# Patient Record
Sex: FEMALE | Race: WHITE | Employment: OTHER | URBAN - METROPOLITAN AREA
[De-identification: names, ages, dates, MRNs, and addresses within clinical notes are randomized per-mention and may not be internally consistent; named-entity substitution may affect disease eponyms.]

---

## 2018-09-14 ENCOUNTER — HOSPITAL ENCOUNTER (EMERGENCY)
Age: 78
Discharge: HOME | End: 2018-09-14
Attending: EMERGENCY MEDICINE
Payer: MEDICARE

## 2018-09-14 ENCOUNTER — APPOINTMENT (OUTPATIENT)
Dept: CT IMAGING | Age: 78
End: 2018-09-14
Payer: MEDICARE

## 2018-09-14 VITALS
SYSTOLIC BLOOD PRESSURE: 207 MMHG | HEIGHT: 59 IN | BODY MASS INDEX: 31.85 KG/M2 | TEMPERATURE: 98.1 F | HEART RATE: 75 BPM | DIASTOLIC BLOOD PRESSURE: 82 MMHG | OXYGEN SATURATION: 99 % | WEIGHT: 158 LBS | RESPIRATION RATE: 14 BRPM
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
## 2018-09-14 DIAGNOSIS — I10 ESSENTIAL HYPERTENSION: ICD-10-CM

## 2018-09-14 DIAGNOSIS — S09.90XA INJURY OF HEAD, INITIAL ENCOUNTER: Primary | ICD-10-CM

## 2018-09-14 PROCEDURE — 6370000000 HC RX 637 (ALT 250 FOR IP): Performed by: PHYSICIAN ASSISTANT

## 2018-09-14 PROCEDURE — 99284 EMERGENCY DEPT VISIT MOD MDM: CPT

## 2018-09-14 PROCEDURE — 6370000000 HC RX 637 (ALT 250 FOR IP): Performed by: EMERGENCY MEDICINE

## 2018-09-14 PROCEDURE — 70450 CT HEAD/BRAIN W/O DYE: CPT

## 2018-09-14 PROCEDURE — 72125 CT NECK SPINE W/O DYE: CPT

## 2018-09-14 RX ORDER — ACETAMINOPHEN 325 MG/1
650 TABLET ORAL ONCE
Status: COMPLETED | OUTPATIENT
Start: 2018-09-14 | End: 2018-09-14

## 2018-09-14 RX ORDER — LOSARTAN POTASSIUM 50 MG/1
50 TABLET ORAL ONCE
Status: COMPLETED | OUTPATIENT
Start: 2018-09-14 | End: 2018-09-14

## 2018-09-14 RX ORDER — CLONIDINE HYDROCHLORIDE 0.1 MG/1
0.2 TABLET ORAL ONCE
Status: COMPLETED | OUTPATIENT
Start: 2018-09-14 | End: 2018-09-14

## 2018-09-14 RX ORDER — HYDROCODONE BITARTRATE AND ACETAMINOPHEN 5; 325 MG/1; MG/1
1 TABLET ORAL EVERY 6 HOURS PRN
Qty: 12 TABLET | Refills: 0 | Status: SHIPPED | OUTPATIENT
Start: 2018-09-14 | End: 2018-09-17

## 2018-09-14 RX ADMIN — LOSARTAN POTASSIUM 50 MG: 50 TABLET, FILM COATED ORAL at 20:14

## 2018-09-14 RX ADMIN — CLONIDINE HYDROCHLORIDE 0.2 MG: 0.1 TABLET ORAL at 19:03

## 2018-09-14 RX ADMIN — ACETAMINOPHEN 650 MG: 325 TABLET ORAL at 19:02

## 2018-09-14 ASSESSMENT — PAIN DESCRIPTION - PAIN TYPE: TYPE: ACUTE PAIN

## 2018-09-14 ASSESSMENT — PAIN SCALES - GENERAL
PAINLEVEL_OUTOF10: 7
PAINLEVEL_OUTOF10: 8

## 2018-09-14 ASSESSMENT — PAIN DESCRIPTION - LOCATION: LOCATION: HEAD

## 2018-09-14 NOTE — ED PROVIDER NOTES
non tender, non distended, mild tenderness to palpation in bilateral lower paraspinous muscles of the back. No midline spine tenderness. Negative straight leg raise to 20° bilaterally. Extremities: Moves all extremities x 4. Warm and well perfused  Skin: warm and dry without rash  Neurologic: GCS 15, cranial nerves II through XII grossly intact. Finger to nose test when using the right arm is intact. Left arm is diminished strength due to previous injury. Patient is able to ambulate in the room with assistance. Psych: Normal Affect      ------------------------------ ED COURSE/MEDICAL DECISION MAKING----------------------  Medications   cloNIDine (CATAPRES) tablet 0.2 mg (0.2 mg Oral Given 9/14/18 1903)   acetaminophen (TYLENOL) tablet 650 mg (650 mg Oral Given 9/14/18 1902)   losartan (COZAAR) tablet 50 mg (50 mg Oral Given 9/14/18 2014)         ED COURSE:       Medical Decision Making:    Patient's blood pressure is 190/84 manually, right arm, lying. Patient is feeling better in terms of her headache, but is feeling more sore from the fall. She was discharged with a prescription for Norco. I did review the medications that the  is to be giving the wife, and they seem to be some confusion on which blood pressure medication she is to be taking. We did write down her list of medications that she is to be taking. Patient is to be returning to the emergency room she has any worsening symptoms, headache, dizziness, or any other acute change condition. Counseling: The emergency provider has spoken with the patient and  and discussed todays results, in addition to providing specific details for the plan of care and counseling regarding the diagnosis and prognosis. Questions are answered at this time and they are agreeable with the plan.      --------------------------------- IMPRESSION AND DISPOSITION ---------------------------------    IMPRESSION  1. Injury of head, initial encounter    2.  Fall, initial encounter    3. Essential hypertension        DISPOSITION  Disposition: Discharge to home  Patient condition is good      NOTE: This report was transcribed using voice recognition software.  Every effort was made to ensure accuracy; however, inadvertent computerized transcription errors may be present       Drea Coto  09/14/18 2017

## 2018-10-22 ENCOUNTER — HOSPITAL ENCOUNTER (OUTPATIENT)
Dept: PREADMISSION TESTING | Age: 78
Discharge: HOME | End: 2018-10-22
Payer: MEDICARE

## 2018-10-22 ENCOUNTER — ANESTHESIA EVENT (OUTPATIENT)
Dept: OPERATING ROOM | Age: 78
DRG: 469 | End: 2018-10-22
Payer: MEDICARE

## 2018-10-22 VITALS
HEART RATE: 72 BPM | OXYGEN SATURATION: 99 % | SYSTOLIC BLOOD PRESSURE: 160 MMHG | RESPIRATION RATE: 16 BRPM | HEIGHT: 59 IN | TEMPERATURE: 97.8 F | BODY MASS INDEX: 32.46 KG/M2 | DIASTOLIC BLOOD PRESSURE: 67 MMHG | WEIGHT: 161 LBS

## 2018-10-22 LAB
ABO/RH: NORMAL
ANION GAP SERPL CALCULATED.3IONS-SCNC: 17 MMOL/L (ref 7–16)
ANTIBODY SCREEN: NORMAL
BUN BLDV-MCNC: 57 MG/DL (ref 8–23)
CALCIUM SERPL-MCNC: 9.4 MG/DL (ref 8.6–10.2)
CHLORIDE BLD-SCNC: 90 MMOL/L (ref 98–107)
CO2: 29 MMOL/L (ref 22–29)
CREAT SERPL-MCNC: 8.4 MG/DL (ref 0.5–1)
GFR AFRICAN AMERICAN: 6
GFR NON-AFRICAN AMERICAN: 5 ML/MIN/1.73
GLUCOSE BLD-MCNC: 227 MG/DL (ref 74–109)
HCT VFR BLD CALC: 31 % (ref 34–48)
HEMOGLOBIN: 10.5 G/DL (ref 11.5–15.5)
MCH RBC QN AUTO: 33.1 PG (ref 26–35)
MCHC RBC AUTO-ENTMCNC: 33.9 % (ref 32–34.5)
MCV RBC AUTO: 97.8 FL (ref 80–99.9)
PDW BLD-RTO: 14.9 FL (ref 11.5–15)
PLATELET # BLD: 391 E9/L (ref 130–450)
PMV BLD AUTO: 10.3 FL (ref 7–12)
POTASSIUM SERPL-SCNC: 4 MMOL/L (ref 3.5–5)
RBC # BLD: 3.17 E12/L (ref 3.5–5.5)
SODIUM BLD-SCNC: 136 MMOL/L (ref 132–146)
WBC # BLD: 14.8 E9/L (ref 4.5–11.5)

## 2018-10-22 PROCEDURE — 80048 BASIC METABOLIC PNL TOTAL CA: CPT

## 2018-10-22 PROCEDURE — 87081 CULTURE SCREEN ONLY: CPT

## 2018-10-22 PROCEDURE — 86901 BLOOD TYPING SEROLOGIC RH(D): CPT

## 2018-10-22 PROCEDURE — 86900 BLOOD TYPING SEROLOGIC ABO: CPT

## 2018-10-22 PROCEDURE — 86850 RBC ANTIBODY SCREEN: CPT

## 2018-10-22 PROCEDURE — 85027 COMPLETE CBC AUTOMATED: CPT

## 2018-10-22 PROCEDURE — 36415 COLL VENOUS BLD VENIPUNCTURE: CPT

## 2018-10-22 RX ORDER — CALCITRIOL 0.25 UG/1
0.25 CAPSULE, LIQUID FILLED ORAL SEE ADMIN INSTRUCTIONS
Status: ON HOLD | COMMUNITY
End: 2019-06-12

## 2018-10-22 RX ORDER — RANITIDINE 300 MG/1
300 TABLET ORAL DAILY
Status: ON HOLD | COMMUNITY
End: 2019-06-12

## 2018-10-22 RX ORDER — DULOXETIN HYDROCHLORIDE 30 MG/1
30 CAPSULE, DELAYED RELEASE ORAL DAILY
COMMUNITY

## 2018-10-22 RX ORDER — ALLOPURINOL 300 MG/1
150 TABLET ORAL DAILY
Status: ON HOLD | COMMUNITY
End: 2019-06-12

## 2018-10-22 RX ORDER — GLUCOSAMINE/D3/BOSWELLIA SERRA 1500MG-400
1 TABLET ORAL DAILY
Status: ON HOLD | COMMUNITY
End: 2019-06-12

## 2018-10-22 RX ORDER — HYDRALAZINE HYDROCHLORIDE 25 MG/1
25 TABLET, FILM COATED ORAL PRN
Status: ON HOLD | COMMUNITY
End: 2019-06-12

## 2018-10-22 RX ORDER — AMLODIPINE BESYLATE 2.5 MG/1
2.5 TABLET ORAL DAILY
Status: ON HOLD | COMMUNITY
End: 2019-06-12

## 2018-10-22 RX ORDER — ACETAMINOPHEN 160 MG
1 TABLET,DISINTEGRATING ORAL DAILY
Status: ON HOLD | COMMUNITY
End: 2019-06-12

## 2018-10-22 RX ORDER — METOPROLOL SUCCINATE 50 MG/1
50 TABLET, EXTENDED RELEASE ORAL DAILY
COMMUNITY
End: 2022-07-20

## 2018-10-22 RX ORDER — LOSARTAN POTASSIUM 100 MG/1
100 TABLET ORAL DAILY
COMMUNITY
End: 2022-07-20

## 2018-10-22 ASSESSMENT — PAIN DESCRIPTION - PROGRESSION
CLINICAL_PROGRESSION: GRADUALLY WORSENING
CLINICAL_PROGRESSION_2: NOT CHANGED

## 2018-10-22 ASSESSMENT — KOOS JR
STANDING UPRIGHT: 2
BENDING TO THE FLOOR TO PICK UP OBJECT: 3
HOW SEVERE IS YOUR KNEE STIFFNESS AFTER FIRST WAKING IN MORNING: 2
TWISING OR PIVOTING ON KNEE: 3
RISING FROM SITTING: 3
STRAIGHTENING KNEE FULLY: 3
GOING UP OR DOWN STAIRS: 3

## 2018-10-22 ASSESSMENT — PAIN DESCRIPTION - PAIN TYPE
TYPE_2: CHRONIC PAIN
TYPE: CHRONIC PAIN

## 2018-10-22 ASSESSMENT — PAIN DESCRIPTION - INTENSITY: RATING_2: 2

## 2018-10-22 ASSESSMENT — PAIN DESCRIPTION - ONSET
ONSET: ON-GOING
ONSET_2: GRADUAL

## 2018-10-22 ASSESSMENT — PAIN DESCRIPTION - ORIENTATION
ORIENTATION_2: LOWER
ORIENTATION: RIGHT

## 2018-10-22 ASSESSMENT — PAIN DESCRIPTION - DURATION: DURATION_2: INTERMITTENT

## 2018-10-22 ASSESSMENT — PAIN DESCRIPTION - LOCATION
LOCATION_2: BACK
LOCATION: KNEE

## 2018-10-22 ASSESSMENT — PAIN SCALES - GENERAL: PAINLEVEL_OUTOF10: 6

## 2018-10-22 ASSESSMENT — PAIN DESCRIPTION - FREQUENCY: FREQUENCY: CONTINUOUS

## 2018-10-22 ASSESSMENT — PAIN DESCRIPTION - DESCRIPTORS
DESCRIPTORS_2: THROBBING
DESCRIPTORS: ACHING;CONSTANT

## 2018-10-22 NOTE — PROGRESS NOTES
Cbc results of 10/22/2018 PAT visit faxed to Dr Jim Pedraza; Kaylin Harper at Dr Faisal Rand office notified of above.
Dr Ana Plaza notified of pt hx ESRD, peritoneal dialysis nightly, result of bmp 10/22/2018 of PAT visit, result of creatinine 10/01/2018 paper on chart. Order for nephrology clearance. message left for Blanca Clemons ext [phone] at Dr Neftaly Ames office re: side clarification and need nephrology clearance, cbc and bmp results of 10/22/2018 PAT visit faxed to Dr Jamie Flood.
products on the day of surgery    [] If not already done, you can expect a call from registration    [x] You can expect a call the business day prior to procedure to notify you if your arrival time changes    [x] If you receive a survey after surgery we would greatly appreciate your comments    [] Parent/guardian of a minor must accompany their child and remain on the premises  the entire time they are under our care     [] Pediatric patients may bring favorite toy, blanket or comfort item with them    [] A caregiver or family member must remain with the patient during their stay if they are mentally handicapped, have dementia, disoriented or unable to use a call light or would be a safety concern if left unattended    [x] Please notify surgeon if you develop any illness between now and time of surgery (cold, cough, sore throat, fever, nausea, vomiting) or any signs of infections  including skin, wounds, and dental.    [] Other instructions    EDUCATIONAL MATERIALS PROVIDED:    [x] PAT Preoperative Education Packet/Booklet     [x] Medication List    [] Fluoroscopy Information Pamphlet    [x] Transfusion bracelet applied with instructions    [] Joint replacement video reviewed    [x] Shower with soap, lather and rinse well, and use CHG wipes provided the evening before surgery as instructed

## 2018-10-22 NOTE — ANESTHESIA PRE PROCEDURE
by mouth 3 times daily       aspirin 81 MG tablet Take 81 mg by mouth daily Prescribed per Dr Slim Artis; states instructed to hold preop      insulin detemir (LEVEMIR) 100 UNIT/ML injection vial Inject 20 Units into the skin 2 times daily Take 10 (ten) units 10/30/2018 pm dose      isosorbide mononitrate (IMDUR) 30 MG CR tablet Take 30 mg by mouth daily Take am day of surgery 10/31      levothyroxine (SYNTHROID) 50 MCG tablet Take 50 mcg by mouth Daily      ezetimibe-simvastatin (VYTORIN) 10-20 MG per tablet Take 1 tablet by mouth every 72 hours          Allergies:     Allergies   Allergen Reactions    Sulfa Antibiotics Swelling     Swelling after being out in sun       Problem List:    Patient Active Problem List   Diagnosis Code    Peritoneal dialysis catheter infection ([facility] T85.71XA    Sepsis ([facility] [address] [facility] A41.9    SIRS (systemic inflammatory response syndrome) ([facility] [facility] R65.10    Type 2 diabetes mellitus with diabetic chronic kidney disease ([facility] [facility] E11.22       Past Medical History:        Diagnosis Date    Anemia     Arthritis     CAD (coronary artery disease)     Diabetes mellitus ([facility] [facility]     Gout     History of bleeding peptic ulcer approx     Hypertension     Peritoneal dialysis catheter in place [facility])     Peritoneal dialysis status ([facility] [address] [facility]     at night for 8 hours    Thyroid disease     Use of cane as ambulatory aid     Wears glasses        Past Surgical History:        Procedure Laterality Date    BACK SURGERY      CARDIOVASCULAR STRESS TEST      CARPAL TUNNEL RELEASE Bilateral     CATARACT REMOVAL WITH IMPLANT Bilateral      SECTION      CORONARY ARTERY BYPASS GRAFT  approx 2000    x 3; per Dr Mikael Sawant, COLON, DIAGNOSTIC      JOINT REPLACEMENT      left knee, right shoulder    UPPER GASTROINTESTINAL ENDOSCOPY         Social History:    Social History   Substance Use Topics    Smoking status: Never Smoker    Smokeless tobacco: Never Used    Alcohol use No

## 2018-10-23 ASSESSMENT — PROMIS GLOBAL HEALTH SCALE
WHO IS THE PERSON COMPLETING THE PROMIS V1.1 SURVEY?: 0
IN THE PAST 7 DAYS, HOW OFTEN HAVE YOU BEEN BOTHERED BY EMOTIONAL PROBLEMS, SUCH AS FEELING ANXIOUS, DEPRESSED, OR IRRITABLE [ON A SCALE FROM 1 (NEVER) TO 5 (ALWAYS)]?: 1
IN GENERAL, WOULD YOU SAY YOUR QUALITY OF LIFE IS...[ON A SCALE OF 1 (POOR) TO 5 (EXCELLENT)]: 2
TO WHAT EXTENT ARE YOU ABLE TO CARRY OUT YOUR EVERYDAY PHYSICAL ACTIVITIES SUCH AS WALKING, CLIMBING STAIRS, CARRYING GROCERIES, OR MOVING A CHAIR [ON A SCALE OF 1 (NOT AT ALL) TO 5 (COMPLETELY)]?: 1
SUM OF RESPONSES TO QUESTIONS 2, 4, 5, & 10: 11
IN GENERAL, PLEASE RATE HOW WELL YOU CARRY OUT YOUR USUAL SOCIAL ACTIVITIES (INCLUDES ACTIVITIES AT HOME, AT WORK, AND IN YOUR COMMUNITY, AND RESPONSIBILITIES AS A PARENT, CHILD, SPOUSE, EMPLOYEE, FRIEND, ETC) [ON A SCALE OF 1 (POOR) TO 5 (EXCELLENT)]?: 3
IN THE PAST 7 DAYS, HOW WOULD YOU RATE YOUR PAIN ON AVERAGE [ON A SCALE FROM 0 (NO PAIN) TO 10 (WORST IMAGINABLE PAIN)]?: 8
IN THE PAST 7 DAYS, HOW WOULD YOU RATE YOUR FATIGUE ON AVERAGE [ON A SCALE FROM 1 (NONE) TO 5 (VERY SEVERE)]?: 2
IN GENERAL, HOW WOULD YOU RATE YOUR SATISFACTION WITH YOUR SOCIAL ACTIVITIES AND RELATIONSHIPS [ON A SCALE OF 1 (POOR) TO 5 (EXCELLENT)]?: 4
SUM OF RESPONSES TO QUESTIONS 3, 6, 7, & 8: 13
IN GENERAL, HOW WOULD YOU RATE YOUR PHYSICAL HEALTH [ON A SCALE OF 1 (POOR) TO 5 (EXCELLENT)]?: 2
IN GENERAL, HOW WOULD YOU RATE YOUR MENTAL HEALTH, INCLUDING YOUR MOOD AND YOUR ABILITY TO THINK [ON A SCALE OF 1 (POOR) TO 5 (EXCELLENT)]?: 4
HOW IS THE PROMIS V1.1 BEING ADMINISTERED?: 2
IN GENERAL, WOULD YOU SAY YOUR HEALTH IS...[ON A SCALE OF 1 (POOR) TO 5 (EXCELLENT)]: 3

## 2018-10-23 ASSESSMENT — KOOS JR
TWISING OR PIVOTING ON KNEE: 2
STRAIGHTENING KNEE FULLY: 3
STANDING UPRIGHT: 4
HOW SEVERE IS YOUR KNEE STIFFNESS AFTER FIRST WAKING IN MORNING: 2
GOING UP OR DOWN STAIRS: 4
BENDING TO THE FLOOR TO PICK UP OBJECT: 4
RISING FROM SITTING: 3

## 2018-10-24 LAB — MRSA CULTURE ONLY: NORMAL

## 2018-10-31 ENCOUNTER — ANESTHESIA (OUTPATIENT)
Dept: OPERATING ROOM | Age: 78
DRG: 469 | End: 2018-10-31
Payer: MEDICARE

## 2018-10-31 ENCOUNTER — HOSPITAL ENCOUNTER (INPATIENT)
Age: 78
LOS: 4 days | Discharge: NURSING, REHAB OR LONG-TERM CARE | DRG: 469 | End: 2018-11-04
Attending: ORTHOPAEDIC SURGERY | Admitting: ORTHOPAEDIC SURGERY
Payer: MEDICARE

## 2018-10-31 ENCOUNTER — APPOINTMENT (OUTPATIENT)
Dept: GENERAL RADIOLOGY | Age: 78
DRG: 469 | End: 2018-10-31
Attending: ORTHOPAEDIC SURGERY
Payer: MEDICARE

## 2018-10-31 VITALS — OXYGEN SATURATION: 98 % | SYSTOLIC BLOOD PRESSURE: 137 MMHG | DIASTOLIC BLOOD PRESSURE: 63 MMHG | TEMPERATURE: 98.4 F

## 2018-10-31 DIAGNOSIS — M17.11 PRIMARY OSTEOARTHRITIS OF RIGHT KNEE: Primary | ICD-10-CM

## 2018-10-31 PROBLEM — M17.12 PRIMARY LOCALIZED OSTEOARTHROSIS OF THE KNEE, LEFT: Status: ACTIVE | Noted: 2018-10-31

## 2018-10-31 LAB
METER GLUCOSE: 158 MG/DL (ref 70–110)
METER GLUCOSE: 252 MG/DL (ref 70–110)
POTASSIUM SERPL-SCNC: 3.4 MMOL/L (ref 3.5–5)

## 2018-10-31 PROCEDURE — 82962 GLUCOSE BLOOD TEST: CPT

## 2018-10-31 PROCEDURE — 2500000003 HC RX 250 WO HCPCS: Performed by: ORTHOPAEDIC SURGERY

## 2018-10-31 PROCEDURE — 7100000001 HC PACU RECOVERY - ADDTL 15 MIN: Performed by: ORTHOPAEDIC SURGERY

## 2018-10-31 PROCEDURE — 84132 ASSAY OF SERUM POTASSIUM: CPT

## 2018-10-31 PROCEDURE — 90945 DIALYSIS ONE EVALUATION: CPT | Performed by: INTERNAL MEDICINE

## 2018-10-31 PROCEDURE — 76942 ECHO GUIDE FOR BIOPSY: CPT | Performed by: ANESTHESIOLOGY

## 2018-10-31 PROCEDURE — 3600000015 HC SURGERY LEVEL 5 ADDTL 15MIN: Performed by: ORTHOPAEDIC SURGERY

## 2018-10-31 PROCEDURE — 3700000000 HC ANESTHESIA ATTENDED CARE: Performed by: ORTHOPAEDIC SURGERY

## 2018-10-31 PROCEDURE — 1200000000 HC SEMI PRIVATE

## 2018-10-31 PROCEDURE — 6360000002 HC RX W HCPCS: Performed by: ANESTHESIOLOGY

## 2018-10-31 PROCEDURE — 2580000003 HC RX 258: Performed by: INTERNAL MEDICINE

## 2018-10-31 PROCEDURE — 2500000003 HC RX 250 WO HCPCS: Performed by: NURSE ANESTHETIST, CERTIFIED REGISTERED

## 2018-10-31 PROCEDURE — 88311 DECALCIFY TISSUE: CPT

## 2018-10-31 PROCEDURE — 0SRC0J9 REPLACEMENT OF RIGHT KNEE JOINT WITH SYNTHETIC SUBSTITUTE, CEMENTED, OPEN APPROACH: ICD-10-PCS | Performed by: ORTHOPAEDIC SURGERY

## 2018-10-31 PROCEDURE — 6360000002 HC RX W HCPCS: Performed by: ORTHOPAEDIC SURGERY

## 2018-10-31 PROCEDURE — 6370000000 HC RX 637 (ALT 250 FOR IP): Performed by: ANESTHESIOLOGY

## 2018-10-31 PROCEDURE — 2580000003 HC RX 258: Performed by: ORTHOPAEDIC SURGERY

## 2018-10-31 PROCEDURE — 2580000003 HC RX 258: Performed by: NURSE ANESTHETIST, CERTIFIED REGISTERED

## 2018-10-31 PROCEDURE — 6370000000 HC RX 637 (ALT 250 FOR IP): Performed by: ORTHOPAEDIC SURGERY

## 2018-10-31 PROCEDURE — 73560 X-RAY EXAM OF KNEE 1 OR 2: CPT

## 2018-10-31 PROCEDURE — 3700000001 HC ADD 15 MINUTES (ANESTHESIA): Performed by: ORTHOPAEDIC SURGERY

## 2018-10-31 PROCEDURE — 2709999900 HC NON-CHARGEABLE SUPPLY: Performed by: ORTHOPAEDIC SURGERY

## 2018-10-31 PROCEDURE — 36415 COLL VENOUS BLD VENIPUNCTURE: CPT

## 2018-10-31 PROCEDURE — 88305 TISSUE EXAM BY PATHOLOGIST: CPT

## 2018-10-31 PROCEDURE — C1713 ANCHOR/SCREW BN/BN,TIS/BN: HCPCS | Performed by: ORTHOPAEDIC SURGERY

## 2018-10-31 PROCEDURE — 6360000002 HC RX W HCPCS: Performed by: NURSE ANESTHETIST, CERTIFIED REGISTERED

## 2018-10-31 PROCEDURE — 3600000005 HC SURGERY LEVEL 5 BASE: Performed by: ORTHOPAEDIC SURGERY

## 2018-10-31 PROCEDURE — 2720000010 HC SURG SUPPLY STERILE: Performed by: ORTHOPAEDIC SURGERY

## 2018-10-31 PROCEDURE — 7100000000 HC PACU RECOVERY - FIRST 15 MIN: Performed by: ORTHOPAEDIC SURGERY

## 2018-10-31 PROCEDURE — C1776 JOINT DEVICE (IMPLANTABLE): HCPCS | Performed by: ORTHOPAEDIC SURGERY

## 2018-10-31 DEVICE — BASEPLATE TIB SZ 2 KNEE TRITANIUM CEM PRI LO PROF TRIATHLON: Type: IMPLANTABLE DEVICE | Site: KNEE | Status: FUNCTIONAL

## 2018-10-31 DEVICE — CEMENT BNE 40 GM RADIOPAQUE BA SIMPLEX P: Type: IMPLANTABLE DEVICE | Site: KNEE | Status: FUNCTIONAL

## 2018-10-31 DEVICE — COMPONENT FEM SZ 3 R KNEE POST STBL CEM TRIATHLON: Type: IMPLANTABLE DEVICE | Site: KNEE | Status: FUNCTIONAL

## 2018-10-31 DEVICE — IMPLANTABLE DEVICE: Type: IMPLANTABLE DEVICE | Site: KNEE | Status: FUNCTIONAL

## 2018-10-31 RX ORDER — FENTANYL CITRATE 50 UG/ML
25 INJECTION, SOLUTION INTRAMUSCULAR; INTRAVENOUS PRN
Status: DISCONTINUED | OUTPATIENT
Start: 2018-10-31 | End: 2018-10-31 | Stop reason: HOSPADM

## 2018-10-31 RX ORDER — ONDANSETRON 2 MG/ML
4 INJECTION INTRAMUSCULAR; INTRAVENOUS
Status: DISCONTINUED | OUTPATIENT
Start: 2018-10-31 | End: 2018-10-31 | Stop reason: HOSPADM

## 2018-10-31 RX ORDER — ONDANSETRON 2 MG/ML
INJECTION INTRAMUSCULAR; INTRAVENOUS PRN
Status: DISCONTINUED | OUTPATIENT
Start: 2018-10-31 | End: 2018-10-31 | Stop reason: SDUPTHER

## 2018-10-31 RX ORDER — PROPOFOL 10 MG/ML
INJECTION, EMULSION INTRAVENOUS PRN
Status: DISCONTINUED | OUTPATIENT
Start: 2018-10-31 | End: 2018-10-31 | Stop reason: SDUPTHER

## 2018-10-31 RX ORDER — LOSARTAN POTASSIUM 50 MG/1
100 TABLET ORAL DAILY
Status: DISCONTINUED | OUTPATIENT
Start: 2018-10-31 | End: 2018-11-04 | Stop reason: HOSPADM

## 2018-10-31 RX ORDER — CALCITRIOL 0.25 UG/1
0.25 CAPSULE, LIQUID FILLED ORAL
Status: DISCONTINUED | OUTPATIENT
Start: 2018-11-01 | End: 2018-11-04 | Stop reason: HOSPADM

## 2018-10-31 RX ORDER — MIDAZOLAM HYDROCHLORIDE 1 MG/ML
1 INJECTION INTRAMUSCULAR; INTRAVENOUS EVERY 5 MIN PRN
Status: DISCONTINUED | OUTPATIENT
Start: 2018-10-31 | End: 2018-10-31 | Stop reason: HOSPADM

## 2018-10-31 RX ORDER — CHOLECALCIFEROL (VITAMIN D3) 50 MCG
2000 TABLET ORAL DAILY
Status: DISCONTINUED | OUTPATIENT
Start: 2018-10-31 | End: 2018-11-04 | Stop reason: HOSPADM

## 2018-10-31 RX ORDER — METOPROLOL SUCCINATE 50 MG/1
50 TABLET, EXTENDED RELEASE ORAL DAILY
Status: DISCONTINUED | OUTPATIENT
Start: 2018-10-31 | End: 2018-11-04 | Stop reason: HOSPADM

## 2018-10-31 RX ORDER — ONDANSETRON 2 MG/ML
4 INJECTION INTRAMUSCULAR; INTRAVENOUS EVERY 6 HOURS PRN
Status: DISCONTINUED | OUTPATIENT
Start: 2018-10-31 | End: 2018-11-04 | Stop reason: HOSPADM

## 2018-10-31 RX ORDER — LIDOCAINE HYDROCHLORIDE 10 MG/ML
5 INJECTION, SOLUTION EPIDURAL; INFILTRATION; INTRACAUDAL; PERINEURAL ONCE
Status: DISCONTINUED | OUTPATIENT
Start: 2018-10-31 | End: 2018-10-31 | Stop reason: HOSPADM

## 2018-10-31 RX ORDER — HYDROCODONE BITARTRATE AND ACETAMINOPHEN 5; 325 MG/1; MG/1
0.5 TABLET ORAL EVERY 6 HOURS PRN
Status: DISCONTINUED | OUTPATIENT
Start: 2018-10-31 | End: 2018-11-04 | Stop reason: HOSPADM

## 2018-10-31 RX ORDER — LIDOCAINE HYDROCHLORIDE 20 MG/ML
INJECTION, SOLUTION INFILTRATION; PERINEURAL PRN
Status: DISCONTINUED | OUTPATIENT
Start: 2018-10-31 | End: 2018-10-31 | Stop reason: SDUPTHER

## 2018-10-31 RX ORDER — RANITIDINE 150 MG/1
300 TABLET ORAL DAILY
Status: DISCONTINUED | OUTPATIENT
Start: 2018-10-31 | End: 2018-10-31 | Stop reason: CLARIF

## 2018-10-31 RX ORDER — DOCUSATE SODIUM 100 MG/1
100 CAPSULE, LIQUID FILLED ORAL DAILY
Status: DISCONTINUED | OUTPATIENT
Start: 2018-10-31 | End: 2018-11-04 | Stop reason: HOSPADM

## 2018-10-31 RX ORDER — SEVELAMER CARBONATE 800 MG/1
1600 TABLET, FILM COATED ORAL 3 TIMES DAILY
Status: DISCONTINUED | OUTPATIENT
Start: 2018-10-31 | End: 2018-11-04 | Stop reason: HOSPADM

## 2018-10-31 RX ORDER — PROMETHAZINE HYDROCHLORIDE 25 MG/ML
12.5 INJECTION, SOLUTION INTRAMUSCULAR; INTRAVENOUS
Status: DISCONTINUED | OUTPATIENT
Start: 2018-10-31 | End: 2018-10-31 | Stop reason: HOSPADM

## 2018-10-31 RX ORDER — SODIUM CHLORIDE 9 MG/ML
INJECTION, SOLUTION INTRAVENOUS CONTINUOUS
Status: DISCONTINUED | OUTPATIENT
Start: 2018-10-31 | End: 2018-11-04 | Stop reason: HOSPADM

## 2018-10-31 RX ORDER — EPHEDRINE SULFATE/0.9% NACL/PF 50 MG/5 ML
SYRINGE (ML) INTRAVENOUS PRN
Status: DISCONTINUED | OUTPATIENT
Start: 2018-10-31 | End: 2018-10-31 | Stop reason: SDUPTHER

## 2018-10-31 RX ORDER — ROPIVACAINE HYDROCHLORIDE 5 MG/ML
30 INJECTION, SOLUTION EPIDURAL; INFILTRATION; PERINEURAL ONCE
Status: COMPLETED | OUTPATIENT
Start: 2018-10-31 | End: 2018-10-31

## 2018-10-31 RX ORDER — INSULIN GLARGINE 100 [IU]/ML
20 INJECTION, SOLUTION SUBCUTANEOUS 2 TIMES DAILY
Status: DISCONTINUED | OUTPATIENT
Start: 2018-10-31 | End: 2018-11-04 | Stop reason: HOSPADM

## 2018-10-31 RX ORDER — SODIUM CHLORIDE 9 MG/ML
INJECTION, SOLUTION INTRAVENOUS CONTINUOUS PRN
Status: DISCONTINUED | OUTPATIENT
Start: 2018-10-31 | End: 2018-10-31 | Stop reason: SDUPTHER

## 2018-10-31 RX ORDER — SODIUM CHLORIDE, SODIUM LACTATE, POTASSIUM CHLORIDE, CALCIUM CHLORIDE 600; 310; 30; 20 MG/100ML; MG/100ML; MG/100ML; MG/100ML
INJECTION, SOLUTION INTRAVENOUS CONTINUOUS
Status: DISCONTINUED | OUTPATIENT
Start: 2018-10-31 | End: 2018-10-31 | Stop reason: ALTCHOICE

## 2018-10-31 RX ORDER — CEFAZOLIN SODIUM 2 G/50ML
2 SOLUTION INTRAVENOUS
Status: COMPLETED | OUTPATIENT
Start: 2018-10-31 | End: 2018-10-31

## 2018-10-31 RX ORDER — ACETAMINOPHEN 500 MG
1000 TABLET ORAL ONCE
Status: COMPLETED | OUTPATIENT
Start: 2018-10-31 | End: 2018-10-31

## 2018-10-31 RX ORDER — ROCURONIUM BROMIDE 10 MG/ML
INJECTION, SOLUTION INTRAVENOUS PRN
Status: DISCONTINUED | OUTPATIENT
Start: 2018-10-31 | End: 2018-10-31 | Stop reason: SDUPTHER

## 2018-10-31 RX ORDER — GLUCOSAMINE/D3/BOSWELLIA SERRA 1500MG-400
1 TABLET ORAL DAILY
Status: DISCONTINUED | OUTPATIENT
Start: 2018-10-31 | End: 2018-10-31 | Stop reason: CLARIF

## 2018-10-31 RX ORDER — MORPHINE SULFATE 2 MG/ML
2 INJECTION, SOLUTION INTRAMUSCULAR; INTRAVENOUS
Status: DISCONTINUED | OUTPATIENT
Start: 2018-10-31 | End: 2018-11-04 | Stop reason: HOSPADM

## 2018-10-31 RX ORDER — OXYCODONE HCL 10 MG/1
10 TABLET, FILM COATED, EXTENDED RELEASE ORAL ONCE
Status: COMPLETED | OUTPATIENT
Start: 2018-10-31 | End: 2018-10-31

## 2018-10-31 RX ORDER — ASPIRIN 81 MG/1
81 TABLET, CHEWABLE ORAL DAILY
Status: DISCONTINUED | OUTPATIENT
Start: 2018-11-01 | End: 2018-11-04 | Stop reason: HOSPADM

## 2018-10-31 RX ORDER — NEOSTIGMINE METHYLSULFATE 1 MG/ML
INJECTION, SOLUTION INTRAVENOUS PRN
Status: DISCONTINUED | OUTPATIENT
Start: 2018-10-31 | End: 2018-10-31 | Stop reason: SDUPTHER

## 2018-10-31 RX ORDER — LABETALOL HYDROCHLORIDE 5 MG/ML
10 INJECTION, SOLUTION INTRAVENOUS EVERY 10 MIN PRN
Status: DISCONTINUED | OUTPATIENT
Start: 2018-10-31 | End: 2018-10-31 | Stop reason: HOSPADM

## 2018-10-31 RX ORDER — 0.9 % SODIUM CHLORIDE 0.9 %
500 INTRAVENOUS SOLUTION INTRAVENOUS ONCE
Status: COMPLETED | OUTPATIENT
Start: 2018-10-31 | End: 2018-10-31

## 2018-10-31 RX ORDER — ALLOPURINOL 300 MG/1
150 TABLET ORAL DAILY
Status: DISCONTINUED | OUTPATIENT
Start: 2018-10-31 | End: 2018-11-04 | Stop reason: HOSPADM

## 2018-10-31 RX ORDER — AMLODIPINE BESYLATE 2.5 MG/1
2.5 TABLET ORAL DAILY
Status: DISCONTINUED | OUTPATIENT
Start: 2018-10-31 | End: 2018-11-04 | Stop reason: HOSPADM

## 2018-10-31 RX ORDER — LEVOTHYROXINE SODIUM 0.05 MG/1
50 TABLET ORAL DAILY
Status: DISCONTINUED | OUTPATIENT
Start: 2018-10-31 | End: 2018-11-04 | Stop reason: HOSPADM

## 2018-10-31 RX ORDER — BUPIVACAINE HYDROCHLORIDE AND EPINEPHRINE 2.5; 5 MG/ML; UG/ML
INJECTION, SOLUTION EPIDURAL; INFILTRATION; INTRACAUDAL; PERINEURAL PRN
Status: DISCONTINUED | OUTPATIENT
Start: 2018-10-31 | End: 2018-10-31 | Stop reason: HOSPADM

## 2018-10-31 RX ORDER — FAMOTIDINE 20 MG/1
40 TABLET, FILM COATED ORAL DAILY
Status: DISCONTINUED | OUTPATIENT
Start: 2018-10-31 | End: 2018-11-04 | Stop reason: HOSPADM

## 2018-10-31 RX ORDER — GLYCOPYRROLATE 1 MG/5 ML
SYRINGE (ML) INTRAVENOUS PRN
Status: DISCONTINUED | OUTPATIENT
Start: 2018-10-31 | End: 2018-10-31 | Stop reason: SDUPTHER

## 2018-10-31 RX ORDER — DULOXETIN HYDROCHLORIDE 20 MG/1
20 CAPSULE, DELAYED RELEASE ORAL DAILY
Status: DISCONTINUED | OUTPATIENT
Start: 2018-10-31 | End: 2018-11-04 | Stop reason: HOSPADM

## 2018-10-31 RX ORDER — EZETIMIBE AND SIMVASTATIN 10; 20 MG/1; MG/1
1 TABLET ORAL
Status: DISCONTINUED | OUTPATIENT
Start: 2018-10-31 | End: 2018-11-04 | Stop reason: HOSPADM

## 2018-10-31 RX ORDER — ISOSORBIDE MONONITRATE 30 MG/1
30 TABLET, EXTENDED RELEASE ORAL DAILY
Status: DISCONTINUED | OUTPATIENT
Start: 2018-10-31 | End: 2018-11-04 | Stop reason: HOSPADM

## 2018-10-31 RX ORDER — FENTANYL CITRATE 50 UG/ML
INJECTION, SOLUTION INTRAMUSCULAR; INTRAVENOUS PRN
Status: DISCONTINUED | OUTPATIENT
Start: 2018-10-31 | End: 2018-10-31 | Stop reason: SDUPTHER

## 2018-10-31 RX ORDER — MORPHINE SULFATE 2 MG/ML
1 INJECTION, SOLUTION INTRAMUSCULAR; INTRAVENOUS
Status: DISCONTINUED | OUTPATIENT
Start: 2018-10-31 | End: 2018-11-04 | Stop reason: HOSPADM

## 2018-10-31 RX ADMIN — Medication 5 MG: at 14:00

## 2018-10-31 RX ADMIN — LIDOCAINE HYDROCHLORIDE 60 MG: 20 INJECTION, SOLUTION INFILTRATION; PERINEURAL at 12:46

## 2018-10-31 RX ADMIN — ROPIVACAINE HYDROCHLORIDE 30 ML: 5 INJECTION, SOLUTION EPIDURAL; INFILTRATION; PERINEURAL at 12:11

## 2018-10-31 RX ADMIN — PHENYLEPHRINE HYDROCHLORIDE 100 MCG: 10 INJECTION INTRAVENOUS at 14:55

## 2018-10-31 RX ADMIN — SODIUM CHLORIDE: 9 INJECTION, SOLUTION INTRAVENOUS at 17:52

## 2018-10-31 RX ADMIN — FENTANYL CITRATE 50 MCG: 50 INJECTION, SOLUTION INTRAMUSCULAR; INTRAVENOUS at 12:46

## 2018-10-31 RX ADMIN — CEFAZOLIN 500 MG: 500 INJECTION, POWDER, FOR SOLUTION INTRAMUSCULAR; INTRAVENOUS at 22:17

## 2018-10-31 RX ADMIN — CHOLECALCIFEROL TAB 50 MCG (2000 UNIT) 2000 UNITS: 50 TAB at 17:53

## 2018-10-31 RX ADMIN — PHENYLEPHRINE HYDROCHLORIDE 100 MCG: 10 INJECTION INTRAVENOUS at 12:50

## 2018-10-31 RX ADMIN — DOCUSATE SODIUM 100 MG: 100 CAPSULE, LIQUID FILLED ORAL at 17:53

## 2018-10-31 RX ADMIN — Medication 0.6 MG: at 15:16

## 2018-10-31 RX ADMIN — ONDANSETRON HYDROCHLORIDE 4 MG: 2 INJECTION, SOLUTION INTRAMUSCULAR; INTRAVENOUS at 15:00

## 2018-10-31 RX ADMIN — SODIUM CHLORIDE: 9 INJECTION, SOLUTION INTRAVENOUS at 12:35

## 2018-10-31 RX ADMIN — Medication 3 MG: at 15:16

## 2018-10-31 RX ADMIN — MIDAZOLAM HYDROCHLORIDE 1 MG: 1 INJECTION, SOLUTION INTRAMUSCULAR; INTRAVENOUS at 12:08

## 2018-10-31 RX ADMIN — PHENYLEPHRINE HYDROCHLORIDE 50 MCG: 10 INJECTION INTRAVENOUS at 14:53

## 2018-10-31 RX ADMIN — HYDROMORPHONE HYDROCHLORIDE 0.5 MG: 1 INJECTION, SOLUTION INTRAMUSCULAR; INTRAVENOUS; SUBCUTANEOUS at 15:54

## 2018-10-31 RX ADMIN — SODIUM CHLORIDE 500 ML: 9 INJECTION, SOLUTION INTRAVENOUS at 20:00

## 2018-10-31 RX ADMIN — FENTANYL CITRATE 50 MCG: 50 INJECTION, SOLUTION INTRAMUSCULAR; INTRAVENOUS at 13:45

## 2018-10-31 RX ADMIN — HYDROCODONE BITARTRATE AND ACETAMINOPHEN 0.5 TABLET: 5; 325 TABLET ORAL at 21:08

## 2018-10-31 RX ADMIN — Medication 5 MG: at 14:12

## 2018-10-31 RX ADMIN — ACETAMINOPHEN 1000 MG: 500 TABLET ORAL at 10:50

## 2018-10-31 RX ADMIN — PHENYLEPHRINE HYDROCHLORIDE 100 MCG: 10 INJECTION INTRAVENOUS at 14:43

## 2018-10-31 RX ADMIN — CEFAZOLIN SODIUM 2 G: 2 SOLUTION INTRAVENOUS at 12:35

## 2018-10-31 RX ADMIN — PHENYLEPHRINE HYDROCHLORIDE 100 MCG: 10 INJECTION INTRAVENOUS at 13:36

## 2018-10-31 RX ADMIN — ALLOPURINOL 150 MG: 300 TABLET ORAL at 17:53

## 2018-10-31 RX ADMIN — OXYCODONE HYDROCHLORIDE 10 MG: 10 TABLET, FILM COATED, EXTENDED RELEASE ORAL at 10:51

## 2018-10-31 RX ADMIN — ROCURONIUM BROMIDE 35 MG: 10 SOLUTION INTRAVENOUS at 12:46

## 2018-10-31 RX ADMIN — FAMOTIDINE 40 MG: 20 TABLET, FILM COATED ORAL at 17:52

## 2018-10-31 RX ADMIN — Medication 5 MG: at 14:30

## 2018-10-31 RX ADMIN — Medication 5 MG: at 13:53

## 2018-10-31 RX ADMIN — PROPOFOL 100 MG: 10 INJECTION, EMULSION INTRAVENOUS at 12:46

## 2018-10-31 RX ADMIN — ROCURONIUM BROMIDE 10 MG: 10 SOLUTION INTRAVENOUS at 14:16

## 2018-10-31 RX ADMIN — INSULIN GLARGINE 20 UNITS: 100 INJECTION, SOLUTION SUBCUTANEOUS at 22:17

## 2018-10-31 RX ADMIN — Medication 5 MG: at 13:57

## 2018-10-31 RX ADMIN — FENTANYL CITRATE 50 MCG: 50 INJECTION, SOLUTION INTRAMUSCULAR; INTRAVENOUS at 12:08

## 2018-10-31 RX ADMIN — SEVELAMER CARBONATE 1600 MG: 800 TABLET, FILM COATED ORAL at 17:53

## 2018-10-31 ASSESSMENT — PULMONARY FUNCTION TESTS
PIF_VALUE: 1
PIF_VALUE: 17
PIF_VALUE: 21
PIF_VALUE: 19
PIF_VALUE: 10
PIF_VALUE: 0
PIF_VALUE: 18
PIF_VALUE: 21
PIF_VALUE: 20
PIF_VALUE: 17
PIF_VALUE: 25
PIF_VALUE: 18
PIF_VALUE: 25
PIF_VALUE: 0
PIF_VALUE: 0
PIF_VALUE: 25
PIF_VALUE: 25
PIF_VALUE: 27
PIF_VALUE: 19
PIF_VALUE: 20
PIF_VALUE: 25
PIF_VALUE: 19
PIF_VALUE: 8
PIF_VALUE: 10
PIF_VALUE: 20
PIF_VALUE: 19
PIF_VALUE: 18
PIF_VALUE: 21
PIF_VALUE: 20
PIF_VALUE: 17
PIF_VALUE: 18
PIF_VALUE: 25
PIF_VALUE: 21
PIF_VALUE: 17
PIF_VALUE: 17
PIF_VALUE: 20
PIF_VALUE: 20
PIF_VALUE: 21
PIF_VALUE: 19
PIF_VALUE: 18
PIF_VALUE: 10
PIF_VALUE: 10
PIF_VALUE: 19
PIF_VALUE: 6
PIF_VALUE: 18
PIF_VALUE: 20
PIF_VALUE: 20
PIF_VALUE: 21
PIF_VALUE: 3
PIF_VALUE: 22
PIF_VALUE: 0
PIF_VALUE: 20
PIF_VALUE: 11
PIF_VALUE: 21
PIF_VALUE: 20
PIF_VALUE: 20
PIF_VALUE: 10
PIF_VALUE: 20
PIF_VALUE: 20
PIF_VALUE: 24
PIF_VALUE: 20
PIF_VALUE: 10
PIF_VALUE: 20
PIF_VALUE: 11
PIF_VALUE: 18
PIF_VALUE: 19
PIF_VALUE: 18
PIF_VALUE: 20
PIF_VALUE: 16
PIF_VALUE: 18
PIF_VALUE: 19
PIF_VALUE: 10
PIF_VALUE: 10
PIF_VALUE: 20
PIF_VALUE: 17
PIF_VALUE: 17
PIF_VALUE: 0
PIF_VALUE: 4
PIF_VALUE: 18
PIF_VALUE: 21
PIF_VALUE: 19
PIF_VALUE: 17
PIF_VALUE: 16
PIF_VALUE: 25
PIF_VALUE: 20
PIF_VALUE: 10
PIF_VALUE: 21
PIF_VALUE: 17
PIF_VALUE: 0
PIF_VALUE: 19
PIF_VALUE: 21
PIF_VALUE: 20
PIF_VALUE: 21
PIF_VALUE: 23
PIF_VALUE: 20
PIF_VALUE: 20
PIF_VALUE: 21
PIF_VALUE: 0
PIF_VALUE: 18
PIF_VALUE: 19
PIF_VALUE: 17
PIF_VALUE: 19
PIF_VALUE: 17
PIF_VALUE: 20
PIF_VALUE: 22
PIF_VALUE: 10
PIF_VALUE: 19
PIF_VALUE: 21
PIF_VALUE: 20
PIF_VALUE: 20
PIF_VALUE: 18
PIF_VALUE: 20
PIF_VALUE: 18
PIF_VALUE: 18
PIF_VALUE: 21
PIF_VALUE: 19
PIF_VALUE: 17
PIF_VALUE: 18
PIF_VALUE: 20
PIF_VALUE: 21
PIF_VALUE: 18
PIF_VALUE: 17
PIF_VALUE: 19
PIF_VALUE: 17
PIF_VALUE: 18
PIF_VALUE: 18
PIF_VALUE: 20
PIF_VALUE: 16
PIF_VALUE: 10
PIF_VALUE: 14
PIF_VALUE: 20
PIF_VALUE: 21
PIF_VALUE: 18
PIF_VALUE: 21
PIF_VALUE: 20
PIF_VALUE: 0
PIF_VALUE: 10
PIF_VALUE: 19
PIF_VALUE: 10
PIF_VALUE: 24
PIF_VALUE: 17
PIF_VALUE: 18
PIF_VALUE: 10
PIF_VALUE: 0
PIF_VALUE: 21
PIF_VALUE: 19
PIF_VALUE: 17
PIF_VALUE: 20
PIF_VALUE: 10
PIF_VALUE: 22
PIF_VALUE: 10
PIF_VALUE: 20
PIF_VALUE: 0
PIF_VALUE: 17
PIF_VALUE: 10
PIF_VALUE: 21
PIF_VALUE: 20
PIF_VALUE: 21
PIF_VALUE: 18
PIF_VALUE: 11
PIF_VALUE: 26
PIF_VALUE: 20
PIF_VALUE: 22
PIF_VALUE: 20
PIF_VALUE: 21
PIF_VALUE: 21
PIF_VALUE: 26
PIF_VALUE: 20
PIF_VALUE: 17
PIF_VALUE: 20
PIF_VALUE: 20
PIF_VALUE: 19
PIF_VALUE: 19
PIF_VALUE: 20
PIF_VALUE: 19
PIF_VALUE: 20
PIF_VALUE: 10
PIF_VALUE: 17
PIF_VALUE: 0

## 2018-10-31 ASSESSMENT — PAIN SCALES - GENERAL
PAINLEVEL_OUTOF10: 5
PAINLEVEL_OUTOF10: 7
PAINLEVEL_OUTOF10: 3
PAINLEVEL_OUTOF10: 0
PAINLEVEL_OUTOF10: 5
PAINLEVEL_OUTOF10: 5
PAINLEVEL_OUTOF10: 7

## 2018-10-31 ASSESSMENT — PAIN DESCRIPTION - DESCRIPTORS
DESCRIPTORS: DISCOMFORT
DESCRIPTORS: DISCOMFORT

## 2018-10-31 ASSESSMENT — PAIN DESCRIPTION - ORIENTATION: ORIENTATION: RIGHT

## 2018-10-31 ASSESSMENT — PAIN DESCRIPTION - LOCATION: LOCATION: KNEE

## 2018-10-31 ASSESSMENT — PAIN - FUNCTIONAL ASSESSMENT: PAIN_FUNCTIONAL_ASSESSMENT: 0-10

## 2018-10-31 NOTE — PROGRESS NOTES
Rosa M Valerio was ordered Vytorin 10/20 which is a nonformulary medication. The patient has indicated that the home supply of this medication will be brought in to the hospital for inpatient use. If the medication has not been administered by 1400 on the following day from the time the order was placed, a pharmacist will follow-up with the nurse of the patient to assess the capability of the patient to bring in the medication. If it is determined that the patient cannot supply the medication and it is not available to be dispensed from the pharmacy, a call will be placed to the ordering provider to discuss alternative options.

## 2018-10-31 NOTE — ANESTHESIA PROCEDURE NOTES
Peripheral Block    Patient location during procedure: holding area  Start time: 10/31/2018 12:05 PM  End time: 10/31/2018 12:15 PM  Staffing  Anesthesiologist: Mario Mae  Performed: anesthesiologist   Preanesthetic Checklist  Completed: patient identified, site marked, surgical consent, pre-op evaluation, timeout performed, IV checked, risks and benefits discussed, monitors and equipment checked, anesthesia consent given, oxygen available and patient being monitored  Peripheral Block  Patient position: supine  Prep: ChloraPrep  Patient monitoring: cardiac monitor, continuous pulse ox, continuous capnometry, frequent blood pressure checks and IV access  Block type: Femoral  Laterality: right  Injection technique: single-shot  Procedures: ultrasound guided  Local infiltration: ropivacaine  Infiltration strength: 0.5 %  Dose: 30 mL  Adductor canal  Provider prep: mask and sterile gloves  Local infiltration: ropivacaine  Needle  Needle type: combined needle/nerve stimulator   Needle gauge: 20 G  Needle length: 8 cm  Needle localization: ultrasound guidance  Assessment  Injection assessment: negative aspiration for heme, no paresthesia on injection and local visualized surrounding nerve on ultrasound  Paresthesia pain: none  Slow fractionated injection: yes  Hemodynamics: stable  Additional Notes  Patient tolerated procedure well. VSS.   Versed 1 mg and fentanyl 50 mcg IV given for sedation for block  Reason for block: post-op pain management and at surgeon's request

## 2018-11-01 PROBLEM — Z98.890 HISTORY OF PERITONEAL DIALYSIS: Status: ACTIVE | Noted: 2018-11-01

## 2018-11-01 PROBLEM — N18.6 END STAGE RENAL DISEASE (HCC): Status: ACTIVE | Noted: 2018-11-01

## 2018-11-01 LAB
ABO/RH: NORMAL
ANION GAP SERPL CALCULATED.3IONS-SCNC: 22 MMOL/L (ref 7–16)
ANTIBODY SCREEN: NORMAL
BUN BLDV-MCNC: 53 MG/DL (ref 8–23)
CALCIUM SERPL-MCNC: 8.2 MG/DL (ref 8.6–10.2)
CHLORIDE BLD-SCNC: 93 MMOL/L (ref 98–107)
CO2: 20 MMOL/L (ref 22–29)
CREAT SERPL-MCNC: 7.9 MG/DL (ref 0.5–1)
FERRITIN: 699 NG/ML
GFR AFRICAN AMERICAN: 6
GFR NON-AFRICAN AMERICAN: 5 ML/MIN/1.73
GLUCOSE BLD-MCNC: 284 MG/DL (ref 74–109)
HCT VFR BLD CALC: 21.2 % (ref 34–48)
HEMOGLOBIN: 7.2 G/DL (ref 11.5–15.5)
IRON SATURATION: 15 % (ref 15–50)
IRON: 28 MCG/DL (ref 37–145)
MAGNESIUM: 1.6 MG/DL (ref 1.6–2.6)
MCH RBC QN AUTO: 33 PG (ref 26–35)
MCHC RBC AUTO-ENTMCNC: 34 % (ref 32–34.5)
MCV RBC AUTO: 97.2 FL (ref 80–99.9)
METER GLUCOSE: 155 MG/DL (ref 70–110)
METER GLUCOSE: 196 MG/DL (ref 70–110)
METER GLUCOSE: 206 MG/DL (ref 70–110)
METER GLUCOSE: 319 MG/DL (ref 70–110)
PDW BLD-RTO: 14.9 FL (ref 11.5–15)
PHOSPHORUS: 4.7 MG/DL (ref 2.5–4.5)
PLATELET # BLD: 303 E9/L (ref 130–450)
PMV BLD AUTO: 10.6 FL (ref 7–12)
POTASSIUM SERPL-SCNC: 3.5 MMOL/L (ref 3.5–5)
RBC # BLD: 2.18 E12/L (ref 3.5–5.5)
SODIUM BLD-SCNC: 135 MMOL/L (ref 132–146)
TOTAL IRON BINDING CAPACITY: 193 MCG/DL (ref 250–450)
WBC # BLD: 17.3 E9/L (ref 4.5–11.5)

## 2018-11-01 PROCEDURE — 36415 COLL VENOUS BLD VENIPUNCTURE: CPT

## 2018-11-01 PROCEDURE — 82962 GLUCOSE BLOOD TEST: CPT

## 2018-11-01 PROCEDURE — 6370000000 HC RX 637 (ALT 250 FOR IP): Performed by: INTERNAL MEDICINE

## 2018-11-01 PROCEDURE — 85027 COMPLETE CBC AUTOMATED: CPT

## 2018-11-01 PROCEDURE — 97110 THERAPEUTIC EXERCISES: CPT

## 2018-11-01 PROCEDURE — 84100 ASSAY OF PHOSPHORUS: CPT

## 2018-11-01 PROCEDURE — G8988 SELF CARE GOAL STATUS: HCPCS

## 2018-11-01 PROCEDURE — 97165 OT EVAL LOW COMPLEX 30 MIN: CPT

## 2018-11-01 PROCEDURE — 97530 THERAPEUTIC ACTIVITIES: CPT

## 2018-11-01 PROCEDURE — 83550 IRON BINDING TEST: CPT

## 2018-11-01 PROCEDURE — 80048 BASIC METABOLIC PNL TOTAL CA: CPT

## 2018-11-01 PROCEDURE — 83540 ASSAY OF IRON: CPT

## 2018-11-01 PROCEDURE — 86850 RBC ANTIBODY SCREEN: CPT

## 2018-11-01 PROCEDURE — 1200000000 HC SEMI PRIVATE

## 2018-11-01 PROCEDURE — G8987 SELF CARE CURRENT STATUS: HCPCS

## 2018-11-01 PROCEDURE — 6360000002 HC RX W HCPCS: Performed by: ORTHOPAEDIC SURGERY

## 2018-11-01 PROCEDURE — 86900 BLOOD TYPING SEROLOGIC ABO: CPT

## 2018-11-01 PROCEDURE — 86901 BLOOD TYPING SEROLOGIC RH(D): CPT

## 2018-11-01 PROCEDURE — 2580000003 HC RX 258: Performed by: ORTHOPAEDIC SURGERY

## 2018-11-01 PROCEDURE — P9016 RBC LEUKOCYTES REDUCED: HCPCS

## 2018-11-01 PROCEDURE — 86923 COMPATIBILITY TEST ELECTRIC: CPT

## 2018-11-01 PROCEDURE — 80074 ACUTE HEPATITIS PANEL: CPT

## 2018-11-01 PROCEDURE — 36430 TRANSFUSION BLD/BLD COMPNT: CPT

## 2018-11-01 PROCEDURE — 82728 ASSAY OF FERRITIN: CPT

## 2018-11-01 PROCEDURE — 97161 PT EVAL LOW COMPLEX 20 MIN: CPT

## 2018-11-01 PROCEDURE — 83735 ASSAY OF MAGNESIUM: CPT

## 2018-11-01 PROCEDURE — 6370000000 HC RX 637 (ALT 250 FOR IP): Performed by: ORTHOPAEDIC SURGERY

## 2018-11-01 PROCEDURE — 90945 DIALYSIS ONE EVALUATION: CPT | Performed by: INTERNAL MEDICINE

## 2018-11-01 RX ORDER — FAMOTIDINE 20 MG/1
20 TABLET, FILM COATED ORAL DAILY
Status: DISCONTINUED | OUTPATIENT
Start: 2018-11-01 | End: 2018-11-04 | Stop reason: HOSPADM

## 2018-11-01 RX ORDER — 0.9 % SODIUM CHLORIDE 0.9 %
250 INTRAVENOUS SOLUTION INTRAVENOUS ONCE
Status: DISCONTINUED | OUTPATIENT
Start: 2018-11-01 | End: 2018-11-04 | Stop reason: HOSPADM

## 2018-11-01 RX ORDER — DEXTROSE MONOHYDRATE 50 MG/ML
100 INJECTION, SOLUTION INTRAVENOUS PRN
Status: DISCONTINUED | OUTPATIENT
Start: 2018-11-01 | End: 2018-11-04 | Stop reason: HOSPADM

## 2018-11-01 RX ORDER — NICOTINE POLACRILEX 4 MG
15 LOZENGE BUCCAL PRN
Status: DISCONTINUED | OUTPATIENT
Start: 2018-11-01 | End: 2018-11-04 | Stop reason: HOSPADM

## 2018-11-01 RX ORDER — DEXTROSE MONOHYDRATE 25 G/50ML
12.5 INJECTION, SOLUTION INTRAVENOUS PRN
Status: DISCONTINUED | OUTPATIENT
Start: 2018-11-01 | End: 2018-11-04 | Stop reason: HOSPADM

## 2018-11-01 RX ORDER — HYDROCODONE BITARTRATE AND ACETAMINOPHEN 5; 325 MG/1; MG/1
.5-1 TABLET ORAL EVERY 6 HOURS PRN
Qty: 28 TABLET | Refills: 0 | Status: SHIPPED | OUTPATIENT
Start: 2018-11-01 | End: 2018-11-08

## 2018-11-01 RX ORDER — POTASSIUM CHLORIDE 20 MEQ/1
20 TABLET, EXTENDED RELEASE ORAL ONCE
Status: COMPLETED | OUTPATIENT
Start: 2018-11-01 | End: 2018-11-01

## 2018-11-01 RX ADMIN — LEVOTHYROXINE SODIUM 50 MCG: 50 TABLET ORAL at 06:37

## 2018-11-01 RX ADMIN — HYDROCODONE BITARTRATE AND ACETAMINOPHEN 0.5 TABLET: 5; 325 TABLET ORAL at 04:14

## 2018-11-01 RX ADMIN — MORPHINE SULFATE 2 MG: 2 INJECTION, SOLUTION INTRAMUSCULAR; INTRAVENOUS at 05:09

## 2018-11-01 RX ADMIN — FAMOTIDINE 40 MG: 20 TABLET, FILM COATED ORAL at 08:03

## 2018-11-01 RX ADMIN — INSULIN LISPRO 2 UNITS: 100 INJECTION, SOLUTION INTRAVENOUS; SUBCUTANEOUS at 12:18

## 2018-11-01 RX ADMIN — CALCITRIOL 0.25 MCG: 0.25 CAPSULE ORAL at 08:03

## 2018-11-01 RX ADMIN — INSULIN LISPRO 2 UNITS: 100 INJECTION, SOLUTION INTRAVENOUS; SUBCUTANEOUS at 21:07

## 2018-11-01 RX ADMIN — AMLODIPINE BESYLATE 2.5 MG: 2.5 TABLET ORAL at 08:03

## 2018-11-01 RX ADMIN — MORPHINE SULFATE 2 MG: 2 INJECTION, SOLUTION INTRAMUSCULAR; INTRAVENOUS at 08:10

## 2018-11-01 RX ADMIN — CEFAZOLIN 500 MG: 500 INJECTION, POWDER, FOR SOLUTION INTRAMUSCULAR; INTRAVENOUS at 21:49

## 2018-11-01 RX ADMIN — DULOXETINE HYDROCHLORIDE 20 MG: 20 CAPSULE, DELAYED RELEASE ORAL at 08:03

## 2018-11-01 RX ADMIN — POTASSIUM CHLORIDE 20 MEQ: 20 TABLET, EXTENDED RELEASE ORAL at 10:42

## 2018-11-01 RX ADMIN — METOPROLOL SUCCINATE 50 MG: 50 TABLET, EXTENDED RELEASE ORAL at 08:03

## 2018-11-01 RX ADMIN — ASPIRIN 81 MG CHEWABLE TABLET 81 MG: 81 TABLET CHEWABLE at 08:03

## 2018-11-01 RX ADMIN — INSULIN GLARGINE 20 UNITS: 100 INJECTION, SOLUTION SUBCUTANEOUS at 21:08

## 2018-11-01 RX ADMIN — CEFAZOLIN 500 MG: 500 INJECTION, POWDER, FOR SOLUTION INTRAMUSCULAR; INTRAVENOUS at 08:02

## 2018-11-01 RX ADMIN — LOSARTAN POTASSIUM 100 MG: 50 TABLET, FILM COATED ORAL at 08:03

## 2018-11-01 RX ADMIN — DOCUSATE SODIUM 100 MG: 100 CAPSULE, LIQUID FILLED ORAL at 08:03

## 2018-11-01 RX ADMIN — ISOSORBIDE MONONITRATE 30 MG: 30 TABLET, EXTENDED RELEASE ORAL at 08:03

## 2018-11-01 RX ADMIN — INSULIN LISPRO 8 UNITS: 100 INJECTION, SOLUTION INTRAVENOUS; SUBCUTANEOUS at 06:34

## 2018-11-01 RX ADMIN — CHOLECALCIFEROL TAB 50 MCG (2000 UNIT) 2000 UNITS: 50 TAB at 08:03

## 2018-11-01 RX ADMIN — SEVELAMER CARBONATE 1600 MG: 800 TABLET, FILM COATED ORAL at 17:26

## 2018-11-01 RX ADMIN — INSULIN GLARGINE 20 UNITS: 100 INJECTION, SOLUTION SUBCUTANEOUS at 08:10

## 2018-11-01 RX ADMIN — SEVELAMER CARBONATE 1600 MG: 800 TABLET, FILM COATED ORAL at 08:03

## 2018-11-01 RX ADMIN — HYDROCODONE BITARTRATE AND ACETAMINOPHEN 0.5 TABLET: 5; 325 TABLET ORAL at 10:41

## 2018-11-01 RX ADMIN — ALLOPURINOL 150 MG: 300 TABLET ORAL at 08:03

## 2018-11-01 RX ADMIN — HYDROCODONE BITARTRATE AND ACETAMINOPHEN 0.5 TABLET: 5; 325 TABLET ORAL at 21:14

## 2018-11-01 RX ADMIN — FAMOTIDINE 20 MG: 20 TABLET ORAL at 08:03

## 2018-11-01 ASSESSMENT — PAIN DESCRIPTION - ORIENTATION: ORIENTATION: RIGHT

## 2018-11-01 ASSESSMENT — PAIN SCALES - GENERAL
PAINLEVEL_OUTOF10: 7
PAINLEVEL_OUTOF10: 0
PAINLEVEL_OUTOF10: 3
PAINLEVEL_OUTOF10: 0
PAINLEVEL_OUTOF10: 3
PAINLEVEL_OUTOF10: 3
PAINLEVEL_OUTOF10: 10

## 2018-11-01 ASSESSMENT — PAIN DESCRIPTION - DESCRIPTORS: DESCRIPTORS: DISCOMFORT

## 2018-11-01 ASSESSMENT — PAIN DESCRIPTION - PAIN TYPE: TYPE: CHRONIC PAIN

## 2018-11-01 ASSESSMENT — PAIN DESCRIPTION - LOCATION: LOCATION: KNEE

## 2018-11-01 NOTE — PROGRESS NOTES
Stair negotiation: ascended and descended NT  NT  4  steps with  1 rail with  Min assist    LE ROM  AAROM R knee 15-65   AAROM R knee 0-90 degrees    LE strength  2-/ 5 R knee   3-/ 5 R knee    AM- PAC RAW score  9/ 24 11/24      Balance: poor dynamic using WW for support    Pt performed therapeutic exercise of the following: B ankle pumps AAROM; R LE quad sets AROM; R LE LAQ's/seated knee flexion AAROM x 20    Patient education  Pt was educated on exercise for circulation, ROM and strengthening, R UE usage with transfers, gait promoting sequence, posture, B UE WB through [facility] during L LE advancement    Patient response to education:   Pt verbalized understanding Pt demonstrated skill Pt requires further education in this area   yes With repeated instruction yes     Additional Comments: Migdalia with gait very slow, guarded and laboring, step to pattern used, Pt fatigued after activity. Pt unable to use L UE to push from/reach for seated surface due to pain and poor ROM. Pt was left in bed with call light in reach. Chair/bed alarm: bed alarm active    Treatment time: 33 minutes  Time out: 1346    Pt is making good progress toward established Physical Therapy goals as per ability to gait this session. Continue with physical therapy current plan of care.     Yue Perez PTA   License Number: [id number]

## 2018-11-01 NOTE — PROGRESS NOTES
Physical Therapy    Facility/Department: [facility] SURGERY  Initial Assessment    NAME: Mariela Pineda  : 1940  MRN: [id number]    Date of Service: 2018      Patient Diagnosis(es): The encounter diagnosis was Primary osteoarthritis of right knee. has a past medical history of Anemia; Arthritis; CAD (coronary artery disease); Diabetes mellitus ([facility] Gout; History of bleeding peptic ulcer; Hypertension; Peritoneal dialysis catheter in place [facility]); Peritoneal dialysis status ([facility] [facility] Thyroid disease; Use of cane as ambulatory aid; and Wears glasses. has a past surgical history that includes  section; Carpal tunnel release (Bilateral); Coronary artery bypass graft (approx ); back surgery; Upper gastrointestinal endoscopy; cardiovascular stress test; Cataract removal with implant (Bilateral); Endoscopy, colon, diagnostic; joint replacement; and pr total knee arthroplasty (Right, 10/31/2018). Evaluating Therapist: Louis Vance PT         Room #:  640   DIAGNOSIS:  OA s/p R  TKA  PRECAUTIONS:  Falls, WBAT     Social:  Pt lives with spouse  in a 1  floor plan  3  steps and  1 rails to enter. Prior to admission pt walked with  ww      Initial Evaluation  Date: 18 Treatment      Short Term/ Long Term   Goals   Was pt agreeable to Eval/treatment? yes      Does pt have pain?   R knee     Bed Mobility  Rolling:  NT   Supine to sit:  Max assist   Sit to supine:  NT   Scooting: max assist    min assist    Transfers Sit to stand: max assist   Stand to sit:  Max assist x 2   Stand pivot: max assist x 2    min assist    Ambulation     Unable to advance feet   100 feet with  ww  with  Min assist        Stair negotiation: ascended and descended NT    4  steps with  1 rail with  Min assist    LE ROM  AAROM R knee 15-65   AAROM R knee 0-90 degrees    LE strength  2-/ 5 R knee   3-/ 5 R knee    AM- PAC RAW score              Pt is alert and Oriented x  3      Balance:  Max assist Endurance: poor   Chair alarm: Yes      ASSESSMENT  Pt displays functional ability as noted in the objective portion of this evaluation. Comments/Treatment:  Great difficulty standing  . Unable to advance LEs with gait      Examination of body systems Decreased   Functional mobility x   ROM x   Strength x   Safety Awareness x   Cognition    Endurance x   Sensation    Balance x   Vision/Visual Deficits    Coordination        Patient education  Pt educated on  Falls , LE exercises , hand placement with transfers    Patient response to education:   Pt verbalized understanding Pt demonstrated skill Pt requires further education in this area   x  x     Rehab potential is Good for reaching above PT goals. Pts/ family goals   1. Decreased pain     Patient and or family understand(s) diagnosis, prognosis, and plan of care. -  Yes     PLAN  PT care will be provided in accordance with the objectives noted above. Whenever appropriate, clear delegation orders will be provided for nursing staff. Exercises and functional mobility practice will be used as well as appropriate assistive devices or modalities to obtain goals. Patient and family education will also be administered as needed. Frequency of treatments will be BID x 3 -5 days.     Time in:  3056   Time out:  0900     Sebas  number:  PT [id number]

## 2018-11-01 NOTE — OP NOTE
[address] [address]                                OPERATIVE REPORT    PATIENT NAME: Vijay Meza                  :        1940  MED REC NO:   [id number]                            ROOM:       2310  ACCOUNT NO:   [de-identified]                           ADMIT DATE: 10/31/2018  PROVIDER:     Bertha Hernández MD    DATE OF PROCEDURE:  10/31/2018    PREOPERATIVE DIAGNOSES:  1. Right knee osteoarthritis. 2.  Peritoneal dialysis. 3.  Insulin-dependent diabetes. POSTOPERATIVE DIAGNOSES:  1. Right knee osteoarthritis. 2.  Peritoneal dialysis. 3.  Insulin-dependent diabetes. PROCEDURE PERFORMED:  Right total knee arthroplasty. SURGEON:  Bertha Hernández MD    ANESTHESIA:  General and block. COMPLICATIONS:  None. TOURNIQUET TIME:  102 minutes. ESTIMATED BLOOD LOSS:  100 mL. HARDWARE:  [place] Triathlon #3 femur, #2 tibia, 13-mm  posterior stabilized poly. SPECIMENS:  None. FINDINGS:  Severely arthritic knee, very thin patella measuring at its  thinnest point 16 mm and at its thickest point 20 mm. Extensive  osteophytes on the condyles as well as patella. Torn medial and lateral  menisci. Intact ACL and PCL. Varus alignment with flexion contracture. DESCRIPTION OF PROCEDURE:  The patient was identified and brought to the  operating room. Tourniquet was placed around her proximal right leg. It was prepped and draped in a sterile fashion. The surgical site was  marked out and sealed with Michelle Epp. The leg was exsanguinated and  tourniquet inflated to 250 mmHg. A midline incision was made and there  was found to be bleeding. The wound was packed. The tourniquet was  deflated, and the leg was re-exsanguinated and tourniquet inflated to  350 mmHg. I then went back to the wound.   There continued to be  bleeding, both venous and arterial.  I irrigated, repacked the wound,  deflated

## 2018-11-02 LAB
ANION GAP SERPL CALCULATED.3IONS-SCNC: 18 MMOL/L (ref 7–16)
BUN BLDV-MCNC: 52 MG/DL (ref 8–23)
CALCIUM SERPL-MCNC: 8.2 MG/DL (ref 8.6–10.2)
CHLORIDE BLD-SCNC: 91 MMOL/L (ref 98–107)
CO2: 24 MMOL/L (ref 22–29)
CREAT SERPL-MCNC: 8 MG/DL (ref 0.5–1)
GFR AFRICAN AMERICAN: 6
GFR NON-AFRICAN AMERICAN: 5 ML/MIN/1.73
GLUCOSE BLD-MCNC: 180 MG/DL (ref 74–109)
HAV IGM SER IA-ACNC: NORMAL
HCT VFR BLD CALC: 22.8 % (ref 34–48)
HEMOGLOBIN: 7.8 G/DL (ref 11.5–15.5)
HEPATITIS B CORE IGM ANTIBODY: NORMAL
HEPATITIS B SURFACE ANTIGEN INTERPRETATION: NORMAL
HEPATITIS C ANTIBODY INTERPRETATION: NORMAL
MAGNESIUM: 1.5 MG/DL (ref 1.6–2.6)
MCH RBC QN AUTO: 33.6 PG (ref 26–35)
MCHC RBC AUTO-ENTMCNC: 34.2 % (ref 32–34.5)
MCV RBC AUTO: 98.3 FL (ref 80–99.9)
METER GLUCOSE: 102 MG/DL (ref 70–110)
METER GLUCOSE: 150 MG/DL (ref 70–110)
METER GLUCOSE: 199 MG/DL (ref 70–110)
METER GLUCOSE: 203 MG/DL (ref 70–110)
PDW BLD-RTO: 15.1 FL (ref 11.5–15)
PHOSPHORUS: 5.9 MG/DL (ref 2.5–4.5)
PLATELET # BLD: 271 E9/L (ref 130–450)
PMV BLD AUTO: 10.7 FL (ref 7–12)
POTASSIUM SERPL-SCNC: 3.2 MMOL/L (ref 3.5–5)
RBC # BLD: 2.32 E12/L (ref 3.5–5.5)
SODIUM BLD-SCNC: 133 MMOL/L (ref 132–146)
WBC # BLD: 17.5 E9/L (ref 4.5–11.5)

## 2018-11-02 PROCEDURE — 83735 ASSAY OF MAGNESIUM: CPT

## 2018-11-02 PROCEDURE — 82962 GLUCOSE BLOOD TEST: CPT

## 2018-11-02 PROCEDURE — 6370000000 HC RX 637 (ALT 250 FOR IP): Performed by: ORTHOPAEDIC SURGERY

## 2018-11-02 PROCEDURE — 6370000000 HC RX 637 (ALT 250 FOR IP): Performed by: INTERNAL MEDICINE

## 2018-11-02 PROCEDURE — 97530 THERAPEUTIC ACTIVITIES: CPT

## 2018-11-02 PROCEDURE — 85027 COMPLETE CBC AUTOMATED: CPT

## 2018-11-02 PROCEDURE — 84100 ASSAY OF PHOSPHORUS: CPT

## 2018-11-02 PROCEDURE — 97110 THERAPEUTIC EXERCISES: CPT

## 2018-11-02 PROCEDURE — 90945 DIALYSIS ONE EVALUATION: CPT

## 2018-11-02 PROCEDURE — 36415 COLL VENOUS BLD VENIPUNCTURE: CPT

## 2018-11-02 PROCEDURE — 80048 BASIC METABOLIC PNL TOTAL CA: CPT

## 2018-11-02 PROCEDURE — 1200000000 HC SEMI PRIVATE

## 2018-11-02 RX ORDER — POTASSIUM CHLORIDE 20 MEQ/1
20 TABLET, EXTENDED RELEASE ORAL 2 TIMES DAILY WITH MEALS
Status: COMPLETED | OUTPATIENT
Start: 2018-11-02 | End: 2018-11-02

## 2018-11-02 RX ADMIN — HYDROCODONE BITARTRATE AND ACETAMINOPHEN 0.5 TABLET: 5; 325 TABLET ORAL at 03:40

## 2018-11-02 RX ADMIN — POTASSIUM CHLORIDE 20 MEQ: 20 TABLET, EXTENDED RELEASE ORAL at 16:48

## 2018-11-02 RX ADMIN — INSULIN LISPRO 2 UNITS: 100 INJECTION, SOLUTION INTRAVENOUS; SUBCUTANEOUS at 06:33

## 2018-11-02 RX ADMIN — INSULIN GLARGINE 20 UNITS: 100 INJECTION, SOLUTION SUBCUTANEOUS at 08:44

## 2018-11-02 RX ADMIN — LEVOTHYROXINE SODIUM 50 MCG: 50 TABLET ORAL at 06:33

## 2018-11-02 RX ADMIN — ASPIRIN 81 MG CHEWABLE TABLET 81 MG: 81 TABLET CHEWABLE at 08:46

## 2018-11-02 RX ADMIN — HYDROCODONE BITARTRATE AND ACETAMINOPHEN 0.5 TABLET: 5; 325 TABLET ORAL at 16:48

## 2018-11-02 RX ADMIN — INSULIN LISPRO 2 UNITS: 100 INJECTION, SOLUTION INTRAVENOUS; SUBCUTANEOUS at 21:36

## 2018-11-02 RX ADMIN — ISOSORBIDE MONONITRATE 30 MG: 30 TABLET, EXTENDED RELEASE ORAL at 08:46

## 2018-11-02 RX ADMIN — INSULIN GLARGINE 20 UNITS: 100 INJECTION, SOLUTION SUBCUTANEOUS at 21:36

## 2018-11-02 RX ADMIN — ALLOPURINOL 150 MG: 300 TABLET ORAL at 08:46

## 2018-11-02 RX ADMIN — LOSARTAN POTASSIUM 100 MG: 50 TABLET, FILM COATED ORAL at 08:46

## 2018-11-02 RX ADMIN — CALCITRIOL 0.25 MCG: 0.25 CAPSULE ORAL at 08:45

## 2018-11-02 RX ADMIN — AMLODIPINE BESYLATE 2.5 MG: 2.5 TABLET ORAL at 08:46

## 2018-11-02 RX ADMIN — DOCUSATE SODIUM 100 MG: 100 CAPSULE, LIQUID FILLED ORAL at 08:45

## 2018-11-02 RX ADMIN — POTASSIUM CHLORIDE 20 MEQ: 20 TABLET, EXTENDED RELEASE ORAL at 08:50

## 2018-11-02 RX ADMIN — METOPROLOL SUCCINATE 50 MG: 50 TABLET, EXTENDED RELEASE ORAL at 08:46

## 2018-11-02 RX ADMIN — DULOXETINE HYDROCHLORIDE 20 MG: 20 CAPSULE, DELAYED RELEASE ORAL at 08:45

## 2018-11-02 RX ADMIN — SEVELAMER CARBONATE 1600 MG: 800 TABLET, FILM COATED ORAL at 08:46

## 2018-11-02 RX ADMIN — CHOLECALCIFEROL TAB 50 MCG (2000 UNIT) 2000 UNITS: 50 TAB at 08:45

## 2018-11-02 RX ADMIN — FAMOTIDINE 20 MG: 20 TABLET ORAL at 08:45

## 2018-11-02 RX ADMIN — INSULIN LISPRO 2 UNITS: 100 INJECTION, SOLUTION INTRAVENOUS; SUBCUTANEOUS at 11:22

## 2018-11-02 RX ADMIN — HYDROCODONE BITARTRATE AND ACETAMINOPHEN 0.5 TABLET: 5; 325 TABLET ORAL at 09:42

## 2018-11-02 ASSESSMENT — PAIN DESCRIPTION - FREQUENCY
FREQUENCY: CONTINUOUS
FREQUENCY: INTERMITTENT

## 2018-11-02 ASSESSMENT — PAIN DESCRIPTION - PROGRESSION
CLINICAL_PROGRESSION: NOT CHANGED
CLINICAL_PROGRESSION: NOT CHANGED
CLINICAL_PROGRESSION: GRADUALLY WORSENING
CLINICAL_PROGRESSION: GRADUALLY IMPROVING

## 2018-11-02 ASSESSMENT — PAIN DESCRIPTION - LOCATION
LOCATION: KNEE

## 2018-11-02 ASSESSMENT — PAIN SCALES - GENERAL
PAINLEVEL_OUTOF10: 0
PAINLEVEL_OUTOF10: 4
PAINLEVEL_OUTOF10: 0
PAINLEVEL_OUTOF10: 3
PAINLEVEL_OUTOF10: 2
PAINLEVEL_OUTOF10: 0
PAINLEVEL_OUTOF10: 3

## 2018-11-02 ASSESSMENT — PAIN DESCRIPTION - ONSET
ONSET: ON-GOING

## 2018-11-02 ASSESSMENT — PAIN DESCRIPTION - ORIENTATION
ORIENTATION: RIGHT

## 2018-11-02 ASSESSMENT — PAIN DESCRIPTION - PAIN TYPE
TYPE: SURGICAL PAIN

## 2018-11-02 ASSESSMENT — PAIN DESCRIPTION - DESCRIPTORS
DESCRIPTORS: ACHING

## 2018-11-02 NOTE — PROGRESS NOTES
ascended and descended NT  NT  4  steps with  1 rail with  Min assist    LE ROM  AAROM R knee 15-65   AAROM R knee 0-90 degrees    LE strength  2-/ 5 R knee   3-/ 5 R knee    AM- PAC RAW score  9/ 24 13/24      Balance: poor during gait using [facility] for support    Pt performed therapeutic exercise of the following: Supine B ankle pumps AROM; R LE quad sets AROM; R LE heelo slides AAROM x 20    Patient education  Pt was educated on exercise for circulation, ROM and strengthening, R UE usage with transfers, gait promoting sequence, step length and upright posture  Patient response to education:   Pt verbalized understanding Pt demonstrated skill Pt requires further education in this area   yes With repeated instruction yes     Additional Comments: Migdalia slow and guarded, step to pattern used, Pt deconditioned, fatigued after activity. Pt was left in a bedside chair call light in reach. Chair/bed alarm: chair alarm active    Treatment time: 29 minutes  Time out: 1420    Pt is showing good progress toward established Physical Therapy goals as per increased functional mobility performed this session. Continue with physical therapy current plan of care.     Kentrell Santiago PTA   License Number: [id number]

## 2018-11-02 NOTE — PROGRESS NOTES
[facility] MD Lisandra Gama MD Clearence Leach, MD Leotis Overly, MD Annie Hutch, ANP  Progress Note  11/2/2018    SUBJECTIVE:  We are following Mrs. Page for end-stage renal failure . 11/2: \"Shitty. \"  Ongoing fatigue, malaise, generalized weakness, anorexia, mild persistent nausea and poor intake food and fluid, ongoing right knee pain. Mobility is poor. Think she needs more rehab. Note: All symptoms are getting better. Sooner be discharged to [facility] for rehab.         PROBLEM LIST:    Patient Active Problem List   Diagnosis    Peritoneal dialysis catheter infection ([facility]    Sepsis ([facility] [facility]    SIRS (systemic inflammatory response syndrome) ([facility] [facility]    Type 2 diabetes mellitus with diabetic chronic kidney disease ([facility] [facility]    Osteoarthritis of right knee    Arthritis of knee, right    History of peritoneal dialysis    End stage renal disease ([facility] [facility]        PAST MEDICAL HISTORY:    Past Medical History:   Diagnosis Date    Anemia     Arthritis     CAD (coronary artery disease)     Diabetes mellitus ([facility] [facility]     Gout     History of bleeding peptic ulcer approx 2015    Hypertension     Peritoneal dialysis catheter in place [facility])     Peritoneal dialysis status ([facility] [address]     at night for 8 hours    Thyroid disease     Use of cane as ambulatory aid     Wears glasses        MEDS (scheduled):   insulin lispro  0-12 Units Subcutaneous TID WC    insulin lispro  0-6 Units Subcutaneous Nightly    famotidine  20 mg Oral Daily    sodium chloride  250 mL Intravenous Once    IV syringe builder   Other Once    allopurinol  150 mg Oral Daily    amLODIPine  2.5 mg Oral Daily    aspirin  81 mg Oral Daily    calcitRIOL  0.25 mcg Oral Once per day on Mon Tue Wed Thu Fri    vitamin D  2,000 Units Oral Daily    DULoxetine  20 mg Oral Daily    ezetimibe-simvastatin  1 tablet Oral Q72H    isosorbide mononitrate  30 mg Oral Daily    levothyroxine  50 mcg Oral Daily    losartan 100 mg Oral Daily    metoprolol succinate  50 mg Oral Daily    sevelamer  1,600 mg Oral TID    insulin glargine  20 Units Subcutaneous BID    docusate sodium  100 mg Oral Daily    famotidine  40 mg Oral Daily       MEDS (infusions):   dextrose      sodium chloride 50 mL/hr at 10/31/18 1752       MEDS (prn):  glucose, dextrose, glucagon (rDNA), dextrose, ondansetron, HYDROcodone 5 mg - acetaminophen, morphine, morphine    DIET:    DIET CARB CONTROL; Carb Control: 4 carbs/meal (approximate 1800 kcals/day)      PHYSICAL EXAM:      Patient Vitals for the past 24 hrs:   BP Temp Temp src Pulse Resp SpO2 Weight   11/02/18 1620 (!) 140/46 97.8 °F (36.6 °C) Oral 76 16 - -   11/02/18 1401 (!) 124/44 98.7 °F (37.1 °C) Oral 80 16 - -   11/02/18 0800 (!) 121/40 98.2 °F (36.8 °C) - 83 16 94 % -   11/02/18 0606 134/66 99 °F (37.2 °C) Oral 84 16 - -   11/02/18 0127 - - - - - - 173 lb 8 oz (78.7 kg)   11/02/18 0115 (!) 144/68 99 °F (37.2 °C) Oral 86 16 - -   11/01/18 2114 (!) 128/58 98.2 °F (36.8 °C) Oral 89 16 - -   11/01/18 2015 (!) 151/63 - - 91 16 - -          Intake/Output Summary (Last 24 hours) at 11/02/18 1938  Last data filed at 11/02/18 0730   Gross per 24 hour   Intake                0 ml   Output              325 ml   Net             -325 ml       Wt Readings from Last 3 Encounters:   11/02/18 173 lb 8 oz (78.7 kg)   10/22/18 161 lb (73 kg)   09/14/18 158 lb (71.7 kg)       General:  Patient in no acute distress  Head: normocephalic, atraumatic  Eyes: equally round and reactive  Cardiovascular: regular rate and rhythm, no murmurs, gallops, or rubs  Respiratory:  Clear, no rales, rhochi, or wheezes  Gastrointestinal: soft, nontender, nondistended  Ext: no cyanosis, clubbing, or edema  Neuro: awake, alert, oriented x3  Skin: dry, no rash      DATA:      Recent Labs      11/01/18   0653  11/02/18   0533   WBC  17.3*  17.5*   HGB  7.2*  7.8*   HCT  21.2*  22.8*   MCV  97.2  98.3   PLT  303  271     Recent Labs

## 2018-11-02 NOTE — CONSULTS
[facility] MD Shauna Inteirano MD Lorenza Contras, MD Sherl Getting, MD Troy Amor, ANP    Consultation  11/1/2018    Thank you for consult  Seen this early afternoon  Full note dictated, to follow  Briefly, 66 y.o. y.o.  Woman w/ ESRD on CCPD, OA adm for elective R TKA, s/p surgery 10/31    A/R:  1) ESRD  2) anemia d/t ESRD + acute blood loss (hgb 10.5 --> 7.2)  3) Sec HPTH d/t ESRD  4) HTN  5) anorexia assoc'd w/ both pain and the pain meds    --> cont CCPD for solute and vol clearance as per o/p orders and dry wt  --> cont IV NS at conservative rate until po intake incr's  --> consider, tx prbc's, definetly if hgb drops  --> follow labs, BP    Nayely Davis MD

## 2018-11-02 NOTE — FLOWSHEET NOTE
CCPD complete. Pt disconnected from cycler using aseptic technique. 325mL NET UF. Effluent pale yellow/ scant fibrin present.

## 2018-11-03 LAB
ANION GAP SERPL CALCULATED.3IONS-SCNC: 18 MMOL/L (ref 7–16)
BLOOD BANK DISPENSE STATUS: NORMAL
BLOOD BANK DISPENSE STATUS: NORMAL
BLOOD BANK PRODUCT CODE: NORMAL
BLOOD BANK PRODUCT CODE: NORMAL
BPU ID: NORMAL
BPU ID: NORMAL
BUN BLDV-MCNC: 51 MG/DL (ref 8–23)
CALCIUM SERPL-MCNC: 8.1 MG/DL (ref 8.6–10.2)
CHLORIDE BLD-SCNC: 89 MMOL/L (ref 98–107)
CO2: 24 MMOL/L (ref 22–29)
CREAT SERPL-MCNC: 8.3 MG/DL (ref 0.5–1)
DESCRIPTION BLOOD BANK: NORMAL
DESCRIPTION BLOOD BANK: NORMAL
GFR AFRICAN AMERICAN: 6
GFR NON-AFRICAN AMERICAN: 5 ML/MIN/1.73
GLUCOSE BLD-MCNC: 183 MG/DL (ref 74–109)
HCT VFR BLD CALC: 21.2 % (ref 34–48)
HEMOGLOBIN: 7.2 G/DL (ref 11.5–15.5)
MCH RBC QN AUTO: 33.6 PG (ref 26–35)
MCHC RBC AUTO-ENTMCNC: 34 % (ref 32–34.5)
MCV RBC AUTO: 99.1 FL (ref 80–99.9)
METER GLUCOSE: 150 MG/DL (ref 70–110)
METER GLUCOSE: 153 MG/DL (ref 70–110)
METER GLUCOSE: 157 MG/DL (ref 70–110)
METER GLUCOSE: 220 MG/DL (ref 70–110)
METER GLUCOSE: 86 MG/DL (ref 70–110)
PDW BLD-RTO: 14.6 FL (ref 11.5–15)
PHOSPHORUS: 6.2 MG/DL (ref 2.5–4.5)
PLATELET # BLD: 273 E9/L (ref 130–450)
PMV BLD AUTO: 10.7 FL (ref 7–12)
POTASSIUM SERPL-SCNC: 4 MMOL/L (ref 3.5–5)
RBC # BLD: 2.14 E12/L (ref 3.5–5.5)
SODIUM BLD-SCNC: 131 MMOL/L (ref 132–146)
WBC # BLD: 16.4 E9/L (ref 4.5–11.5)

## 2018-11-03 PROCEDURE — 6370000000 HC RX 637 (ALT 250 FOR IP): Performed by: ORTHOPAEDIC SURGERY

## 2018-11-03 PROCEDURE — 84100 ASSAY OF PHOSPHORUS: CPT

## 2018-11-03 PROCEDURE — 80048 BASIC METABOLIC PNL TOTAL CA: CPT

## 2018-11-03 PROCEDURE — 36430 TRANSFUSION BLD/BLD COMPNT: CPT

## 2018-11-03 PROCEDURE — 97530 THERAPEUTIC ACTIVITIES: CPT | Performed by: PHYSICAL THERAPIST

## 2018-11-03 PROCEDURE — 6370000000 HC RX 637 (ALT 250 FOR IP): Performed by: INTERNAL MEDICINE

## 2018-11-03 PROCEDURE — 86923 COMPATIBILITY TEST ELECTRIC: CPT

## 2018-11-03 PROCEDURE — P9016 RBC LEUKOCYTES REDUCED: HCPCS

## 2018-11-03 PROCEDURE — 90945 DIALYSIS ONE EVALUATION: CPT

## 2018-11-03 PROCEDURE — 82962 GLUCOSE BLOOD TEST: CPT

## 2018-11-03 PROCEDURE — 2580000003 HC RX 258: Performed by: ORTHOPAEDIC SURGERY

## 2018-11-03 PROCEDURE — 36415 COLL VENOUS BLD VENIPUNCTURE: CPT

## 2018-11-03 PROCEDURE — 85027 COMPLETE CBC AUTOMATED: CPT

## 2018-11-03 PROCEDURE — 1200000000 HC SEMI PRIVATE

## 2018-11-03 RX ORDER — 0.9 % SODIUM CHLORIDE 0.9 %
250 INTRAVENOUS SOLUTION INTRAVENOUS ONCE
Status: COMPLETED | OUTPATIENT
Start: 2018-11-03 | End: 2018-11-04

## 2018-11-03 RX ADMIN — ALLOPURINOL 150 MG: 300 TABLET ORAL at 10:03

## 2018-11-03 RX ADMIN — DOCUSATE SODIUM 100 MG: 100 CAPSULE, LIQUID FILLED ORAL at 10:02

## 2018-11-03 RX ADMIN — CHOLECALCIFEROL TAB 50 MCG (2000 UNIT) 2000 UNITS: 50 TAB at 10:02

## 2018-11-03 RX ADMIN — SODIUM CHLORIDE 250 ML: 9 INJECTION, SOLUTION INTRAVENOUS at 14:12

## 2018-11-03 RX ADMIN — SEVELAMER CARBONATE 1600 MG: 800 TABLET, FILM COATED ORAL at 14:12

## 2018-11-03 RX ADMIN — DULOXETINE HYDROCHLORIDE 20 MG: 20 CAPSULE, DELAYED RELEASE ORAL at 10:04

## 2018-11-03 RX ADMIN — INSULIN LISPRO 2 UNITS: 100 INJECTION, SOLUTION INTRAVENOUS; SUBCUTANEOUS at 11:18

## 2018-11-03 RX ADMIN — INSULIN GLARGINE 20 UNITS: 100 INJECTION, SOLUTION SUBCUTANEOUS at 10:05

## 2018-11-03 RX ADMIN — SEVELAMER CARBONATE 1600 MG: 800 TABLET, FILM COATED ORAL at 10:01

## 2018-11-03 RX ADMIN — METOPROLOL SUCCINATE 50 MG: 50 TABLET, EXTENDED RELEASE ORAL at 10:03

## 2018-11-03 RX ADMIN — INSULIN LISPRO 4 UNITS: 100 INJECTION, SOLUTION INTRAVENOUS; SUBCUTANEOUS at 06:33

## 2018-11-03 RX ADMIN — LOSARTAN POTASSIUM 100 MG: 50 TABLET, FILM COATED ORAL at 10:02

## 2018-11-03 RX ADMIN — AMLODIPINE BESYLATE 2.5 MG: 2.5 TABLET ORAL at 10:02

## 2018-11-03 RX ADMIN — LEVOTHYROXINE SODIUM 50 MCG: 50 TABLET ORAL at 06:33

## 2018-11-03 RX ADMIN — ASPIRIN 81 MG CHEWABLE TABLET 81 MG: 81 TABLET CHEWABLE at 10:02

## 2018-11-03 RX ADMIN — ISOSORBIDE MONONITRATE 30 MG: 30 TABLET, EXTENDED RELEASE ORAL at 10:04

## 2018-11-03 RX ADMIN — INSULIN GLARGINE 20 UNITS: 100 INJECTION, SOLUTION SUBCUTANEOUS at 20:24

## 2018-11-03 RX ADMIN — FAMOTIDINE 20 MG: 20 TABLET ORAL at 10:04

## 2018-11-03 RX ADMIN — INSULIN LISPRO 1 UNITS: 100 INJECTION, SOLUTION INTRAVENOUS; SUBCUTANEOUS at 20:24

## 2018-11-03 RX ADMIN — HYDROCODONE BITARTRATE AND ACETAMINOPHEN 0.5 TABLET: 5; 325 TABLET ORAL at 06:40

## 2018-11-03 ASSESSMENT — PAIN SCALES - GENERAL
PAINLEVEL_OUTOF10: 0
PAINLEVEL_OUTOF10: 3
PAINLEVEL_OUTOF10: 5
PAINLEVEL_OUTOF10: 0

## 2018-11-03 ASSESSMENT — PAIN DESCRIPTION - DESCRIPTORS: DESCRIPTORS: ACHING;CONSTANT;DISCOMFORT

## 2018-11-03 ASSESSMENT — PAIN DESCRIPTION - LOCATION: LOCATION: KNEE

## 2018-11-03 ASSESSMENT — PAIN DESCRIPTION - PAIN TYPE: TYPE: SURGICAL PAIN

## 2018-11-03 ASSESSMENT — PAIN DESCRIPTION - ONSET: ONSET: ON-GOING

## 2018-11-03 ASSESSMENT — PAIN DESCRIPTION - FREQUENCY: FREQUENCY: CONTINUOUS

## 2018-11-03 ASSESSMENT — PAIN DESCRIPTION - ORIENTATION: ORIENTATION: RIGHT

## 2018-11-03 NOTE — PROGRESS NOTES
[facility] MD Roderick Webster MD Azalia Shears, MD Tempie Burow, MD Delberta Later, ANP  Progress Note  11/3/2018    SUBJECTIVE:  We are following Mrs. Page for end-stage renal failure . 11/2: \"Shitty. \"  Ongoing fatigue, malaise, generalized weakness, anorexia, mild persistent nausea and poor intake food and fluid, ongoing right knee pain. Mobility is poor. Think she needs more rehab. Note: All symptoms are getting better. Sooner be discharged to [facility] for rehab.  11/3 : feels ok . On RA.          PROBLEM LIST:    Patient Active Problem List   Diagnosis    Peritoneal dialysis catheter infection ([address]    Sepsis ([address]    SIRS (systemic inflammatory response syndrome) ([address]    Type 2 diabetes mellitus with diabetic chronic kidney disease ([address]    Osteoarthritis of right knee    Arthritis of knee, right    History of peritoneal dialysis    End stage renal disease ([address]        PAST MEDICAL HISTORY:    Past Medical History:   Diagnosis Date    Anemia     Arthritis     CAD (coronary artery disease)     Diabetes mellitus ([address]     Gout     History of bleeding peptic ulcer approx 2015    Hypertension     Peritoneal dialysis [facility] in place [facility])     Peritoneal dialysis status ([facility] [address]     at night for 8 hours    Thyroid disease     Use of cane as ambulatory aid     Wears glasses        MEDS (scheduled):   sodium chloride  250 mL Intravenous Once    insulin lispro  0-12 Units Subcutaneous TID WC    insulin lispro  0-6 Units Subcutaneous Nightly    famotidine  20 mg Oral Daily    sodium chloride  250 mL Intravenous Once    IV syringe builder   Other Once    allopurinol  150 mg Oral Daily    amLODIPine  2.5 mg Oral Daily    aspirin  81 mg Oral Daily    calcitRIOL  0.25 mcg Oral Once per day on Mon Tue Wed Thu Fri    vitamin D  2,000 Units Oral Daily    DULoxetine  20 mg Oral Daily    ezetimibe-simvastatin  1 tablet Oral Q72H    isosorbide

## 2018-11-03 NOTE — PROGRESS NOTES
CCPD treatment complete. Total  ml. Effluent drainage clear pale yellow. Stay safe cap applied using aseptic technique.

## 2018-11-03 NOTE — PLAN OF CARE
Problem: Falls - Risk of:  Goal: Will remain free from falls  Will remain free from falls   Outcome: Met This Shift  Patient educated on fall and safety precautions, Call light within reach.  Hourly rounding continues

## 2018-11-03 NOTE — PROGRESS NOTES
Physical Therapy  Facility/Department: [address] ORTHO SURGERY  Daily Treatment Note  NAME: Alex Rubin  : 1940  MRN: [id number]    Date of Service: 11/3/2018       Patient Diagnosis(es): The encounter diagnosis was Primary osteoarthritis of right knee.      has a past medical history of Anemia; Arthritis; CAD (coronary artery disease); Diabetes mellitus ([facility] Gout; History of bleeding peptic ulcer; Hypertension; Peritoneal dialysis catheter in place [facility]); Peritoneal dialysis status ([facility] [facility] Thyroid disease; Use of cane as ambulatory aid; and Wears glasses.   has a past surgical history that includes  section; Carpal tunnel release (Bilateral); Coronary artery bypass graft (approx ); back surgery; Upper gastrointestinal endoscopy; cardiovascular stress test; Cataract removal with implant (Bilateral); Endoscopy, colon, diagnostic; joint replacement; and pr total knee arthroplasty (Right, 10/31/2018).    Evaluating Therapist: Monica Green PT            Room #:  302   DIAGNOSIS:  OA s/p R  TKA  PRECAUTIONS:  Falls, WBAT      Social:  Pt lives with spouse  in a 1  floor plan  3  steps and  1 rails to enter. Prior to admission pt walked with  ww                        Initial Evaluation  Date: 18 Treatment    11/3/18 AM Short Term/ Long Term   Goals   Was pt agreeable to Eval/treatment?  yes  yes     Does pt have pain?  R knee  R knee     Bed Mobility  Rolling:  NT   Supine to sit:  Max assist   Sit to supine:  NT   Scooting: max assist  Rolling: NT  Supine to sit: NT  Sit to supine: NT  Scooting: NT  min assist    Transfers Sit to stand: max assist   Stand to sit:  Max assist x 2   Stand pivot: max assist x 2  Sit to stand: Mod A  Stand to sit:  Mod A   Stand Pivot: NT  min assist    Ambulation     Unable to advance feet  35 feet x 1 using [address] [place] [address] [place] for support Min A for balance WBAT R  feet with  ww  with  Min assist          Stair negotiation: ascended and descended NT  NT  4  steps with  1 rail with  Min assist    LE ROM  AAROM R knee 15-65    AAROM R knee 0-90 degrees    LE strength  2-/ 5 R knee    3-/ 5 R knee    AM- PAC RAW score  9/ 24 13/24        Balance: standing min A with w/w      Pt performed therapeutic exercise of the following:  LAQ's x 5     Patient education  Pt was educated on technique for mobility.     Patient response to education:   Pt verbalized understanding Pt demonstrated skill Pt requires further education in this area       x      Additional Comments: pt found sitting in bedside chair agreeable to session. Pt again requires increased time to complete all mobility. Pt reports leg feels stiff initially but improves with ambulation. Pt was left sitting in bedside chair with call light left by patient.     Chair/bed alarm: reapplied.     Time in: 1102  Time out: 1114     Pt is making fair  progress toward established Physical Therapy goals.   Continue with physical therapy current plan of care.     Andree Calix PT, DPT [id number]

## 2018-11-03 NOTE — PROGRESS NOTES
Perfect serve Dr. Rachel Estrada re: critical lab value. Awaiting response.     Physician read perfect serve

## 2018-11-04 VITALS
SYSTOLIC BLOOD PRESSURE: 140 MMHG | WEIGHT: 170.86 LBS | BODY MASS INDEX: 34.44 KG/M2 | TEMPERATURE: 98.5 F | HEIGHT: 59 IN | HEART RATE: 104 BPM | RESPIRATION RATE: 18 BRPM | OXYGEN SATURATION: 90 % | DIASTOLIC BLOOD PRESSURE: 76 MMHG

## 2018-11-04 LAB
BASOPHILS ABSOLUTE: 0.04 E9/L (ref 0–0.2)
BASOPHILS RELATIVE PERCENT: 0.2 % (ref 0–2)
EOSINOPHILS ABSOLUTE: 0.29 E9/L (ref 0.05–0.5)
EOSINOPHILS RELATIVE PERCENT: 1.8 % (ref 0–6)
HCT VFR BLD CALC: 26.5 % (ref 34–48)
HEMOGLOBIN: 9.2 G/DL (ref 11.5–15.5)
IMMATURE GRANULOCYTES #: 0.19 E9/L
IMMATURE GRANULOCYTES %: 1.2 % (ref 0–5)
LYMPHOCYTES ABSOLUTE: 1.11 E9/L (ref 1.5–4)
LYMPHOCYTES RELATIVE PERCENT: 6.8 % (ref 20–42)
MCH RBC QN AUTO: 33.2 PG (ref 26–35)
MCHC RBC AUTO-ENTMCNC: 34.7 % (ref 32–34.5)
MCV RBC AUTO: 95.7 FL (ref 80–99.9)
METER GLUCOSE: 135 MG/DL (ref 70–110)
METER GLUCOSE: 140 MG/DL (ref 70–110)
MONOCYTES ABSOLUTE: 1.13 E9/L (ref 0.1–0.95)
MONOCYTES RELATIVE PERCENT: 6.9 % (ref 2–12)
NEUTROPHILS ABSOLUTE: 13.51 E9/L (ref 1.8–7.3)
NEUTROPHILS RELATIVE PERCENT: 83.1 % (ref 43–80)
PDW BLD-RTO: 14.5 FL (ref 11.5–15)
PLATELET # BLD: 304 E9/L (ref 130–450)
PMV BLD AUTO: 10.3 FL (ref 7–12)
RBC # BLD: 2.77 E12/L (ref 3.5–5.5)
WBC # BLD: 16.3 E9/L (ref 4.5–11.5)

## 2018-11-04 PROCEDURE — 85025 COMPLETE CBC W/AUTO DIFF WBC: CPT

## 2018-11-04 PROCEDURE — 82962 GLUCOSE BLOOD TEST: CPT

## 2018-11-04 PROCEDURE — 6370000000 HC RX 637 (ALT 250 FOR IP): Performed by: ORTHOPAEDIC SURGERY

## 2018-11-04 PROCEDURE — 36415 COLL VENOUS BLD VENIPUNCTURE: CPT

## 2018-11-04 PROCEDURE — 97530 THERAPEUTIC ACTIVITIES: CPT | Performed by: PHYSICAL THERAPIST

## 2018-11-04 RX ORDER — INSULIN GLARGINE 100 [IU]/ML
20 INJECTION, SOLUTION SUBCUTANEOUS 2 TIMES DAILY
Qty: 1 VIAL | Refills: 3 | Status: ON HOLD | OUTPATIENT
Start: 2018-11-04 | End: 2019-06-18 | Stop reason: SDUPTHER

## 2018-11-04 RX ADMIN — FAMOTIDINE 20 MG: 20 TABLET ORAL at 08:58

## 2018-11-04 RX ADMIN — LEVOTHYROXINE SODIUM 50 MCG: 50 TABLET ORAL at 05:25

## 2018-11-04 RX ADMIN — AMLODIPINE BESYLATE 2.5 MG: 2.5 TABLET ORAL at 08:58

## 2018-11-04 RX ADMIN — DULOXETINE HYDROCHLORIDE 20 MG: 20 CAPSULE, DELAYED RELEASE ORAL at 09:04

## 2018-11-04 RX ADMIN — SEVELAMER CARBONATE 1600 MG: 800 TABLET, FILM COATED ORAL at 08:59

## 2018-11-04 RX ADMIN — METOPROLOL SUCCINATE 50 MG: 50 TABLET, EXTENDED RELEASE ORAL at 08:58

## 2018-11-04 RX ADMIN — CHOLECALCIFEROL TAB 50 MCG (2000 UNIT) 2000 UNITS: 50 TAB at 08:58

## 2018-11-04 RX ADMIN — HYDROCODONE BITARTRATE AND ACETAMINOPHEN 0.5 TABLET: 5; 325 TABLET ORAL at 05:25

## 2018-11-04 RX ADMIN — DOCUSATE SODIUM 100 MG: 100 CAPSULE, LIQUID FILLED ORAL at 08:59

## 2018-11-04 RX ADMIN — ALLOPURINOL 150 MG: 300 TABLET ORAL at 08:59

## 2018-11-04 RX ADMIN — INSULIN GLARGINE 20 UNITS: 100 INJECTION, SOLUTION SUBCUTANEOUS at 08:58

## 2018-11-04 RX ADMIN — ISOSORBIDE MONONITRATE 30 MG: 30 TABLET, EXTENDED RELEASE ORAL at 08:58

## 2018-11-04 RX ADMIN — ASPIRIN 81 MG CHEWABLE TABLET 81 MG: 81 TABLET CHEWABLE at 08:58

## 2018-11-04 ASSESSMENT — PAIN SCALES - GENERAL: PAINLEVEL_OUTOF10: 3

## 2018-11-04 NOTE — PROGRESS NOTES
Pod #4    Feels good, sleeping well    Vitals:    11/04/18 0734   BP: (!) 140/76   Pulse: 104   Resp: 18   Temp: 98.5 °F (36.9 °C)   SpO2:      Dressing r knee  Calf soft  Intact ehl    Pending cbc  If hgb stable ok to transfer  [facility] 72. dressing to be changed 11/7

## 2018-11-04 NOTE — PROGRESS NOTES
Physical Therapy  Facility/Department: [address] ORTHO SURGERY  Daily Treatment Note  NAME: Abby Hdez  : 1940  MRN: [id number]    Date of Service: 2018       Patient Diagnosis(es): The encounter diagnosis was Primary osteoarthritis of right knee.      has a past medical history of Anemia; Arthritis; CAD (coronary artery disease); Diabetes mellitus ([facility] [address] Gout; History of bleeding peptic ulcer; Hypertension; Peritoneal dialysis catheter in place [facility]); Peritoneal dialysis status ([facility] [address] Thyroid disease; Use of cane as ambulatory aid; and Wears glasses.   has a past surgical history that includes  section; Carpal tunnel release (Bilateral); Coronary artery bypass graft (approx ); back surgery; Upper gastrointestinal endoscopy; cardiovascular stress test; Cataract removal with implant (Bilateral); Endoscopy, colon, diagnostic; joint replacement; and pr total knee arthroplasty (Right, 10/31/2018).    Evaluating Therapist: Inocente Hanna PT            Room #:  324   DIAGNOSIS:  OA s/p R  TKA  PRECAUTIONS:  Falls, WBAT      Social:  Pt lives with spouse  in a 1  floor plan  3  steps and  1 rails to enter. Prior to admission pt walked with  ww                        Initial Evaluation  Date: 18 Treatment    18 AM Short Term/ Long Term   Goals   Was pt agreeable to Eval/treatment?  yes  yes     Does pt have pain?  R knee  R knee     Bed Mobility  Rolling:  NT   Supine to sit:  Max assist   Sit to supine:  NT   Scooting: max assist  Rolling: NT  Supine to sit: max A  Sit to supine: NT  Scooting: max A   min assist    Transfers Sit to stand: max assist   Stand to sit:  Max assist x 2   Stand pivot: max assist x 2  Sit to stand: Mod A  Stand to sit:  Mod A   Stand Pivot: NT  min assist    Ambulation     Unable to advance feet  6 feet x 1 using [facility] for support Min A for balance WBAT R  feet with  ww  with  Min assist          Stair negotiation: ascended and descended NT  NT  4  steps

## 2018-11-04 NOTE — PROGRESS NOTES
Physical Therapy  Facility/Department: [address] ORTHO SURGERY  Daily Treatment Note  NAME: Nelson Alaniz  : 1940  MRN: [id number]    Date of Service: 2018        Patient Diagnosis(es): The encounter diagnosis was Primary osteoarthritis of right knee.      has a past medical history of Anemia; Arthritis; CAD (coronary artery disease); Diabetes mellitus ([facility] [address] Gout; History of bleeding peptic ulcer; Hypertension; Peritoneal dialysis catheter in place [facility]); Peritoneal dialysis status ([facility] Thyroid disease; Use of cane as ambulatory aid; and Wears glasses.   has a past surgical history that includes  section; Carpal tunnel release (Bilateral); Coronary artery bypass graft (approx ); back surgery; Upper gastrointestinal endoscopy; cardiovascular stress test; Cataract removal with implant (Bilateral); Endoscopy, colon, diagnostic; joint replacement; and pr total knee arthroplasty (Right, 10/31/2018).    Evaluating Therapist: Fer Elizabeth PT            Room #:  495   DIAGNOSIS:  OA s/p R  TKA  PRECAUTIONS:  Falls, WBAT      Social:  Pt lives with spouse  in a 1  floor plan  3  steps and  1 rails to enter. Prior to admission pt walked with  ww                        Initial Evaluation  Date: 18 Treatment    18 AM Short Term/ Long Term   Goals   Was pt agreeable to Eval/treatment?  yes  yes     Does pt have pain?  R knee  R knee     Bed Mobility  Rolling:  NT   Supine to sit:  Max assist   Sit to supine:  NT   Scooting: max assist  Rolling: NT  Supine to sit: NT  Sit to supine: mod A   Scooting: NT  min assist    Transfers Sit to stand: max assist   Stand to sit:  Max assist x 2   Stand pivot: max assist x 2  Sit to stand: Mod A  Stand to sit:  Mod A   Stand Pivot: NT  min assist    Ambulation     Unable to advance feet  15 feet x 1 using [employer] for support Min A for balance WBAT R  feet with  ww  with  Min assist          Stair negotiation: ascended and descended NT  NT  4  steps

## 2018-11-04 NOTE — PROGRESS NOTES
Nurse to nurse report called to lacie at [facility].

## 2018-11-27 ASSESSMENT — KOOS JR
GOING UP OR DOWN STAIRS: 2
STRAIGHTENING KNEE FULLY: 1
HOW SEVERE IS YOUR KNEE STIFFNESS AFTER FIRST WAKING IN MORNING: 1
STANDING UPRIGHT: 0
RISING FROM SITTING: 1
BENDING TO THE FLOOR TO PICK UP OBJECT: 2
TWISING OR PIVOTING ON KNEE: 1

## 2018-11-27 ASSESSMENT — PROMIS GLOBAL HEALTH SCALE
TO WHAT EXTENT ARE YOU ABLE TO CARRY OUT YOUR EVERYDAY PHYSICAL ACTIVITIES SUCH AS WALKING, CLIMBING STAIRS, CARRYING GROCERIES, OR MOVING A CHAIR [ON A SCALE OF 1 (NOT AT ALL) TO 5 (COMPLETELY)]?: 3
HOW IS THE PROMIS V1.1 BEING ADMINISTERED?: 2
IN GENERAL, WOULD YOU SAY YOUR QUALITY OF LIFE IS...[ON A SCALE OF 1 (POOR) TO 5 (EXCELLENT)]: 4
IN GENERAL, HOW WOULD YOU RATE YOUR PHYSICAL HEALTH [ON A SCALE OF 1 (POOR) TO 5 (EXCELLENT)]?: 3
IN GENERAL, HOW WOULD YOU RATE YOUR SATISFACTION WITH YOUR SOCIAL ACTIVITIES AND RELATIONSHIPS [ON A SCALE OF 1 (POOR) TO 5 (EXCELLENT)]?: 4
IN THE PAST 7 DAYS, HOW WOULD YOU RATE YOUR PAIN ON AVERAGE [ON A SCALE FROM 0 (NO PAIN) TO 10 (WORST IMAGINABLE PAIN)]?: 2
IN THE PAST 7 DAYS, HOW OFTEN HAVE YOU BEEN BOTHERED BY EMOTIONAL PROBLEMS, SUCH AS FEELING ANXIOUS, DEPRESSED, OR IRRITABLE [ON A SCALE FROM 1 (NEVER) TO 5 (ALWAYS)]?: 2
IN THE PAST 7 DAYS, HOW WOULD YOU RATE YOUR FATIGUE ON AVERAGE [ON A SCALE FROM 1 (NONE) TO 5 (VERY SEVERE)]?: 3
IN GENERAL, WOULD YOU SAY YOUR HEALTH IS...[ON A SCALE OF 1 (POOR) TO 5 (EXCELLENT)]: 3
IN GENERAL, HOW WOULD YOU RATE YOUR MENTAL HEALTH, INCLUDING YOUR MOOD AND YOUR ABILITY TO THINK [ON A SCALE OF 1 (POOR) TO 5 (EXCELLENT)]?: 4
IN GENERAL, PLEASE RATE HOW WELL YOU CARRY OUT YOUR USUAL SOCIAL ACTIVITIES (INCLUDES ACTIVITIES AT HOME, AT WORK, AND IN YOUR COMMUNITY, AND RESPONSIBILITIES AS A PARENT, CHILD, SPOUSE, EMPLOYEE, FRIEND, ETC) [ON A SCALE OF 1 (POOR) TO 5 (EXCELLENT)]?: 3

## 2019-06-11 ENCOUNTER — APPOINTMENT (OUTPATIENT)
Dept: CT IMAGING | Age: 79
DRG: 947 | End: 2019-06-11
Payer: MEDICARE

## 2019-06-11 ENCOUNTER — HOSPITAL ENCOUNTER (INPATIENT)
Age: 79
LOS: 7 days | Discharge: HOME HEALTH | DRG: 947 | End: 2019-06-18
Attending: EMERGENCY MEDICINE | Admitting: INTERNAL MEDICINE
Payer: MEDICARE

## 2019-06-11 ENCOUNTER — APPOINTMENT (OUTPATIENT)
Dept: GENERAL RADIOLOGY | Age: 79
DRG: 947 | End: 2019-06-11
Payer: MEDICARE

## 2019-06-11 DIAGNOSIS — K65.2 SBP (SPONTANEOUS BACTERIAL PERITONITIS) (HCC): Primary | ICD-10-CM

## 2019-06-11 DIAGNOSIS — R41.82 ALTERED MENTAL STATUS, UNSPECIFIED ALTERED MENTAL STATUS TYPE: ICD-10-CM

## 2019-06-11 LAB
ALBUMIN SERPL-MCNC: 3.7 G/DL (ref 3.5–5.2)
ALP BLD-CCNC: 117 U/L (ref 35–104)
ALT SERPL-CCNC: 19 U/L (ref 0–32)
AMMONIA: 37.4 UMOL/L (ref 11–51)
ANION GAP SERPL CALCULATED.3IONS-SCNC: 19 MMOL/L (ref 7–16)
APPEARANCE FLUID: CLEAR
APTT: 26.6 SEC (ref 24.5–35.1)
AST SERPL-CCNC: 23 U/L (ref 0–31)
BASOPHILS ABSOLUTE: 0.08 E9/L (ref 0–0.2)
BASOPHILS RELATIVE PERCENT: 0.6 % (ref 0–2)
BILIRUB SERPL-MCNC: 0.2 MG/DL (ref 0–1.2)
BUN BLDV-MCNC: 58 MG/DL (ref 8–23)
CALCIUM SERPL-MCNC: 9.6 MG/DL (ref 8.6–10.2)
CELL COUNT FLUID TYPE: NORMAL
CHLORIDE BLD-SCNC: 88 MMOL/L (ref 98–107)
CHP ED QC CHECK: NORMAL
CO2: 24 MMOL/L (ref 22–29)
COLOR FLUID: YELLOW
CREAT SERPL-MCNC: 7.2 MG/DL (ref 0.5–1)
EOSINOPHILS ABSOLUTE: 0.26 E9/L (ref 0.05–0.5)
EOSINOPHILS RELATIVE PERCENT: 2.1 % (ref 0–6)
GFR AFRICAN AMERICAN: 7
GFR NON-AFRICAN AMERICAN: 5 ML/MIN/1.73
GLUCOSE BLD-MCNC: 194 MG/DL (ref 74–99)
GLUCOSE BLD-MCNC: 198 MG/DL
HCT VFR BLD CALC: 33.2 % (ref 34–48)
HEMOGLOBIN: 11.5 G/DL (ref 11.5–15.5)
IMMATURE GRANULOCYTES #: 0.09 E9/L
IMMATURE GRANULOCYTES %: 0.7 % (ref 0–5)
INR BLD: 1
LACTIC ACID, SEPSIS: 3.7 MMOL/L (ref 0.5–1.9)
LYMPHOCYTES ABSOLUTE: 1.83 E9/L (ref 1.5–4)
LYMPHOCYTES RELATIVE PERCENT: 14.5 % (ref 20–42)
MCH RBC QN AUTO: 33 PG (ref 26–35)
MCHC RBC AUTO-ENTMCNC: 34.6 % (ref 32–34.5)
MCV RBC AUTO: 95.4 FL (ref 80–99.9)
MONOCYTE, FLUID: 76 %
MONOCYTES ABSOLUTE: 0.88 E9/L (ref 0.1–0.95)
MONOCYTES RELATIVE PERCENT: 7 % (ref 2–12)
NEUTROPHIL, FLUID: 24 %
NEUTROPHILS ABSOLUTE: 9.51 E9/L (ref 1.8–7.3)
NEUTROPHILS RELATIVE PERCENT: 75.1 % (ref 43–80)
NUCLEATED CELLS FLUID: 45 /UL
PDW BLD-RTO: 13.8 FL (ref 11.5–15)
PLATELET # BLD: 387 E9/L (ref 130–450)
PMV BLD AUTO: 9.7 FL (ref 7–12)
POTASSIUM SERPL-SCNC: 6.1 MMOL/L (ref 3.5–5)
PROTHROMBIN TIME: 11.9 SEC (ref 9.3–12.4)
RBC # BLD: 3.48 E12/L (ref 3.5–5.5)
RBC FLUID: <2000 /UL
SODIUM BLD-SCNC: 131 MMOL/L (ref 132–146)
TOTAL PROTEIN: 6.9 G/DL (ref 6.4–8.3)
TROPONIN: 0.12 NG/ML (ref 0–0.03)
WBC # BLD: 12.7 E9/L (ref 4.5–11.5)

## 2019-06-11 PROCEDURE — 71045 X-RAY EXAM CHEST 1 VIEW: CPT

## 2019-06-11 PROCEDURE — 84484 ASSAY OF TROPONIN QUANT: CPT

## 2019-06-11 PROCEDURE — 87205 SMEAR GRAM STAIN: CPT

## 2019-06-11 PROCEDURE — 6360000002 HC RX W HCPCS: Performed by: INTERNAL MEDICINE

## 2019-06-11 PROCEDURE — 82140 ASSAY OF AMMONIA: CPT

## 2019-06-11 PROCEDURE — 6360000002 HC RX W HCPCS: Performed by: STUDENT IN AN ORGANIZED HEALTH CARE EDUCATION/TRAINING PROGRAM

## 2019-06-11 PROCEDURE — 96374 THER/PROPH/DIAG INJ IV PUSH: CPT

## 2019-06-11 PROCEDURE — 85730 THROMBOPLASTIN TIME PARTIAL: CPT

## 2019-06-11 PROCEDURE — 83605 ASSAY OF LACTIC ACID: CPT

## 2019-06-11 PROCEDURE — 6360000002 HC RX W HCPCS: Performed by: EMERGENCY MEDICINE

## 2019-06-11 PROCEDURE — 87070 CULTURE OTHR SPECIMN AEROBIC: CPT

## 2019-06-11 PROCEDURE — 89051 BODY FLUID CELL COUNT: CPT

## 2019-06-11 PROCEDURE — 85610 PROTHROMBIN TIME: CPT

## 2019-06-11 PROCEDURE — 94760 N-INVAS EAR/PLS OXIMETRY 1: CPT

## 2019-06-11 PROCEDURE — 2580000003 HC RX 258: Performed by: INTERNAL MEDICINE

## 2019-06-11 PROCEDURE — 3E1M39Z IRRIGATION OF PERITONEAL CAVITY USING DIALYSATE, PERCUTANEOUS APPROACH: ICD-10-PCS | Performed by: INTERNAL MEDICINE

## 2019-06-11 PROCEDURE — 80053 COMPREHEN METABOLIC PANEL: CPT

## 2019-06-11 PROCEDURE — 90945 DIALYSIS ONE EVALUATION: CPT

## 2019-06-11 PROCEDURE — 99285 EMERGENCY DEPT VISIT HI MDM: CPT

## 2019-06-11 PROCEDURE — 85025 COMPLETE CBC W/AUTO DIFF WBC: CPT

## 2019-06-11 PROCEDURE — 96375 TX/PRO/DX INJ NEW DRUG ADDON: CPT

## 2019-06-11 PROCEDURE — 96372 THER/PROPH/DIAG INJ SC/IM: CPT

## 2019-06-11 PROCEDURE — 2060000000 HC ICU INTERMEDIATE R&B

## 2019-06-11 RX ORDER — LORAZEPAM 2 MG/ML
1 INJECTION INTRAMUSCULAR ONCE
Status: COMPLETED | OUTPATIENT
Start: 2019-06-11 | End: 2019-06-11

## 2019-06-11 RX ORDER — 0.9 % SODIUM CHLORIDE 0.9 %
1000 INTRAVENOUS SOLUTION INTRAVENOUS ONCE
Status: COMPLETED | OUTPATIENT
Start: 2019-06-11 | End: 2019-06-12

## 2019-06-11 RX ORDER — ZIPRASIDONE MESYLATE 20 MG/ML
20 INJECTION, POWDER, LYOPHILIZED, FOR SOLUTION INTRAMUSCULAR ONCE
Status: COMPLETED | OUTPATIENT
Start: 2019-06-11 | End: 2019-06-11

## 2019-06-11 RX ORDER — HALOPERIDOL 5 MG/ML
2 INJECTION INTRAMUSCULAR ONCE
Status: COMPLETED | OUTPATIENT
Start: 2019-06-11 | End: 2019-06-11

## 2019-06-11 RX ORDER — DIPHENHYDRAMINE HYDROCHLORIDE 50 MG/ML
25 INJECTION INTRAMUSCULAR; INTRAVENOUS ONCE
Status: COMPLETED | OUTPATIENT
Start: 2019-06-11 | End: 2019-06-11

## 2019-06-11 RX ORDER — FENTANYL CITRATE 50 UG/ML
50 INJECTION, SOLUTION INTRAMUSCULAR; INTRAVENOUS ONCE
Status: COMPLETED | OUTPATIENT
Start: 2019-06-12 | End: 2019-06-12

## 2019-06-11 RX ORDER — HALOPERIDOL 5 MG/ML
5 INJECTION INTRAMUSCULAR ONCE
Status: COMPLETED | OUTPATIENT
Start: 2019-06-11 | End: 2019-06-11

## 2019-06-11 RX ADMIN — HALOPERIDOL LACTATE 2 MG: 5 INJECTION INTRAMUSCULAR at 21:50

## 2019-06-11 RX ADMIN — DEXTROSE MONOHYDRATE, SODIUM CHLORIDE, SODIUM LACTATE, CALCIUM CHLORIDE, MAGNESIUM CHLORIDE: 1.5; 538; 448; 18.4; 5.08 SOLUTION INTRAPERITONEAL at 21:45

## 2019-06-11 RX ADMIN — DIPHENHYDRAMINE HYDROCHLORIDE 25 MG: 50 INJECTION, SOLUTION INTRAMUSCULAR; INTRAVENOUS at 21:20

## 2019-06-11 RX ADMIN — ZIPRASIDONE MESYLATE 20 MG: 20 INJECTION, POWDER, LYOPHILIZED, FOR SOLUTION INTRAMUSCULAR at 22:43

## 2019-06-11 RX ADMIN — LORAZEPAM 1 MG: 2 INJECTION INTRAMUSCULAR; INTRAVENOUS at 20:45

## 2019-06-11 RX ADMIN — HALOPERIDOL LACTATE 5 MG: 5 INJECTION, SOLUTION INTRAMUSCULAR at 22:16

## 2019-06-11 NOTE — ED NOTES
Pt did not finish peritoneal dialysis.  found pt disoriented with dialysis port open. Per pt \"the dialysis bag should have more fluid in it. This is not normal\". Pt anxious and thrashing around in bed disoriented.       Jose Martin Mahmood RN  06/11/19 1949

## 2019-06-12 ENCOUNTER — APPOINTMENT (OUTPATIENT)
Dept: GENERAL RADIOLOGY | Age: 79
DRG: 947 | End: 2019-06-12
Payer: MEDICARE

## 2019-06-12 PROBLEM — I10 ESSENTIAL HYPERTENSION, BENIGN: Status: ACTIVE | Noted: 2019-06-12

## 2019-06-12 PROBLEM — R41.0 ACUTE DELIRIUM: Status: ACTIVE | Noted: 2019-06-12

## 2019-06-12 LAB
ANION GAP SERPL CALCULATED.3IONS-SCNC: 14 MMOL/L (ref 7–16)
ANION GAP SERPL CALCULATED.3IONS-SCNC: 15 MMOL/L (ref 7–16)
ANION GAP SERPL CALCULATED.3IONS-SCNC: 17 MMOL/L (ref 7–16)
APPEARANCE FLUID: CLEAR
BASOPHILS ABSOLUTE: 0.07 E9/L (ref 0–0.2)
BASOPHILS RELATIVE PERCENT: 0.5 % (ref 0–2)
BUN BLDV-MCNC: 56 MG/DL (ref 8–23)
BUN BLDV-MCNC: 57 MG/DL (ref 8–23)
BUN BLDV-MCNC: 60 MG/DL (ref 8–23)
C-REACTIVE PROTEIN: 1.1 MG/DL (ref 0–0.4)
CALCIUM SERPL-MCNC: 8.4 MG/DL (ref 8.6–10.2)
CALCIUM SERPL-MCNC: 8.7 MG/DL (ref 8.6–10.2)
CALCIUM SERPL-MCNC: 9.7 MG/DL (ref 8.6–10.2)
CELL COUNT FLUID TYPE: NORMAL
CHLORIDE BLD-SCNC: 93 MMOL/L (ref 98–107)
CHLORIDE BLD-SCNC: 95 MMOL/L (ref 98–107)
CHLORIDE BLD-SCNC: 98 MMOL/L (ref 98–107)
CO2: 23 MMOL/L (ref 22–29)
CO2: 24 MMOL/L (ref 22–29)
CO2: 25 MMOL/L (ref 22–29)
COLOR FLUID: NORMAL
CREAT SERPL-MCNC: 7.5 MG/DL (ref 0.5–1)
CREAT SERPL-MCNC: 7.8 MG/DL (ref 0.5–1)
CREAT SERPL-MCNC: 7.9 MG/DL (ref 0.5–1)
EKG ATRIAL RATE: 101 BPM
EKG P-R INTERVAL: 200 MS
EKG Q-T INTERVAL: 370 MS
EKG QRS DURATION: 88 MS
EKG QTC CALCULATION (BAZETT): 479 MS
EKG R AXIS: 27 DEGREES
EKG T AXIS: 92 DEGREES
EKG VENTRICULAR RATE: 101 BPM
EOSINOPHILS ABSOLUTE: 0.12 E9/L (ref 0.05–0.5)
EOSINOPHILS RELATIVE PERCENT: 0.9 % (ref 0–6)
FILM ARRAY ADENOVIRUS: NORMAL
FILM ARRAY BORDETELLA PERTUSSIS: NORMAL
FILM ARRAY CHLAMYDOPHILIA PNEUMONIAE: NORMAL
FILM ARRAY CORONAVIRUS 229E: NORMAL
FILM ARRAY CORONAVIRUS HKU1: NORMAL
FILM ARRAY CORONAVIRUS NL63: NORMAL
FILM ARRAY CORONAVIRUS OC43: NORMAL
FILM ARRAY INFLUENZA A VIRUS 09H1: NORMAL
FILM ARRAY INFLUENZA A VIRUS H1: NORMAL
FILM ARRAY INFLUENZA A VIRUS H3: NORMAL
FILM ARRAY INFLUENZA A VIRUS: NORMAL
FILM ARRAY INFLUENZA B: NORMAL
FILM ARRAY METAPNEUMOVIRUS: NORMAL
FILM ARRAY MYCOPLASMA PNEUMONIAE: NORMAL
FILM ARRAY PARAINFLUENZA VIRUS 1: NORMAL
FILM ARRAY PARAINFLUENZA VIRUS 2: NORMAL
FILM ARRAY PARAINFLUENZA VIRUS 3: NORMAL
FILM ARRAY PARAINFLUENZA VIRUS 4: NORMAL
FILM ARRAY RESPIRATORY SYNCITIAL VIRUS: NORMAL
FILM ARRAY RHINOVIRUS/ENTEROVIRUS: NORMAL
GFR AFRICAN AMERICAN: 6
GFR NON-AFRICAN AMERICAN: 5 ML/MIN/1.73
GLUCOSE BLD-MCNC: 159 MG/DL (ref 74–99)
GLUCOSE BLD-MCNC: 184 MG/DL (ref 74–99)
GLUCOSE BLD-MCNC: 285 MG/DL (ref 74–99)
HCT VFR BLD CALC: 26.9 % (ref 34–48)
HEMOGLOBIN: 9 G/DL (ref 11.5–15.5)
IMMATURE GRANULOCYTES #: 0.08 E9/L
IMMATURE GRANULOCYTES %: 0.6 % (ref 0–5)
LYMPHOCYTES ABSOLUTE: 2.22 E9/L (ref 1.5–4)
LYMPHOCYTES RELATIVE PERCENT: 15.9 % (ref 20–42)
MCH RBC QN AUTO: 33.1 PG (ref 26–35)
MCHC RBC AUTO-ENTMCNC: 33.5 % (ref 32–34.5)
MCV RBC AUTO: 98.9 FL (ref 80–99.9)
METER GLUCOSE: 146 MG/DL (ref 74–99)
METER GLUCOSE: 166 MG/DL (ref 74–99)
METER GLUCOSE: 240 MG/DL (ref 74–99)
METER GLUCOSE: 319 MG/DL (ref 74–99)
MONOCYTE, FLUID: 100 %
MONOCYTES ABSOLUTE: 1.23 E9/L (ref 0.1–0.95)
MONOCYTES RELATIVE PERCENT: 8.8 % (ref 2–12)
NEUTROPHIL, FLUID: 0 %
NEUTROPHILS ABSOLUTE: 10.28 E9/L (ref 1.8–7.3)
NEUTROPHILS RELATIVE PERCENT: 73.3 % (ref 43–80)
NUCLEATED CELLS FLUID: 28 /UL
PDW BLD-RTO: 14 FL (ref 11.5–15)
PLATELET # BLD: 276 E9/L (ref 130–450)
PMV BLD AUTO: 9.6 FL (ref 7–12)
POTASSIUM SERPL-SCNC: 4.9 MMOL/L (ref 3.5–5)
POTASSIUM SERPL-SCNC: 5.5 MMOL/L (ref 3.5–5)
POTASSIUM SERPL-SCNC: 5.9 MMOL/L (ref 3.5–5)
RBC # BLD: 2.72 E12/L (ref 3.5–5.5)
RBC FLUID: <2000 /UL
SEDIMENTATION RATE, ERYTHROCYTE: 60 MM/HR (ref 0–20)
SODIUM BLD-SCNC: 134 MMOL/L (ref 132–146)
SODIUM BLD-SCNC: 135 MMOL/L (ref 132–146)
SODIUM BLD-SCNC: 135 MMOL/L (ref 132–146)
WBC # BLD: 14 E9/L (ref 4.5–11.5)

## 2019-06-12 PROCEDURE — 90945 DIALYSIS ONE EVALUATION: CPT

## 2019-06-12 PROCEDURE — 6360000002 HC RX W HCPCS: Performed by: EMERGENCY MEDICINE

## 2019-06-12 PROCEDURE — 36415 COLL VENOUS BLD VENIPUNCTURE: CPT

## 2019-06-12 PROCEDURE — 2580000003 HC RX 258

## 2019-06-12 PROCEDURE — 80048 BASIC METABOLIC PNL TOTAL CA: CPT

## 2019-06-12 PROCEDURE — 2060000000 HC ICU INTERMEDIATE R&B

## 2019-06-12 PROCEDURE — 6360000002 HC RX W HCPCS: Performed by: INTERNAL MEDICINE

## 2019-06-12 PROCEDURE — 6370000000 HC RX 637 (ALT 250 FOR IP): Performed by: INTERNAL MEDICINE

## 2019-06-12 PROCEDURE — 93005 ELECTROCARDIOGRAM TRACING: CPT | Performed by: STUDENT IN AN ORGANIZED HEALTH CARE EDUCATION/TRAINING PROGRAM

## 2019-06-12 PROCEDURE — 85651 RBC SED RATE NONAUTOMATED: CPT

## 2019-06-12 PROCEDURE — 85025 COMPLETE CBC W/AUTO DIFF WBC: CPT

## 2019-06-12 PROCEDURE — 89051 BODY FLUID CELL COUNT: CPT

## 2019-06-12 PROCEDURE — 87040 BLOOD CULTURE FOR BACTERIA: CPT

## 2019-06-12 PROCEDURE — 93010 ELECTROCARDIOGRAM REPORT: CPT | Performed by: INTERNAL MEDICINE

## 2019-06-12 PROCEDURE — 71045 X-RAY EXAM CHEST 1 VIEW: CPT

## 2019-06-12 PROCEDURE — 87798 DETECT AGENT NOS DNA AMP: CPT

## 2019-06-12 PROCEDURE — 86140 C-REACTIVE PROTEIN: CPT

## 2019-06-12 PROCEDURE — 2500000003 HC RX 250 WO HCPCS: Performed by: INTERNAL MEDICINE

## 2019-06-12 PROCEDURE — 87581 M.PNEUMON DNA AMP PROBE: CPT

## 2019-06-12 PROCEDURE — 2580000003 HC RX 258: Performed by: STUDENT IN AN ORGANIZED HEALTH CARE EDUCATION/TRAINING PROGRAM

## 2019-06-12 PROCEDURE — 82962 GLUCOSE BLOOD TEST: CPT

## 2019-06-12 PROCEDURE — 87486 CHLMYD PNEUM DNA AMP PROBE: CPT

## 2019-06-12 PROCEDURE — 2580000003 HC RX 258: Performed by: EMERGENCY MEDICINE

## 2019-06-12 PROCEDURE — 87633 RESP VIRUS 12-25 TARGETS: CPT

## 2019-06-12 PROCEDURE — 2580000003 HC RX 258: Performed by: INTERNAL MEDICINE

## 2019-06-12 RX ORDER — CHOLECALCIFEROL (VITAMIN D3) 50 MCG
2000 TABLET ORAL DAILY
Status: DISCONTINUED | OUTPATIENT
Start: 2019-06-12 | End: 2019-06-12

## 2019-06-12 RX ORDER — NICOTINE POLACRILEX 4 MG
15 LOZENGE BUCCAL PRN
Status: DISCONTINUED | OUTPATIENT
Start: 2019-06-12 | End: 2019-06-18 | Stop reason: HOSPADM

## 2019-06-12 RX ORDER — DEXTROSE MONOHYDRATE 50 MG/ML
100 INJECTION, SOLUTION INTRAVENOUS PRN
Status: DISCONTINUED | OUTPATIENT
Start: 2019-06-12 | End: 2019-06-18 | Stop reason: HOSPADM

## 2019-06-12 RX ORDER — EZETIMIBE AND SIMVASTATIN 10; 20 MG/1; MG/1
1 TABLET ORAL
Status: DISCONTINUED | OUTPATIENT
Start: 2019-06-12 | End: 2019-06-12

## 2019-06-12 RX ORDER — CALCITRIOL 0.25 UG/1
0.25 CAPSULE, LIQUID FILLED ORAL SEE ADMIN INSTRUCTIONS
Status: DISCONTINUED | OUTPATIENT
Start: 2019-06-12 | End: 2019-06-12

## 2019-06-12 RX ORDER — DULOXETIN HYDROCHLORIDE 30 MG/1
30 CAPSULE, DELAYED RELEASE ORAL DAILY
Status: DISCONTINUED | OUTPATIENT
Start: 2019-06-13 | End: 2019-06-18 | Stop reason: HOSPADM

## 2019-06-12 RX ORDER — LEVOTHYROXINE SODIUM 0.05 MG/1
50 TABLET ORAL DAILY
Status: DISCONTINUED | OUTPATIENT
Start: 2019-06-12 | End: 2019-06-18 | Stop reason: HOSPADM

## 2019-06-12 RX ORDER — AMLODIPINE BESYLATE 2.5 MG/1
2.5 TABLET ORAL DAILY
Status: DISCONTINUED | OUTPATIENT
Start: 2019-06-12 | End: 2019-06-12

## 2019-06-12 RX ORDER — DEXTROSE MONOHYDRATE 25 G/50ML
12.5 INJECTION, SOLUTION INTRAVENOUS PRN
Status: DISCONTINUED | OUTPATIENT
Start: 2019-06-12 | End: 2019-06-18 | Stop reason: HOSPADM

## 2019-06-12 RX ORDER — LOSARTAN POTASSIUM 50 MG/1
100 TABLET ORAL DAILY
Status: DISCONTINUED | OUTPATIENT
Start: 2019-06-12 | End: 2019-06-18 | Stop reason: HOSPADM

## 2019-06-12 RX ORDER — INSULIN GLARGINE 100 [IU]/ML
20 INJECTION, SOLUTION SUBCUTANEOUS 2 TIMES DAILY
Status: DISCONTINUED | OUTPATIENT
Start: 2019-06-12 | End: 2019-06-16

## 2019-06-12 RX ORDER — SODIUM CHLORIDE 0.9 % (FLUSH) 0.9 %
SYRINGE (ML) INJECTION
Status: COMPLETED
Start: 2019-06-12 | End: 2019-06-12

## 2019-06-12 RX ORDER — HYDRALAZINE HYDROCHLORIDE 25 MG/1
25 TABLET, FILM COATED ORAL PRN
Status: DISCONTINUED | OUTPATIENT
Start: 2019-06-12 | End: 2019-06-14

## 2019-06-12 RX ORDER — FAMOTIDINE 20 MG/1
40 TABLET, FILM COATED ORAL EVERY EVENING
Status: DISCONTINUED | OUTPATIENT
Start: 2019-06-12 | End: 2019-06-12

## 2019-06-12 RX ORDER — SODIUM POLYSTYRENE SULFONATE 15 G/60ML
15 SUSPENSION ORAL; RECTAL ONCE
Status: COMPLETED | OUTPATIENT
Start: 2019-06-12 | End: 2019-06-12

## 2019-06-12 RX ORDER — SEVELAMER CARBONATE 800 MG/1
1600 TABLET, FILM COATED ORAL 3 TIMES DAILY
Status: DISCONTINUED | OUTPATIENT
Start: 2019-06-12 | End: 2019-06-18 | Stop reason: HOSPADM

## 2019-06-12 RX ORDER — ASPIRIN 81 MG/1
81 TABLET, CHEWABLE ORAL DAILY
Status: DISCONTINUED | OUTPATIENT
Start: 2019-06-12 | End: 2019-06-12

## 2019-06-12 RX ORDER — ALLOPURINOL 300 MG/1
150 TABLET ORAL DAILY
Status: DISCONTINUED | OUTPATIENT
Start: 2019-06-12 | End: 2019-06-12

## 2019-06-12 RX ORDER — METOPROLOL SUCCINATE 50 MG/1
50 TABLET, EXTENDED RELEASE ORAL DAILY
Status: DISCONTINUED | OUTPATIENT
Start: 2019-06-12 | End: 2019-06-18 | Stop reason: HOSPADM

## 2019-06-12 RX ORDER — DEXTROSE MONOHYDRATE 25 G/50ML
25 INJECTION, SOLUTION INTRAVENOUS ONCE
Status: COMPLETED | OUTPATIENT
Start: 2019-06-12 | End: 2019-06-12

## 2019-06-12 RX ORDER — GLUCOSAMINE/D3/BOSWELLIA SERRA 1500MG-400
1 TABLET ORAL DAILY
Status: DISCONTINUED | OUTPATIENT
Start: 2019-06-12 | End: 2019-06-12 | Stop reason: CLARIF

## 2019-06-12 RX ORDER — DULOXETIN HYDROCHLORIDE 20 MG/1
20 CAPSULE, DELAYED RELEASE ORAL DAILY
Status: DISCONTINUED | OUTPATIENT
Start: 2019-06-12 | End: 2019-06-12

## 2019-06-12 RX ORDER — ISOSORBIDE MONONITRATE 30 MG/1
30 TABLET, EXTENDED RELEASE ORAL DAILY
Status: DISCONTINUED | OUTPATIENT
Start: 2019-06-12 | End: 2019-06-18 | Stop reason: HOSPADM

## 2019-06-12 RX ADMIN — CALCIUM GLUCONATE 1 G: 98 INJECTION, SOLUTION INTRAVENOUS at 12:39

## 2019-06-12 RX ADMIN — Medication 10 ML: at 14:26

## 2019-06-12 RX ADMIN — CALCIUM GLUCONATE 1 G: 98 INJECTION, SOLUTION INTRAVENOUS at 02:15

## 2019-06-12 RX ADMIN — INSULIN GLARGINE 20 UNITS: 100 INJECTION, SOLUTION SUBCUTANEOUS at 22:03

## 2019-06-12 RX ADMIN — FENTANYL CITRATE 50 MCG: 50 INJECTION INTRAMUSCULAR; INTRAVENOUS at 02:23

## 2019-06-12 RX ADMIN — SEVELAMER CARBONATE 1600 MG: 800 TABLET, FILM COATED ORAL at 12:50

## 2019-06-12 RX ADMIN — SODIUM CHLORIDE 1000 ML: 9 INJECTION, SOLUTION INTRAVENOUS at 02:13

## 2019-06-12 RX ADMIN — DEXTROSE 50 % IN WATER (D50W) INTRAVENOUS SYRINGE 25 G: at 17:21

## 2019-06-12 RX ADMIN — SEVELAMER CARBONATE 1600 MG: 800 TABLET, FILM COATED ORAL at 22:03

## 2019-06-12 RX ADMIN — SODIUM CHLORIDE 1000 ML: 9 INJECTION, SOLUTION INTRAVENOUS at 02:14

## 2019-06-12 RX ADMIN — INSULIN HUMAN 6 UNITS: 100 INJECTION, SOLUTION PARENTERAL at 17:16

## 2019-06-12 RX ADMIN — Medication 10 ML: at 12:39

## 2019-06-12 RX ADMIN — CALCIUM GLUCONATE 1 G: 98 INJECTION, SOLUTION INTRAVENOUS at 17:16

## 2019-06-12 RX ADMIN — SODIUM POLYSTYRENE SULFONATE 15 G: 15 SUSPENSION ORAL; RECTAL at 17:13

## 2019-06-12 RX ADMIN — SODIUM BICARBONATE 50 MEQ: 84 INJECTION INTRAVENOUS at 17:16

## 2019-06-12 ASSESSMENT — PAIN SCALES - GENERAL
PAINLEVEL_OUTOF10: 0

## 2019-06-12 NOTE — PROGRESS NOTES
Peritoneal fluid specimen obtained using aseptic technique. Effluent drainage clear pale yellow no fibrin noted.

## 2019-06-12 NOTE — H&P
History and Physical  Internal Medicine  Lei Livingston MD    CHIEF COMPLAINT:  Altered mental status      HISTORY OF PRESENT ILLNESS:      The patient is a 66 y.o. female patient of Dr Jesse Prescott who presents with acute delirium presumptively due to Spont Bact Peritonitis - as per ER - The patient is a 80-year-old female presenting with altered mental status. Patient has a history of peritoneal dialysis, sepsis, type II diabetes, and end-stage renal disease. Patient was reported that she did not finish peritoneal dialysis today and  found her disoriented with a dialysis port open. Dialysis bag stated by the  should have more fluid in it. Patient anxious and thrashing and moving around upon arrival. ME is limited due the patient's condition. Patient's  at bedside states that she has a history of peritonitis and presented similarly in the past to her present presentation.             Past Medical History:   Diagnosis Date    Anemia     Arthritis     CAD (coronary artery disease)     Diabetes mellitus ([facility]     Gout     History of bleeding peptic ulcer approx     Hypertension     Peritoneal dialysis catheter in place [facility])     Peritoneal dialysis status ([facility] [address] [facility]     at night for 8 hours    Thyroid disease     Use of cane as ambulatory aid     Wears glasses            Past Surgical History:   Procedure Laterality Date    BACK SURGERY      CARDIOVASCULAR STRESS TEST      CARPAL TUNNEL RELEASE Bilateral     CATARACT REMOVAL WITH IMPLANT Bilateral      SECTION      CORONARY ARTERY BYPASS GRAFT  approx 2000    x 3; per BRENNON Burk, [facility] [address]      left knee, right shoulder    ME TOTAL KNEE ARTHROPLASTY Right 10/31/2018    RIGHT KNEE TOTAL ARTHROPLASTY +++BRIDGER++ PNB++ performed by Rossi Ramírez MD at [facility]         Medications Prior to Admission:    Medications Prior to Admission: insulin glargine (LANTUS) 100 UNIT/ML injection vial, Inject 20 Units into the skin 2 times daily  insulin lispro (HUMALOG) 100 UNIT/ML injection vial, Inject 0-12 Units into the skin 3 times daily (with meals)  insulin lispro (HUMALOG) 100 UNIT/ML injection vial, Inject 0-6 Units into the skin nightly  hydrALAZINE (APRESOLINE) 25 MG tablet, Take 25 mg by mouth as needed If BP systolic > 504 mm hg states pt. Take am day of surgery if needed  ranitidine (ZANTAC) 300 MG tablet, Take 300 mg by mouth daily Take am day of surgery 10/31  allopurinol (ZYLOPRIM) 300 MG tablet, Take 150 mg by mouth daily Pt states takes 1/2 of 300 mg tab daily  losartan (COZAAR) 100 MG tablet, Take 100 mg by mouth daily  metoprolol succinate (TOPROL XL) 50 MG extended release tablet, Take 50 mg by mouth daily Take am day of surgery 10/31  DULoxetine (CYMBALTA) 20 MG extended release capsule, Take 20 mg by mouth daily Take am day of surgery 10/31  NONFORMULARY, Take 1 tablet by mouth daily DAILYVITE 800 MG Last day preop 10/25  Biotin 55520 MCG TABS, Take 1 tablet by mouth daily LD 10/25  calcitRIOL (ROCALTROL) 0.25 MCG capsule, Take 0.25 mcg by mouth See Admin Instructions Pt states takes 1 tab 5 days per week  Cholecalciferol (VITAMIN D3) 2000 units CAPS, Take 1 capsule by mouth daily Ld 10/25  amLODIPine (NORVASC) 2.5 MG tablet, Take 2.5 mg by mouth daily Take am day of surgery 10/31  sevelamer (RENVELA) 800 MG tablet, Take 2 tablets by mouth 3 times daily   aspirin 81 MG tablet, Take 81 mg by mouth daily Prescribed per Dr Sujit Servin; states instructed to hold preop  isosorbide mononitrate (IMDUR) 30 MG CR tablet, Take 30 mg by mouth daily Take am day of surgery 10/31  levothyroxine (SYNTHROID) 50 MCG tablet, Take 50 mcg by mouth Daily  ezetimibe-simvastatin (VYTORIN) 10-20 MG per tablet, Take 1 tablet by mouth every 72 hours     Allergies:    Sulfa antibiotics    Social History:    reports that she has never smoked.  She has never used smokeless tobacco. She reports that she does not drink alcohol or use drugs. Family History:   family history is not on file.     REVIEW OF SYSTEMS:  As above in the HPI, otherwise negative    Vital Signs:  /71   Pulse 101   Temp 98.9 °F (37.2 °C) (Axillary)   Resp 20   Wt 160 lb 12.8 oz (72.9 kg)   SpO2 97%   BMI 32.48 kg/m²     Physical Exam:    General appearance: appears stated age  Head: Normocephalic, without obvious abnormality, atraumatic  Eyes: negative  Throat: normal findings: lips normal without lesions  Neck: no adenopathy, no carotid bruit, no JVD and supple, symmetrical, trachea midline  Back: negative  Lungs: clear to auscultation bilaterally  Chest wall: no tenderness  Heart: regular rate and rhythm  Abdomen: soft, non-tender; bowel sounds normal; no masses,  no organomegaly  Extremities: edema B  Pulses: 2+ and symmetric  Skin: Skin color, texture, turgor normal. No rashes or lesions  Lymph nodes: Cervical, supraclavicular, and axillary nodes normal.  Neurologic: unable to assess at this point  Rectal: deferred    LABS:    Recent Results (from the past 24 hour(s))   CBC Auto Differential    Collection Time: 06/11/19  7:51 PM   Result Value Ref Range    WBC 12.7 (H) 4.5 - 11.5 E9/L    RBC 3.48 (L) 3.50 - 5.50 E12/L    Hemoglobin 11.5 11.5 - 15.5 g/dL    Hematocrit 33.2 (L) 34.0 - 48.0 %    MCV 95.4 80.0 - 99.9 fL    MCH 33.0 26.0 - 35.0 pg    MCHC 34.6 (H) 32.0 - 34.5 %    RDW 13.8 11.5 - 15.0 fL    Platelets 186 092 - 195 E9/L    MPV 9.7 7.0 - 12.0 fL    Neutrophils % 75.1 43.0 - 80.0 %    Immature Granulocytes % 0.7 0.0 - 5.0 %    Lymphocytes % 14.5 (L) 20.0 - 42.0 %    Monocytes % 7.0 2.0 - 12.0 %    Eosinophils % 2.1 0.0 - 6.0 %    Basophils % 0.6 0.0 - 2.0 %    Neutrophils # 9.51 (H) 1.80 - 7.30 E9/L    Immature Granulocytes # 0.09 E9/L    Lymphocytes # 1.83 1.50 - 4.00 E9/L    Monocytes # 0.88 0.10 - 0.95 E9/L    Eosinophils # 0.26 0.05 - 0.50 E9/L    Basophils # 0.08 0.00 - 0.20 E9/L   Comprehensive Metabolic Panel    Collection Time: 06/11/19  7:51 PM   Result Value Ref Range    Sodium 131 (L) 132 - 146 mmol/L    Potassium 6.1 (H) 3.5 - 5.0 mmol/L    Chloride 88 (L) 98 - 107 mmol/L    CO2 24 22 - 29 mmol/L    Anion Gap 19 (H) 7 - 16 mmol/L    Glucose 194 (H) 74 - 99 mg/dL    BUN 58 (H) 8 - 23 mg/dL    CREATININE 7.2 (H) 0.5 - 1.0 mg/dL    GFR Non-African American 5 >=60 mL/min/1.73    GFR African American 7     Calcium 9.6 8.6 - 10.2 mg/dL    Total Protein 6.9 6.4 - 8.3 g/dL    Alb 3.7 3.5 - 5.2 g/dL    Total Bilirubin 0.2 0.0 - 1.2 mg/dL    Alkaline Phosphatase 117 (H) 35 - 104 U/L    ALT 19 0 - 32 U/L    AST 23 0 - 31 U/L   Ammonia    Collection Time: 06/11/19  7:51 PM   Result Value Ref Range    Ammonia 37.4 11.0 - 51.0 umol/L   Protime-INR    Collection Time: 06/11/19  7:51 PM   Result Value Ref Range    Protime 11.9 9.3 - 12.4 sec    INR 1.0    APTT    Collection Time: 06/11/19  7:51 PM   Result Value Ref Range    aPTT 26.6 24.5 - 35.1 sec   Troponin    Collection Time: 06/11/19  7:51 PM   Result Value Ref Range    Troponin 0.12 (H) 0.00 - 0.03 ng/mL   Lactate, Sepsis    Collection Time: 06/11/19  7:51 PM   Result Value Ref Range    Lactic Acid, Sepsis 3.7 (H) 0.5 - 1.9 mmol/L   POCT Glucose    Collection Time: 06/11/19  7:54 PM   Result Value Ref Range    Glucose 198 mg/dL    QC OK? ok    BODY FLUID CELL COUNT WITH DIFFERENTIAL    Collection Time: 06/11/19  8:30 PM   Result Value Ref Range    Cell Count Fluid Type Peritoneal     Color, Fluid Yellow     Appearance, Fluid Clear     Nucl Cell, Fluid 45 /uL    RBC, Fluid <2,000 /uL    Neutrophil Count, Fluid 24 %    Monocyte Count, Fluid 76 %   EKG 12 Lead    Collection Time: 06/12/19  2:23 AM   Result Value Ref Range    Ventricular Rate 101 BPM    Atrial Rate 101 BPM    P-R Interval 200 ms    QRS Duration 88 ms    Q-T Interval 370 ms    QTc Calculation (Bazett) 479 ms    R Axis 27 degrees    T Axis 92 degrees       Radiology:     Chest  Xray:  No results found for this or any previous visit. Results for orders placed during the hospital encounter of 06/11/19   XR CHEST PORTABLE    Narrative EXAM:    XR Chest, 1 View     EXAM DATE/TIME:    6/12/2019 2:15 AM     CLINICAL HISTORY:    66years old, female; Other vascular access device placement or adjustment;   Central line, non-tunnelled     TECHNIQUE:    Imaging protocol: XR of the chest, 1 view. COMPARISON:    DX XR CHEST PORTABLE 6/11/2019 7:54 PM     FINDINGS:    Tubes, catheters and devices: EKG leads overlie the chest.    Lungs: Peribronchial cuffing, mild prominence and indistinctness of the   central pulmonary vasculature and perihilar hazy and strandy opacities suggests   cardiac decompensation or volume overload. Pleural space: Unremarkable. No pleural effusion. No pneumothorax. Heart/Mediastinum: Unremarkable. No cardiomegaly. Vasculature: A central venous line is placed via the right internal jugular   vein with its tip at the level of the superior vena cava. Bones/joints: Status post right shoulder replacement. Status post median   sternotomy. Impression Findings suggesting cardiac decompensation or volume overload. This report has been electronically signed by Natacha Oliveira MD.       Other:  none      ASSESSMENT:      Principal Problem:    Acute delirium  Active Problems:    Peritoneal dialysis catheter infection ([address]    Sepsis ([address]    Type 2 diabetes mellitus with diabetic chronic kidney disease ([address]    History of peritoneal dialysis    End stage renal disease ([address]    Altered mental status    Essential hypertension, benign  Resolved Problems:    * No resolved hospital problems.  *      PLAN:    ATB per ID  Renal replacement therapy per Nephrology    Aniya Tse  7:00 AM  6/12/2019

## 2019-06-12 NOTE — ED PROVIDER NOTES
The patient is a 75-year-old female presenting with altered mental status. Patient has a history of peritoneal dialysis, sepsis, type II diabetes, and end-stage renal disease. Patient was reported that she did not finish peritoneal dialysis today and  found her disoriented with a dialysis port open. Dialysis bag stated by the  should have more fluid in it. Patient anxious and thrashing and moving around upon arrival. HPI is limited due the patient's condition. Patient's  at bedside states that she has a history of peritonitis and presented similarly in the past to her present presentation. The history is limited by the condition of the patient. Review of Systems   Unable to perform ROS: Mental status change       Physical Exam   Constitutional: She appears well-developed. She appears distressed. HENT:   Head: Normocephalic and atraumatic. Eyes: Pupils are equal, round, and reactive to light. Conjunctivae and EOM are normal.   Neck: Normal range of motion. No JVD present. Cardiovascular: Normal rate, regular rhythm, normal heart sounds and intact distal pulses. Pulmonary/Chest: Effort normal and breath sounds normal. No respiratory distress. She has no wheezes. Abdominal: Bowel sounds are normal. She exhibits no distension and no mass. There is generalized tenderness. There is no guarding. Generalized tenderness to palpation to the abdomen. Dialysis catheter in place in right lower quadrant of abdomen. No surrounding erythema present. No leakage. Musculoskeletal: She exhibits no edema. Neurological: She is disoriented. GCS eye subscore is 4. GCS verbal subscore is 2. GCS motor subscore is 5. Range of motion in upper and lower extremities. Skin: Skin is warm and dry. Skin jaundiced   Psychiatric: She has a normal mood and affect. Nursing note and vitals reviewed.       Procedures    MDM    ED Course as of Jun 12 1443   Tue Jun 11, 2019 2102 Lactic Acid, Sepsis(!): 3.7 [WL]   2113 WBC(!): 12.7 [WL]   2113 Lactic Acid, Sepsis(!): 3.7 [WL]   2113 Sodium(!): 131 [WL]   2113 Potassium(!): 6.1 [WL]   2146 9:46 PM  I received this patient at sign out from Dr. Ghazala Thorpe. I have discussed the patient's initial exam, treatment and plan of care with the out going physician. I have introduced my self to the patient / family and have answered their questions to this point. I have examined the patient myself and reviewed ordered tests / medications and  reviewed any available results to this point. If a resident is involved in the Emergency Department care, I have discussed my findings and plan with them as well. [KS]   Wed Jun 12, 2019   0206 Central Line Placement Procedure Note    Indication: vascular access, poor peripheral access, long term access and centrally administered medications    Consent: Unable to be obtained due to the emergent nature of this procedure. Procedure: The patient was positioned appropriately and the skin over the right internal jugular vein was prepped with betadine and draped in a sterile fashion. Local anesthesia was obtained by infiltration using 1% Lidocaine without epinephrine. A large bore needle was used to identify the vein. A guide wire was then inserted into the vein through the needle. A triple lumen catheter was then inserted into the vessel over the guide wire using the Seldinger technique. All ports showed good, free flowing blood return and were flushed with saline solution. The catheter was then securely fastened to the skin with suture at 16 cm. Two sutures were placed into the kit included tube clamp\", \"proximal eyelets\", \"a suture end from each of the securing sutures was extended around the catheter and tied to the proximal eyelets as an added measure to prevent dislodgement. An antibiotic disk was placed and the site was then covered with a sterile dressing.   A post procedure X-ray was ordered and showed good line position. The patient tolerated the procedure well. Complications: None          [KS]      ED Course User Index  [KS] Trenton Phalen, DO  [WL] 1024 [facility],      2130 Spoke with Dr. Mark Marquez, who agreed to admit the patient. 2123 spoke with Dr. Katheryn Ramos, nephrology, he stated he will put in some orders and the dialysis nurse will be here shortly to obtain cultures. 9:54 PM patient was signed out to oncoming physician. I have signed this patient out to the oncoming physician, Dr. Bonnie Owen. I have discussed the patient's initial exam, treatment and plan of care with the on coming physician. I have notified the patient / family of the change in treating physician and answered their questions to this point.              1024 [facility]  Resident  06/13/19 6564

## 2019-06-12 NOTE — ED NOTES
Pt thrashing in bed restless.  Dr Mauricio Sharma notified      Saundra Sebastian, RN  06/11/19 3048

## 2019-06-12 NOTE — ED NOTES
Pt tossing and turning in bed.  Unable to remain still while performing EKG     Jose Martin Mahmood RN  06/11/19 1111

## 2019-06-12 NOTE — FLOWSHEET NOTE
CAPD exchange complete using aseptic technique, pt was a fill only with 1.5% 2L .  Pt tolerated well, drsg changed to pd site

## 2019-06-12 NOTE — CONSULTS
Inpatient consult to Infectious Diseases  Consult performed by: Geovany Stoner MD  Consult ordered by: Jamar Santos MD        [address] [place] [address] [facility]  Consultation Note     Admit Date: 6/11/2019  7:21 PM    Reason for Consult:   Recurrent spontaneous bacterial peritonitis    Attending Physician:  Jamar Santos MD    HISTORY OF PRESENT ILLNESS:             The history is obtained from extensive review of available past medical records. The patient is a 66 y.o. female who is known to the ID service. The patient has a history of ESRD and is currently on home peritoneal dialysis. According to her  he had come back from [facility] and found her lying on the bed with the PD catheter disconnected and patient being confused and moaning. She was brought into the ED on 6/11/2019. White count was 12.7. Blood pressure was high and she was afebrile. Peritoneal fluid was sent out for cultures. She received a dose of intraperitoneal Vancomycin. The patient reports no abdominal pain. Her  says that she has been coughing since January and was told that it could be because of some of her medications. There is no history of fevers, chills, nausea, diarrhea or vomiting at home. No other complaints. The peritoneal fluid showed a paucity of WBCs. Past Medical History:        Diagnosis Date    Anemia     Arthritis     CAD (coronary artery disease)     Diabetes mellitus ([address]     Gout     History of bleeding peptic ulcer approx 2015    Hypertension     Peritoneal dialysis [facility] in [facility])     Peritoneal dialysis status ([address]     at night for 8 hours    Thyroid disease     Use of cane as ambulatory aid     Wears glasses      December 2015. Admitted to [facility] with acute abdominal pain, fever and an elevated white count. She was on peritoneal dialysis in the dialysate was cloudy.   Treated for PD catheter associated peritonitis with Vancomycin and Gentamicin. Cultures grew vancomycin sensitive Enterococcus faecalis. Seen by ID and continued intraperitoneal Vancomycin and Gentamicin to salvage the PD catheter. The patient was discharged on 3 weeks of intraperitoneal antibiotics. She followed up in the office and was doing well.     Past Surgical History:        Procedure Laterality Date    BACK SURGERY      CARDIOVASCULAR STRESS TEST      CARPAL TUNNEL RELEASE Bilateral     CATARACT REMOVAL WITH IMPLANT Bilateral      SECTION      CORONARY ARTERY BYPASS GRAFT  approx 2000    x 3; per Dr Aj Delgado, COLON, DIAGNOSTIC      JOINT REPLACEMENT      left knee, right shoulder    [place] TOTAL KNEE ARTHROPLASTY Right 10/31/2018    RIGHT KNEE TOTAL ARTHROPLASTY +++BRIDGER++ PNB++ performed by Sri White MD at [place] [address] ENDOSCOPY       Current Medications:   Scheduled Meds:   isosorbide mononitrate  30 mg Oral Daily    levothyroxine  50 mcg Oral Daily    sevelamer  1,600 mg Oral TID    losartan  100 mg Oral Daily    metoprolol succinate  50 mg Oral Daily    insulin glargine  20 Units Subcutaneous BID    [START ON 2019] DULoxetine  30 mg Oral Daily    calcium gluconate IVPB  1 g Intravenous Once     Continuous Infusions:   dextrose       PRN Meds:hydrALAZINE, glucose, dextrose, glucagon (rDNA), dextrose    Allergies:  Sulfa antibiotics    Social History:   Social History     Socioeconomic History    Marital status:      Spouse name: Not on file    Number of children: Not on file    Years of education: Not on file    Highest education level: Not on file   Occupational History    Not on file   Social Needs    Financial resource strain: Not on file    Food insecurity:     Worry: Not on file     Inability: Not on file    Transportation needs:     Medical: Not on file     Non-medical: Not on file   Tobacco Use    Smoking status: Never Smoker    Smokeless tobacco: Never Used   Substance and Sexual Activity    Alcohol use: No    Drug use: No    Sexual activity: Not on file   Lifestyle    Physical activity:     Days per week: Not on file     Minutes per session: Not on file    Stress: Not on file   Relationships    Social connections:     Talks on phone: Not on file     Gets together: Not on file     Attends Voodoo service: Not on file     Active member of club or organization: Not on file     Attends meetings of clubs or organizations: Not on file     Relationship status: Not on file    Intimate partner violence:     Fear of current or ex partner: Not on file     Emotionally abused: Not on file     Physically abused: Not on file     Forced sexual activity: Not on file   Other Topics Concern    Not on file   Social History Narrative    Not on file      Pets: No  Travel: No  Patient is  and lives at home with her     Family History:   No family history on file. . Otherwise non-pertinent to the chief complaint. REVIEW OF SYSTEMS:    Constitutional: Negative for fevers, chills, diaphoresis  Neurologic: Obtundation  Psychiatric: Negative  Rheumatologic: Negative   Endocrine: Negative  Hematologic: Negative  Immunologic: Negative  ENT: Negative  Respiratory: Negative   Cardiovascular: Negative  GI: Negative  : Negative  Musculoskeletal: Negative  Skin: No rashes. PHYSICAL EXAM:    Vitals:   BP (!) 107/42   Pulse 80   Temp 99.2 °F (37.3 °C) (Axillary)   Resp 18   Ht 4' 11\" (1.499 m)   Wt 160 lb 12.8 oz (72.9 kg)   SpO2 98%   BMI 32.48 kg/m²   Constitutional: The patient is lying in bed.  present. The patient is somewhat lethargic but arousable. Answers questions, smiles and follows commands. She goes back to sleep. Skin: Warm and dry. No rashes were noted. HEENT: Eyes show round, and reactive pupils. No jaundice. Moist mucous membranes, no ulcerations, no thrush. Neck: Supple to movements. No lymphadenopathy.    Chest: No use of accessory muscles to breathe. Symmetrical expansion. Auscultation reveals no wheezing, crackles, or rhonchi. Cardiovascular: S1 and S2 are rhythmic and regular. No murmurs appreciated. Abdomen: Positive bowel sounds to auscultation. Benign to palpation. No masses felt. No hepatosplenomegaly. Extremities: No clubbing, no cyanosis, no edema. Lines: peripheral      CBC+dif:  Recent Labs     06/11/19 1951 06/12/19  0908   WBC 12.7* 14.0*   HGB 11.5 9.0*   HCT 33.2* 26.9*   MCV 95.4 98.9    276   NEUTROABS 9.51* 10.28*     Lab Results   Component Value Date    CRP 21.1 (H) 12/21/2015      No results found for: CRPHS  Lab Results   Component Value Date    SEDRATE 109 (H) 12/21/2015     Lab Results   Component Value Date    ALT 19 06/11/2019    AST 23 06/11/2019    ALKPHOS 117 (H) 06/11/2019    BILITOT 0.2 06/11/2019     Lab Results   Component Value Date     06/12/2019    K 5.5 06/12/2019    CL 98 06/12/2019    CO2 23 06/12/2019    BUN 57 06/12/2019    CREATININE 7.5 06/12/2019    GFRAA 6 06/12/2019    LABGLOM 5 06/12/2019    GLUCOSE 159 06/12/2019    PROT 6.9 06/11/2019    LABALBU 3.7 06/11/2019    CALCIUM 8.4 06/12/2019    BILITOT 0.2 06/11/2019    ALKPHOS 117 06/11/2019    AST 23 06/11/2019    ALT 19 06/11/2019       Lab Results   Component Value Date    PROTIME 11.9 06/11/2019    INR 1.0 06/11/2019       No results found for: TSH    No results found for: NITRITE, COLORU, PHUR, LABCAST, WBCUA, RBCUA, MUCUS, TRICHOMONAS, YEAST, BACTERIA, CLARITYU, SPECGRAV, LEUKOCYTESUR, UROBILINOGEN, BILIRUBINUR, BLOODU, GLUCOSEU, AMORPHOUS    No results found for: HCO3, BE, O2SAT, PH, THGB, PCO2, PO2, TCO2  Radiology:  Noted    Microbiology:  Pending  No results for input(s): BC in the last 72 hours. No results for input(s): ORG in the last 72 hours. No results for input(s): Hector Brewer in the last 72 hours. No results for input(s): STREPNEUMAGU in the last 72 hours. No results for input(s): LP1UAG in the last 72 hours.   No results for input(s): ASO in the last 72 hours. No results for input(s): CULTRESP in the last 72 hours. No results for input(s): PROCAL in the last 72 hours. Assessment:  · Decreased level of consciousness. Etiology unclear  · No evidence of peritonitis, either spontaneous or secondary to PD catheter  · Leukocytosis  · Chronic cough    Plan:    · CT of the head  · Check ammonia level  · Respiratory panel  · Check cultures, baseline ESR, CRP  · Monitor labs  · Will follow with you    Thank you for having us see this patient in consultation. I will be discussing this case with the treating physicians.     April Snowball  12:05 PM  6/12/2019

## 2019-06-12 NOTE — CONSULTS
Consults   [facility] [employer]. Roberto A. Patrina Boeck, MD Marnee Dural, MD Marcha Fuel, MD Kaaren Kanaris, MD Christophe Gayer, CNP  Consultation  Patient's Name: Avinash Contreras  11:17 AM  6/12/2019    Nephrologist: Isaac Ruvalcaba MD    Reason for Consult: ESRD management  Requesting Physician:  Uri Oh MD    Chief Complaint: Acute mental status change    History Obtained From: Patient's , chart    History of Present Ilness:     Mrs. Becky Fragoso is a pleasant 70-year-old woman known to me from outpatient practice and followed for ESRD. She presented her  found her unresponsive. At the time she is apparently beginning to set up her CCPD cycler for the evening/night dialysis treatments. Her catheter was dangling, and the cap was open. He brought her to the ER. He notes that she has not had recent fever, chills, nausea or vomiting, abdominal pain or cramping, diarrhea or constipation, cloudy fluid or any blood in her dialysis effluent. He did note to the ER physician that in the past when she had an episode of peritonitis she had also been obtunded; hence, the believe that she had developed CAPD-associated peritonitis. Last night we performed one exchange, administering 1 g of vancomycin and 1 g of cefepime and her peritoneal fluid. We allowed it to do well overnight. Fluid this morning was clear. Cell count differential are noted (normal). Gram stain and culture is pending at this time. At the time of my initial evaluation she was awake, still confused, disoriented to time and clinical scenario. Still had no abdominal pain.       Past Medical History:   Diagnosis Date    Anemia     Arthritis     CAD (coronary artery disease)     Diabetes mellitus [facility]     Gout     History of bleeding peptic ulcer approx 2015    Hypertension     Peritoneal dialysis catheter in place [facility])     Peritoneal dialysis status ([address]     at night for 8 hours    Thyroid disease     Use of cane as ambulatory aid     Wears glasses        Past Surgical History:   Procedure Laterality Date    BACK SURGERY      CARDIOVASCULAR STRESS TEST      CARPAL TUNNEL RELEASE Bilateral     CATARACT REMOVAL WITH IMPLANT Bilateral      SECTION      CORONARY ARTERY BYPASS GRAFT  approx 2000    x 3; per Dr Kaleb Valerio COLON, DIAGNOSTIC     [address]      left knee, right shoulder    [place] TOTAL KNEE ARTHROPLASTY Right 10/31/2018    RIGHT KNEE TOTAL ARTHROPLASTY +++BRIDGER++ PNB++ performed by Laurie Gentile MD at [place] [address]         No family history on file. reports that she has never smoked. She has never used smokeless tobacco. She reports that she does not drink alcohol or use drugs. Allergies:  Sulfa antibiotics    Current Medications:      isosorbide mononitrate (IMDUR) extended release tablet 30 mg Daily   levothyroxine (SYNTHROID) tablet 50 mcg Daily   sevelamer (RENVELA) tablet 1,600 mg TID   hydrALAZINE (APRESOLINE) tablet 25 mg PRN   losartan (COZAAR) tablet 100 mg Daily   metoprolol succinate (TOPROL XL) extended release tablet 50 mg Daily   insulin glargine (LANTUS) injection vial 20 Units BID   glucose (GLUTOSE) 40 % oral gel 15 g PRN   dextrose 50 % IV solution PRN   glucagon (rDNA) injection 1 mg PRN   dextrose 5 % solution PRN   [START ON 2019] DULoxetine (CYMBALTA) extended release capsule 30 mg Daily   calcium gluconate 1 g in dextrose 5 % 100 mL IVPB Once       Review of Systems:   A comprehensive review of systems was negative.     Physical exam:   BP (!) 164/82   Pulse 106   Temp 97.4 °F (36.3 °C) (Oral)   Resp 20   Ht 4' 11\" (1.499 m)   Wt 165 lb 8 oz (75.1 kg)   SpO2 99%   BMI 33.43 kg/m²   In no apparent distress  NC/AT EOMI sclera conjunctiva clear and anicteric mucous members are moist  Neck soft supple trachea midline no carotid bruit no JVD  CTA bilateral  Regular rhythm normal S1 and S2  Abdomen soft nontender FERRITIN:    Lab Results   Component Value Date    FERRITIN 699 11/01/2018        Imaging:  XR CHEST PORTABLE   Final Result   Findings suggesting cardiac decompensation or volume overload. This report has been electronically signed by Michael Wilcox MD.      XR CHEST PORTABLE   Final Result      No evidence for acute cardiopulmonary process. Assessment  1. ESRD  2. Anemia  3. Hyperkalemia  4. HTN  5. Secondary HPTH  6. Acute mental status change    Note:  She does not have peritonitis -- nothing in her history, signs, or symptoms or her laboratory evaluation to indicate peritonitis. She did have a potential contamination, for which we would ordinarily treat with empiric prophylactic antibiotics. The vanco and cefepime administered IP last night and allowed to dwell > 6 hrs is more than enough. Recommendation  1. CAPD -- 1 manual exchange today  2. Resume CCPD tonight per cycler as per usual orders, all 1.5% dex solutions  3. Abilio gluc 1 g iv x 1      Thank you for the opportunity to participate in the care of your pleasant patient. We look forward to following along with you.     Electronically signed by Elias Mckeon MD on 6/12/2019 at 11:17 AM

## 2019-06-13 ENCOUNTER — APPOINTMENT (OUTPATIENT)
Dept: CT IMAGING | Age: 79
DRG: 947 | End: 2019-06-13
Payer: MEDICARE

## 2019-06-13 LAB
ANION GAP SERPL CALCULATED.3IONS-SCNC: 20 MMOL/L (ref 7–16)
BASOPHILS ABSOLUTE: 0.06 E9/L (ref 0–0.2)
BASOPHILS RELATIVE PERCENT: 0.5 % (ref 0–2)
BILIRUBIN URINE: NEGATIVE
BLOOD, URINE: ABNORMAL
BUN BLDV-MCNC: 49 MG/DL (ref 8–23)
CALCIUM SERPL-MCNC: 9.4 MG/DL (ref 8.6–10.2)
CHLORIDE BLD-SCNC: 96 MMOL/L (ref 98–107)
CLARITY: ABNORMAL
CO2: 26 MMOL/L (ref 22–29)
COLOR: ABNORMAL
CREAT SERPL-MCNC: 7.4 MG/DL (ref 0.5–1)
EOSINOPHILS ABSOLUTE: 0.36 E9/L (ref 0.05–0.5)
EOSINOPHILS RELATIVE PERCENT: 2.8 % (ref 0–6)
GFR AFRICAN AMERICAN: 6
GFR NON-AFRICAN AMERICAN: 5 ML/MIN/1.73
GLUCOSE BLD-MCNC: 197 MG/DL (ref 74–99)
GLUCOSE URINE: 250 MG/DL
GRAM STAIN ORDERABLE: NORMAL
HCT VFR BLD CALC: 29.3 % (ref 34–48)
HEMOGLOBIN: 9.9 G/DL (ref 11.5–15.5)
IMMATURE GRANULOCYTES #: 0.07 E9/L
IMMATURE GRANULOCYTES %: 0.5 % (ref 0–5)
KETONES, URINE: NEGATIVE MG/DL
LEUKOCYTE ESTERASE, URINE: ABNORMAL
LYMPHOCYTES ABSOLUTE: 1.74 E9/L (ref 1.5–4)
LYMPHOCYTES RELATIVE PERCENT: 13.6 % (ref 20–42)
MCH RBC QN AUTO: 32.9 PG (ref 26–35)
MCHC RBC AUTO-ENTMCNC: 33.8 % (ref 32–34.5)
MCV RBC AUTO: 97.3 FL (ref 80–99.9)
METER GLUCOSE: 191 MG/DL (ref 74–99)
METER GLUCOSE: 212 MG/DL (ref 74–99)
METER GLUCOSE: 234 MG/DL (ref 74–99)
METER GLUCOSE: 254 MG/DL (ref 74–99)
MONOCYTES ABSOLUTE: 1.06 E9/L (ref 0.1–0.95)
MONOCYTES RELATIVE PERCENT: 8.3 % (ref 2–12)
NEUTROPHILS ABSOLUTE: 9.47 E9/L (ref 1.8–7.3)
NEUTROPHILS RELATIVE PERCENT: 74.3 % (ref 43–80)
NITRITE, URINE: NEGATIVE
PDW BLD-RTO: 13.6 FL (ref 11.5–15)
PH UA: 8.5 (ref 5–9)
PLATELET # BLD: 319 E9/L (ref 130–450)
PMV BLD AUTO: 9.9 FL (ref 7–12)
POTASSIUM SERPL-SCNC: 4.1 MMOL/L (ref 3.5–5)
PROTEIN UA: >=300 MG/DL
RBC # BLD: 3.01 E12/L (ref 3.5–5.5)
SODIUM BLD-SCNC: 142 MMOL/L (ref 132–146)
SPECIFIC GRAVITY UA: 1.01 (ref 1–1.03)
UROBILINOGEN, URINE: 0.2 E.U./DL
WBC # BLD: 12.8 E9/L (ref 4.5–11.5)

## 2019-06-13 PROCEDURE — 90945 DIALYSIS ONE EVALUATION: CPT | Performed by: INTERNAL MEDICINE

## 2019-06-13 PROCEDURE — 82962 GLUCOSE BLOOD TEST: CPT

## 2019-06-13 PROCEDURE — 85025 COMPLETE CBC W/AUTO DIFF WBC: CPT

## 2019-06-13 PROCEDURE — 80048 BASIC METABOLIC PNL TOTAL CA: CPT

## 2019-06-13 PROCEDURE — 6370000000 HC RX 637 (ALT 250 FOR IP): Performed by: INTERNAL MEDICINE

## 2019-06-13 PROCEDURE — 87088 URINE BACTERIA CULTURE: CPT

## 2019-06-13 PROCEDURE — 70450 CT HEAD/BRAIN W/O DYE: CPT

## 2019-06-13 PROCEDURE — 36415 COLL VENOUS BLD VENIPUNCTURE: CPT

## 2019-06-13 PROCEDURE — 2060000000 HC ICU INTERMEDIATE R&B

## 2019-06-13 PROCEDURE — 81001 URINALYSIS AUTO W/SCOPE: CPT

## 2019-06-13 RX ORDER — HYDRALAZINE HYDROCHLORIDE 25 MG/1
TABLET, FILM COATED ORAL
Status: COMPLETED
Start: 2019-06-13 | End: 2019-06-14

## 2019-06-13 RX ADMIN — ISOSORBIDE MONONITRATE 30 MG: 30 TABLET, EXTENDED RELEASE ORAL at 09:02

## 2019-06-13 RX ADMIN — SEVELAMER CARBONATE 1600 MG: 800 TABLET, FILM COATED ORAL at 09:01

## 2019-06-13 RX ADMIN — LOSARTAN POTASSIUM 100 MG: 50 TABLET, FILM COATED ORAL at 09:02

## 2019-06-13 RX ADMIN — INSULIN GLARGINE 20 UNITS: 100 INJECTION, SOLUTION SUBCUTANEOUS at 09:02

## 2019-06-13 RX ADMIN — DULOXETINE HYDROCHLORIDE 30 MG: 30 CAPSULE, DELAYED RELEASE ORAL at 09:01

## 2019-06-13 RX ADMIN — LEVOTHYROXINE SODIUM 50 MCG: 50 TABLET ORAL at 06:41

## 2019-06-13 RX ADMIN — METOPROLOL SUCCINATE 50 MG: 50 TABLET, EXTENDED RELEASE ORAL at 09:02

## 2019-06-13 RX ADMIN — INSULIN GLARGINE 20 UNITS: 100 INJECTION, SOLUTION SUBCUTANEOUS at 22:47

## 2019-06-13 ASSESSMENT — PAIN SCALES - GENERAL
PAINLEVEL_OUTOF10: 0
PAINLEVEL_OUTOF10: 0

## 2019-06-13 NOTE — PROGRESS NOTES
years old, female; Other vascular access device placement or adjustment;   Central line, non-tunnelled     TECHNIQUE:    Imaging protocol: XR of the chest, 1 view. COMPARISON:    DX XR CHEST PORTABLE 6/11/2019 7:54 PM     FINDINGS:    Tubes, catheters and devices: EKG leads overlie the chest.    Lungs: Peribronchial cuffing, mild prominence and indistinctness of the   central pulmonary vasculature and perihilar hazy and strandy opacities suggests   cardiac decompensation or volume overload. Pleural space: Unremarkable. No pleural effusion. No pneumothorax. Heart/Mediastinum: Unremarkable. No cardiomegaly. Vasculature: A central venous line is placed via the right internal jugular   vein with its tip at the level of the superior vena cava. Bones/joints: Status post right shoulder replacement. Status post median   sternotomy. Impression Findings suggesting cardiac decompensation or volume overload.      This report has been electronically signed by Rosa Perez MD.       Additional Imaging:  none    Lab Review   Recent Results (from the past 24 hour(s))   BODY FLUID CELL COUNT WITH DIFFERENTIAL    Collection Time: 06/12/19  7:45 AM   Result Value Ref Range    Cell Count Fluid Type peritoneal     Color, Fluid Pale Yellow     Appearance, Fluid Clear     Nucl Cell, Fluid 28 /uL    RBC, Fluid <2,000 /uL    Neutrophil Count, Fluid 0 %    Monocyte Count, Fluid 100 %   CBC WITH AUTO DIFFERENTIAL    Collection Time: 06/12/19  9:08 AM   Result Value Ref Range    WBC 14.0 (H) 4.5 - 11.5 E9/L    RBC 2.72 (L) 3.50 - 5.50 E12/L    Hemoglobin 9.0 (L) 11.5 - 15.5 g/dL    Hematocrit 26.9 (L) 34.0 - 48.0 %    MCV 98.9 80.0 - 99.9 fL    MCH 33.1 26.0 - 35.0 pg    MCHC 33.5 32.0 - 34.5 %    RDW 14.0 11.5 - 15.0 fL    Platelets 311 870 - 575 E9/L    MPV 9.6 7.0 - 12.0 fL    Neutrophils % 73.3 43.0 - 80.0 %    Immature Granulocytes % 0.6 0.0 - 5.0 %    Lymphocytes % 15.9 (L) 20.0 - 42.0 %    Monocytes % 8.8 2.0 - Value Ref Range    Sodium 135 132 - 146 mmol/L    Potassium 4.9 3.5 - 5.0 mmol/L    Chloride 93 (L) 98 - 107 mmol/L    CO2 25 22 - 29 mmol/L    Anion Gap 17 (H) 7 - 16 mmol/L    Glucose 285 (H) 74 - 99 mg/dL    BUN 56 (H) 8 - 23 mg/dL    CREATININE 7.8 (H) 0.5 - 1.0 mg/dL    GFR Non-African American 5 >=60 mL/min/1.73    GFR African American 6     Calcium 9.7 8.6 - 10.2 mg/dL   POCT Glucose    Collection Time: 06/12/19 10:02 PM   Result Value Ref Range    Meter Glucose 319 (H) 74 - 99 mg/dL   CBC WITH AUTO DIFFERENTIAL    Collection Time: 06/13/19  5:55 AM   Result Value Ref Range    WBC 12.8 (H) 4.5 - 11.5 E9/L    RBC 3.01 (L) 3.50 - 5.50 E12/L    Hemoglobin 9.9 (L) 11.5 - 15.5 g/dL    Hematocrit 29.3 (L) 34.0 - 48.0 %    MCV 97.3 80.0 - 99.9 fL    MCH 32.9 26.0 - 35.0 pg    MCHC 33.8 32.0 - 34.5 %    RDW 13.6 11.5 - 15.0 fL    Platelets 698 901 - 993 E9/L    MPV 9.9 7.0 - 12.0 fL    Neutrophils % 74.3 43.0 - 80.0 %    Immature Granulocytes % 0.5 0.0 - 5.0 %    Lymphocytes % 13.6 (L) 20.0 - 42.0 %    Monocytes % 8.3 2.0 - 12.0 %    Eosinophils % 2.8 0.0 - 6.0 %    Basophils % 0.5 0.0 - 2.0 %    Neutrophils # 9.47 (H) 1.80 - 7.30 E9/L    Immature Granulocytes # 0.07 E9/L    Lymphocytes # 1.74 1.50 - 4.00 E9/L    Monocytes # 1.06 (H) 0.10 - 0.95 E9/L    Eosinophils # 0.36 0.05 - 0.50 E9/L    Basophils # 0.06 0.00 - 0.20 R0/I   Basic metabolic panel    Collection Time: 06/13/19  5:55 AM   Result Value Ref Range    Sodium 142 132 - 146 mmol/L    Potassium 4.1 3.5 - 5.0 mmol/L    Chloride 96 (L) 98 - 107 mmol/L    CO2 26 22 - 29 mmol/L    Anion Gap 20 (H) 7 - 16 mmol/L    Glucose 197 (H) 74 - 99 mg/dL    BUN 49 (H) 8 - 23 mg/dL    CREATININE 7.4 (H) 0.5 - 1.0 mg/dL    GFR Non-African American 5 >=60 mL/min/1.73    GFR African American 6     Calcium 9.4 8.6 - 10.2 mg/dL   POCT Glucose    Collection Time: 06/13/19  6:40 AM   Result Value Ref Range    Meter Glucose 191 (H) 74 - 99 mg/dL     Assessment:     Principal Problem:    Acute delirium  Active Problems:    Peritoneal dialysis catheter infection ([facility]    Sepsis ([facility]    Type 2 diabetes mellitus with diabetic chronic kidney disease ([facility]    History of peritoneal dialysis    End stage renal disease (HCC)    Altered mental status    Essential hypertension, benign  Resolved Problems:    * No resolved hospital problems.  *      Plan:   Much better   Home when OK with renal and ID  Viktor Hendrix  6/13/2019  7:00 AM

## 2019-06-13 NOTE — CARE COORDINATION
Social Work / Discharge Planning : SW attempted to meet with patient but patient soundly sleeping. Patient resides with her  in a one story home. Patient has a cane, bedside commode and shower seat. Patient has a SNF hx at [facility] as well as [facility]. Patient PCP is Dr. Cedric Rodas and pharmacy is [facility]. Patient has peritoneal dialysis every night at home. SW contacted Hakan Huynh at [facility] dialysis center at [phone] fax [phone]. Isidro Parson will need to be updated regarding discharge date and plan. Patient does have therapy ordered and await therapy in put to better assist with discharge planning. SW to follow.  Electronically signed by JAN Prabhakar on 6/13/19 at 9:53 AM

## 2019-06-13 NOTE — PLAN OF CARE
Problem: Falls - Risk of:  Goal: Will remain free from falls  Description  Will remain free from falls  6/13/2019 0116 by Carole Jackson RN  Outcome: Met This Shift     Problem: Falls - Risk of:  Goal: Absence of physical injury  Description  Absence of physical injury  6/13/2019 0116 by Carole Jackson RN  Outcome: Met This Shift

## 2019-06-13 NOTE — PROGRESS NOTES
[address] [place] [address] [facility]  Progress Note    SUBJECTIVE:  Chief Complaint   Patient presents with    Altered Mental Status     The patient is feeling much better today. Does not have much recollection of the past couple of days. Denies any abdominal pain. No nausea or vomiting. No diarrhea. No dyspnea. Remains afebrile. Review of systems:  As stated above in the chief complaint, otherwise negative. Medications:  Scheduled Meds:   isosorbide mononitrate  30 mg Oral Daily    levothyroxine  50 mcg Oral Daily    sevelamer  1,600 mg Oral TID    losartan  100 mg Oral Daily    metoprolol succinate  50 mg Oral Daily    insulin glargine  20 Units Subcutaneous BID    DULoxetine  30 mg Oral Daily     Continuous Infusions:   dextrose       PRN Meds:hydrALAZINE, glucose, dextrose, glucagon (rDNA), dextrose    OBJECTIVE:  BP (!) 164/82   Pulse 106   Temp 97.4 °F (36.3 °C) (Oral)   Resp 20   Ht 4' 11\" (1.499 m)   Wt 165 lb 8 oz (75.1 kg)   SpO2 99%   BMI 33.43 kg/m²   Temp  Av.9 °F (36.6 °C)  Min: 97.4 °F (36.3 °C)  Max: 98.3 °F (36.8 °C)  Constitutional: The patient is awake, alert, and oriented. No distress. Skin: Warm and dry. No rashes were noted. HEENT: Eyes show round, and reactive pupils. No jaundice. Moist mucous membranes, no ulcerations, no thrush. Neck: Supple to movements. No lymphadenopathy. Chest: No wheezing, crackles, or rhonchi. Cardiovascular: S1 and S2 are rhythmic and regular. No murmurs appreciated. Abdomen: Positive bowel sounds to auscultation. Benign to palpation. PD catheter in place. No evidence of infection. Extremities: No clubbing, no cyanosis, no edema. Lines: Right IJ TLC.     Laboratory and Tests Review:  Lab Results   Component Value Date    WBC 12.8 (H) 2019    WBC 14.0 (H) 2019    WBC 12.7 (H) 2019    HGB 9.9 (L) 2019    HCT 29.3 (L) 2019    MCV 97.3 2019     2019     Lab Results   Component Value Date    NEUTROABS 9.47 (H) 06/13/2019    NEUTROABS 10.28 (H) 06/12/2019    NEUTROABS 9.51 (H) 06/11/2019     Lab Results   Component Value Date    CRP 1.1 (H) 06/12/2019    CRP 21.1 (H) 12/21/2015     No results found for: CRPHS  Lab Results   Component Value Date    SEDRATE 60 (H) 06/12/2019    SEDRATE 109 (H) 12/21/2015     Lab Results   Component Value Date    ALT 19 06/11/2019    AST 23 06/11/2019    ALKPHOS 117 (H) 06/11/2019    BILITOT 0.2 06/11/2019     Lab Results   Component Value Date     06/13/2019    K 4.1 06/13/2019    CL 96 06/13/2019    CO2 26 06/13/2019    BUN 49 06/13/2019    CREATININE 7.4 06/13/2019    CREATININE 7.8 06/12/2019    CREATININE 7.9 06/12/2019    GFRAA 6 06/13/2019    LABGLOM 5 06/13/2019    GLUCOSE 197 06/13/2019    PROT 6.9 06/11/2019    LABALBU 3.7 06/11/2019    CALCIUM 9.4 06/13/2019    BILITOT 0.2 06/11/2019    ALKPHOS 117 06/11/2019    AST 23 06/11/2019    ALT 19 06/11/2019     Radiology:      Microbiology:   Peritoneal fluid 6/11/2019. Gram stain: NOS.   Culture negative so far  Blood cultures 6/12/2019 Negative so far     ASSESSMENT:  · Altered level of consciousness with confusion, improved etiology not well established  · ESRD on PD  · No evidence of peritonitis  · No ongoing infection    PLAN:  · Observe off antibiotics  · Patient can be discharged from ID standpoint  · Remove TLC    Brenda Malagon  11:59 AM  6/13/2019 pt currently in sinus rhythm  monitor for episodes of AF on tele  INR therapeutic today, will dose Coumadin/ F/up INR tmrw In AM pt currently in sinus rhythm  monitor for episodes of AF on tele  INR subtherapeutic today, will dose Coumadin/ F/up INR tmrw In AM

## 2019-06-13 NOTE — FLOWSHEET NOTE
06/13/19 0800   Cycler   Verification of Prescription CCPD   Ultrafiltration (UF) (mL) 928 mL   pt disconnected using aseptic technique. vss.

## 2019-06-13 NOTE — CARE COORDINATION
Met with pt at bedside; introduced myself as an RN case manager and explained my role. Discussed current course of treatment/plan of care. Pt perplexed as to what happened to her at home. Discussed and assured her that we are currently attempting to find a resolution. was alert and oriented x3 during our conversation. During our conversation; pt seemed to pause frequently; searching for her words. Pt states she was independent  PTA,recently returned from [place] where she and her  recently returned for the winter. She states she incurred no medical issues while there. plan is for discharge to home when medically ready. Will follow clinically for any additional discharge needs. Anastasia Harrison.

## 2019-06-13 NOTE — PROGRESS NOTES
[facility] MD Wil Dallas, MD Viridiana Michel, MD Dena Storm, MD Vibha Camarillo, CNP  Progress Note  6/13/2019    SUBJECTIVE:  We are following this patient for end-stage renal failure . 6/13: Feeling much better. Much more awake, alert. Notes that at times she has difficulty finding her words, though is not having this problem this morning. Appetite is good, though has not yet eaten today. No nausea or vomiting. No abdominal pain. No fever or chill. No dyspnea. No chest pain or palpitations. Peritoneal dialysis fluid this morning was clear. No complication with treatment on the cycler overnight.       PROBLEM LIST:    Patient Active Problem List   Diagnosis    Peritoneal dialysis catheter infection ([address]    Sepsis ([address]    SIRS (systemic inflammatory response syndrome) ([address]    Type 2 diabetes mellitus with diabetic chronic kidney disease ([address]    Osteoarthritis of right knee    Arthritis of knee, right    History of peritoneal dialysis    End stage renal disease ([address]    Altered mental status    Acute delirium    Essential hypertension, benign        PAST MEDICAL HISTORY:    Past Medical History:   Diagnosis Date    Anemia     Arthritis     CAD (coronary artery disease)     Diabetes mellitus ([address]     Gout     History of bleeding peptic ulcer approx 2015    Hypertension     Peritoneal dialysis [facility] in place [facility])     Peritoneal dialysis status ([address]     at night for 8 hours    Thyroid disease     Use of cane as ambulatory aid     Wears glasses        MEDS (scheduled):   isosorbide mononitrate  30 mg Oral Daily    levothyroxine  50 mcg Oral Daily    sevelamer  1,600 mg Oral TID    losartan  100 mg Oral Daily    metoprolol succinate  50 mg Oral Daily    insulin glargine  20 Units Subcutaneous BID    DULoxetine  30 mg Oral Daily       MEDS (infusions):   dextrose         MEDS (prn):  hydrALAZINE, glucose, dextrose, glucagon (rDNA), dextrose    DIET:    DIET RENAL;      PHYSICAL EXAM:      Patient Vitals for the past 24 hrs:   BP Temp Temp src Pulse Resp SpO2 Weight   06/13/19 1045 (!) 164/82 -- -- -- -- -- --   06/13/19 0808 (!) 184/86 97.4 °F (36.3 °C) Oral 106 20 99 % --   06/13/19 0552 -- -- -- -- -- -- 165 lb 8 oz (75.1 kg)   06/12/19 2312 134/68 98.1 °F (36.7 °C) Oral 95 20 96 % --   06/12/19 1938 (!) 198/82 97.8 °F (36.6 °C) Oral 101 20 94 % --   06/12/19 1533 -- -- -- -- -- 98 % --   06/12/19 1528 (!) 167/88 98.3 °F (36.8 °C) Axillary 86 20 -- --          Intake/Output Summary (Last 24 hours) at 6/13/2019 1240  Last data filed at 6/13/2019 1232  Gross per 24 hour   Intake 240 ml   Output 928 ml   Net -688 ml       Wt Readings from Last 3 Encounters:   06/13/19 165 lb 8 oz (75.1 kg)   11/03/18 170 lb 13.7 oz (77.5 kg)   10/22/18 161 lb (73 kg)       General:   in no acute distress  Head: normocephalic, atraumatic  Eyes: equally round and reactive  Cardiovascular: regular rate and rhythm, no murmurs, gallops, or rubs  Respiratory:  Clear, no rales, rhochi, or wheezes  Gastrointestinal: soft, nontender, nondistended normal active bowel sounds  Ext: no cyanosis, clubbing, or edema  Neuro: awake, alert, oriented x3  Skin: dry, no rash      DATA:      Recent Labs     06/11/19  1951 06/12/19  0908 06/13/19  0555   WBC 12.7* 14.0* 12.8*   HGB 11.5 9.0* 9.9*   HCT 33.2* 26.9* 29.3*   MCV 95.4 98.9 97.3    276 319     Recent Labs     06/12/19  1430 06/12/19 2015 06/13/19  0555    135 142   K 5.9* 4.9 4.1   CL 95* 93* 96*   CO2 24 25 26   BUN 60* 56* 49*   CREATININE 7.9* 7.8* 7.4*       Iron studies:  Lab Results   Component Value Date    FERRITIN 699 11/01/2018     Bone disease:  Lab Results   Component Value Date    MG 1.5 (L) 11/02/2018    PHOS 6.2 (H) 11/03/2018       Microbiology:  Reviewed    DIALYSIS ORDERS:    Reviewed    Assessment  1. ESRD  2. Anemia  3. Hyperkalemia  4. HTN  5. Secondary HPTH  6.  Acute mental status change, improving. Etiology under investigation, may speculate she developed hypotension after receiving a medicine that her  gave her when he measured her blood pressure to be very high. He does not recall what medicine it was, and is actually not sure whether he did it or not.     Note:  She does not have peritonitis -- nothing in her history, signs, or symptoms or her laboratory evaluation to indicate peritonitis. She did have a potential contamination, for which we would ordinarily treat with empiric prophylactic po antibiotics. The vanco and cefepime administered IP last night and allowed to dwell > 6 hrs is enough.     Recommendation  1. Continue CCPD nightly per cycler as per usual orders, all 1.5% dex solutions for now as does not appear hypervolemic  2. Renal diet  3.  Follow labs, BP        Jenn Bartlett MD  6/13/2019

## 2019-06-14 ENCOUNTER — APPOINTMENT (OUTPATIENT)
Dept: MRI IMAGING | Age: 79
DRG: 947 | End: 2019-06-14
Payer: MEDICARE

## 2019-06-14 LAB
ANION GAP SERPL CALCULATED.3IONS-SCNC: 17 MMOL/L (ref 7–16)
BASOPHILS ABSOLUTE: 0.04 E9/L (ref 0–0.2)
BASOPHILS RELATIVE PERCENT: 0.3 % (ref 0–2)
BODY FLUID CULTURE, STERILE: NORMAL
BUN BLDV-MCNC: 42 MG/DL (ref 8–23)
CALCIUM SERPL-MCNC: 9.1 MG/DL (ref 8.6–10.2)
CHLORIDE BLD-SCNC: 93 MMOL/L (ref 98–107)
CO2: 26 MMOL/L (ref 22–29)
CREAT SERPL-MCNC: 7.3 MG/DL (ref 0.5–1)
EOSINOPHILS ABSOLUTE: 0.11 E9/L (ref 0.05–0.5)
EOSINOPHILS RELATIVE PERCENT: 0.7 % (ref 0–6)
GFR AFRICAN AMERICAN: 7
GFR NON-AFRICAN AMERICAN: 5 ML/MIN/1.73
GLUCOSE BLD-MCNC: 239 MG/DL (ref 74–99)
GRAM STAIN RESULT: NORMAL
HCT VFR BLD CALC: 30.9 % (ref 34–48)
HEMOGLOBIN: 10.6 G/DL (ref 11.5–15.5)
IMMATURE GRANULOCYTES #: 0.09 E9/L
IMMATURE GRANULOCYTES %: 0.6 % (ref 0–5)
LYMPHOCYTES ABSOLUTE: 1.33 E9/L (ref 1.5–4)
LYMPHOCYTES RELATIVE PERCENT: 8.4 % (ref 20–42)
MCH RBC QN AUTO: 32.9 PG (ref 26–35)
MCHC RBC AUTO-ENTMCNC: 34.3 % (ref 32–34.5)
MCV RBC AUTO: 96 FL (ref 80–99.9)
METER GLUCOSE: 177 MG/DL (ref 74–99)
METER GLUCOSE: 197 MG/DL (ref 74–99)
METER GLUCOSE: 246 MG/DL (ref 74–99)
METER GLUCOSE: 304 MG/DL (ref 74–99)
MONOCYTES ABSOLUTE: 1.1 E9/L (ref 0.1–0.95)
MONOCYTES RELATIVE PERCENT: 7 % (ref 2–12)
NEUTROPHILS ABSOLUTE: 13.07 E9/L (ref 1.8–7.3)
NEUTROPHILS RELATIVE PERCENT: 83 % (ref 43–80)
PDW BLD-RTO: 13.5 FL (ref 11.5–15)
PLATELET # BLD: 342 E9/L (ref 130–450)
PMV BLD AUTO: 9.6 FL (ref 7–12)
POTASSIUM SERPL-SCNC: 3.8 MMOL/L (ref 3.5–5)
RBC # BLD: 3.22 E12/L (ref 3.5–5.5)
SODIUM BLD-SCNC: 136 MMOL/L (ref 132–146)
WBC # BLD: 15.7 E9/L (ref 4.5–11.5)

## 2019-06-14 PROCEDURE — 36415 COLL VENOUS BLD VENIPUNCTURE: CPT

## 2019-06-14 PROCEDURE — 97161 PT EVAL LOW COMPLEX 20 MIN: CPT

## 2019-06-14 PROCEDURE — 90945 DIALYSIS ONE EVALUATION: CPT

## 2019-06-14 PROCEDURE — 6370000000 HC RX 637 (ALT 250 FOR IP): Performed by: INTERNAL MEDICINE

## 2019-06-14 PROCEDURE — 80048 BASIC METABOLIC PNL TOTAL CA: CPT

## 2019-06-14 PROCEDURE — 82962 GLUCOSE BLOOD TEST: CPT

## 2019-06-14 PROCEDURE — 70551 MRI BRAIN STEM W/O DYE: CPT

## 2019-06-14 PROCEDURE — 2060000000 HC ICU INTERMEDIATE R&B

## 2019-06-14 PROCEDURE — 97165 OT EVAL LOW COMPLEX 30 MIN: CPT

## 2019-06-14 PROCEDURE — 6370000000 HC RX 637 (ALT 250 FOR IP)

## 2019-06-14 PROCEDURE — 85025 COMPLETE CBC W/AUTO DIFF WBC: CPT

## 2019-06-14 RX ORDER — HYDRALAZINE HYDROCHLORIDE 25 MG/1
25 TABLET, FILM COATED ORAL EVERY 6 HOURS PRN
Status: DISCONTINUED | OUTPATIENT
Start: 2019-06-14 | End: 2019-06-18 | Stop reason: HOSPADM

## 2019-06-14 RX ADMIN — ISOSORBIDE MONONITRATE 30 MG: 30 TABLET, EXTENDED RELEASE ORAL at 09:23

## 2019-06-14 RX ADMIN — HYDRALAZINE HYDROCHLORIDE 25 MG: 25 TABLET, FILM COATED ORAL at 00:06

## 2019-06-14 RX ADMIN — SEVELAMER CARBONATE 1600 MG: 800 TABLET, FILM COATED ORAL at 17:42

## 2019-06-14 RX ADMIN — INSULIN GLARGINE 20 UNITS: 100 INJECTION, SOLUTION SUBCUTANEOUS at 22:52

## 2019-06-14 RX ADMIN — LEVOTHYROXINE SODIUM 50 MCG: 50 TABLET ORAL at 06:10

## 2019-06-14 RX ADMIN — INSULIN GLARGINE 20 UNITS: 100 INJECTION, SOLUTION SUBCUTANEOUS at 09:23

## 2019-06-14 RX ADMIN — DULOXETINE HYDROCHLORIDE 30 MG: 30 CAPSULE, DELAYED RELEASE ORAL at 09:22

## 2019-06-14 RX ADMIN — METOPROLOL SUCCINATE 50 MG: 50 TABLET, EXTENDED RELEASE ORAL at 09:22

## 2019-06-14 RX ADMIN — SEVELAMER CARBONATE 1600 MG: 800 TABLET, FILM COATED ORAL at 09:22

## 2019-06-14 RX ADMIN — SEVELAMER CARBONATE 1600 MG: 800 TABLET, FILM COATED ORAL at 11:24

## 2019-06-14 RX ADMIN — LOSARTAN POTASSIUM 100 MG: 50 TABLET, FILM COATED ORAL at 09:22

## 2019-06-14 ASSESSMENT — PAIN SCALES - GENERAL
PAINLEVEL_OUTOF10: 0

## 2019-06-14 NOTE — PROGRESS NOTES
[address] [place] [address] [facility]  ALAYNAIDA  Progress Note    SUBJECTIVE:  Chief Complaint   Patient presents with    Altered Mental Status     The patient is feeling fair today. Still having tremors and feels either dizzy or lightheaded. No fevers. No abdominal pain. Review of systems:  As stated above in the chief complaint, otherwise negative. Medications:  Scheduled Meds:   isosorbide mononitrate  30 mg Oral Daily    levothyroxine  50 mcg Oral Daily    sevelamer  1,600 mg Oral TID    losartan  100 mg Oral Daily    metoprolol succinate  50 mg Oral Daily    insulin glargine  20 Units Subcutaneous BID    DULoxetine  30 mg Oral Daily     Continuous Infusions:   dextrose       PRN Meds:hydrALAZINE, glucose, dextrose, glucagon (rDNA), dextrose    OBJECTIVE:  BP (!) 156/80   Pulse 104   Temp 97.8 °F (36.6 °C) (Oral)   Resp 18   Ht 4' 11\" (1.499 m)   Wt 168 lb 3.2 oz (76.3 kg)   SpO2 93%   BMI 33.97 kg/m²   Temp  Av.8 °F (36.6 °C)  Min: 97.2 °F (36.2 °C)  Max: 98.1 °F (36.7 °C)  Constitutional: Patient is awake, alert and in no distress. Sitting in chair. Skin: Remain dry. No rash  HEENT: Pupils. No jaundice. No thrush. Neck: Supple. Chest: Breath sounds. No crackles. Cardiovascular: Sounds rhythmic and regular. Abdomen: Positive bowel sounds to auscultation. Benign to palpation. PD catheter in place. No evidence of infection. Extremities: No edema.     Laboratory and Tests Review:  Lab Results   Component Value Date    WBC 12.8 (H) 2019    WBC 14.0 (H) 2019    WBC 12.7 (H) 2019    HGB 9.9 (L) 2019    HCT 29.3 (L) 2019    MCV 97.3 2019     2019     Lab Results   Component Value Date    NEUTROABS 9.47 (H) 2019    NEUTROABS 10.28 (H) 2019    NEUTROABS 9.51 (H) 2019     Lab Results   Component Value Date    CRP 1.1 (H) 2019    CRP 21.1 (H) 2015     No results found for: [facility]  Lab Results

## 2019-06-14 NOTE — PROGRESS NOTES
Occupational Therapy  OCCUPATIONAL THERAPY INITIAL EVALUATION      Date:2019  Patient Name: Crystal Harris  MRN: [id number]  : 1940  Room: [address] [place], [place] [address]        Evaluating OT: Shvia Blanc OTR/L [id number]    AM-PAC Daily Activity Raw Score: 15/24    Recommended Adaptive Equipment: TBD     Comments: Based on patient's functional performance as documented below and prior level of function, patient would benefit from continued skilled OT during/following hospital stay in an effort to increase functional safety, independence with ADLS/IADLS, and overall quality of life    Diagnosis:  Altered mental Status   Past Medical History: gout, peritoneal dialysis catheter. R TKA   Precautions: Falls,      Home Living/PLOF:   Patient lives with , 1 story with 3 steps/rail to enter. Walk in shower   PTA: Assist with ADLs, ambulating with cane     Pain Level: no pain ;   Cognition:  Oriented x3, alert and conversing.   At times during conversation - patient slightly confused and forgetful - but patient aware of her forgetfulness of recalling things     Judgement/safety:  fair     Functional Assessment:   Initial Eval Status  Date: 19 Tx Session   Date:  Short Term Goals  Treatment frequency: PRN   Feeding Set-up   Hand tremor/shakiness noted - picking up cup   Patient aware and stating she didn't have before -      Grooming SBA/set-up,seated   Combing hair   Mod I    UB Dressing Min A  Mod I    LB Dressing Mod A  Assist donning socks   Mod I    Bathing      Toileting      Bed Mobility  Sitting in chair upon entry      Functional Transfers Min A  Sit-stand from chair   Supervision    Functional Mobility Min A,w/walker   approx 4 steps forward - patient wanting to return to chair, fearful of falling   Supervision with good tolerance    Balance Sitting:     Static:  Supervision     Standing: Min A     Activity Tolerance Fair   Good with ADL activity    Visual/  Perceptual Glasses: reading          During eval - patient at times would display body twitching like movement and at times pause in speech   Patient again aware and stating she \"wasn't like this before\"          Strength ROM Additional Info:    RUE  3+/5  Shoulder AROM approx 90 degrees (hx shoulder surgery)    Distally WFLS good  and wfl FMC/dexterity noted during ADL tasks       LUE 3+/5  Shoulder AROM less than 90 degrees    Distally WFLS good  and wfl FMC/dexterity noted during ADL tasks         Hearing: [facility]   Sensation:  No c/o numbness or tingling                             Comments/Treatment: Upon arrival, patient sitting in chair . At end of session, patient returned to chair  with call light and phone within reach, all lines and tubes intact. Pt would benefit from continued skilled OT to increase safety and independence with completion of ADL/IADL tasks for functional independence and quality of life.      Eval Complexity: Low    Assessment of current deficits   Functional mobility [x]  ADLs [x] Strength [x]  Cognition []  Functional transfers  [x] IADLs [x] Safety Awareness [x]  Endurance [x]  Fine Motor Coordination [] Balance [x] Vision/perception [] Sensation []   Gross Motor Coordination [] ROM [] Delirium []                  Motor Control []    Plan of Care:   ADL retraining [x]   Equipment needs [x]   Neuromuscular re-education [x] Energy Conservation Techniques [x]  Functional Transfer training [x] Patient and/or Family Education [x]  Functional Mobility training [x]  Environmental Modifications [x]  Cognitive re-training []   Compensatory techniques for ADLs [x]  Splinting Needs []   Positioning to improve overall function [x]   Therapeutic Activity [x]  Therapeutic Exercise  [x]  Visual/Perceptual: []    Delirium prevention/treatment  []   Other:  []    Rehab Potential: Good for established goals     Patient / Family Goal: return home       Patient and/or family were instructed on functional diagnosis, prognosis/goals and OT plan of

## 2019-06-14 NOTE — PROGRESS NOTES
[facility] Roberto A. Magdalene Isle, MD Margarie Hamman, MD Kimble Clan, MD Elesa Murphy, MD Winfield Broaden, CNP  Progress Note  6/14/2019    SUBJECTIVE:  We are following this patient for end-stage renal failure . 6/13: Feeling much better. Much more awake, alert. Notes that at times she has difficulty finding her words, though is not having this problem this morning. Appetite is good, though has not yet eaten today. No nausea or vomiting. No abdominal pain. No fever or chill. No dyspnea. No chest pain or palpitations. Peritoneal dialysis fluid this morning was clear. No complication with treatment on the cycler overnight. 6/14: Still having some difficulty with word finding. Has developed a tremor, and periodic myoclonic jerks that shake her shoulders and upper extremities sometimes, her whole torso or other times. Some mild discoordination. No gross focal motor deficits. No dyspnea at rest.  No chest pain or palpitations. No abdominal pain. No nausea or vomiting. CCPD last night without event. All tolerated. Fluid remains clear today. Does complain of some mild peripheral swelling.       PROBLEM LIST:    Patient Active Problem List   Diagnosis    Peritoneal dialysis catheter infection ([address]    Sepsis ([address]    SIRS (systemic inflammatory response syndrome) ([address]    Type 2 diabetes mellitus with diabetic chronic kidney disease ([address]    Osteoarthritis of right knee    Arthritis of knee, right    History of peritoneal dialysis    End stage renal disease ([address]    Altered mental status    Acute delirium    Essential hypertension, benign        PAST MEDICAL HISTORY:    Past Medical History:   Diagnosis Date    Anemia     Arthritis     CAD (coronary artery disease)     Diabetes mellitus ([address]     Gout     History of bleeding peptic ulcer approx 2015    Hypertension     Peritoneal dialysis [facility] in place [facility])     Peritoneal dialysis status ([address]     at night for 8 hours    Thyroid disease     Use of cane as ambulatory aid     Wears glasses        MEDS (scheduled):   isosorbide mononitrate  30 mg Oral Daily    levothyroxine  50 mcg Oral Daily    sevelamer  1,600 mg Oral TID    losartan  100 mg Oral Daily    metoprolol succinate  50 mg Oral Daily    insulin glargine  20 Units Subcutaneous BID    DULoxetine  30 mg Oral Daily       MEDS (infusions):   dextrose         MEDS (prn):  hydrALAZINE, glucose, dextrose, glucagon (rDNA), dextrose    DIET:    DIET RENAL;      PHYSICAL EXAM:      Patient Vitals for the past 24 hrs:   BP Temp Temp src Pulse Resp SpO2 Weight   06/14/19 0815 (!) 160/86 98.4 °F (36.9 °C) Oral 94 18 94 % --   06/14/19 0500 -- -- -- -- -- -- 168 lb 3.2 oz (76.3 kg)   06/14/19 0345 (!) 156/80 -- -- -- -- 93 % --   06/14/19 0000 (!) 192/90 97.8 °F (36.6 °C) Oral 104 18 93 % --   06/13/19 2009 (!) 153/78 98.1 °F (36.7 °C) Oral 101 19 93 % --   06/13/19 2000 (!) 148/78 97.2 °F (36.2 °C) -- 100 18 -- 167 lb 8.8 oz (76 kg)   06/13/19 1945 -- -- -- 102 -- -- --   06/13/19 1615 (!) 160/82 -- -- -- -- -- --   06/13/19 1509 (!) 181/84 98 °F (36.7 °C) Oral 88 18 95 % --          Intake/Output Summary (Last 24 hours) at 6/14/2019 1300  Last data filed at 6/14/2019 1244  Gross per 24 hour   Intake 250 ml   Output 393 ml   Net -143 ml       Wt Readings from Last 3 Encounters:   06/14/19 168 lb 3.2 oz (76.3 kg)   11/03/18 170 lb 13.7 oz (77.5 kg)   10/22/18 161 lb (73 kg)       General:   in no acute distress  Head: normocephalic, atraumatic  Eyes: equally round and reactive, mild periorbital edema  Cardiovascular: regular rate and rhythm, no murmurs, gallops, or rubs  Respiratory:  Clear, no rales, rhochi, or wheezes  Gastrointestinal: soft, nontender, nondistended normal active bowel sounds  Ext: 1/2+ peripheral edema  Neuro: awake, alert, oriented x3. Mild fine tremor bilateral hands. No asterixis. Intermittent myoclonic jerks.   No slurred speech, though does have conversational pauses while she chooses her words. Skin: dry, no rash      DATA:      Recent Labs     06/12/19 0908 06/13/19 0555 06/14/19 0718   WBC 14.0* 12.8* 15.7*   HGB 9.0* 9.9* 10.6*   HCT 26.9* 29.3* 30.9*   MCV 98.9 97.3 96.0    319 342     Recent Labs     06/12/19 2015 06/13/19 0555 06/14/19 0718    142 136   K 4.9 4.1 3.8   CL 93* 96* 93*   CO2 25 26 26   BUN 56* 49* 42*   CREATININE 7.8* 7.4* 7.3*       Iron studies:  Lab Results   Component Value Date    FERRITIN 699 11/01/2018     Bone disease:  Lab Results   Component Value Date    MG 1.5 (L) 11/02/2018    PHOS 6.2 (H) 11/03/2018       Microbiology:  Reviewed    DIALYSIS ORDERS:    Reviewed    Assessment  1. ESRD  2. Anemia  3. Hyperkalemia  4. HTN  5. Secondary HPTH  6. Acute mental status change, improving. Etiology under investigation, may speculate she developed hypotension after receiving a medicine that her  gave her when he measured her blood pressure to be very high. He does not recall what medicine it was, and is actually not sure whether he did it or not.     Note:  She does not have peritonitis -- nothing in her history, signs, or symptoms or her laboratory evaluation to indicate peritonitis. She did have a potential contamination, for which we would ordinarily treat with empiric prophylactic po antibiotics. The vanco and cefepime administered IP on the night of her admission and allowed to dwell > 6 hrs is enough.     Recommendation  1. Continue CCPD nightly per cycler as per usual orders, will increase to all 2.5% dex solutions this evening to effect ultrafiltration  2. Discussed with Dr. Teresa Cintron: Agree: May have offered a stroke --MRI brain, without gadolinium for now  3.  If findings are non-gadolinium study suggestive of stroke, and if she needs a contrast enhanced MRI, will coordinate placement of dialysis catheter, and hemodialysis to follow gadolinium administration immediately and daily x3  4. Renal diet  5.  Follow labs, BP        Michael Amaral MD  6/14/2019

## 2019-06-14 NOTE — FLOWSHEET NOTE
06/13/19 2000   Vitals   BP (!) 148/78   Temp 97.2 °F (36.2 °C)   Pulse 100   Resp 18   Weight 167 lb 8.8 oz (76 kg)   Cycler   Verification of Prescription CCPD   Total Volume Programmed 92039 mL   Therapy Time (Hours:Minutes) 8   Fill Volume 2300 mL   Last Fill Volume 1800 mL   Dextrose Setting Same (Nonextraneal)   Number of Cycles 4   Bag Volume 5000 mL   Number of Bags Used 3   Dianeal Solution   (2.5% in 5000mL)   CCPD initiated at this time using aseptic technique. Draining and filling without difficulty. Dressing changed. Site non-tender.

## 2019-06-14 NOTE — FLOWSHEET NOTE
06/14/19 1730   Cycler   Verification of Prescription CCPD   Total Volume Programmed 1100 mL   Therapy Time (Hours:Minutes) 8.5   Fill Volume 2300 mL   Last Fill Volume 1800 mL   Dextrose Setting Same (Nonextraneal)   Number of Cycles 4   Bag Volume 5000 mL   Number of Bags Used 3   Dianeal Solution   (2.5%)   CCPD started using aseptic technique, dressing changed, draining and filling w/o difficulty

## 2019-06-14 NOTE — PROGRESS NOTES
Internal Medicine  Progress Note  Enrico North MD     Subjective:     Patient is alert. Patient does still have some confusion. Tolerating diet well no other c/o. Scheduled Meds:   isosorbide mononitrate  30 mg Oral Daily    levothyroxine  50 mcg Oral Daily    sevelamer  1,600 mg Oral TID    losartan  100 mg Oral Daily    metoprolol succinate  50 mg Oral Daily    insulin glargine  20 Units Subcutaneous BID    DULoxetine  30 mg Oral Daily     Continuous Infusions:   dextrose       PRN Meds:hydrALAZINE, glucose, dextrose, glucagon (rDNA), dextrose    Objective:      Physical Exam:  Vitals:   BP (!) 156/80   Pulse 104   Temp 97.8 °F (36.6 °C) (Oral)   Resp 18   Ht 4' 11\" (1.499 m)   Wt 168 lb 3.2 oz (76.3 kg)   SpO2 93%   BMI 33.97 kg/m²   I/O's    Intake/Output Summary (Last 24 hours) at 6/14/2019 0707  Last data filed at 6/13/2019 1440  Gross per 24 hour   Intake 250 ml   Output 938 ml   Net -688 ml     Exam:  General appearance: alert, appears stated age and cooperative  Head: Normocephalic, without obvious abnormality, atraumatic  Eyes: conjunctivae/corneas clear. PERRL, EOM's intact. Fundi benign. Throat: lips, mucosa, and tongue normal; teeth and gums normal  Neck: no adenopathy, no carotid bruit, no JVD, supple, symmetrical, trachea midline and thyroid not enlarged, symmetric, no tenderness/mass/nodules  Back: symmetric, no curvature. ROM normal. No CVA tenderness.   Lungs: clear to auscultation bilaterally  Chest wall: no tenderness  Heart: regular rate and rhythm, S1, S2 normal, no murmur, click, rub or gallop  Abdomen: soft, non-tender; bowel sounds normal; no masses,  no organomegaly  Extremities: extremities normal, atraumatic, no cyanosis or edema  Pulses: 2+ and symmetric  Skin: Skin color, texture, turgor normal. No rashes or lesions  Lymph nodes: Cervical, supraclavicular, and axillary nodes normal.  Neurologic: Grossly normal      Imaging     Chest  Xray:  No results found for 212 (H) 74 - 99 mg/dL   POCT Glucose    Collection Time: 06/13/19  5:59 PM   Result Value Ref Range    Meter Glucose 254 (H) 74 - 99 mg/dL   POCT Glucose    Collection Time: 06/13/19 10:47 PM   Result Value Ref Range    Meter Glucose 234 (H) 74 - 99 mg/dL   POCT Glucose    Collection Time: 06/14/19  6:28 AM   Result Value Ref Range    Meter Glucose 246 (H) 74 - 99 mg/dL     Assessment:     Principal Problem:    Acute delirium  Active Problems:    Peritoneal dialysis catheter infection ([address]    Sepsis ([address]    Type 2 diabetes mellitus with diabetic chronic kidney disease ([facility]    History of peritoneal dialysis    End stage renal disease (HCC)    Altered mental status    Essential hypertension, benign  Resolved Problems:    * No resolved hospital problems.  *      Plan:   Same  Home when ID Renal TYSON Izquierdo Rakel  6/14/2019  7:07 AM

## 2019-06-14 NOTE — PROGRESS NOTES
Good for reaching above PT goals. Pts/ family goals   1. To get stronger and go home. Patient and or family understand(s) diagnosis, prognosis, and plan of care. PLAN  PT care will be provided in accordance with the objectives noted above. Whenever appropriate, clear delegation orders will be provided for nursing staff. Exercises and functional mobility practice will be used as well as appropriate assistive devices or modalities to obtain goals. Patient and family education will also be administered as needed. Frequency of treatments will be 2-5x/week x 3-5 days. Time in: 10:15  Time out: 10:40    Mahesh Sharma., P.T.    License number:  [id number]

## 2019-06-14 NOTE — FLOWSHEET NOTE
06/14/19 0724   Peritoneal Dialysis (CAPD manual)   Effluent Appearance Clear;Yellow   Cycler   Ultrafiltration (UF) (mL) 393 mL   Deaccessed using aseptic technique, 393 UF clear yellow effluent noted

## 2019-06-14 NOTE — PLAN OF CARE
Problem: Falls - Risk of:  Goal: Will remain free from falls  Description  Will remain free from falls  6/14/2019 0153 by Kaylyn Forte RN  Outcome: Met This Shift  6/13/2019 2302 by Walter Blood RN  Outcome: Met This Shift  6/13/2019 1815 by Lana Winkler RN  Outcome: Met This Shift  Goal: Absence of physical injury  Description  Absence of physical injury  6/14/2019 0153 by Kaylyn Forte RN  Outcome: Met This Shift  6/13/2019 1815 by Lana Winkler RN  Outcome: Met This Shift

## 2019-06-15 LAB
ANION GAP SERPL CALCULATED.3IONS-SCNC: 15 MMOL/L (ref 7–16)
ANION GAP SERPL CALCULATED.3IONS-SCNC: 17 MMOL/L (ref 7–16)
BASOPHILS ABSOLUTE: 0.05 E9/L (ref 0–0.2)
BASOPHILS RELATIVE PERCENT: 0.3 % (ref 0–2)
BUN BLDV-MCNC: 37 MG/DL (ref 8–23)
BUN BLDV-MCNC: 38 MG/DL (ref 8–23)
CALCIUM SERPL-MCNC: 8.9 MG/DL (ref 8.6–10.2)
CALCIUM SERPL-MCNC: 9.1 MG/DL (ref 8.6–10.2)
CHLORIDE BLD-SCNC: 91 MMOL/L (ref 98–107)
CHLORIDE BLD-SCNC: 92 MMOL/L (ref 98–107)
CO2: 27 MMOL/L (ref 22–29)
CO2: 28 MMOL/L (ref 22–29)
CREAT SERPL-MCNC: 7.1 MG/DL (ref 0.5–1)
CREAT SERPL-MCNC: 7.2 MG/DL (ref 0.5–1)
EOSINOPHILS ABSOLUTE: 0.38 E9/L (ref 0.05–0.5)
EOSINOPHILS RELATIVE PERCENT: 2.6 % (ref 0–6)
GFR AFRICAN AMERICAN: 7
GFR AFRICAN AMERICAN: 7
GFR NON-AFRICAN AMERICAN: 5 ML/MIN/1.73
GFR NON-AFRICAN AMERICAN: 6 ML/MIN/1.73
GLUCOSE BLD-MCNC: 186 MG/DL (ref 74–99)
GLUCOSE BLD-MCNC: 82 MG/DL (ref 74–99)
HCT VFR BLD CALC: 30.3 % (ref 34–48)
HEMOGLOBIN: 10.4 G/DL (ref 11.5–15.5)
IMMATURE GRANULOCYTES #: 0.08 E9/L
IMMATURE GRANULOCYTES %: 0.5 % (ref 0–5)
LYMPHOCYTES ABSOLUTE: 1.65 E9/L (ref 1.5–4)
LYMPHOCYTES RELATIVE PERCENT: 11.3 % (ref 20–42)
MAGNESIUM: 1.5 MG/DL (ref 1.6–2.6)
MCH RBC QN AUTO: 33.1 PG (ref 26–35)
MCHC RBC AUTO-ENTMCNC: 34.3 % (ref 32–34.5)
MCV RBC AUTO: 96.5 FL (ref 80–99.9)
METER GLUCOSE: 127 MG/DL (ref 74–99)
METER GLUCOSE: 136 MG/DL (ref 74–99)
METER GLUCOSE: 176 MG/DL (ref 74–99)
METER GLUCOSE: 227 MG/DL (ref 74–99)
METER GLUCOSE: 83 MG/DL (ref 74–99)
METER GLUCOSE: 89 MG/DL (ref 74–99)
METER GLUCOSE: 98 MG/DL (ref 74–99)
MONOCYTES ABSOLUTE: 1.08 E9/L (ref 0.1–0.95)
MONOCYTES RELATIVE PERCENT: 7.4 % (ref 2–12)
NEUTROPHILS ABSOLUTE: 11.38 E9/L (ref 1.8–7.3)
NEUTROPHILS RELATIVE PERCENT: 77.9 % (ref 43–80)
PDW BLD-RTO: 13.3 FL (ref 11.5–15)
PLATELET # BLD: 342 E9/L (ref 130–450)
PMV BLD AUTO: 9.7 FL (ref 7–12)
POTASSIUM SERPL-SCNC: 3.5 MMOL/L (ref 3.5–5)
POTASSIUM SERPL-SCNC: 3.9 MMOL/L (ref 3.5–5)
RBC # BLD: 3.14 E12/L (ref 3.5–5.5)
SODIUM BLD-SCNC: 134 MMOL/L (ref 132–146)
SODIUM BLD-SCNC: 136 MMOL/L (ref 132–146)
URINE CULTURE, ROUTINE: NORMAL
WBC # BLD: 14.6 E9/L (ref 4.5–11.5)

## 2019-06-15 PROCEDURE — 36415 COLL VENOUS BLD VENIPUNCTURE: CPT

## 2019-06-15 PROCEDURE — 6370000000 HC RX 637 (ALT 250 FOR IP): Performed by: INTERNAL MEDICINE

## 2019-06-15 PROCEDURE — 85025 COMPLETE CBC W/AUTO DIFF WBC: CPT

## 2019-06-15 PROCEDURE — 6360000002 HC RX W HCPCS: Performed by: INTERNAL MEDICINE

## 2019-06-15 PROCEDURE — 83735 ASSAY OF MAGNESIUM: CPT

## 2019-06-15 PROCEDURE — 2060000000 HC ICU INTERMEDIATE R&B

## 2019-06-15 PROCEDURE — 82962 GLUCOSE BLOOD TEST: CPT

## 2019-06-15 PROCEDURE — 80048 BASIC METABOLIC PNL TOTAL CA: CPT

## 2019-06-15 PROCEDURE — 90945 DIALYSIS ONE EVALUATION: CPT

## 2019-06-15 RX ORDER — MAGNESIUM SULFATE 1 G/100ML
1 INJECTION INTRAVENOUS ONCE
Status: COMPLETED | OUTPATIENT
Start: 2019-06-15 | End: 2019-06-15

## 2019-06-15 RX ADMIN — SEVELAMER CARBONATE 1600 MG: 800 TABLET, FILM COATED ORAL at 11:19

## 2019-06-15 RX ADMIN — METOPROLOL SUCCINATE 50 MG: 50 TABLET, EXTENDED RELEASE ORAL at 09:11

## 2019-06-15 RX ADMIN — LOSARTAN POTASSIUM 100 MG: 50 TABLET, FILM COATED ORAL at 09:10

## 2019-06-15 RX ADMIN — HYDRALAZINE HYDROCHLORIDE 25 MG: 25 TABLET, FILM COATED ORAL at 03:55

## 2019-06-15 RX ADMIN — SEVELAMER CARBONATE 1600 MG: 800 TABLET, FILM COATED ORAL at 17:33

## 2019-06-15 RX ADMIN — INSULIN GLARGINE 20 UNITS: 100 INJECTION, SOLUTION SUBCUTANEOUS at 09:17

## 2019-06-15 RX ADMIN — DULOXETINE HYDROCHLORIDE 30 MG: 30 CAPSULE, DELAYED RELEASE ORAL at 09:10

## 2019-06-15 RX ADMIN — LEVOTHYROXINE SODIUM 50 MCG: 50 TABLET ORAL at 06:36

## 2019-06-15 RX ADMIN — MAGNESIUM SULFATE HEPTAHYDRATE 1 G: 1 INJECTION, SOLUTION INTRAVENOUS at 14:28

## 2019-06-15 RX ADMIN — INSULIN GLARGINE 20 UNITS: 100 INJECTION, SOLUTION SUBCUTANEOUS at 20:38

## 2019-06-15 RX ADMIN — ISOSORBIDE MONONITRATE 30 MG: 30 TABLET, EXTENDED RELEASE ORAL at 09:10

## 2019-06-15 ASSESSMENT — PAIN - FUNCTIONAL ASSESSMENT: PAIN_FUNCTIONAL_ASSESSMENT: ACTIVITIES ARE NOT PREVENTED

## 2019-06-15 ASSESSMENT — PAIN SCALES - GENERAL
PAINLEVEL_OUTOF10: 0
PAINLEVEL_OUTOF10: 6
PAINLEVEL_OUTOF10: 0

## 2019-06-15 ASSESSMENT — PAIN DESCRIPTION - LOCATION: LOCATION: EAR

## 2019-06-15 ASSESSMENT — PAIN DESCRIPTION - DESCRIPTORS: DESCRIPTORS: SHARP

## 2019-06-15 ASSESSMENT — PAIN DESCRIPTION - PROGRESSION: CLINICAL_PROGRESSION: NOT CHANGED

## 2019-06-15 ASSESSMENT — PAIN DESCRIPTION - PAIN TYPE: TYPE: ACUTE PAIN

## 2019-06-15 ASSESSMENT — PAIN DESCRIPTION - ORIENTATION: ORIENTATION: LEFT

## 2019-06-15 ASSESSMENT — PAIN DESCRIPTION - ONSET: ONSET: ON-GOING

## 2019-06-15 ASSESSMENT — PAIN DESCRIPTION - FREQUENCY: FREQUENCY: INTERMITTENT

## 2019-06-15 NOTE — PROGRESS NOTES
Spoke to Dr. Macario Coleman regarding patient's difficulty swallowing and taking oral medications. Dr. Macario Coleman stated he is aware and does not want to do anything at this time.

## 2019-06-15 NOTE — FLOWSHEET NOTE
06/15/19 0700   Peritoneal Dialysis Catheter Right lower abdomen   No Placement Date or Time found. Catheter Location: Right lower abdomen   Status Deaccessed   Site Condition No Complications   Dressing Status Clean;Dry; Intact   Dressing Gauze   Peritoneal Dialysis (CAPD manual)   Effluent Appearance Clear;Yellow   Effluent Volume Out (mL) 1029 ml   CCPD complete, deaccessed using aseptic technique, 1029 ml fluid removed, clear yellow effluent noted

## 2019-06-15 NOTE — PROGRESS NOTES
[facility] Roberto A. Merline Mares, MD Isidro Holiday, MD Maisie Rockers, MD Penelope Levering, MD Gonzella Handler, CNP  Progress Note  6/15/2019    SUBJECTIVE:  We are following this patient for end-stage renal failure . 6/13: Feeling much better. Much more awake, alert. Notes that at times she has difficulty finding her words, though is not having this problem this morning. Appetite is good, though has not yet eaten today. No nausea or vomiting. No abdominal pain. No fever or chill. No dyspnea. No chest pain or palpitations. Peritoneal dialysis fluid this morning was clear. No complication with treatment on the cycler overnight. 6/14: Still having some difficulty with word finding. Has developed a tremor, and periodic myoclonic jerks that shake her shoulders and upper extremities sometimes, her whole torso or other times. Some mild discoordination. No gross focal motor deficits. No dyspnea at rest.  No chest pain or palpitations. No abdominal pain. No nausea or vomiting. CCPD last night without event. All tolerated. Fluid remains clear today. Does complain of some mild peripheral swelling. 6/15 : weak . Alert awake . Feels lightheaded .  MR with no evidence of stroke     PROBLEM LIST:    Patient Active Problem List   Diagnosis    Peritoneal dialysis catheter infection ([address]    Sepsis ([address]    SIRS (systemic inflammatory response syndrome) (HCC)    Type 2 diabetes mellitus with diabetic chronic kidney disease ([address]    Osteoarthritis of right knee    Arthritis of knee, right    History of peritoneal dialysis    End stage renal disease ([address]    Altered mental status    Acute delirium    Essential hypertension, benign        PAST MEDICAL HISTORY:    Past Medical History:   Diagnosis Date    Anemia     Arthritis     CAD (coronary artery disease)     Diabetes mellitus ([address]     Gout     History of bleeding peptic ulcer approx 2015    Hypertension     Peritoneal

## 2019-06-15 NOTE — PROGRESS NOTES
Message left on Dr. Maya Phoenix answering service regarding status update and family's request to speak to him.

## 2019-06-15 NOTE — PROGRESS NOTES
Subjective: The patient is awake and alert. No problems overnight. Denies chest pain, angina, and dyspnea. Denies abdominal pain. Tolerating diet--though saringly. No nausea or vomiting. Objective:    BP (!) 192/90 Comment: maunal  Pulse 80   Temp 98.2 °F (36.8 °C) (Oral)   Resp 18   Ht 4' 11\" (1.499 m)   Wt 168 lb 12.8 oz (76.6 kg)   SpO2 96%   BMI 34.09 kg/m²     Heart:  RRR, no murmurs, gallops, or rubs. Lungs:  CTA bilaterally, no wheeze, rales or rhonchi  Abd: bowel sounds present, nontender, nondistended, no masses  Extrem:  No clubbing, cyanosis, or edema  A&O x 3. No focal neuro deficits    CBC:   Lab Results   Component Value Date    WBC 14.6 06/15/2019    RBC 3.14 06/15/2019    HGB 10.4 06/15/2019    HCT 30.3 06/15/2019    MCV 96.5 06/15/2019    MCH 33.1 06/15/2019    MCHC 34.3 06/15/2019    RDW 13.3 06/15/2019     06/15/2019    MPV 9.7 06/15/2019     BMP:    Lab Results   Component Value Date     06/15/2019    K 3.5 06/15/2019    CL 91 06/15/2019    CO2 28 06/15/2019    BUN 37 06/15/2019    LABALBU 3.7 06/11/2019    CREATININE 7.1 06/15/2019    CALCIUM 9.1 06/15/2019    GFRAA 7 06/15/2019    LABGLOM 6 06/15/2019    GLUCOSE 186 06/15/2019        Assessment: MRI from yesterday unremarkable. Patient Active Problem List   Diagnosis    Peritoneal dialysis catheter infection ([address]    Sepsis ([address]    SIRS (systemic inflammatory response syndrome) ([address]    Type 2 diabetes mellitus with diabetic chronic kidney disease ([address]    Osteoarthritis of right knee    Arthritis of knee, right    History of peritoneal dialysis    End stage renal disease ([address]    Altered mental status    Acute delirium    Essential hypertension, benign       Plan:  Dialysis continues. Needs rehab. Not strong/safe enough to go home.           Millicent Persaud  7:16 AM  6/15/2019

## 2019-06-16 LAB
ANION GAP SERPL CALCULATED.3IONS-SCNC: 15 MMOL/L (ref 7–16)
BASOPHILS ABSOLUTE: 0.06 E9/L (ref 0–0.2)
BASOPHILS RELATIVE PERCENT: 0.4 % (ref 0–2)
BUN BLDV-MCNC: 38 MG/DL (ref 8–23)
CALCIUM SERPL-MCNC: 8.8 MG/DL (ref 8.6–10.2)
CHLORIDE BLD-SCNC: 93 MMOL/L (ref 98–107)
CO2: 28 MMOL/L (ref 22–29)
CREAT SERPL-MCNC: 6.9 MG/DL (ref 0.5–1)
EOSINOPHILS ABSOLUTE: 0.62 E9/L (ref 0.05–0.5)
EOSINOPHILS RELATIVE PERCENT: 4.5 % (ref 0–6)
GFR AFRICAN AMERICAN: 7
GFR NON-AFRICAN AMERICAN: 6 ML/MIN/1.73
GLUCOSE BLD-MCNC: 92 MG/DL (ref 74–99)
HCT VFR BLD CALC: 28.1 % (ref 34–48)
HEMOGLOBIN: 9.9 G/DL (ref 11.5–15.5)
IMMATURE GRANULOCYTES #: 0.07 E9/L
IMMATURE GRANULOCYTES %: 0.5 % (ref 0–5)
LYMPHOCYTES ABSOLUTE: 2.14 E9/L (ref 1.5–4)
LYMPHOCYTES RELATIVE PERCENT: 15.4 % (ref 20–42)
MAGNESIUM: 1.8 MG/DL (ref 1.6–2.6)
MCH RBC QN AUTO: 33.8 PG (ref 26–35)
MCHC RBC AUTO-ENTMCNC: 35.2 % (ref 32–34.5)
MCV RBC AUTO: 95.9 FL (ref 80–99.9)
METER GLUCOSE: 105 MG/DL (ref 74–99)
METER GLUCOSE: 156 MG/DL (ref 74–99)
METER GLUCOSE: 59 MG/DL (ref 74–99)
METER GLUCOSE: 64 MG/DL (ref 74–99)
METER GLUCOSE: 66 MG/DL (ref 74–99)
METER GLUCOSE: 73 MG/DL (ref 74–99)
MONOCYTES ABSOLUTE: 1.25 E9/L (ref 0.1–0.95)
MONOCYTES RELATIVE PERCENT: 9 % (ref 2–12)
NEUTROPHILS ABSOLUTE: 9.78 E9/L (ref 1.8–7.3)
NEUTROPHILS RELATIVE PERCENT: 70.2 % (ref 43–80)
PDW BLD-RTO: 13.3 FL (ref 11.5–15)
PLATELET # BLD: 317 E9/L (ref 130–450)
PMV BLD AUTO: 9.1 FL (ref 7–12)
POTASSIUM SERPL-SCNC: 3.4 MMOL/L (ref 3.5–5)
RBC # BLD: 2.93 E12/L (ref 3.5–5.5)
SODIUM BLD-SCNC: 136 MMOL/L (ref 132–146)
WBC # BLD: 13.9 E9/L (ref 4.5–11.5)

## 2019-06-16 PROCEDURE — 6360000002 HC RX W HCPCS: Performed by: NURSE PRACTITIONER

## 2019-06-16 PROCEDURE — 85025 COMPLETE CBC W/AUTO DIFF WBC: CPT

## 2019-06-16 PROCEDURE — 6370000000 HC RX 637 (ALT 250 FOR IP): Performed by: INTERNAL MEDICINE

## 2019-06-16 PROCEDURE — 2060000000 HC ICU INTERMEDIATE R&B

## 2019-06-16 PROCEDURE — 2580000003 HC RX 258: Performed by: INTERNAL MEDICINE

## 2019-06-16 PROCEDURE — 90945 DIALYSIS ONE EVALUATION: CPT | Performed by: INTERNAL MEDICINE

## 2019-06-16 PROCEDURE — 36415 COLL VENOUS BLD VENIPUNCTURE: CPT

## 2019-06-16 PROCEDURE — 80048 BASIC METABOLIC PNL TOTAL CA: CPT

## 2019-06-16 PROCEDURE — 83735 ASSAY OF MAGNESIUM: CPT

## 2019-06-16 PROCEDURE — 82962 GLUCOSE BLOOD TEST: CPT

## 2019-06-16 RX ORDER — POTASSIUM CHLORIDE 20 MEQ/1
20 TABLET, EXTENDED RELEASE ORAL 2 TIMES DAILY WITH MEALS
Status: COMPLETED | OUTPATIENT
Start: 2019-06-16 | End: 2019-06-16

## 2019-06-16 RX ORDER — INSULIN GLARGINE 100 [IU]/ML
10 INJECTION, SOLUTION SUBCUTANEOUS 2 TIMES DAILY
Status: DISCONTINUED | OUTPATIENT
Start: 2019-06-16 | End: 2019-06-18 | Stop reason: HOSPADM

## 2019-06-16 RX ORDER — ONDANSETRON 2 MG/ML
4 INJECTION INTRAMUSCULAR; INTRAVENOUS EVERY 6 HOURS PRN
Status: DISCONTINUED | OUTPATIENT
Start: 2019-06-16 | End: 2019-06-18 | Stop reason: HOSPADM

## 2019-06-16 RX ORDER — ACETAMINOPHEN 325 MG/1
650 TABLET ORAL EVERY 6 HOURS PRN
Status: DISCONTINUED | OUTPATIENT
Start: 2019-06-16 | End: 2019-06-18 | Stop reason: HOSPADM

## 2019-06-16 RX ADMIN — POTASSIUM CHLORIDE 20 MEQ: 20 TABLET, EXTENDED RELEASE ORAL at 16:28

## 2019-06-16 RX ADMIN — DEXTROSE 50 % IN WATER (D50W) INTRAVENOUS SYRINGE 12.5 G: at 18:04

## 2019-06-16 RX ADMIN — ACETAMINOPHEN 650 MG: 325 TABLET ORAL at 12:15

## 2019-06-16 RX ADMIN — DULOXETINE HYDROCHLORIDE 30 MG: 30 CAPSULE, DELAYED RELEASE ORAL at 08:54

## 2019-06-16 RX ADMIN — DEXTROSE 15 G: 15 GEL ORAL at 16:54

## 2019-06-16 RX ADMIN — METOPROLOL SUCCINATE 50 MG: 50 TABLET, EXTENDED RELEASE ORAL at 08:54

## 2019-06-16 RX ADMIN — SEVELAMER CARBONATE 1600 MG: 800 TABLET, FILM COATED ORAL at 16:28

## 2019-06-16 RX ADMIN — SEVELAMER CARBONATE 1600 MG: 800 TABLET, FILM COATED ORAL at 12:15

## 2019-06-16 RX ADMIN — INSULIN GLARGINE 10 UNITS: 100 INJECTION, SOLUTION SUBCUTANEOUS at 20:33

## 2019-06-16 RX ADMIN — ONDANSETRON 4 MG: 2 INJECTION INTRAMUSCULAR; INTRAVENOUS at 13:45

## 2019-06-16 RX ADMIN — DEXTROSE 15 G: 15 GEL ORAL at 16:28

## 2019-06-16 RX ADMIN — LOSARTAN POTASSIUM 100 MG: 50 TABLET, FILM COATED ORAL at 08:54

## 2019-06-16 RX ADMIN — LEVOTHYROXINE SODIUM 50 MCG: 50 TABLET ORAL at 06:30

## 2019-06-16 RX ADMIN — INSULIN GLARGINE 20 UNITS: 100 INJECTION, SOLUTION SUBCUTANEOUS at 08:55

## 2019-06-16 RX ADMIN — SEVELAMER CARBONATE 1600 MG: 800 TABLET, FILM COATED ORAL at 08:54

## 2019-06-16 RX ADMIN — POTASSIUM CHLORIDE 20 MEQ: 20 TABLET, EXTENDED RELEASE ORAL at 09:08

## 2019-06-16 RX ADMIN — ISOSORBIDE MONONITRATE 30 MG: 30 TABLET, EXTENDED RELEASE ORAL at 08:54

## 2019-06-16 ASSESSMENT — PAIN DESCRIPTION - FREQUENCY
FREQUENCY: INTERMITTENT

## 2019-06-16 ASSESSMENT — PAIN SCALES - GENERAL
PAINLEVEL_OUTOF10: 0
PAINLEVEL_OUTOF10: 0
PAINLEVEL_OUTOF10: 4
PAINLEVEL_OUTOF10: 6
PAINLEVEL_OUTOF10: 5
PAINLEVEL_OUTOF10: 0

## 2019-06-16 ASSESSMENT — PAIN DESCRIPTION - ONSET
ONSET: ON-GOING

## 2019-06-16 ASSESSMENT — PAIN - FUNCTIONAL ASSESSMENT
PAIN_FUNCTIONAL_ASSESSMENT: PREVENTS OR INTERFERES SOME ACTIVE ACTIVITIES AND ADLS
PAIN_FUNCTIONAL_ASSESSMENT: ACTIVITIES ARE NOT PREVENTED
PAIN_FUNCTIONAL_ASSESSMENT: PREVENTS OR INTERFERES SOME ACTIVE ACTIVITIES AND ADLS

## 2019-06-16 ASSESSMENT — PAIN DESCRIPTION - ORIENTATION
ORIENTATION: LEFT
ORIENTATION: RIGHT;LEFT
ORIENTATION: LEFT;RIGHT

## 2019-06-16 ASSESSMENT — PAIN DESCRIPTION - PROGRESSION
CLINICAL_PROGRESSION: NOT CHANGED

## 2019-06-16 ASSESSMENT — PAIN DESCRIPTION - DESCRIPTORS
DESCRIPTORS: PATIENT UNABLE TO DESCRIBE
DESCRIPTORS: SHARP

## 2019-06-16 ASSESSMENT — PAIN DESCRIPTION - PAIN TYPE
TYPE: ACUTE PAIN

## 2019-06-16 ASSESSMENT — PAIN DESCRIPTION - LOCATION
LOCATION: EAR

## 2019-06-16 NOTE — PROGRESS NOTES
Subjective: The patient is awake and more alert. No problems overnight. Denies chest pain, angina, and dyspnea. Denies abdominal pain. Tolerating diet--sparingly. No nausea or vomiting. Objective:  Less myoclonic jerking(none seen on my exam this AM over 5 minutes). Yesterday's Mg++ repleted. BP (!) 169/68   Pulse 90   Temp 97.5 °F (36.4 °C) (Oral)   Resp 16   Ht 4' 11\" (1.499 m)   Wt 168 lb (76.2 kg)   SpO2 97%   BMI 33.93 kg/m²     Heart:  RRR, no murmurs, gallops, or rubs. Lungs:  CTA bilaterally, no wheeze, rales or rhonchi  Abd: bowel sounds present, nontender, nondistended, no masses  Extrem:  No clubbing, cyanosis, or edema  Neuro:  No focal signs. CBC:   Lab Results   Component Value Date    WBC 13.9 06/16/2019    RBC 2.93 06/16/2019    HGB 9.9 06/16/2019    HCT 28.1 06/16/2019    MCV 95.9 06/16/2019    MCH 33.8 06/16/2019    MCHC 35.2 06/16/2019    RDW 13.3 06/16/2019     06/16/2019    MPV 9.1 06/16/2019     BMP:    Lab Results   Component Value Date     06/16/2019    K 3.4 06/16/2019    CL 93 06/16/2019    CO2 28 06/16/2019    BUN 38 06/16/2019    LABALBU 3.7 06/11/2019    CREATININE 6.9 06/16/2019    CALCIUM 8.8 06/16/2019    GFRAA 7 06/16/2019    LABGLOM 6 06/16/2019    GLUCOSE 92 06/16/2019        Assessment:  Present tx. Agreed she's not strong enough, etc.---needs rehab placement before home-going. Patient Active Problem List   Diagnosis    Peritoneal dialysis catheter infection ([address]    Sepsis ([address]    SIRS (systemic inflammatory response syndrome) ([address]    Type 2 diabetes mellitus with diabetic chronic kidney disease ([address]    Osteoarthritis of right knee    Arthritis of knee, right    History of peritoneal dialysis    End stage renal disease ([address]    Altered mental status    Acute delirium    Essential hypertension, benign       Plan:  Present tx. (Spoke to  yesterday AM).         Thais Pineda  8:54 AM  6/16/2019

## 2019-06-16 NOTE — PROGRESS NOTES
bleeding peptic ulcer approx 2015    Hypertension     Peritoneal dialysis catheter in place [facility])     Peritoneal dialysis status ([facility]     at night for 8 hours    Thyroid disease     Use of cane as ambulatory aid     Wears glasses        MEDS (scheduled):   potassium chloride  20 mEq Oral BID WC    isosorbide mononitrate  30 mg Oral Daily    levothyroxine  50 mcg Oral Daily    sevelamer  1,600 mg Oral TID    losartan  100 mg Oral Daily    metoprolol succinate  50 mg Oral Daily    insulin glargine  20 Units Subcutaneous BID    DULoxetine  30 mg Oral Daily       MEDS (infusions):   dextrose         MEDS (prn):  acetaminophen, hydrALAZINE, glucose, dextrose, glucagon (rDNA), dextrose    DIET:    DIET RENAL;      PHYSICAL EXAM:      Patient Vitals for the past 24 hrs:   BP Temp Temp src Pulse Resp SpO2 Weight   06/16/19 0800 (!) 169/68 97.5 °F (36.4 °C) Oral 90 16 97 % --   06/16/19 0528 -- -- -- -- -- -- 168 lb (76.2 kg)   06/16/19 0000 (!) 165/78 97.8 °F (36.6 °C) Oral 84 16 95 % --   06/15/19 1945 (!) 140/82 97.3 °F (36.3 °C) Oral 71 18 96 % --   06/15/19 1842 138/79 98 °F (36.7 °C) -- 88 18 -- 168 lb 14 oz (76.6 kg)   06/15/19 1500 (!) 142/88 98.2 °F (36.8 °C) Oral 86 18 96 % --          Intake/Output Summary (Last 24 hours) at 6/16/2019 1225  Last data filed at 6/16/2019 0700  Gross per 24 hour   Intake 295.05 ml   Output 908 ml   Net -612.95 ml       Wt Readings from Last 3 Encounters:   06/16/19 168 lb (76.2 kg)   11/03/18 170 lb 13.7 oz (77.5 kg)   10/22/18 161 lb (73 kg)       General:   in no acute distress  Head: normocephalic, atraumatic  Eyes: equally round and reactive, mild periorbital edema  Cardiovascular: regular rate and rhythm, no murmurs, gallops, or rubs  Respiratory:  Clear, no rales, rhochi, or wheezes  Gastrointestinal: soft, nontender, nondistended normal active bowel sounds  Ext: 1/2+ peripheral edema  Neuro: awake, alert, oriented x3. Mild fine tremor bilateral hands.   No

## 2019-06-16 NOTE — PROGRESS NOTES
Placed call to Dr. Molina Plan regarding pt's complaints of bilateral ear pain and difficulty swallowing during med pass. Pt had to take multiple swallows of water and apple sauce. Stated difficulty with swallowing. Dr. Molina Plan notified at [phone]. Will continue to monitor swallowing at this time. Tylenol ordered for ear pain.

## 2019-06-16 NOTE — PROGRESS NOTES
Placed call to Dr. Sofia Dwyer regarding low blood sugar readings of 66, 59, and 64. Glucose gel administered per protocol, pt encouraged to eat dinner tray. Pt's mental status unchanged, asymptomatic at this time. Will continue to monitor.

## 2019-06-16 NOTE — FLOWSHEET NOTE
06/16/19 0700   Peritoneal Dialysis Catheter Right lower abdomen   No Placement Date or Time found. Catheter Location: Right lower abdomen   Status Deaccessed   Site Condition No Complications   Dressing Status Clean;Dry; Intact   Dressing Occlusive   Peritoneal Dialysis (CAPD manual)   Effluent Appearance Clear;Yellow   Cycler   Ultrafiltration (UF) (mL) 908 mL   CCPD deaccessed using aseptic technique, 908 ml removed, clear yellow effluent noted

## 2019-06-16 NOTE — PROGRESS NOTES
06/12/2019    CRP 21.1 (H) 12/21/2015     No results found for: [facility]  Lab Results   Component Value Date    SEDRATE 60 (H) 06/12/2019    SEDRATE 109 (H) 12/21/2015     Lab Results   Component Value Date    ALT 19 06/11/2019    AST 23 06/11/2019    ALKPHOS 117 (H) 06/11/2019    BILITOT 0.2 06/11/2019     Lab Results   Component Value Date     06/16/2019    K 3.4 06/16/2019    CL 93 06/16/2019    CO2 28 06/16/2019    BUN 38 06/16/2019    CREATININE 6.9 06/16/2019    CREATININE 7.2 06/15/2019    CREATININE 7.1 06/15/2019    GFRAA 7 06/16/2019    LABGLOM 6 06/16/2019    GLUCOSE 92 06/16/2019    PROT 6.9 06/11/2019    LABALBU 3.7 06/11/2019    CALCIUM 8.8 06/16/2019    BILITOT 0.2 06/11/2019    ALKPHOS 117 06/11/2019    AST 23 06/11/2019    ALT 19 06/11/2019     Radiology:  MRI of the brain without contrast showed no recent CVA    Microbiology:   Peritoneal fluid 6/11/2019. Gram stain: NOS. Culture negative so far  Blood cultures 6/12/2019 Negative so far   Urine cx negative  resp viral panel neg    ASSESSMENT:  · Altered level of consciousness with confusion, improved etiology not well established. MRI done without contrast was nondiagnostic  · ESRD on PD  · No evidence of peritonitis  · No ongoing infection  · Leukocytosis, slowly improving  · Lightheadedness. No evidence of CVA on MRI without contrast    PLAN:  · Observe off antibiotics  · Zofran prn    April [place] Mel Cousins  12:22 PM  6/16/2019    Patient seen and examined. I had a face to face encounter with the patient. Agree with exam, assessment and plan as outlined above. Addition and corrections were done as deemed appropriate. My exam and plan include: Extended family present. Patient is in no distress. No abdominal pain. PD catheter in place. So far no evidence of infection. We will continue to observe off antibiotics.     Keshawn Ragsdale  6/16/2019

## 2019-06-16 NOTE — PLAN OF CARE
Problem: Falls - Risk of:  Goal: Will remain free from falls  Description  Will remain free from falls  6/16/2019 0127 by Foreign Hampton RN  Outcome: Met This Shift  6/15/2019 1606 by Feliciano Barron RN  Outcome: Met This Shift  Goal: Absence of physical injury  Description  Absence of physical injury  6/16/2019 0127 by Foreign Hampton RN  Outcome: Met This Shift  6/15/2019 1606 by Feliciano Barron RN  Outcome: Met This Shift

## 2019-06-17 LAB
ANION GAP SERPL CALCULATED.3IONS-SCNC: 13 MMOL/L (ref 7–16)
BASOPHILS ABSOLUTE: 0.09 E9/L (ref 0–0.2)
BASOPHILS RELATIVE PERCENT: 0.7 % (ref 0–2)
BLOOD CULTURE, ROUTINE: NORMAL
BUN BLDV-MCNC: 34 MG/DL (ref 8–23)
CALCIUM SERPL-MCNC: 8.3 MG/DL (ref 8.6–10.2)
CHLORIDE BLD-SCNC: 91 MMOL/L (ref 98–107)
CO2: 29 MMOL/L (ref 22–29)
CREAT SERPL-MCNC: 6.8 MG/DL (ref 0.5–1)
CULTURE, BLOOD 2: NORMAL
EOSINOPHILS ABSOLUTE: 0.58 E9/L (ref 0.05–0.5)
EOSINOPHILS RELATIVE PERCENT: 4.4 % (ref 0–6)
GFR AFRICAN AMERICAN: 7
GFR NON-AFRICAN AMERICAN: 6 ML/MIN/1.73
GLUCOSE BLD-MCNC: 203 MG/DL (ref 74–99)
HCT VFR BLD CALC: 28.8 % (ref 34–48)
HEMOGLOBIN: 9.5 G/DL (ref 11.5–15.5)
IMMATURE GRANULOCYTES #: 0.12 E9/L
IMMATURE GRANULOCYTES %: 0.9 % (ref 0–5)
LYMPHOCYTES ABSOLUTE: 2.17 E9/L (ref 1.5–4)
LYMPHOCYTES RELATIVE PERCENT: 16.6 % (ref 20–42)
MAGNESIUM: 1.7 MG/DL (ref 1.6–2.6)
MCH RBC QN AUTO: 32.5 PG (ref 26–35)
MCHC RBC AUTO-ENTMCNC: 33 % (ref 32–34.5)
MCV RBC AUTO: 98.6 FL (ref 80–99.9)
METER GLUCOSE: 143 MG/DL (ref 74–99)
METER GLUCOSE: 149 MG/DL (ref 74–99)
METER GLUCOSE: 154 MG/DL (ref 74–99)
METER GLUCOSE: 85 MG/DL (ref 74–99)
MONOCYTES ABSOLUTE: 1.13 E9/L (ref 0.1–0.95)
MONOCYTES RELATIVE PERCENT: 8.6 % (ref 2–12)
NEUTROPHILS ABSOLUTE: 8.99 E9/L (ref 1.8–7.3)
NEUTROPHILS RELATIVE PERCENT: 68.8 % (ref 43–80)
PDW BLD-RTO: 13.7 FL (ref 11.5–15)
PLATELET # BLD: 332 E9/L (ref 130–450)
PMV BLD AUTO: 9.5 FL (ref 7–12)
POTASSIUM SERPL-SCNC: 4 MMOL/L (ref 3.5–5)
RBC # BLD: 2.92 E12/L (ref 3.5–5.5)
SODIUM BLD-SCNC: 133 MMOL/L (ref 132–146)
WBC # BLD: 13.1 E9/L (ref 4.5–11.5)

## 2019-06-17 PROCEDURE — 83735 ASSAY OF MAGNESIUM: CPT

## 2019-06-17 PROCEDURE — 2060000000 HC ICU INTERMEDIATE R&B

## 2019-06-17 PROCEDURE — 36415 COLL VENOUS BLD VENIPUNCTURE: CPT

## 2019-06-17 PROCEDURE — 82962 GLUCOSE BLOOD TEST: CPT

## 2019-06-17 PROCEDURE — 6360000002 HC RX W HCPCS: Performed by: NURSE PRACTITIONER

## 2019-06-17 PROCEDURE — 6370000000 HC RX 637 (ALT 250 FOR IP): Performed by: INTERNAL MEDICINE

## 2019-06-17 PROCEDURE — 6360000002 HC RX W HCPCS: Performed by: INTERNAL MEDICINE

## 2019-06-17 PROCEDURE — 90935 HEMODIALYSIS ONE EVALUATION: CPT

## 2019-06-17 PROCEDURE — 80048 BASIC METABOLIC PNL TOTAL CA: CPT

## 2019-06-17 PROCEDURE — 90945 DIALYSIS ONE EVALUATION: CPT

## 2019-06-17 PROCEDURE — 85025 COMPLETE CBC W/AUTO DIFF WBC: CPT

## 2019-06-17 RX ADMIN — DULOXETINE HYDROCHLORIDE 30 MG: 30 CAPSULE, DELAYED RELEASE ORAL at 09:45

## 2019-06-17 RX ADMIN — SEVELAMER CARBONATE 1600 MG: 800 TABLET, FILM COATED ORAL at 09:44

## 2019-06-17 RX ADMIN — ISOSORBIDE MONONITRATE 30 MG: 30 TABLET, EXTENDED RELEASE ORAL at 09:44

## 2019-06-17 RX ADMIN — ONDANSETRON 4 MG: 2 INJECTION INTRAMUSCULAR; INTRAVENOUS at 19:09

## 2019-06-17 RX ADMIN — LOSARTAN POTASSIUM 100 MG: 50 TABLET, FILM COATED ORAL at 09:45

## 2019-06-17 RX ADMIN — LEVOTHYROXINE SODIUM 50 MCG: 50 TABLET ORAL at 06:41

## 2019-06-17 RX ADMIN — INSULIN GLARGINE 10 UNITS: 100 INJECTION, SOLUTION SUBCUTANEOUS at 06:41

## 2019-06-17 RX ADMIN — METOPROLOL SUCCINATE 50 MG: 50 TABLET, EXTENDED RELEASE ORAL at 09:45

## 2019-06-17 RX ADMIN — INSULIN GLARGINE 10 UNITS: 100 INJECTION, SOLUTION SUBCUTANEOUS at 21:19

## 2019-06-17 RX ADMIN — SEVELAMER CARBONATE 1600 MG: 800 TABLET, FILM COATED ORAL at 11:57

## 2019-06-17 RX ADMIN — DARBEPOETIN ALFA 40 MCG: 40 INJECTION, SOLUTION INTRAVENOUS; SUBCUTANEOUS at 16:17

## 2019-06-17 RX ADMIN — SEVELAMER CARBONATE 1600 MG: 800 TABLET, FILM COATED ORAL at 16:59

## 2019-06-17 ASSESSMENT — PAIN SCALES - GENERAL
PAINLEVEL_OUTOF10: 0

## 2019-06-17 NOTE — FLOWSHEET NOTE
CCPD initiated via aseptic technique. drsg dry/intact. Pt denies any abd pain at this time. cycler draining/filling without difficulty. Effluent clear/yellow.

## 2019-06-17 NOTE — FLOWSHEET NOTE
CCPD complete using aseptic technique, cath capped/clamped. Total uf 1332ml of clear, yellow effluent. drsg to exit site changed, site clean with no drainage, no redness or pain.  Pt denies any abd pain/n/v

## 2019-06-17 NOTE — PROGRESS NOTES
[facility] MD Rani Guzman MD Gillie Sage, MD Bufford Stacks, MD Annalee Harvard, CNP  Progress Note  6/17/2019    SUBJECTIVE:  We are following this patient for end-stage renal failure . 6/13: Feeling much better. Much more awake, alert. Notes that at times she has difficulty finding her words, though is not having this problem this morning. Appetite is good, though has not yet eaten today. No nausea or vomiting. No abdominal pain. No fever or chill. No dyspnea. No chest pain or palpitations. Peritoneal dialysis fluid this morning was clear. No complication with treatment on the cycler overnight. 6/14: Still having some difficulty with word finding. Has developed a tremor, and periodic myoclonic jerks that shake her shoulders and upper extremities sometimes, her whole torso or other times. Some mild discoordination. No gross focal motor deficits. No dyspnea at rest.  No chest pain or palpitations. No abdominal pain. No nausea or vomiting. CCPD last night without event. All tolerated. Fluid remains clear today. Does complain of some mild peripheral swelling. 6/15 : weak . Alert awake . Feels lightheaded . MR with no evidence of stroke   6/16 : doing better this am though still weak . Poor po intake . 6/17: More awake and alert. Appetite is improved. Intermittent nonproductive cough which he tells me is due to [employer]. \"  Postnasal drip. No headache. No fever. No nausea or vomiting. No dyspnea. No wheeze. No peripheral swelling. ROS otherwise unremarkable. No tremor. No myoclonic jerks . CCPD last night without event. Well-tolerated. 1.3 L UF. Fluid clear. No abdominal pain.     PROBLEM LIST:    Patient Active Problem List   Diagnosis    Peritoneal dialysis catheter infection ([address]    Sepsis ([address]    SIRS (systemic inflammatory response syndrome) ([address]    Type 2 diabetes mellitus with diabetic chronic kidney disease ([address]  Osteoarthritis of right knee    Arthritis of knee, right    History of peritoneal dialysis    End stage renal disease (HCC)    Altered mental status    Acute delirium    Essential hypertension, benign        PAST MEDICAL HISTORY:    Past Medical History:   Diagnosis Date    Anemia     Arthritis     CAD (coronary artery disease)     [facility] mellitus [facility]     Gout     History of bleeding peptic ulcer approx 2015    Hypertension     Peritoneal dialysis catheter in place [facility])     Peritoneal dialysis status ([facility]     at night for 8 hours    Thyroid disease     Use of cane as ambulatory aid     Wears glasses        MEDS (scheduled):   insulin glargine  10 Units Subcutaneous BID    isosorbide mononitrate  30 mg Oral Daily    levothyroxine  50 mcg Oral Daily    sevelamer  1,600 mg Oral TID    losartan  100 mg Oral Daily    metoprolol succinate  50 mg Oral Daily    DULoxetine  30 mg Oral Daily       MEDS (infusions):   dextrose         MEDS (prn):  acetaminophen, ondansetron, hydrALAZINE, glucose, dextrose, glucagon (rDNA), dextrose    DIET:    DIET RENAL;      PHYSICAL EXAM:      Patient Vitals for the past 24 hrs:   BP Temp Temp src Pulse Resp SpO2 Weight   06/17/19 0930 134/67 97.9 °F (36.6 °C) Oral 73 20 96 % --   06/17/19 0210 -- -- -- -- -- -- 167 lb 8 oz (76 kg)   06/17/19 0000 (!) 141/70 98.9 °F (37.2 °C) Oral 67 16 -- --   06/16/19 2018 (!) 148/65 98.8 °F (37.1 °C) Oral 70 16 98 % --   06/16/19 1557 (!) 174/76 98.2 °F (36.8 °C) Oral 70 16 -- --          Intake/Output Summary (Last 24 hours) at 6/17/2019 1313  Last data filed at 6/17/2019 1220  Gross per 24 hour   Intake 360 ml   Output 1332 ml   Net -972 ml       Wt Readings from Last 3 Encounters:   06/17/19 167 lb 8 oz (76 kg)   11/03/18 170 lb 13.7 oz (77.5 kg)   10/22/18 161 lb (73 kg)       General:   in no acute distress  Head: normocephalic, atraumatic  Eyes: equally round and reactive, mild periorbital edema  Cardiovascular: regular rate and rhythm, no murmurs, gallops, or rubs  Respiratory:  Clear, no rales, rhochi, or wheezes  Gastrointestinal: soft, nontender, nondistended normal active bowel sounds  Ext: No peripheral edema. Pulses 2+x4  Neuro: awake, alert, oriented x3. No tremor. No myoclonic jerks. Skin: dry, no rash      DATA:      Recent Labs     06/15/19  0440 06/16/19  0420 06/17/19  0940   WBC 14.6* 13.9* 13.1*   HGB 10.4* 9.9* 9.5*   HCT 30.3* 28.1* 28.8*   MCV 96.5 95.9 98.6    317 332     Recent Labs     06/15/19  1222 06/16/19  0420 06/17/19  0940    136 133   K 3.9 3.4* 4.0   CL 92* 93* 91*   CO2 27 28 29   BUN 38* 38* 34*   CREATININE 7.2* 6.9* 6.8*       Iron studies:  Lab Results   Component Value Date    FERRITIN 699 11/01/2018     Bone disease:  Lab Results   Component Value Date    MG 1.7 06/17/2019    PHOS 6.2 (H) 11/03/2018       Microbiology:  Reviewed    DIALYSIS ORDERS:    Reviewed    Assessment  1. ESRD  2. Anemia. Iron stores when checked as an outpatient or at goal  3. Hyperkalemia  4. HTN  5. Secondary HPTH  6. Acute mental status change, improving. Etiology under investigation, may speculate she developed hypotension after receiving a medicine that her  gave her when he measured her blood pressure to be very high. He does not recall what medicine it was, and is actually not sure whether he did it or not.     Note:  She does not have peritonitis -- nothing in her history, signs, or symptoms or her laboratory evaluation to indicate peritonitis. She did have a potential contamination, for which we would ordinarily treat with empiric prophylactic po antibiotics. The vanco and cefepime administered IP on the night of her admission and allowed to dwell > 6 hrs is enough.     Mildly hypervolemic,  sodium 133. MRI with no evidence of stroke .    Improving    Recommendation  -  Continue CCPD nightly per cycler as per usual orders, will use all 2.5% dex solutions this evening to effect ultrafiltration  -  If Sx recur then will need neurology evaluation   -  Aranesp x1 dose        Micah Yarbrough MD  6/17/2019

## 2019-06-17 NOTE — PROGRESS NOTES
12/21/2015     No results found for: [facility]  Lab Results   Component Value Date    SEDRATE 60 (H) 06/12/2019    SEDRATE 109 (H) 12/21/2015     Lab Results   Component Value Date    ALT 19 06/11/2019    AST 23 06/11/2019    ALKPHOS 117 (H) 06/11/2019    BILITOT 0.2 06/11/2019     Lab Results   Component Value Date     06/17/2019    K 4.0 06/17/2019    CL 91 06/17/2019    CO2 29 06/17/2019    BUN 34 06/17/2019    CREATININE 6.8 06/17/2019    CREATININE 6.9 06/16/2019    CREATININE 7.2 06/15/2019    GFRAA 7 06/17/2019    LABGLOM 6 06/17/2019    GLUCOSE 203 06/17/2019    PROT 6.9 06/11/2019    LABALBU 3.7 06/11/2019    CALCIUM 8.3 06/17/2019    BILITOT 0.2 06/11/2019    ALKPHOS 117 06/11/2019    AST 23 06/11/2019    ALT 19 06/11/2019     Radiology:  MRI of the brain without contrast showed no recent CVA    Microbiology:   Peritoneal fluid 6/11/2019. Gram stain: NOS. Culture negative so far  Blood cultures 6/12/2019 Negative so far   Urine cx negative  resp viral panel neg    ASSESSMENT:  · Altered level of consciousness with confusion, improved etiology not well established. MRI done without contrast was nondiagnostic  · ESRD on PD  · No evidence of peritonitis  · No ongoing infection. All cultures negative  · Leukocytosis, slowly improving  · Lightheadedness.   No evidence of CVA on MRI without contrast    PLAN:  · The patient can be discharged from [place] [id number]  11:04 AM  6/17/2019

## 2019-06-17 NOTE — PROGRESS NOTES
Subjective:  Certainly more alert than I've seen her. \"I feel better. \"    The patient is awake and alert. No problems overnight. Denies chest pain, angina, and dyspnea. Denies abdominal pain. .  No nausea or vomiting. Objective: Concerns re swallowing. Seems more lethargy related than dysfunction? No more myoclonic jerking. BP (!) 141/70   Pulse 67   Temp 98.9 °F (37.2 °C) (Oral)   Resp 16   Ht 4' 11\" (1.499 m)   Wt 167 lb 8 oz (76 kg)   SpO2 98%   BMI 33.83 kg/m²     Heart:  IRRR, no murmurs, gallops, or rubs. Lungs:  CTA bilaterally, no wheeze, rales or rhonchi  Abd: bowel sounds present, nontender, nondistended, no masses  Extrem:  No clubbing, cyanosis, or edema  No focal deficits. CBC:   Lab Results   Component Value Date    WBC 13.9 06/16/2019    RBC 2.93 06/16/2019    HGB 9.9 06/16/2019    HCT 28.1 06/16/2019    MCV 95.9 06/16/2019    MCH 33.8 06/16/2019    MCHC 35.2 06/16/2019    RDW 13.3 06/16/2019     06/16/2019    MPV 9.1 06/16/2019     BMP:    Lab Results   Component Value Date     06/16/2019    K 3.4 06/16/2019    CL 93 06/16/2019    CO2 28 06/16/2019    BUN 38 06/16/2019    LABALBU 3.7 06/11/2019    CREATININE 6.9 06/16/2019    CALCIUM 8.8 06/16/2019    GFRAA 7 06/16/2019    LABGLOM 6 06/16/2019    GLUCOSE 92 06/16/2019        Assessment:  Nees up in chair and physical rehab. Dialysis continues. Patient Active Problem List   Diagnosis    Peritoneal dialysis catheter infection ([address]    Sepsis ([address]    SIRS (systemic inflammatory response syndrome) ([address]    Type 2 diabetes mellitus with diabetic chronic kidney disease ([address]    Osteoarthritis of right knee    Arthritis of knee, right    History of peritoneal dialysis    End stage renal disease ([address]    Altered mental status    Acute delirium    Essential hypertension, benign       Plan:  Present tx. Encourage po intake.           Lily Wiggins  6:33 AM  6/17/2019

## 2019-06-18 VITALS
HEART RATE: 62 BPM | OXYGEN SATURATION: 95 % | DIASTOLIC BLOOD PRESSURE: 83 MMHG | WEIGHT: 169.31 LBS | BODY MASS INDEX: 34.13 KG/M2 | RESPIRATION RATE: 18 BRPM | SYSTOLIC BLOOD PRESSURE: 169 MMHG | TEMPERATURE: 97.5 F | HEIGHT: 59 IN

## 2019-06-18 LAB
ANION GAP SERPL CALCULATED.3IONS-SCNC: 13 MMOL/L (ref 7–16)
BASOPHILS ABSOLUTE: 0.08 E9/L (ref 0–0.2)
BASOPHILS RELATIVE PERCENT: 0.5 % (ref 0–2)
BUN BLDV-MCNC: 33 MG/DL (ref 8–23)
CALCIUM SERPL-MCNC: 8.6 MG/DL (ref 8.6–10.2)
CHLORIDE BLD-SCNC: 92 MMOL/L (ref 98–107)
CO2: 30 MMOL/L (ref 22–29)
CREAT SERPL-MCNC: 6.5 MG/DL (ref 0.5–1)
EOSINOPHILS ABSOLUTE: 0.49 E9/L (ref 0.05–0.5)
EOSINOPHILS RELATIVE PERCENT: 3.3 % (ref 0–6)
GFR AFRICAN AMERICAN: 7
GFR NON-AFRICAN AMERICAN: 6 ML/MIN/1.73
GLUCOSE BLD-MCNC: 121 MG/DL (ref 74–99)
HCT VFR BLD CALC: 29.1 % (ref 34–48)
HEMOGLOBIN: 9.8 G/DL (ref 11.5–15.5)
IMMATURE GRANULOCYTES #: 0.13 E9/L
IMMATURE GRANULOCYTES %: 0.9 % (ref 0–5)
LYMPHOCYTES ABSOLUTE: 2.14 E9/L (ref 1.5–4)
LYMPHOCYTES RELATIVE PERCENT: 14.5 % (ref 20–42)
MAGNESIUM: 1.7 MG/DL (ref 1.6–2.6)
MCH RBC QN AUTO: 32.5 PG (ref 26–35)
MCHC RBC AUTO-ENTMCNC: 33.7 % (ref 32–34.5)
MCV RBC AUTO: 96.4 FL (ref 80–99.9)
METER GLUCOSE: 153 MG/DL (ref 74–99)
METER GLUCOSE: 82 MG/DL (ref 74–99)
METER GLUCOSE: 91 MG/DL (ref 74–99)
MONOCYTES ABSOLUTE: 1.21 E9/L (ref 0.1–0.95)
MONOCYTES RELATIVE PERCENT: 8.2 % (ref 2–12)
NEUTROPHILS ABSOLUTE: 10.68 E9/L (ref 1.8–7.3)
NEUTROPHILS RELATIVE PERCENT: 72.6 % (ref 43–80)
PDW BLD-RTO: 13.8 FL (ref 11.5–15)
PHOSPHORUS: 5.9 MG/DL (ref 2.5–4.5)
PLATELET # BLD: 348 E9/L (ref 130–450)
PMV BLD AUTO: 9.4 FL (ref 7–12)
POTASSIUM SERPL-SCNC: 4.2 MMOL/L (ref 3.5–5)
RBC # BLD: 3.02 E12/L (ref 3.5–5.5)
SODIUM BLD-SCNC: 135 MMOL/L (ref 132–146)
WBC # BLD: 14.7 E9/L (ref 4.5–11.5)

## 2019-06-18 PROCEDURE — 36415 COLL VENOUS BLD VENIPUNCTURE: CPT

## 2019-06-18 PROCEDURE — 85025 COMPLETE CBC W/AUTO DIFF WBC: CPT

## 2019-06-18 PROCEDURE — 83735 ASSAY OF MAGNESIUM: CPT

## 2019-06-18 PROCEDURE — 84100 ASSAY OF PHOSPHORUS: CPT

## 2019-06-18 PROCEDURE — 6370000000 HC RX 637 (ALT 250 FOR IP): Performed by: INTERNAL MEDICINE

## 2019-06-18 PROCEDURE — 82962 GLUCOSE BLOOD TEST: CPT

## 2019-06-18 PROCEDURE — 80048 BASIC METABOLIC PNL TOTAL CA: CPT

## 2019-06-18 RX ORDER — INSULIN GLARGINE 100 [IU]/ML
10 INJECTION, SOLUTION SUBCUTANEOUS 2 TIMES DAILY
Qty: 1 VIAL | Refills: 3 | Status: SHIPPED | OUTPATIENT
Start: 2019-06-18 | End: 2019-06-18 | Stop reason: SDUPTHER

## 2019-06-18 RX ORDER — INSULIN GLARGINE 100 [IU]/ML
10 INJECTION, SOLUTION SUBCUTANEOUS 2 TIMES DAILY
Qty: 1 VIAL | Refills: 3 | Status: SHIPPED | OUTPATIENT
Start: 2019-06-18 | End: 2022-07-20

## 2019-06-18 RX ADMIN — SEVELAMER CARBONATE 1600 MG: 800 TABLET, FILM COATED ORAL at 11:51

## 2019-06-18 RX ADMIN — INSULIN GLARGINE 10 UNITS: 100 INJECTION, SOLUTION SUBCUTANEOUS at 08:17

## 2019-06-18 RX ADMIN — LEVOTHYROXINE SODIUM 50 MCG: 50 TABLET ORAL at 05:46

## 2019-06-18 RX ADMIN — LOSARTAN POTASSIUM 100 MG: 50 TABLET, FILM COATED ORAL at 08:17

## 2019-06-18 RX ADMIN — DULOXETINE HYDROCHLORIDE 30 MG: 30 CAPSULE, DELAYED RELEASE ORAL at 08:16

## 2019-06-18 RX ADMIN — ISOSORBIDE MONONITRATE 30 MG: 30 TABLET, EXTENDED RELEASE ORAL at 08:17

## 2019-06-18 RX ADMIN — METOPROLOL SUCCINATE 50 MG: 50 TABLET, EXTENDED RELEASE ORAL at 08:17

## 2019-06-18 ASSESSMENT — PAIN SCALES - GENERAL
PAINLEVEL_OUTOF10: 0
PAINLEVEL_OUTOF10: 0

## 2019-06-18 NOTE — PROGRESS NOTES
Subjective:  Pt prefers to go home vs ECF/rehab    The patient is awake and alert. No problems overnight. Denies chest pain, angina, and dyspnea. Denies abdominal pain. Tolerating diet--states she's eating better with appetite. .  No nausea or vomiting. Objective:    BP (!) 146/70   Pulse 69   Temp 98 °F (36.7 °C) (Oral)   Resp 18   Ht 4' 11\" (1.499 m)   Wt 172 lb (78 kg)   SpO2 97%   BMI 34.74 kg/m²     Heart:  RRR, no murmurs, gallops, or rubs. Lungs:  CTA bilaterally, no wheeze, rales or rhonchi  Abd: bowel sounds present, nontender, nondistended, no masses  Extrem:  No clubbing, cyanosis, or edema    CBC:   Lab Results   Component Value Date    WBC 13.1 06/17/2019    RBC 2.92 06/17/2019    HGB 9.5 06/17/2019    HCT 28.8 06/17/2019    MCV 98.6 06/17/2019    MCH 32.5 06/17/2019    MCHC 33.0 06/17/2019    RDW 13.7 06/17/2019     06/17/2019    MPV 9.5 06/17/2019     BMP:    Lab Results   Component Value Date     06/17/2019    K 4.0 06/17/2019    CL 91 06/17/2019    CO2 29 06/17/2019    BUN 34 06/17/2019    LABALBU 3.7 06/11/2019    CREATININE 6.8 06/17/2019    CALCIUM 8.3 06/17/2019    GFRAA 7 06/17/2019    LABGLOM 6 06/17/2019    GLUCOSE 203 06/17/2019        Assessment:  PT/OT assess. Up in chair. Patient Active Problem List   Diagnosis    Peritoneal dialysis catheter infection ([address]    Sepsis ([address]    SIRS (systemic inflammatory response syndrome) ([address]    Type 2 diabetes mellitus with diabetic chronic kidney disease ([address]    Osteoarthritis of right knee    Arthritis of knee, right    History of peritoneal dialysis    End stage renal disease ([address]    Altered mental status    Acute delirium    Essential hypertension, benign       Plan:  MICHELLE planning in progress.           Anuj Poncebinder  6:48 AM  6/18/2019

## 2019-06-18 NOTE — FLOWSHEET NOTE
06/18/19 0700   Vitals   BP (!) 148/79   Temp 97.9 °F (36.6 °C)   Pulse 73   Resp 20   Weight 169 lb 5 oz (76.8 kg)   Peritoneal Dialysis Catheter Right lower abdomen   No Placement Date or Time found. Catheter Location: Right lower abdomen   Status Accessed   Site Condition No Complications   Dressing Status Clean;Dry; Intact   Dressing Gauze   Cycler   Therapy Time (Hours:Minutes) 10:38   Fill Volume 79851 mL   Last Fill Volume 1795 mL   Ultrafiltration (UF) (mL) 1314 mL   CCPD completed using aseptic technique. Site non-tender.

## 2019-06-18 NOTE — PROGRESS NOTES
Value Date    SEDRATE 60 (H) 06/12/2019    SEDRATE 109 (H) 12/21/2015     Lab Results   Component Value Date    ALT 19 06/11/2019    AST 23 06/11/2019    ALKPHOS 117 (H) 06/11/2019    BILITOT 0.2 06/11/2019     Lab Results   Component Value Date     06/18/2019    K 4.2 06/18/2019    CL 92 06/18/2019    CO2 30 06/18/2019    BUN 33 06/18/2019    CREATININE 6.5 06/18/2019    CREATININE 6.8 06/17/2019    CREATININE 6.9 06/16/2019    GFRAA 7 06/18/2019    LABGLOM 6 06/18/2019    GLUCOSE 121 06/18/2019    PROT 6.9 06/11/2019    LABALBU 3.7 06/11/2019    CALCIUM 8.6 06/18/2019    BILITOT 0.2 06/11/2019    ALKPHOS 117 06/11/2019    AST 23 06/11/2019    ALT 19 06/11/2019     Radiology:  MRI of the brain without contrast showed no recent CVA    Microbiology:   Peritoneal fluid 6/11/2019. Gram stain: NOS. Culture negative so far  Blood cultures 6/12/2019 Negative so far   Urine cx negative  resp viral panel neg    ASSESSMENT:  · Altered level of consciousness with confusion, improved etiology not well established. MRI done without contrast was nondiagnostic  · ESRD on PD  · No evidence of peritonitis  · No ongoing infection. All cultures negative  · Chronic leukocytosis.   Patient normally runs a WBC in the low teens    PLAN:  · The patient can be discharged from [place] [id number]  2:48 PM  6/18/2019

## 2019-06-18 NOTE — PROGRESS NOTES
Notified Dr. Umesh Rodriguez regarding patient being ok to be discharged from nephrology and infectious disease. To follow up in his office in one week.

## 2019-06-18 NOTE — PROGRESS NOTES
[facility] Roberto A. Milas Maser, MD Shirly Raven, MD Debbi Bevel, MD Charmayne Sauers, MD Osker Flake, CNP  Progress Note  6/18/2019    SUBJECTIVE:  We are following this patient for end-stage renal failure . 6/13: Feeling much better. Much more awake, alert. Notes that at times she has difficulty finding her words, though is not having this problem this morning. Appetite is good, though has not yet eaten today. No nausea or vomiting. No abdominal pain. No fever or chill. No dyspnea. No chest pain or palpitations. Peritoneal dialysis fluid this morning was clear. No complication with treatment on the cycler overnight. 6/14: Still having some difficulty with word finding. Has developed a tremor, and periodic myoclonic jerks that shake her shoulders and upper extremities sometimes, her whole torso or other times. Some mild discoordination. No gross focal motor deficits. No dyspnea at rest.  No chest pain or palpitations. No abdominal pain. No nausea or vomiting. CCPD last night without event. All tolerated. Fluid remains clear today. Does complain of some mild peripheral swelling. 6/15 : weak . Alert awake . Feels lightheaded . MR with no evidence of stroke   6/16 : doing better this am though still weak . Poor po intake . 6/17: More awake and alert. Appetite is improved. Intermittent nonproductive cough which he tells me is due to [employer]. \"  Postnasal drip. No headache. No fever. No nausea or vomiting. No dyspnea. No wheeze. No peripheral swelling. ROS otherwise unremarkable. No tremor. No myoclonic jerks . CCPD last night without event. Well-tolerated. 1.3 L UF. Fluid clear. No abdominal pain. 6/18: Feeling better. No new complaint. Mentally more sharp. Appetite improved. Had the \"runs\" last night, though now resolved. No swelling.     PROBLEM LIST:    Patient Active Problem List   Diagnosis    Peritoneal dialysis catheter infection ([address] rubs  Respiratory:  Clear, no rales, rhochi, or wheezes  Gastrointestinal: soft, nontender, nondistended normal active bowel sounds  Ext: No peripheral edema. Pulses 2+x4  Neuro: awake, alert, oriented x3. No tremor. No myoclonic jerks. Skin: dry, no rash      DATA:      Recent Labs     06/16/19  0420 06/17/19  0940 06/18/19  0835   WBC 13.9* 13.1* 14.7*   HGB 9.9* 9.5* 9.8*   HCT 28.1* 28.8* 29.1*   MCV 95.9 98.6 96.4    332 348     Recent Labs     06/16/19  0420 06/17/19  0940 06/18/19  0835    133 135   K 3.4* 4.0 4.2   CL 93* 91* 92*   CO2 28 29 30*   BUN 38* 34* 33*   CREATININE 6.9* 6.8* 6.5*       Iron studies:  Lab Results   Component Value Date    FERRITIN 699 11/01/2018     Bone disease:  Lab Results   Component Value Date    MG 1.7 06/18/2019    PHOS 5.9 (H) 06/18/2019       Microbiology:  Reviewed    DIALYSIS ORDERS:    Reviewed    Assessment  1. ESRD  2. Anemia. Iron stores when checked as an outpatient or at goal  3. Hyperkalemia  4. HTN  5. Secondary HPTH  6. Acute mental status change, improving. Etiology under investigation, may speculate she developed hypotension after receiving a medicine that her  gave her when he measured her blood pressure to be very high. He does not recall what medicine it was, and is actually not sure whether he did it or not.     Recommendation  -  Continue CCPD nightly per cycler as per usual orders, will use all 2.5% dex solutions this evening to effect ultrafiltration  -  Otherwise continue current aspects of renal management  -  Okay for discharge from renal standpoint - follow-up in outpatient PD unit as per already scheduled appointment        Trina Cortez MD  6/18/2019

## 2019-06-18 NOTE — PROGRESS NOTES
Occupational Therapy  Patient treatment attempted this AM.  Patient declined tx due to experiencing diarreha will attempt at a later time.                                                  Lidia GARCIA/FRANCIA [id number]

## 2019-06-18 NOTE — CARE COORDINATION
Social Work/Discharge Planning:  C order noted. Notified liaison Jose Camacho from [facility] of order. Will continue to follow.   Electronically signed by JAN Fish on 6/18/2019 at 8:32 AM

## 2019-06-18 NOTE — PROGRESS NOTES
6/18/2019  12:46 PM           Nutrition Therapy      Type and Reason for Visit: LUIS F Nutrition Re-Screen    Nutrition Screen:   · Have you recently lost weight without trying? - 0 to 1 pound (0 points)   · Have you been eating poorly because of a decreased appetite? - No (0 points)   · Malnutrition Screening Tool Score - 0    Dietitian Assessment of Nutrition Re-Screen: Pt with poor appetite recently but stated it is better today. She is on CCPD and tolerating it. Will add Protein Modular ONS and Ensure Clear ONS due to high protein needs/known losses via dialysis. No significant wt changes and no nonhealing wounds. Discharge planning in progress         Electronically signed by Annabelle George RD, CNSC, LD on 6/18/19 at 12:46 PM    Contact Number: [phone]

## 2019-07-07 NOTE — DISCHARGE SUMMARY
Physician Discharge Summary     Patient ID:  Madhu Verde  [id number]  35 y.o.  1940    Admit date: 6/11/2019    Discharge date and time: 6/18/2019  4:58 PM     Admission Diagnoses: Altered mental status [R41.82]  Altered mental status [R41.82]    Discharge Diagnoses:   Seps[facility] [address] [address]     SIRS (systemic inflammatory response syndrome) (HCC)    Type 2 diabetes mellitus with diabetic chronic kidney disease ([facility] [address]    Osteoarthritis of right knee    Arthritis of knee, right    History of peritoneal dialysis    End stage renal disease ([facility] [address]    Altered mental status    Acute delirium    Essential hypertension, benign          Consults: ID and nephrology    Procedures: Radiology:  MRI of the brain without contrast showed no recent CVA     Microbiology:   Peritoneal fluid 6/11/2019. Gram stain: NOS. Culture negative so far  Blood cultures 6/12/2019 Negative so far   Urine cx negative  resp viral panel neg      Hospital Course: The history is obtained from extensive review of available past medical records. The patient is a 66 y.o. female who is known to the ID service. The patient has a history of ESRD and is currently on home peritoneal dialysis. According to her  he had come back from [facility] and found her lying on the bed with the PD catheter disconnected and patient being confused and moaning. The  patient was reported that she did not finish peritoneal dialysis today and  found her disoriented with a dialysis port open. Dialysis bag stated by the  should have more fluid in it. Patient anxious and thrashing and moving around upon arrival. MO is limited due the patient's condition. Patient's  at bedside states that she has a history of peritonitis and presented similarly in the past to her present presentation. She was brought into the ED on 6/11/2019. White count was 12.7. Blood pressure was high and she was afebrile. Peritoneal fluid was sent out for cultures. tablet Comments:   Reason for Stopping:         NONFORMULARY Comments:   Reason for Stopping:         Biotin 26088 MCG TABS Comments:   Reason for Stopping:         calcitRIOL (ROCALTROL) 0.25 MCG capsule Comments:   Reason for Stopping:         Cholecalciferol (VITAMIN D3) 2000 units CAPS Comments:   Reason for Stopping:         amLODIPine (NORVASC) 2.5 MG tablet Comments:   Reason for Stopping:         aspirin 81 MG tablet Comments:   Reason for Stopping:         ezetimibe-simvastatin (VYTORIN) 10-20 MG per tablet Comments:   Reason for Stopping:             Activity: activity as tolerated  Diet: cardiac diet, diabetic diet and renal diet    Follow-up with Dr. Tresa Zayas in 6 days.     Note that over 30 minutes was spent in preparing discharge papers, discussing discharge with patient, medication review, etc.    Signed:  Thais Pineda  7/7/2019  12:10 PM

## 2019-08-29 ASSESSMENT — PROMIS GLOBAL HEALTH SCALE
IN GENERAL, HOW WOULD YOU RATE YOUR PHYSICAL HEALTH [ON A SCALE OF 1 (POOR) TO 5 (EXCELLENT)]?: 3
IN GENERAL, HOW WOULD YOU RATE YOUR SATISFACTION WITH YOUR SOCIAL ACTIVITIES AND RELATIONSHIPS [ON A SCALE OF 1 (POOR) TO 5 (EXCELLENT)]?: 4
IN THE PAST 7 DAYS, HOW OFTEN HAVE YOU BEEN BOTHERED BY EMOTIONAL PROBLEMS, SUCH AS FEELING ANXIOUS, DEPRESSED, OR IRRITABLE [ON A SCALE FROM 1 (NEVER) TO 5 (ALWAYS)]?: 2
IN GENERAL, WOULD YOU SAY YOUR HEALTH IS...[ON A SCALE OF 1 (POOR) TO 5 (EXCELLENT)]: 3
IN GENERAL, HOW WOULD YOU RATE YOUR MENTAL HEALTH, INCLUDING YOUR MOOD AND YOUR ABILITY TO THINK [ON A SCALE OF 1 (POOR) TO 5 (EXCELLENT)]?: 4
IN THE PAST 7 DAYS, HOW WOULD YOU RATE YOUR FATIGUE ON AVERAGE [ON A SCALE FROM 1 (NONE) TO 5 (VERY SEVERE)]?: 3
HOW IS THE PROMIS V1.1 BEING ADMINISTERED?: 2
IN THE PAST 7 DAYS, HOW WOULD YOU RATE YOUR PAIN ON AVERAGE [ON A SCALE FROM 0 (NO PAIN) TO 10 (WORST IMAGINABLE PAIN)]?: 0
TO WHAT EXTENT ARE YOU ABLE TO CARRY OUT YOUR EVERYDAY PHYSICAL ACTIVITIES SUCH AS WALKING, CLIMBING STAIRS, CARRYING GROCERIES, OR MOVING A CHAIR [ON A SCALE OF 1 (NOT AT ALL) TO 5 (COMPLETELY)]?: 3
WHO IS THE PERSON COMPLETING THE PROMIS V1.1 SURVEY?: 0
IN GENERAL, PLEASE RATE HOW WELL YOU CARRY OUT YOUR USUAL SOCIAL ACTIVITIES (INCLUDES ACTIVITIES AT HOME, AT WORK, AND IN YOUR COMMUNITY, AND RESPONSIBILITIES AS A PARENT, CHILD, SPOUSE, EMPLOYEE, FRIEND, ETC) [ON A SCALE OF 1 (POOR) TO 5 (EXCELLENT)]?: 3
IN GENERAL, WOULD YOU SAY YOUR QUALITY OF LIFE IS...[ON A SCALE OF 1 (POOR) TO 5 (EXCELLENT)]: 3

## 2019-08-29 ASSESSMENT — KOOS JR
TWISING OR PIVOTING ON KNEE: 0
BENDING TO THE FLOOR TO PICK UP OBJECT: 1
GOING UP OR DOWN STAIRS: 1
RISING FROM SITTING: 1
STRAIGHTENING KNEE FULLY: 0
STANDING UPRIGHT: 1
HOW SEVERE IS YOUR KNEE STIFFNESS AFTER FIRST WAKING IN MORNING: 1

## 2021-12-21 LAB — GRAM STAIN ORDERABLE: NORMAL

## 2021-12-23 LAB — ANAEROBIC CULTURE: NORMAL

## 2021-12-24 LAB
ORGANISM: ABNORMAL
WOUND/ABSCESS: ABNORMAL

## 2022-04-13 NOTE — PROGRESS NOTES
Unable to rotate IV site due to poor venous access. IV team consulted, unable to do at this time, another RN also tried, unable to insert at this time. Render Risk Assessment In Note?: yes Note Text (......Xxx Chief Complaint.): This diagnosis correlates with the Detail Level: Zone

## 2022-04-27 ENCOUNTER — APPOINTMENT (OUTPATIENT)
Dept: CT IMAGING | Age: 82
End: 2022-04-27
Payer: MEDICARE

## 2022-04-27 ENCOUNTER — APPOINTMENT (OUTPATIENT)
Dept: GENERAL RADIOLOGY | Age: 82
End: 2022-04-27
Payer: MEDICARE

## 2022-04-27 ENCOUNTER — HOSPITAL ENCOUNTER (OUTPATIENT)
Age: 82
Setting detail: OBSERVATION
Discharge: HOME | End: 2022-04-29
Attending: STUDENT IN AN ORGANIZED HEALTH CARE EDUCATION/TRAINING PROGRAM | Admitting: INTERNAL MEDICINE
Payer: MEDICARE

## 2022-04-27 DIAGNOSIS — Z98.890 HISTORY OF PERITONEAL DIALYSIS: ICD-10-CM

## 2022-04-27 DIAGNOSIS — R29.90 STROKE-LIKE SYMPTOMS: Primary | ICD-10-CM

## 2022-04-27 LAB
APTT: 33.6 SEC (ref 24.5–35.1)
BASOPHILS ABSOLUTE: 0.09 E9/L (ref 0–0.2)
BASOPHILS RELATIVE PERCENT: 0.7 % (ref 0–2)
CHP ED QC CHECK: NORMAL
EOSINOPHILS ABSOLUTE: 0.32 E9/L (ref 0.05–0.5)
EOSINOPHILS RELATIVE PERCENT: 2.5 % (ref 0–6)
GLUCOSE BLD-MCNC: 169 MG/DL
HCT VFR BLD CALC: 35 % (ref 34–48)
HEMOGLOBIN: 12.1 G/DL (ref 11.5–15.5)
IMMATURE GRANULOCYTES #: 0.13 E9/L
IMMATURE GRANULOCYTES %: 1 % (ref 0–5)
INR BLD: 1.3
LYMPHOCYTES ABSOLUTE: 2.55 E9/L (ref 1.5–4)
LYMPHOCYTES RELATIVE PERCENT: 20.3 % (ref 20–42)
MCH RBC QN AUTO: 31.1 PG (ref 26–35)
MCHC RBC AUTO-ENTMCNC: 34.6 % (ref 32–34.5)
MCV RBC AUTO: 90 FL (ref 80–99.9)
METER GLUCOSE: 169 MG/DL (ref 74–99)
MONOCYTES ABSOLUTE: 0.79 E9/L (ref 0.1–0.95)
MONOCYTES RELATIVE PERCENT: 6.3 % (ref 2–12)
NEUTROPHILS ABSOLUTE: 8.7 E9/L (ref 1.8–7.3)
NEUTROPHILS RELATIVE PERCENT: 69.2 % (ref 43–80)
PDW BLD-RTO: 15.2 FL (ref 11.5–15)
PLATELET # BLD: 383 E9/L (ref 130–450)
PMV BLD AUTO: 11.4 FL (ref 7–12)
PROTHROMBIN TIME: 13.6 SEC (ref 9.3–12.4)
RBC # BLD: 3.89 E12/L (ref 3.5–5.5)
REASON FOR REJECTION: NORMAL
REJECTED TEST: NORMAL
WBC # BLD: 12.6 E9/L (ref 4.5–11.5)

## 2022-04-27 PROCEDURE — 93005 ELECTROCARDIOGRAM TRACING: CPT | Performed by: STUDENT IN AN ORGANIZED HEALTH CARE EDUCATION/TRAINING PROGRAM

## 2022-04-27 PROCEDURE — 71045 X-RAY EXAM CHEST 1 VIEW: CPT

## 2022-04-27 PROCEDURE — 36415 COLL VENOUS BLD VENIPUNCTURE: CPT

## 2022-04-27 PROCEDURE — 85025 COMPLETE CBC W/AUTO DIFF WBC: CPT

## 2022-04-27 PROCEDURE — 70496 CT ANGIOGRAPHY HEAD: CPT

## 2022-04-27 PROCEDURE — 85730 THROMBOPLASTIN TIME PARTIAL: CPT

## 2022-04-27 PROCEDURE — 99285 EMERGENCY DEPT VISIT HI MDM: CPT

## 2022-04-27 PROCEDURE — 71275 CT ANGIOGRAPHY CHEST: CPT

## 2022-04-27 PROCEDURE — 90945 DIALYSIS ONE EVALUATION: CPT

## 2022-04-27 PROCEDURE — 82962 GLUCOSE BLOOD TEST: CPT

## 2022-04-27 PROCEDURE — 70450 CT HEAD/BRAIN W/O DYE: CPT

## 2022-04-27 PROCEDURE — 6360000004 HC RX CONTRAST MEDICATION: Performed by: RADIOLOGY

## 2022-04-27 PROCEDURE — 85610 PROTHROMBIN TIME: CPT

## 2022-04-27 PROCEDURE — 70498 CT ANGIOGRAPHY NECK: CPT

## 2022-04-27 RX ADMIN — IOPAMIDOL 135 ML: 755 INJECTION, SOLUTION INTRAVENOUS at 23:28

## 2022-04-27 ASSESSMENT — PAIN - FUNCTIONAL ASSESSMENT: PAIN_FUNCTIONAL_ASSESSMENT: NONE - DENIES PAIN

## 2022-04-27 NOTE — ED NOTES
Pt reports you cannot take her blood sugar In her finger d/t her peritoneal dialysis.      Sheri Mcarthur RN  04/27/22 0172

## 2022-04-27 NOTE — ED NOTES
Entered in error. This nurse is not the nurse for this pt.       Ashish Herrera, RN  04/27/22 5 Twila Carr RN  04/27/22 1920

## 2022-04-28 PROBLEM — E83.42 HYPOMAGNESEMIA: Status: ACTIVE | Noted: 2022-04-28

## 2022-04-28 PROBLEM — Z79.4 TYPE 2 DIABETES MELLITUS, WITH LONG-TERM CURRENT USE OF INSULIN (HCC): Status: ACTIVE | Noted: 2022-04-28

## 2022-04-28 PROBLEM — E87.6 HYPOKALEMIA: Status: ACTIVE | Noted: 2022-04-28

## 2022-04-28 PROBLEM — N18.6 ESRD ON PERITONEAL DIALYSIS (HCC): Status: ACTIVE | Noted: 2022-04-28

## 2022-04-28 PROBLEM — E66.9 OBESITY (BMI 30-39.9): Chronic | Status: ACTIVE | Noted: 2022-04-28

## 2022-04-28 PROBLEM — E11.9 TYPE 2 DIABETES MELLITUS, WITH LONG-TERM CURRENT USE OF INSULIN (HCC): Status: ACTIVE | Noted: 2022-04-28

## 2022-04-28 PROBLEM — I10 PRIMARY HYPERTENSION: Status: ACTIVE | Noted: 2022-04-28

## 2022-04-28 PROBLEM — Z99.2 ESRD ON PERITONEAL DIALYSIS (HCC): Status: ACTIVE | Noted: 2022-04-28

## 2022-04-28 PROBLEM — R29.90 STROKE-LIKE SYMPTOMS: Status: ACTIVE | Noted: 2022-04-28

## 2022-04-28 LAB
ALBUMIN SERPL-MCNC: 3.1 G/DL (ref 3.5–5.2)
ALBUMIN SERPL-MCNC: 3.4 G/DL (ref 3.5–5.2)
ALP BLD-CCNC: 107 U/L (ref 35–104)
ALP BLD-CCNC: 116 U/L (ref 35–104)
ALT SERPL-CCNC: 7 U/L (ref 0–32)
ALT SERPL-CCNC: 7 U/L (ref 0–32)
ANION GAP SERPL CALCULATED.3IONS-SCNC: 15 MMOL/L (ref 7–16)
ANION GAP SERPL CALCULATED.3IONS-SCNC: 18 MMOL/L (ref 7–16)
ANION GAP SERPL CALCULATED.3IONS-SCNC: 18 MMOL/L (ref 7–16)
ANION GAP SERPL CALCULATED.3IONS-SCNC: 19 MMOL/L (ref 7–16)
AST SERPL-CCNC: 10 U/L (ref 0–31)
AST SERPL-CCNC: 9 U/L (ref 0–31)
BASOPHILS ABSOLUTE: 0.09 E9/L (ref 0–0.2)
BASOPHILS RELATIVE PERCENT: 0.7 % (ref 0–2)
BILIRUB SERPL-MCNC: 0.3 MG/DL (ref 0–1.2)
BILIRUB SERPL-MCNC: 0.3 MG/DL (ref 0–1.2)
BUN BLDV-MCNC: 61 MG/DL (ref 6–23)
BUN BLDV-MCNC: 62 MG/DL (ref 6–23)
BUN BLDV-MCNC: 63 MG/DL (ref 6–23)
BUN BLDV-MCNC: 64 MG/DL (ref 6–23)
CALCIUM SERPL-MCNC: 8.3 MG/DL (ref 8.6–10.2)
CALCIUM SERPL-MCNC: 8.5 MG/DL (ref 8.6–10.2)
CALCIUM SERPL-MCNC: 8.5 MG/DL (ref 8.6–10.2)
CALCIUM SERPL-MCNC: 8.7 MG/DL (ref 8.6–10.2)
CHLORIDE BLD-SCNC: 85 MMOL/L (ref 98–107)
CHLORIDE BLD-SCNC: 87 MMOL/L (ref 98–107)
CHOLESTEROL, FASTING: 187 MG/DL (ref 0–199)
CO2: 24 MMOL/L (ref 22–29)
CO2: 24 MMOL/L (ref 22–29)
CO2: 25 MMOL/L (ref 22–29)
CO2: 25 MMOL/L (ref 22–29)
CREAT SERPL-MCNC: 7.3 MG/DL (ref 0.5–1)
CREAT SERPL-MCNC: 7.4 MG/DL (ref 0.5–1)
CREAT SERPL-MCNC: 7.5 MG/DL (ref 0.5–1)
CREAT SERPL-MCNC: 7.8 MG/DL (ref 0.5–1)
EKG ATRIAL RATE: 52 BPM
EKG Q-T INTERVAL: 458 MS
EKG QRS DURATION: 126 MS
EKG QTC CALCULATION (BAZETT): 522 MS
EKG R AXIS: -13 DEGREES
EKG T AXIS: 24 DEGREES
EKG VENTRICULAR RATE: 78 BPM
EOSINOPHILS ABSOLUTE: 0.4 E9/L (ref 0.05–0.5)
EOSINOPHILS RELATIVE PERCENT: 3.1 % (ref 0–6)
GFR AFRICAN AMERICAN: 6
GFR NON-AFRICAN AMERICAN: 5 ML/MIN/1.73
GLUCOSE BLD-MCNC: 127 MG/DL (ref 74–99)
GLUCOSE BLD-MCNC: 163 MG/DL (ref 74–99)
GLUCOSE BLD-MCNC: 171 MG/DL (ref 74–99)
GLUCOSE BLD-MCNC: 174 MG/DL (ref 74–99)
HBA1C MFR BLD: 6.5 % (ref 4–5.6)
HCT VFR BLD CALC: 32.7 % (ref 34–48)
HDLC SERPL-MCNC: 27 MG/DL
HEMOGLOBIN: 11.4 G/DL (ref 11.5–15.5)
IMMATURE GRANULOCYTES #: 0.09 E9/L
IMMATURE GRANULOCYTES %: 0.7 % (ref 0–5)
LDL CHOLESTEROL CALCULATED: 119 MG/DL (ref 0–99)
LYMPHOCYTES ABSOLUTE: 2.04 E9/L (ref 1.5–4)
LYMPHOCYTES RELATIVE PERCENT: 15.7 % (ref 20–42)
MAGNESIUM: 1.1 MG/DL (ref 1.6–2.6)
MAGNESIUM: 1.1 MG/DL (ref 1.6–2.6)
MAGNESIUM: 1.9 MG/DL (ref 1.6–2.6)
MCH RBC QN AUTO: 30.7 PG (ref 26–35)
MCHC RBC AUTO-ENTMCNC: 34.9 % (ref 32–34.5)
MCV RBC AUTO: 88.1 FL (ref 80–99.9)
METER GLUCOSE: 145 MG/DL (ref 74–99)
METER GLUCOSE: 200 MG/DL (ref 74–99)
METER GLUCOSE: 200 MG/DL (ref 74–99)
MONOCYTES ABSOLUTE: 0.8 E9/L (ref 0.1–0.95)
MONOCYTES RELATIVE PERCENT: 6.1 % (ref 2–12)
NEUTROPHILS ABSOLUTE: 9.61 E9/L (ref 1.8–7.3)
NEUTROPHILS RELATIVE PERCENT: 73.7 % (ref 43–80)
PDW BLD-RTO: 13.8 FL (ref 11.5–15)
PLATELET # BLD: 381 E9/L (ref 130–450)
PMV BLD AUTO: 10.5 FL (ref 7–12)
POTASSIUM REFLEX MAGNESIUM: 2.6 MMOL/L (ref 3.5–5)
POTASSIUM REFLEX MAGNESIUM: 2.7 MMOL/L (ref 3.5–5)
POTASSIUM REFLEX MAGNESIUM: 2.7 MMOL/L (ref 3.5–5)
POTASSIUM SERPL-SCNC: 4.2 MMOL/L (ref 3.5–5)
RBC # BLD: 3.71 E12/L (ref 3.5–5.5)
SODIUM BLD-SCNC: 126 MMOL/L (ref 132–146)
SODIUM BLD-SCNC: 128 MMOL/L (ref 132–146)
SODIUM BLD-SCNC: 129 MMOL/L (ref 132–146)
SODIUM BLD-SCNC: 131 MMOL/L (ref 132–146)
TOTAL PROTEIN: 6.1 G/DL (ref 6.4–8.3)
TOTAL PROTEIN: 6.5 G/DL (ref 6.4–8.3)
TRIGLYCERIDE, FASTING: 206 MG/DL (ref 0–149)
TROPONIN, HIGH SENSITIVITY: 184 NG/L (ref 0–9)
TROPONIN, HIGH SENSITIVITY: 195 NG/L (ref 0–9)
VLDLC SERPL CALC-MCNC: 41 MG/DL
WBC # BLD: 13 E9/L (ref 4.5–11.5)

## 2022-04-28 PROCEDURE — 82962 GLUCOSE BLOOD TEST: CPT

## 2022-04-28 PROCEDURE — 97165 OT EVAL LOW COMPLEX 30 MIN: CPT

## 2022-04-28 PROCEDURE — 83036 HEMOGLOBIN GLYCOSYLATED A1C: CPT

## 2022-04-28 PROCEDURE — 85025 COMPLETE CBC W/AUTO DIFF WBC: CPT

## 2022-04-28 PROCEDURE — 80048 BASIC METABOLIC PNL TOTAL CA: CPT

## 2022-04-28 PROCEDURE — 6370000000 HC RX 637 (ALT 250 FOR IP): Performed by: INTERNAL MEDICINE

## 2022-04-28 PROCEDURE — 2500000003 HC RX 250 WO HCPCS: Performed by: NURSE PRACTITIONER

## 2022-04-28 PROCEDURE — G0378 HOSPITAL OBSERVATION PER HR: HCPCS

## 2022-04-28 PROCEDURE — 6360000002 HC RX W HCPCS: Performed by: NURSE PRACTITIONER

## 2022-04-28 PROCEDURE — 84484 ASSAY OF TROPONIN QUANT: CPT

## 2022-04-28 PROCEDURE — 83735 ASSAY OF MAGNESIUM: CPT

## 2022-04-28 PROCEDURE — 6360000002 HC RX W HCPCS: Performed by: INTERNAL MEDICINE

## 2022-04-28 PROCEDURE — 92610 EVALUATE SWALLOWING FUNCTION: CPT

## 2022-04-28 PROCEDURE — 99219 PR INITIAL OBSERVATION CARE/DAY 50 MINUTES: CPT | Performed by: PSYCHIATRY & NEUROLOGY

## 2022-04-28 PROCEDURE — 6370000000 HC RX 637 (ALT 250 FOR IP): Performed by: NURSE PRACTITIONER

## 2022-04-28 PROCEDURE — 80053 COMPREHEN METABOLIC PANEL: CPT

## 2022-04-28 PROCEDURE — 96365 THER/PROPH/DIAG IV INF INIT: CPT

## 2022-04-28 PROCEDURE — 96366 THER/PROPH/DIAG IV INF ADDON: CPT

## 2022-04-28 PROCEDURE — 80061 LIPID PANEL: CPT

## 2022-04-28 PROCEDURE — 97161 PT EVAL LOW COMPLEX 20 MIN: CPT

## 2022-04-28 PROCEDURE — 36415 COLL VENOUS BLD VENIPUNCTURE: CPT

## 2022-04-28 PROCEDURE — 6360000002 HC RX W HCPCS: Performed by: EMERGENCY MEDICINE

## 2022-04-28 PROCEDURE — 96375 TX/PRO/DX INJ NEW DRUG ADDON: CPT

## 2022-04-28 PROCEDURE — 2580000003 HC RX 258: Performed by: NURSE PRACTITIONER

## 2022-04-28 PROCEDURE — S5553 INSULIN LONG ACTING 5 U: HCPCS | Performed by: NURSE PRACTITIONER

## 2022-04-28 PROCEDURE — 96372 THER/PROPH/DIAG INJ SC/IM: CPT

## 2022-04-28 PROCEDURE — 96368 THER/DIAG CONCURRENT INF: CPT

## 2022-04-28 PROCEDURE — 90945 DIALYSIS ONE EVALUATION: CPT

## 2022-04-28 RX ORDER — SEVELAMER CARBONATE 800 MG/1
1600 TABLET, FILM COATED ORAL 3 TIMES DAILY
Status: DISCONTINUED | OUTPATIENT
Start: 2022-04-28 | End: 2022-04-30 | Stop reason: HOSPADM

## 2022-04-28 RX ORDER — INSULIN GLARGINE-YFGN 100 [IU]/ML
10 INJECTION, SOLUTION SUBCUTANEOUS 2 TIMES DAILY
Status: DISCONTINUED | OUTPATIENT
Start: 2022-04-28 | End: 2022-04-30 | Stop reason: HOSPADM

## 2022-04-28 RX ORDER — METOPROLOL SUCCINATE 50 MG/1
50 TABLET, EXTENDED RELEASE ORAL DAILY
Status: DISCONTINUED | OUTPATIENT
Start: 2022-04-28 | End: 2022-04-30 | Stop reason: HOSPADM

## 2022-04-28 RX ORDER — DEXTROSE MONOHYDRATE 25 G/50ML
12.5 INJECTION, SOLUTION INTRAVENOUS PRN
Status: DISCONTINUED | OUTPATIENT
Start: 2022-04-28 | End: 2022-04-30 | Stop reason: HOSPADM

## 2022-04-28 RX ORDER — SODIUM CHLORIDE 0.9 % (FLUSH) 0.9 %
5-40 SYRINGE (ML) INJECTION PRN
Status: DISCONTINUED | OUTPATIENT
Start: 2022-04-28 | End: 2022-04-30 | Stop reason: HOSPADM

## 2022-04-28 RX ORDER — SODIUM CHLORIDE 0.9 % (FLUSH) 0.9 %
5-40 SYRINGE (ML) INJECTION EVERY 12 HOURS SCHEDULED
Status: DISCONTINUED | OUTPATIENT
Start: 2022-04-28 | End: 2022-04-30 | Stop reason: HOSPADM

## 2022-04-28 RX ORDER — ISOSORBIDE MONONITRATE 30 MG/1
30 TABLET, EXTENDED RELEASE ORAL DAILY
Status: DISCONTINUED | OUTPATIENT
Start: 2022-04-28 | End: 2022-04-30 | Stop reason: HOSPADM

## 2022-04-28 RX ORDER — POTASSIUM CHLORIDE 7.45 MG/ML
10 INJECTION INTRAVENOUS
Status: DISPENSED | OUTPATIENT
Start: 2022-04-28 | End: 2022-04-28

## 2022-04-28 RX ORDER — METOPROLOL TARTRATE 5 MG/5ML
5 INJECTION INTRAVENOUS EVERY 6 HOURS
Status: DISCONTINUED | OUTPATIENT
Start: 2022-04-28 | End: 2022-04-28

## 2022-04-28 RX ORDER — BISACODYL 10 MG
10 SUPPOSITORY, RECTAL RECTAL DAILY PRN
Status: DISCONTINUED | OUTPATIENT
Start: 2022-04-28 | End: 2022-04-30 | Stop reason: HOSPADM

## 2022-04-28 RX ORDER — DULOXETIN HYDROCHLORIDE 30 MG/1
30 CAPSULE, DELAYED RELEASE ORAL DAILY
Status: DISCONTINUED | OUTPATIENT
Start: 2022-04-28 | End: 2022-04-30 | Stop reason: HOSPADM

## 2022-04-28 RX ORDER — ASPIRIN 81 MG/1
81 TABLET, CHEWABLE ORAL DAILY
Status: DISCONTINUED | OUTPATIENT
Start: 2022-04-28 | End: 2022-04-30 | Stop reason: HOSPADM

## 2022-04-28 RX ORDER — MAGNESIUM SULFATE HEPTAHYDRATE 500 MG/ML
2000 INJECTION, SOLUTION INTRAMUSCULAR; INTRAVENOUS ONCE
Status: DISCONTINUED | OUTPATIENT
Start: 2022-04-28 | End: 2022-04-28 | Stop reason: CLARIF

## 2022-04-28 RX ORDER — DEXTROSE MONOHYDRATE 25 G/50ML
12.5 INJECTION, SOLUTION INTRAVENOUS PRN
Status: DISCONTINUED | OUTPATIENT
Start: 2022-04-28 | End: 2022-04-28 | Stop reason: SDUPTHER

## 2022-04-28 RX ORDER — DEXTROSE MONOHYDRATE 50 MG/ML
100 INJECTION, SOLUTION INTRAVENOUS PRN
Status: DISCONTINUED | OUTPATIENT
Start: 2022-04-28 | End: 2022-04-30 | Stop reason: HOSPADM

## 2022-04-28 RX ORDER — SODIUM CHLORIDE 9 MG/ML
INJECTION, SOLUTION INTRAVENOUS PRN
Status: DISCONTINUED | OUTPATIENT
Start: 2022-04-28 | End: 2022-04-30 | Stop reason: HOSPADM

## 2022-04-28 RX ORDER — INSULIN LISPRO 100 [IU]/ML
0-6 INJECTION, SOLUTION INTRAVENOUS; SUBCUTANEOUS
Status: DISCONTINUED | OUTPATIENT
Start: 2022-04-28 | End: 2022-04-30 | Stop reason: HOSPADM

## 2022-04-28 RX ORDER — ATORVASTATIN CALCIUM 40 MG/1
40 TABLET, FILM COATED ORAL NIGHTLY
Status: DISCONTINUED | OUTPATIENT
Start: 2022-04-28 | End: 2022-04-30 | Stop reason: HOSPADM

## 2022-04-28 RX ORDER — HEPARIN SODIUM 10000 [USP'U]/ML
5000 INJECTION, SOLUTION INTRAVENOUS; SUBCUTANEOUS EVERY 8 HOURS
Status: DISCONTINUED | OUTPATIENT
Start: 2022-04-28 | End: 2022-04-28

## 2022-04-28 RX ORDER — DEXTROSE MONOHYDRATE 50 MG/ML
100 INJECTION, SOLUTION INTRAVENOUS PRN
Status: DISCONTINUED | OUTPATIENT
Start: 2022-04-28 | End: 2022-04-28 | Stop reason: SDUPTHER

## 2022-04-28 RX ORDER — ASPIRIN 81 MG/1
324 TABLET, CHEWABLE ORAL ONCE
Status: DISCONTINUED | OUTPATIENT
Start: 2022-04-28 | End: 2022-04-30 | Stop reason: HOSPADM

## 2022-04-28 RX ORDER — MAGNESIUM SULFATE IN WATER 40 MG/ML
2000 INJECTION, SOLUTION INTRAVENOUS ONCE
Status: COMPLETED | OUTPATIENT
Start: 2022-04-28 | End: 2022-04-28

## 2022-04-28 RX ORDER — LEVOTHYROXINE SODIUM 0.05 MG/1
50 TABLET ORAL DAILY
Status: DISCONTINUED | OUTPATIENT
Start: 2022-04-28 | End: 2022-04-30 | Stop reason: HOSPADM

## 2022-04-28 RX ORDER — INSULIN LISPRO 100 [IU]/ML
0-3 INJECTION, SOLUTION INTRAVENOUS; SUBCUTANEOUS NIGHTLY
Status: DISCONTINUED | OUTPATIENT
Start: 2022-04-28 | End: 2022-04-30 | Stop reason: HOSPADM

## 2022-04-28 RX ADMIN — ISOSORBIDE MONONITRATE 30 MG: 30 TABLET, EXTENDED RELEASE ORAL at 10:17

## 2022-04-28 RX ADMIN — SODIUM CHLORIDE, PRESERVATIVE FREE 10 ML: 5 INJECTION INTRAVENOUS at 21:51

## 2022-04-28 RX ADMIN — POTASSIUM CHLORIDE 10 MEQ: 7.46 INJECTION, SOLUTION INTRAVENOUS at 05:51

## 2022-04-28 RX ADMIN — METOPROLOL SUCCINATE 50 MG: 50 TABLET, EXTENDED RELEASE ORAL at 10:17

## 2022-04-28 RX ADMIN — HEPARIN SODIUM 5000 UNITS: 10000 INJECTION, SOLUTION INTRAVENOUS; SUBCUTANEOUS at 06:02

## 2022-04-28 RX ADMIN — DULOXETINE HYDROCHLORIDE 30 MG: 30 CAPSULE, DELAYED RELEASE ORAL at 10:17

## 2022-04-28 RX ADMIN — METOPROLOL TARTRATE 5 MG: 5 INJECTION INTRAVENOUS at 05:51

## 2022-04-28 RX ADMIN — APIXABAN 2.5 MG: 2.5 TABLET, FILM COATED ORAL at 21:37

## 2022-04-28 RX ADMIN — ATORVASTATIN CALCIUM 40 MG: 40 TABLET, FILM COATED ORAL at 21:37

## 2022-04-28 RX ADMIN — MAGNESIUM SULFATE HEPTAHYDRATE 2000 MG: 40 INJECTION, SOLUTION INTRAVENOUS at 03:54

## 2022-04-28 RX ADMIN — INSULIN GLARGINE-YFGN 10 UNITS: 100 INJECTION, SOLUTION SUBCUTANEOUS at 21:49

## 2022-04-28 RX ADMIN — SODIUM CHLORIDE, PRESERVATIVE FREE 10 ML: 5 INJECTION INTRAVENOUS at 10:18

## 2022-04-28 RX ADMIN — POTASSIUM BICARBONATE 40 MEQ: 782 TABLET, EFFERVESCENT ORAL at 10:17

## 2022-04-28 RX ADMIN — INSULIN GLARGINE-YFGN 10 UNITS: 100 INJECTION, SOLUTION SUBCUTANEOUS at 10:00

## 2022-04-28 RX ADMIN — ASPIRIN 81 MG 81 MG: 81 TABLET ORAL at 10:17

## 2022-04-28 NOTE — H&P
[address] [facility]  History and Physical      CHIEF COMPLAINT:    Chief Complaint   Patient presents with    Numbness     numb/ting started at 5pm, \"felt different\" moved down to left arm, now resolved, DM , [email or web address]        Patient of Edwina Garcia MD presents with:  Stroke-like symptoms    History of Present Illness:   Patient reports that yesterday evening she felt acute onset of a sensory change in the left side of her face as well as transient blurred vision. She also felt bilateral tingling of the feet. There is no focal weakness. No facial droop. No double vision. No numbness of the arm. It resolved after about an hour. She currently feels fine. There are no other associated symptoms or modifying factors. She is on Eliquis for A. fib. She does not think she has missed any doses       REVIEW OF SYSTEMS:  Pertinent negatives are above in HPI. 10 point ROS otherwise negative.       Past Medical History:   Diagnosis Date    Anemia     Arthritis     CAD (coronary artery disease)     Diabetes mellitus ([facility] [address]     Gout     History of bleeding peptic ulcer approx     Hypertension     Peritoneal dialysis catheter in place [facility])     Peritoneal dialysis status ([facility] [address]     at night for 8 hours    Thyroid disease     Use of cane as ambulatory aid     Wears glasses          Past Surgical History:   Procedure Laterality Date    BACK SURGERY      CARDIOVASCULAR STRESS TEST      CARPAL TUNNEL RELEASE Bilateral     CATARACT REMOVAL WITH IMPLANT Bilateral      SECTION      CORONARY ARTERY BYPASS GRAFT  approx 2000    x 3; per Dr John Neville [facility] [address]      left knee, right shoulder    IA TOTAL KNEE ARTHROPLASTY Right 10/31/2018    RIGHT KNEE TOTAL ARTHROPLASTY +++BRIDGER++ PNB++ performed by Kaykay Jeffers MD at [place] [address] ENDOSCOPY         Medications Prior to Admission:    Medications Prior to Admission: insulin glargine (LANTUS) 100 UNIT/ML injection vial, Inject 10 Units into the skin 2 times daily  losartan (COZAAR) 100 MG tablet, Take 100 mg by mouth daily  metoprolol succinate (TOPROL XL) 50 MG extended release tablet, Take 50 mg by mouth daily Take am day of surgery 10/31  DULoxetine (CYMBALTA) 20 MG extended release capsule, Take 30 mg by mouth daily Take am day of surgery 10/31   sevelamer (RENVELA) 800 MG tablet, Take 2 tablets by mouth 3 times daily   isosorbide mononitrate (IMDUR) 30 MG CR tablet, Take 30 mg by mouth daily Take am day of surgery 10/31  levothyroxine (SYNTHROID) 50 MCG tablet, Take 50 mcg by mouth Daily    Note that the patient's home medications were reviewed and the above list is accurate to the best of my knowledge at the time of the exam.    Allergies:    Sulfa antibiotics    Social History:    reports that she has never smoked. She has never used smokeless tobacco. She reports that she does not drink alcohol and does not use drugs. Family History:   No fhx cva      PHYSICAL EXAM:    Vitals:  BP (!) 167/67   Pulse 83   Temp 96.2 °F (35.7 °C) (Temporal)   Resp 20   Ht 4' 11\" (1.499 m)   Wt 162 lb 3.2 oz (73.6 kg)   SpO2 97%   BMI 32.76 kg/m²       General appearance: NAD, conversant  Eyes: Sclerae anicteric, PERRLA  HEENT: AT/NC, MMM  Neck: FROM, supple, no thyromegaly  Lymph: No cervical / supraclavicular lymphadenopathy  Lungs: Clear to auscultation, WOB normal  CV: RRR, 2/6 LILLIE, no lower extremity edema  Abdomen: Soft, non-tender; no masses or HSM, +BS  Extremities: FROM without synovitis. No clubbing or cyanosis of the hands. Skin: no rash, induration, lesions, or ulcers  Psych: Calm and cooperative. Normal judgement and insight. Normal mood and affect. Neuro: Alert and interactive, face symmetric, speech fluent.   Tongue midline, PERRLA, EOMI, handgrips 5/5 b/l, R elbow flexor 5/5, L shoulder issues precludes full exam, b/l hip flexors 5/5, b/l foot plantarflexors 5/5, sensation intact in b/l face and extremities. LABS:  All labs reviewed. Of note:  CBC with Differential:    Lab Results   Component Value Date    WBC 12.6 04/27/2022    RBC 3.89 04/27/2022    HGB 12.1 04/27/2022    HCT 35.0 04/27/2022     04/27/2022    MCV 90.0 04/27/2022    MCH 31.1 04/27/2022    MCHC 34.6 04/27/2022    RDW 15.2 04/27/2022    BANDSPCT 2 12/19/2015    METASPCT 1 12/19/2015    LYMPHOPCT 20.3 04/27/2022    MONOPCT 6.3 04/27/2022    MYELOPCT 1 12/19/2015    BASOPCT 0.7 04/27/2022    MONOSABS 0.79 04/27/2022    LYMPHSABS 2.55 04/27/2022    EOSABS 0.32 04/27/2022    BASOSABS 0.09 04/27/2022     CMP:    Lab Results   Component Value Date     04/28/2022    K 2.7 04/28/2022    CL 87 04/28/2022    CO2 24 04/28/2022    BUN 62 04/28/2022    CREATININE 7.5 04/28/2022    GFRAA 6 04/28/2022    LABGLOM 5 04/28/2022    GLUCOSE 174 04/28/2022    PROT 6.1 04/28/2022    LABALBU 3.1 04/28/2022    CALCIUM 8.3 04/28/2022    BILITOT 0.3 04/28/2022    ALKPHOS 107 04/28/2022    AST 10 04/28/2022    ALT 7 04/28/2022       Imaging:  I've personally reviewed the patient's CT head  1. No large vessel occlusion in the head or neck. 2. Moderate atherosclerotic disease. 3. No acute intracranial hemorrhage or mass effect. I've personally reviewed the patient's CXR  Clear    EKG:  I've personally reviewed the patient's EKG:  AF HR 78 RBBB    Telemetry:  I've personally reviewed the patient's telemetry:  AF HR 70's    ASSESSMENT/PLAN:  Principal Problem:    Stroke-like symptoms  Active Problems:    ESRD on peritoneal dialysis ([address]    Type 2 diabetes mellitus, with long-term current use of insulin ([facility] [address]    Primary hypertension    Hypomagnesemia    Hypokalemia    Obesity (BMI 30-39. 9)    Type 2 diabetes mellitus with diabetic chronic kidney disease ([facility] [address]  Resolved Problems:    * No resolved hospital problems. *      Aspirin, statin, MRI.   Reports she's already on Eliquis, though she didn't bring in a med list.  I will confirm.     Neuro c/s    Glargine/SSI    Replate K/Mag    Nephro c/s    DVT prophylaxis: heparin  Code status: full  Requires obs level of care  Tanja Mccabe MD    7:02 AM  4/28/2022

## 2022-04-28 NOTE — PROGRESS NOTES
SPEECH/LANGUAGE PATHOLOGY  CLINICAL ASSESSMENT OF SWALLOWING FUNCTION   and PLAN OF CARE  PATIENT NAME:  Osiris Reid  (female)     MRN:  [id number]    :  1940  (80 y.o.)  STATUS:  Inpatient: Room 8203/8203-A    TODAY'S DATE:  2022  REFERRING PROVIDER:  KERRI Silva CNP     SPECIFIC PROVIDER ORDER: SLP swallowing-dysphagia evaluation and treatment Date of order:  22  REASON FOR REFERRAL: dysphagia   EVALUATING THERAPIST: TANNER Byrd                 ASSESSMENT:    DYSPHAGIA DIAGNOSIS:   Clinical indicators of normal swallow function      DIET RECOMMENDATIONS:  Regular consistency solids (IDDSI level 7) with  thin liquids (IDDSI level 0)     FEEDING RECOMMENDATIONS:     Assistance level:  No assistance needed      Compensatory strategies recommended: No strategies are recommended at this time      Discussed recommendations with nursing?: No secondary to no diet/liquid change recommended     SPEECH THERAPY  PLAN OF CARE   The dysphagia POC is established based on physician order, dysphagia diagnosis and results of clinical assessment     Dysphagia therapy is not recommended     Conditions Requiring Skilled Therapeutic Intervention for dysphagia:    not applicable    Specific dysphagia interventions to include:     Not applicable    Specific instructions for next treatment:  not applicable   Patient Treatment Goals:    Short Term Goals:  Not applicable no therapy warranted     Long Term Goals:   Not applicable no therapy warranted      Patient/family Goal:    not applicable                    ADMITTING DIAGNOSIS: Stroke-like symptoms [R29.90]  History of peritoneal dialysis [Z98.890]    VISIT DIAGNOSIS:      PATIENT REPORT/COMPLAINT: denies difficulty swallowing  nursing not available at time of evaluation chart reviewed and patient is not NPO for any procedures per current documentation.      PRIOR LEVEL OF SWALLOW FUNCTION:    PAST HISTORY OF DYSPHAGIA?: none reported    Home diet: Regular consistency solids (IDDSI level 7) with  thin liquids (IDDSI level 0)    Current Diet Order:  ADULT DIET; Regular; 4 carb choices (60 gm/meal); Low Sodium (2 gm)    PROCEDURE:  Consistencies Administered During the Evaluation   Liquids: thin liquid   Solids:  pureed foods and soft solid foods      Method of Intake:   cup, straw, spoon  Self fed      Position:   Seated, upright    CLINICAL ASSESSMENT  Oral Stage: The oral stage of swallowing was within functional limits      Pharyngeal Stage:    No signs of aspiration were noted during this evaluation however, silent aspiration cannot be ruled out at bedside. If silent aspiration is suspected, a Videofluoroscopic Study of Swallowing (MBS) is recommended and requires a physician order. Cognition:   Confusion noted    Oral Peripheral Examination   Adequate lingual/labial strength     Current Respiratory Status    room air     Parameters of Speech Production  Respiration:  Adequate for speech production  Quality:   Within functional limits  Intensity: Within functional limits    Volitional Swallow: Present    Volitional Cough:    Present    Pain: No pain reported. EDUCATION:   The Speech Language Pathologist (SLP) completed education regarding results of evaluation and that intervention is not warranted at this time. Learner: Patient  Education:  Reviewed results and recommendations of this evaluation  Evaluation of Education: Verbalizes understanding    This plan will be re-evaluated and revised in 1 week  if warranted. CPT code:  96867  bedside swallow eval      [x]The admitting diagnosis and active problem list, have been reviewed prior to initiation of this evaluation.         ACTIVE PROBLEM LIST:   Patient Active Problem List   Diagnosis    Peritoneal dialysis catheter infection ([facility] [address]    Sepsis ([facility] [address]    SIRS (systemic inflammatory response syndrome) ([facility] [address]    Type 2 diabetes mellitus with diabetic chronic kidney disease ([facility] [address]    Osteoarthritis of right knee    Arthritis of knee, right    History of peritoneal dialysis    End stage renal disease ([facility] [address]    Altered mental status    Acute delirium    Essential hypertension, benign    Stroke-like symptoms    ESRD on peritoneal dialysis ([facility] [address]    Type 2 diabetes mellitus, with long-term current use of insulin ([facility] [address]    Primary hypertension    Hypomagnesemia    Hypokalemia    Obesity (BMI 30-39. 9)

## 2022-04-28 NOTE — PROGRESS NOTES
OCCUPATIONAL THERAPY INITIAL EVALUATION    [address] [address] [place]      Date:2022                                                  Patient Name: Keshav Reid  MRN: [id number]  : 1940  Room: [address] [place], [place] [address]    Evaluating OT: PRISCILLA Mario, OTR/L [id number]  Referring Provider:KERRI Silva CNP  Specific Provider Orders: OT eval and treat   Recommended Adaptive Equipment: 24 hr SUP     Diagnosis: stroke like sx   Surgery: none   Pertinent Medical History: none on file   Precautions:  Fall Risk, PD    Assessment of current deficits   [x] Functional mobility  [x]ADLs  [x] Strength               [x]Cognition   [x] Functional transfers   [x] IADLs         [x] Safety Awareness   [x]Endurance   [] Fine Coordination              [x] Balance      [] Vision/perception   [x]Sensation    []Gross Motor Coordination  [] ROM  [] Delirium                   [] Motor Control     OT PLAN OF CARE   OT POC based on physician orders, patient diagnosis and results of clinical assessment    Frequency/Duration: 2-4 days/wk for 2 weeks PRN   Specific OT Treatment to include:   * Instruction/training on adapted ADL techniques and AE recommendations to increase functional independence within precautions       * Training on energy conservation strategies, correct breathing pattern and techniques to improve independence/tolerance for self-care routine  * Functional transfer/mobility training/DME recommendations for increased independence, safety, and fall prevention  * Patient/Family education to increase follow through with safety techniques and functional independence  * Recommendation of environmental modifications for increased safety with functional transfers/mobility and ADLs  * Therapeutic exercise to improve motor endurance, ROM, and functional strength for ADLs/functional transfers  * Therapeutic activities to facilitate/challenge dynamic balance, stand tolerance for increased safety and independence with ADLs  * Neuro-muscular re-education: facilitation of righting/equilibrium reactions, midline orientation, scapular stability/mobility, normalization of muscle tone, and facilitation of volitional active controled movement    Home Living: Pt lives alone in a 1 story home; bed/bath on main level, laundry in basement (but son completes)   Bathroom setup: walk in shower (but pt has been sponge bathing)   Equipment owned: shower chair  Prior Level of Function: IND with ADLs , assist with IADLs; using rollator for functional mobility     Pain Level: 0/10  Cognition: A&O: 3/4; Follows 1 step directions, with repetition and increased time   Memory:  fair    Sequencing:  fair    Problem solving:  fair    Judgement/safety:  fair     Functional Assessment:  AM-PAC Daily Activity Raw Score: 14/24   Initial Eval Status  Date: 4/28/22 Treatment Status  Date: STGs=LTGs  Time Frame: 10-14 days   Feeding SUP   IND   Grooming SBA (seated EOB)   SUP   UB Dressing Mod A (due to limited ROM)   Min A   LB Dressing Max A (assist with socks)  Mod A    Bathing Max A (simulated)  Mod A    Toileting Mod A  Min A   Bed Mobility  Log roll: Min A  Supine to sit: Min A   Sit to supine: Min A   Log roll SUP  Supine to sit: SUP   Sit to supine: SUP   Functional Transfers Sit to stand:Min A   Stand to sit:Min A  Commode: Min A  SBA   Functional Mobility Min A (using ww, to/from bathroom)  SBA   Balance Sitting: SBA  Standing: Min A     Activity Tolerance fair     Visual/  Perceptual Glasses: no                UE ROM: BUE: elbow flex WFL, shoulder flex grossly 45'  Strength: RUE: grossly 3+/5 LUE: grossly 3+/5   Strength: B WFL  Fine Motor Coordination:  Grossly fair    Hearing: WFL  Sensation:  c/o numbness/tingling in B feet  Tone:  WFL  Edema: none noted                            Comments:Cleared by RN to see pt. Upon arrival, patient supine in bed and agreeable to OT session.  At end of session, patient supine in bed with call light and phone within reach, all lines and tubes intact. Pt would benefit from continued OT to increase functional independence and quality of life. Treatment:  Pt appeared latent with following commands and answering questions. Pt required vc's and physical assist for proper technique/safety with hand placement/body mechanics/posture for bed mobility/ADLs/functional tranfers/mobility/ww management. Pt required vc's for sequencing/initiation of ADLs/functional transfers. Pt able to  sit EOB  ~5 mins to increase core strength/balance/activity tolerance for ease with ADLs. Pt required increased time to complete ADLs/functional transfers due to management of multiple lines and cognitive processing. Pt required rest breaks during session. Pt appeared to have tolerated session fairly and appears cooperative. Pt instructed on use of call light for assistance and fall prevention. Pt demo'ing fair understanding of education provided. Continue to educate. Eval Complexity: Low    Rehab Potential: Good for established goals, pt. assisted in establishment of goals. LTG: maximize independence with ADLs to return to PLOF    Patient  instructed on diagnosis, prognosis/goals and plan of care. Demonstrated fair understanding. [] Malnutrition indicators have been identified and nursing has been notified to ensure a dietitian consult is ordered. Evaluation time includes thorough review of current medical information, gathering information on past medical & social history & PLOF, completion of standardized testing, informal observation of tasks, consultation with other medical professions/disciplines, assessment of data & development of POC/goals.      Time In: 0850       Time Out: 0900     Total treatment time: 0       Treatment Charges: Mins Units   OT Eval Low 31987 X    OT Eval Medium 06743     OT Eval High 82215     OT Re-Eval [id number]     Ther Ex  81193       Manual Therapy [address]       ADL/Home Mgt 53286     Neuro Re-ed [address] manage/training  [address]       Non-Billable Time           Jake Coles, OTR/L [id number]

## 2022-04-28 NOTE — ED NOTES
Received critical potassium of 2.6.   Section RN, Tristan Mia notified     Marily Parikh RN  04/28/22 1529

## 2022-04-28 NOTE — ED NOTES
Received critical value from lab. Potassium is 2.67.   Section RN, Jerod Payne notified     Kishan Saunders RN  04/28/22 1 [facility] Pepito Conti, ANA CRISTINA  04/28/22 7621 Subjective





- Date & Time of Evaluation


Date of Evaluation: 03/27/17


Time of Evaluation: 09:40





- Subjective


Subjective: 


Patient is resting comfortably in bed, not in distress, no fevers overnight.





Objective





- Vital Signs/Intake and Output


Vital Signs (last 24 hours): 


 











Temp Pulse Resp BP Pulse Ox


 


 98 F   104 H  18   148/76   96 


 


 03/27/17 06:00  03/27/17 06:00  03/27/17 06:00  03/27/17 06:00  03/27/17 06:00








Intake and Output: 


 











 03/27/17 03/27/17





 06:59 18:59


 


Intake Total 960 240


 


Output Total 700 2500


 


Balance 260 -2260














- Medications


Medications: 


 Current Medications





Acetaminophen (Tylenol 325mg Tab)  650 mg PO Q4 PRN


   PRN Reason: Fever >100.4 F


   Last Admin: 03/23/17 09:15 Dose:  650 mg


Albuterol/Ipratropium (Duoneb 3 Mg/0.5 Mg (3 Ml) Ud)  3 ml IH [id number] PRN


   PRN Reason: Shortness of Breath


   Last Admin: 03/27/17 02:20 Dose:  3 ml


Aspirin (Aspirin)  325 mg PO DAILY [facility]


   Last Admin: 03/26/17 11:51 Dose:  325 mg


Clotrimazole (Lotrimin 1%)  0 gm TOP BID [facility]


   Last Admin: 03/26/17 17:55 Dose:  1 applic


Ceftriaxone Sodium (Rocephin 2 Gm Ivpb)  100 mls @ 100 mls/hr IVPB DAILY [facility]


   PRN Reason: Protocol


   Stop: 04/08/17 10:01


   Last Admin: 03/26/17 10:03 Dose:  100 mls/hr


Metoprolol Tartrate (Lopressor)  25 mg PO BID [facility]


   Last Admin: 03/26/17 17:55 Dose:  25 mg


Morphine Sulfate (Morphine)  1 mg IVP Q4H PRN


   PRN Reason: Pain, moderate (4-7)


   Last Admin: 03/27/17 02:22 Dose:  1 mg


Silver Sulfadiazine (Silvadene 1% 20 Gm)  0 ea TOP DAILY [facility]


   Last Admin: 03/26/17 10:57 Dose:  1 applic











- Labs


Labs: 


 





 03/27/17 06:30 





 03/27/17 06:30 





 











PT  12.1 Seconds (9.9-11.8)  H  03/25/17  06:15    


 


INR  1.12  (0.93-1.08)  H  03/25/17  06:15    


 


APTT  32.7 Seconds (23.7-30.8)  H  03/22/17  03:33    














- Constitutional


Appears: Non-toxic, No Acute Distress





- Head Exam


Head Exam: NORMAL INSPECTION





- ENT Exam


ENT Exam: Mucous Membranes Moist





- Neck Exam


Neck Exam: absent: Lymphadenopathy, Meningismus





- Respiratory Exam


Respiratory Exam: Decreased Breath Sounds





- Cardiovascular Exam


Cardiovascular Exam: +S1, +S2





- GI/Abdominal Exam


GI & Abdominal Exam: Soft.  absent: Tenderness





- Extremities Exam


Additional comments: 


both lower extremities with bandages in place; still with some erythema over 

both lower extremities but it has improved





Assessment and Plan





- Assessment and Plan (Free Text)


Plan: 


Assessment


severe sepsis with acute on chronic renal failure due to Group G strep 

bacteremia, probably secondary to bilateral lower extremities skin and skin 

structure infection; also with MSSA from the wound cultures


acute rhabdomyolysis


consider acute NSTEMI


HTN


chronic renal failure


dementia


obesity with BMI 38





Plan


Will continue Rocephin (day 5 from 1st negative blood cx) - will recommend 

prolonged antibiotic course (ie. 28 days of Rocephin) 


reviewed Podiatry evaluation 


2D echocardiogram does not show vegetations


will continue to monitor clinically

## 2022-04-28 NOTE — PROGRESS NOTES
Perfect served Neurology out to Dr. Deven Ziegler covering and consult for Nephro to Dr. Carmelo Mayers, she sees him out pt for perineal dialysis.

## 2022-04-28 NOTE — PROGRESS NOTES
Physical Therapy Initial Assessment     Name: Osiris Redi  : 1940  MRN: [id number]      Date of Service: 2022    Evaluating PT:  Swathi Molina, PT [place] [address] [facility]    Room #:  5519/1654-C  Diagnosis:  Stroke-like symptoms [R29.90]  History of peritoneal dialysis [Z98.890]  PMHx/PSHx:   has a past medical history of Anemia, Arthritis, CAD (coronary artery disease), Diabetes mellitus ([facility] [address] Gout, History of bleeding peptic ulcer, Hypertension, Peritoneal dialysis catheter in place [facility]), Peritoneal dialysis status ([facility] [address] Thyroid disease, Use of cane as ambulatory aid, and Wears glasses. has a past surgical history that includes  section; Carpal tunnel release (Bilateral); Coronary artery bypass graft (approx ); back surgery; Upper gastrointestinal endoscopy; cardiovascular stress test; Cataract removal with implant (Bilateral); Endoscopy, colon, diagnostic; joint replacement; and pr total knee arthroplasty (Right, 10/31/2018). Procedure/Surgery:  None at this admission    Precautions:  Falls risk  Equipment Needs: To be determined    SUBJECTIVE:    Pt lives alone in a 1 story home with 4 stairs to enter and 2 rail. Bed is on 1 floor and bath is on 1 floor. Pt ambulated with rollator PTA. OBJECTIVE:   Initial Evaluation  Date: 22 Treatment Short Term/ Long Term   Goals   AM-PAC 6 Clicks 55/72     Was pt agreeable to Eval/treatment? Yes     Does pt have pain? None rated at this time     Bed Mobility  Rolling: NT  Supine to sit: SBA  Sit to supine: SBA  Scooting: SBA  Rolling: Ind  Supine to sit: Ind  Sit to supine: Ind  Scooting: Ind   Transfers Sit to stand: SBA with fww  Stand to sit: SBA with fww  Stand pivot: SBA with fww  Sit to stand: Mod Ind with fww  Stand to sit: Mod Ind with fww  Stand pivot:  Mod Ind with fww   Ambulation    100 feet with Min with fww   >150 feet with Mod Ind with fww    Stair negotiation: ascended and descended  NT  4 steps with 2 rail Ind   ROM BUE: Refer to OT eval  BLE:  WFL     Strength BUE:  Refer to OT eval  BLE:  Grossly 4-/5  BLE: WFL   Balance Sitting EOB:  SBA  Dynamic Standing:  SBA with fww  Sitting EOB:  Ind  Dynamic Standing: Mod Ind with fww     Sensation:  Pt states she is experiencing numbness and tingling in B feet  Edema:  None noted    Vitals:  Blood Pressure at rest - Blood Pressure post session -   Heart Rate at rest 84bpm Heart Rate post session 94bpm   SPO2 at rest 97% on RA SPO2 post session -     Therapeutic Exercises: Focus on functional activities    Patient education  Pt educated on role of PT and safety awareness during transfers and ambulation. Patient verbally instructed for sequencing and walker placement during ambulation. Patient response to education:   Pt verbalized understanding Pt demonstrated skill Pt requires further education in this area   Yes yes Verbal cues for walker placement     ASSESSMENT:    Conditions Requiring Skilled Therapeutic Intervention:    [x]Decreased strength     [x]Decreased ROM  [x]Decreased functional mobility  [x]Decreased balance   [x]Decreased endurance   []Decreased posture  []Decreased sensation  []Decreased coordination   []Decreased vision  []Decreased safety awareness   []Increased pain       Comments:    Patient cleared by nursing prior to PT evaluation. Patient positioned in supine upon arrival and agreed to PT session. Patient completed bed mobility consisting of supine<>sit transfer and scooting SBA. Patient transferred sit<>stand SBA with verbal cues for hand placement. Patient ambulated 100ft Min A with fww with multiple verbal cues for walker placement during ambulation. Patient positioned in supine upon completion of session with phone and call light within reach. Patient continues to require skilled PT intervention to address impairments. Son observed patient ambulating and stated her gait pattern with fww is similar to premorbid status.    Treatment:  Patient practiced and was instructed in the following treatment:     Bed mobility: supine<>sit transfer and scooting SBA   Transfers: sit<>stand SBA, verbal cues for hand placement   Ambulation: 100ft Min A with fww with multiple verbal cues for walker placement during ambulation  Pt's/ family goals   1. Return home    Prognosis is good  for reaching above PT goals. Patient and or family understand(s) diagnosis, prognosis, and plan of care. Yes    PHYSICAL THERAPY PLAN OF CARE:    PT POC is established based on physician order and patient diagnosis     Referring provider/PT Order:    04/28/22 0545  PT eval and treat      April KERRI Dahl - QIANA        Diagnosis:  Stroke-like symptoms [R29.90]  History of peritoneal dialysis [Z98.890]  Specific instructions for next treatment:  Increase ambulation distance, stair navigation    Current Treatment Recommendations:     [x] Strengthening to improve independence with functional mobility   [x] ROM to improve independence with functional mobility   [x] Balance Training to improve static/dynamic balance and to reduce fall risk  [x] Endurance Training to improve activity tolerance during functional mobility   [x] Transfer Training to improve safety and independence with all functional transfers   [x] Gait Training to improve gait mechanics, endurance and assess need for appropriate assistive device  [x] Stair Training in preparation for safe discharge home and/or into the community   [] Positioning to prevent skin breakdown and contractures  [] Safety and Education Training   [x] Patient/Caregiver Education   [] HEP  [] Other     PT long term treatment goals are located in above grid    Frequency of treatments: 2-5x/week x 1-2 weeks.     Time in  1435  Time out  1450    Total Treatment Time  15 minutes     Evaluation Time includes thorough review of current medical information, gathering information on past medical history/social history and prior level of function, completion of standardized testing/informal observation of tasks, assessment of data and education on plan of care and goals.     CPT codes:  [x] Low Complexity PT evaluation 28005  [] Moderate Complexity PT evaluation 41709  [] High Complexity PT evaluation 06138  [] PT Re-evaluation 99815  [] Gait training 35894  minutes  [] Manual therapy 57904  minutes  [] Therapeutic activities 93944  minutes  [] Therapeutic exercises 97725  minutes  [] Neuromuscular reeducation 64753  minutes     Mimi Beka, SPT  [place], [place] [address] [id number]

## 2022-04-28 NOTE — FLOWSHEET NOTE
This note also relates to the following rows which could not be included:  BP - Cannot attach notes to unvalidated device data  Pulse - Cannot attach notes to unvalidated device data  Resp - Cannot attach notes to unvalidated device data  SpO2 - Cannot attach notes to unvalidated device data       04/27/22 0125   Cycler   Verification of Prescription CCPD   Total Volume Programmed 7500 mL   Therapy Time (Hours:Minutes) 9:00   Fill Volume 2000 mL   Last Fill Volume 1500 mL   Number of Cycles 4   Bag Volume 5000 mL   Number of Bags Used 2     CCPD initiated using strict aseptic technique. PD fluid noted clear, yellow. No c/o reported.

## 2022-04-28 NOTE — ED NOTES
Radiology Procedure Waiver   Name: Марина Anguiano  : 1940  MRN: [id number]    Date:  22    Time: 11:15 PM EDT    Benefits of immediately proceeding with Radiology exam(s) without pre-testing outweigh the risks or are not indicated as specified below and therefore the following is/are being waived:    [] Pregnancy test   [] Patients LMP on-time and regular.   [] Patient had Tubal Ligation or has other Contraception Device. [] Patient  is Menopausal or Premenarcheal.    [] Patient had Full or Partial Hysterectomy. [] Protocol for Iodine allergy    [] MRI Questionnaire     [x] BUN/Creatinine   [] Patient age w/no hx of renal dysfunction. [] Patient on Dialysis. [] Recent Normal Labs.   Electronically signed by Va Rose MD on 22 at 11:15 PM EDT               Va Rose MD  22 1997

## 2022-04-28 NOTE — CARE COORDINATION
4/28 Care Coordination: Pt presenting to the ED for left-sided paresthesias. CTA shows no large vessel occlusion or intracranial process. CT PE shows no pulmonary embolism. PTA Pt lives alone in a 1 story home. She is on peritoneal dialysis at home. She see Dr. Angelita Rodriguez. Discussed discharge plan . She will return home her son. He lives close, helps her everyday with her PD. PT/OT will see. Pt does use a Rolator at home. CM/SW will continue to follow for discharge planning.    Linda CHRISTOPHER,RN--BC  [phone]

## 2022-04-28 NOTE — ED NOTES
Patient states she doesn't make urine d/t peritoneal dialysis.      ANA CRISTINA Jasmine  04/27/22 2020

## 2022-04-28 NOTE — ED PROVIDER NOTES
Department of Emergency Medicine   ED  Provider Note  Admit Date/RoomTime: 4/27/2022  6:38 PM  ED Room: 12/12          History of Present Illness:  4/27/22, Time: 11:15 PM EDT  Chief Complaint   Patient presents with    Numbness     numb/ting started at 5pm, \"felt different\" moved down to left arm, now resolved, DM , [email or web address]       Марина Anguiano is a 80 y.o. female presenting to the ED for numbness. The patient states that her symptoms started at 5 PM.  She began to feel left-sided facial numbness and left eye blurred vision as well as left leg paresthesias. At this time however, her vision is back to normal and she is not having numbness or tingling. Symptoms were intermittent. Nothing worsens or improves them. She has not had this in the past but was concerned about a stroke. She denies any associated weakness. No speech change. No chest pain or short of breath, abdominal pain, nausea, vomiting or diarrhea. The patient does ambulate with a walker at baseline. She is on home hemodialysis. In regards to trauma, she did fall hitting her head on Easter Sunday. She is not having any symptoms at that time and denies any headache. She did not lose consciousness. She is not on any blood thinners.     Review of Systems:   Constitutional symptoms: Denies fever  Eyes: Denies eye pain  Ears, Nose, Mouth, Throat: Denies ear pain  Cardiovascular: Denies chest pain  Respiratory: Denies shortness of breath  Gastrointestinal: Denies blood per rectum  Genitourinary: Denies hematuria  Skin: Denies rashes  Neurological: Positive for left-sided paresthesias, positive for left eye vision change  Musculoskeletal: Denies extremity pain    --------------------------------------------- PAST HISTORY ---------------------------------------------  Past Medical History:  has a past medical history of Anemia, Arthritis, CAD (coronary artery disease), Diabetes mellitus ([facility] Gout, History of bleeding peptic ulcer, Hypertension, Peritoneal dialysis catheter in place [facility]), Peritoneal dialysis status [facility]), Thyroid disease, Use of cane as ambulatory aid, and Wears glasses. Past Surgical History:  has a past surgical history that includes  section; Carpal tunnel release (Bilateral); Coronary artery bypass graft (approx ); back surgery; Upper gastrointestinal endoscopy; cardiovascular stress test; Cataract removal with implant (Bilateral); Endoscopy, colon, diagnostic; joint replacement; and pr total knee arthroplasty (Right, 10/31/2018). Social History:  reports that she has never smoked. She has never used smokeless tobacco. She reports that she does not drink alcohol and does not use drugs. Family History: family history is not on file. . Unless otherwise noted, family history is non contributory    The patients home medications have been reviewed. Allergies: Sulfa antibiotics    I have reviewed the past medical history, past surgical history, social history, and family history    ---------------------------------------------------PHYSICAL EXAM--------------------------------------    General: The patient is conversational and lying comfortably in bed. Head: Normocephalic and atraumatic. Eyes: Sclera non-icteric and no conjunctival injection. Able to finger count in all 4 quadrants. ENT: Nasal and oral membranes moist  Neck: Trachea midline. No JVD  Respiratory: Lungs clear to auscultation bilaterally. Patient speaking in full sentences. Cardiovascular: Irregularly irregular. No pedal edema. Gastrointestinal:  Abdomen is soft and nondistended. Bowel sounds present. There is no tenderness. Patient does have ascites noted. There is no guarding or rebound. Peritoneal dialysis catheter in place  Musculoskeletal: No deformity  Skin: Skin warm and dry. No rashes. Neurologic: No gross motor deficits. No aphasia. Pupils are equal and reactive to light. Extraocular eye movements intact. Sensation intact bilaterally. Symmetric facies. Tongue protrudes midline. 5 out of 5 symmetric strength of the upper and lower extremities. Negative finger-to-nose testing. Alert and oriented. Psychiatric: Normal affect.    -------------------------------------------------- RESULTS -------------------------------------------------  I have personally reviewed all laboratory and imaging results for this patient. Results are listed below. LABS: (Lab results interpreted by me)  Results for orders placed or performed during the hospital encounter of 04/27/22   CBC with Auto Differential   Result Value Ref Range    WBC 12.6 (H) 4.5 - 11.5 E9/L    RBC 3.89 3.50 - 5.50 E12/L    Hemoglobin 12.1 11.5 - 15.5 g/dL    Hematocrit 35.0 34.0 - 48.0 %    MCV 90.0 80.0 - 99.9 fL    MCH 31.1 26.0 - 35.0 pg    MCHC 34.6 (H) 32.0 - 34.5 %    RDW 15.2 (H) 11.5 - 15.0 fL    Platelets 303 900 - 366 E9/L    MPV 11.4 7.0 - 12.0 fL    Neutrophils % 69.2 43.0 - 80.0 %    Immature Granulocytes % 1.0 0.0 - 5.0 %    Lymphocytes % 20.3 20.0 - 42.0 %    Monocytes % 6.3 2.0 - 12.0 %    Eosinophils % 2.5 0.0 - 6.0 %    Basophils % 0.7 0.0 - 2.0 %    Neutrophils Absolute 8.70 (H) 1.80 - 7.30 E9/L    Immature Granulocytes # 0.13 E9/L    Lymphocytes Absolute 2.55 1.50 - 4.00 E9/L    Monocytes Absolute 0.79 0.10 - 0.95 E9/L    Eosinophils Absolute 0.32 0.05 - 0.50 E9/L    Basophils Absolute 0.09 0.00 - 0.20 E9/L   SPECIMEN REJECTION   Result Value Ref Range    Rejected Test PT,PTT     Reason for Rejection see below    Protime-INR   Result Value Ref Range    Protime 13.6 (H) 9.3 - 12.4 sec    INR 1.3    APTT   Result Value Ref Range    aPTT 33.6 24.5 - 35.1 sec   POCT Glucose   Result Value Ref Range    Glucose 169 mg/dL    QC OK? ok    POCT Glucose   Result Value Ref Range    Meter Glucose 169 (H) 74 - 99 mg/dL   ,     RADIOLOGY:  Interpreted by Radiologist unless otherwise specified  CT HEAD WO CONTRAST   Final Result   1.  No large vessel occlusion in the head or neck. 2. Moderate atherosclerotic disease. 3. No acute intracranial hemorrhage or mass effect. CTA HEAD W CONTRAST   Final Result   1. No large vessel occlusion in the head or neck. 2. Moderate atherosclerotic disease. 3. No acute intracranial hemorrhage or mass effect. CTA NECK W CONTRAST   Final Result   1. No large vessel occlusion in the head or neck. 2. Moderate atherosclerotic disease. 3. No acute intracranial hemorrhage or mass effect. CTA PULMONARY W CONTRAST   Final Result   No evidence of pulmonary embolism or acute pulmonary abnormality. Cardiomegaly with suggestion of right heart dysfunction. Partially visualized ascites and somewhat cirrhotic liver morphology,   question if patient has congestive hepatopathy. XR CHEST PORTABLE   Final Result   No acute process. ------------------------- NURSING NOTES AND VITALS REVIEWED ---------------------------   The nursing notes within the ED encounter and vital signs as below have been reviewed by myself  BP (!) 151/86   Pulse 64   Temp 97 °F (36.1 °C) (Oral)   Resp 16   Wt 155 lb (70.3 kg)   SpO2 95%   BMI 31.31 kg/m²     Oxygen Saturation Interpretation: Normal    The patients available past medical records and past encounters were reviewed. ------------------------------ ED COURSE/MEDICAL DECISION MAKING----------------------  Medications   aspirin chewable tablet 324 mg (has no administration in time range)   iopamidol (ISOVUE-370) 76 % injection 135 mL (135 mLs IntraVENous Given 4/27/22 9877)       Medical Decision Making: This is a 80 y.o. female presenting to the ED for left-sided paresthesias. On initial presentation, the patient's vital signs are interpreted as hypertensive, afebrile and saturating well on room air.  Based on history and physical exam, we have a broad differential.  The patient symptoms are concerning for intracranial process including TIA.  Her symptoms have resolved however and her stroke scale is currently 0. Therefore, stroke pager will not be activated. We considered atypical ACS, arrhythmia. Patient is a peritoneal dialysis patient's electrolyte abnormalities on the differential.  Abdominal exam soft and nonsurgical.  She denies any abdominal pain. she does endorse a fall on Easter, cannot exclude subdural hemorrhage. At this time CT of the head, CTA of the head and neck, chest x-ray EKG and laboratory studies obtained. As the patient is currently asymptomatic she will not be given any medication. A 12-lead EKG was performed and interpreted by myself. The rate is 78 with irregularly irregular and normal axis. Patient has RSR prime in V1 and V2 as well as lead III and V3. There is evidence of right bundle branch block which is new from prior. The QRS interval is 126, and QTC interval is 522. No acute ST elevation. Biphasic T wave in V3, V2 with T wave inversion in V1. T wave inversion in lead III. This is atrial fibrillation with right bundle branch block and nonspecific abnormality. As the patient has evidence of new bundle branch block I cannot exclude pulmonary embolism and CT will obtain obtained that she is obtaining contrast for other studies. As the patient is peritoneal dialysis patient I did contact nephrology. Patient does not know her nephrologist so on-call was contacted. They recommend calling the peritoneal dialysis nurse. Subsequently, Dr. Roxan Goldmann called and states that the patient is known to his partner. Although there is no documentationfor men with her. He will continue to follow. Upon further discussion, patient does endorse having history of atrial fibrillation. She is compliant with her Eliquis. Laboratory studies show leukocytosis. Patient CMP and troponin are still pending. CT of the head and CTA shows no large vessel occlusion or intracranial process.   CT PE shows no pulmonary embolism. Cardiomegaly with right heart dysfunction and ascites. This is interpreted by radiology. Patient given aspirin. At this time, my shift is ended. Patient will require admission for TIA and strokelike symptoms. Only currently feeling numbness and tingling of the feet. Sensation intact. She is receiving peritoneal dialysis overnight. Troponin, UA and CMP pending. I did speak with April who is excepted the patient under the care of Dr. Young Burk. This patient's ED course included: a personal history and physicial examination and re-evaluation prior to disposition    This patient has improved during their ED course. Consultations:  Internal medicine    The cardiac monitor revealed atrial fibrillation with a heart rate in the 60s as interpreted by me. The cardiac monitor was ordered secondary to the patient's paresthesias and to monitor the patient for dysrhythmia. CPT [id number]    Oxygen Saturation Interpretation: 95 % on room air. Normal    Counseling:   I have spoken with the patient and discussed todays results, in addition to providing specific details for the plan of care and counseling regarding the diagnosis and prognosis. Questions are answered at this time and they are agreeable with the plan.     --------------------------------- IMPRESSION AND DISPOSITION ---------------------------------    IMPRESSION  1. Stroke-like symptoms    2. History of peritoneal dialysis        DISPOSITION  Disposition: Admit to telemetry  Patient condition is fair    I, Dr. Mel Rai, am the primary provider of record    NOTE: This report was transcribed using voice recognition software.  Every effort was made to ensure accuracy; however, inadvertent computerized transcription errors may be present        Mel Rai MD  04/28/22 0109

## 2022-04-28 NOTE — ED NOTES
Peritoneal dialysis tubing disconnected. The provided cap did not fit. [facility] called for new supplies. Pt cap wrapped in alcohol swab in the meantime.       Gama Morrison RN  04/28/22 6918

## 2022-04-28 NOTE — FLOWSHEET NOTE
04/28/22 0900   Cycler   Ultrafiltration (UF) (mL) -2169 mL   Pt was disconnected by ER Rn and sent to 8th floor, found machine still in ER room 12, unplugged, with liberty to castellano adapter still connected to liberty cycler pt line. UF taken from machine was -2169. Tore down machine, cl yellow effluent. Went to patients room on 8th floor and found castellano connector, twisted closed, with no cap open to air. Soaked open end in [employer] 50 soaked gauze for 5 min and then connected a new sterile liberty to castellano adapter with end cap in place and closed clamp.

## 2022-04-28 NOTE — CONSULTS
[facility] MD Denis Rodriguez MD Ritta Penning, MD Revonda Debar, MD Nan Fava, QIANA Jackson, DELILAH  Consultation  4/28/2022    Thank you for consult  Full note dictated, to follow  Briefly, pleasant 80 y.o. woman well-known to me from outpatient practice I have followed her for a number of years for CKD and ESRD, she is currently followed from a nephrology standpoint by Dr. Sri Ward, who manages her outpatient [employer] therapy. Recent treatments have been without complication she is performing tolerated treatment quite well. No recent peripheral swelling. No signs of uremia lately. Yesterday morning she developed numbness and tingling on the left face and a questionable facial droop at the same site, numbness tingling throughout the length of her left lower extremity, though no weakness or discoordination per se. Noted also numbness and tingling in her feet, particular in her toes, the latter symptoms not new (have been present for at least several months). No recent falls. There is no dysphagia or odynophagia, no dysarthria. No weakness. She was brought to the emergency department by her son. After several hours symptoms resolved. Notably she is treated with aspirin and other anticoagulant. She has no symptoms currently. A/R:  1. 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 She has employed [employer] for a number of years without complication. 2.  Anemia due to ESRD    3. 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 Hypertension, essential    5.   Hypokalemia    Continue [employer] support for solute and volume clearance  ALIA not warranted at this time  Supplement potassium (already ordered, has received 20 mEq IV over 2 hours, and 40 mill equivalents orally)  Recheck potassium this afternoon, spree supplement as warranted  Continue supportive care  Follow labs, BP    Denis Cortes MD, MD

## 2022-04-28 NOTE — CONSULTS
[facility]  Neurology Consult    Date:  4/28/2022  Patient Name:  Ella Ge  YOB: 1940  MRN: [id number]     PCP:  Brandon Armstrong MD   Referring:  No ref. provider found      Chief Complaint: Numbness of the left cheek, blurring of vision of the left eye and left leg numbness     History obtained from: Patient, medical record and her son    Caleb Pastrana is a 80 y.o. female admitted to the hospital with chief complaint of numbness of the left facial, blurring of vision of the left eye and left leg paresthesia. CTA of the head and neck were negative for any acute finding. Considering her risk factor including diabetes mellitus, hypertension, age and A. fib the patient has an embolic event likely TIA. Her extensive electrolytes imbalance also contributing to her symptoms. Plan  · Follow MRI of the brain  · PT OT and stroke education  · Replace electrolytes defer to nephro and primary team  · Aggressive risk factor modification   · Continue Eliquis,  aspirin and statin    History of Present Illness:  Ella Ge is a 80 y.o. right handed female with past medical history of diabetes mellitus, gout, hypertension, peptic ulcer, CABG, hypothyroidism, A. fib presented to the ED with chief concern of numbness of the left cheek, blurring of vision of the right eye and left leg paresthesia. Patient symptoms started yesterday evening and went on to for 30 minutes. Concerned for stroke the family called EMS. Per her son she was cleared by EMS for any stroke. She denies headache, loss of consciousness, weakness of the upper or lower extremities, changes in urine or bowel habits. In the ED her NIH stroke scale was 0. She was worked up for TIA; CT head, CTA of the head and neck were negative for any acute finding. CT chest was also negative for PE.   On further work-up she was found to have right bundle branch block, prolonged QTC, multiple electrolytes abnormality, sodium 129, potassium 2.7, chloride 87, BUN of 63, creatinine of 7.5, anion gap of 18, magnesium 1.1. Patient was admitted under hospitalist and nephrology and neurology were consulted. Review of Systems:  Constitutional  · Weight loss: No  · Fever: No    Eyes  · Double Vision: No  · Visions loss: No    Ears, Nose, Mouth, and Throat  · Difficulty swallowing: No    Cardiovascular  · Chest Pain: No    Respiratory  · Shortness of Breath: No    Gastrointestinal  · Abdominal Pain: No    Genitourinary  · Difficulty with Urination: No    Integumentary  · Rash: No    Musculoskeletal  · Back Pain: No    Neurological  · Headaches: No  · Weakness: No  · Numbness: No  · Seizures: No  · Difficulty with Memory: No  · Further symptoms noted in HPI    Psychiatric  · Anxiety: No  · Depression: No      Medical History:   Past Medical History:   Diagnosis Date    Anemia     Arthritis     CAD (coronary artery disease)     Diabetes mellitus (HCC)     Gout     History of bleeding peptic ulcer approx     Hypertension     Peritoneal dialysis catheter in place [facility])     Peritoneal dialysis status ([facility] [address]     at night for 8 hours    Thyroid disease     Use of cane as ambulatory aid     Wears glasses         Surgical History:   Past Surgical History:   Procedure Laterality Date    BACK SURGERY      CARDIOVASCULAR STRESS TEST      CARPAL TUNNEL RELEASE Bilateral     CATARACT REMOVAL WITH IMPLANT Bilateral      SECTION      CORONARY ARTERY BYPASS GRAFT  approx 2000    x 3; per Dr Lary Randolph, COLON, DIAGNOSTIC      JOINT REPLACEMENT      left knee, right shoulder    WI TOTAL KNEE ARTHROPLASTY Right 10/31/2018    RIGHT KNEE TOTAL ARTHROPLASTY +++BRIDGER++ PNB++ performed [facility] Sarah [facility] OR    UPPER GASTROINTESTINAL ENDOSCOPY          Family History:   History reviewed. No pertinent family history.     Social History:  Social History     Tobacco Use    Smoking status: Never Smoker    Smokeless tobacco: Never Used   Vaping Use    Vaping Use: Never used   Substance Use Topics    Alcohol use: No    Drug use: No        Current Medications:      Current Facility-Administered Medications   Medication Dose Route Frequency Provider Last Rate Last Admin    aspirin chewable tablet 324 mg  324 mg Oral Once Chinedu Conde MD        sodium chloride flush 0.9 % injection 5-40 mL  5-40 mL IntraVENous 2 times per day April KERRI Dahl CNP        sodium chloride flush 0.9 % injection 5-40 mL  5-40 mL IntraVENous PRN April KERRI Dahl CNP        0.9 % sodium chloride infusion   IntraVENous PRN April KERRI Dahl CNP        bisacodyl (DULCOLAX) suppository 10 mg  10 mg Rectal Daily PRN April KERRI Dahl CNP        trimethobenzamide Leartis Close) injection 200 mg  200 mg IntraMUSCular Q6H PRN April KERRI Dahl CNP        heparin (porcine) injection 5,000 Units  5,000 Units SubCUTAneous [facility] April KERRI Dahl CNP   5,000 Units at 04/28/22 0602    DULoxetine (CYMBALTA) extended release capsule 30 mg  30 mg Oral Daily April KERRI Dahl CNP        insulin [facility] injection vial 10 Units  10 Units SubCUTAneous BID April KERRI Dahl CNP        glucose chewable tablet 16-32 g  16-32 g Oral PRN April KERRI Dahl CNP        isosorbide mononitrate (IMDUR) extended release tablet 30 mg  30 mg Oral Daily April KERRI Dahl CNP        levothyroxine (SYNTHROID) tablet 50 mcg  50 mcg Oral Daily April KERRI Dahl CNP        sevelamer (RENVELA) tablet 1,600 mg  1,600 mg Oral TID April KERRI Dahl CNP        metoprolol succinate (TOPROL XL) extended release tablet 50 mg  50 mg Oral Daily Sijie Randa Hodgkins, MD        potassium bicarb-citric acid (EFFER-K) effervescent tablet 40 mEq  40 mEq Oral Once Leena Love MD        glucose chewable tablet 16-32 g  16-32 g Oral PRN Donaldo Rahman MD        dextrose 50 % IV solution  12.5 g IntraVENous PRN Donaldo Rahman MD        glucagon (rDNA) injection 1 mg  1 mg IntraMUSCular PRN Donaldo Rahman MD        dextrose 5 % solution  100 mL/hr IntraVENous PRN Donaldo Rahman MD        insulin lispro (HUMALOG) injection vial 0-6 Units  0-6 Units SubCUTAneous TID  Komal Matias MD        insulin lispro (HUMALOG) injection vial 0-3 Units  0-3 Units SubCUTAneous Nightly Donaldo Rahman MD        aspirin chewable tablet 81 mg  81 mg Oral Daily Donaldo Rahman MD        atorvastatin (LIPITOR) tablet 40 mg  40 mg Oral Nightly Donaldo Rahman MD            Allergies: Allergies   Allergen Reactions    Sulfa Antibiotics Swelling     Swelling after being out in sun        Physical Examination  Vitals   Vitals:    04/28/22 0550 04/28/22 0630 04/28/22 0659 04/28/22 0820   BP: (!) 184/75 (!) 167/67  (!) 161/77   Pulse: 83   79   Resp: 20   20   Temp: 96.2 °F (35.7 °C)   96.5 °F (35.8 °C)   TempSrc: Temporal   Temporal   SpO2: 97%   96%   Weight:   162 lb 3.2 oz (73.6 kg)    Height:            General: Patient appears in no acute distress. Awake and oriented x 4. HEENT: Normocephalic, atraumatic  Chest: Clear to auscultation bilaterally  Heart: No murmurs appreciated  Extremities/Peripheral vascular: No edema/swelling noted. No cold limbs noted. Neurologic Examination    Mental Status  Alert, and oriented to person, place and time. Speech is fluent with intact comprehension. No evidence of memory impairment. Attention and concentration appeared normal.     Cranial Nerves  II. Visual fields full to confrontation bilaterally. Fundoscopic exam: Discs sharp bilaterally. III, IV, VI: Pupils equally round and reactive to light, 3 to 2 mm bilaterally. EOMs: full, no nystagmus.    V. Facial sensation intact to light touch bilaterally  VII: Facial movements symmetric and strong  VIII: Hearing intact to voice  IX,X: Palate elevates symmetrically. No dysarthria  XI: Sternocleidomastoid and trapezius 5/5 bilaterally   XII: Tongue is midline    Motor     Right Left   Right Left   Deltoid 5 5  Hip Flexion 5 5   Biceps      5  5  Knee Extension 5 5   Triceps 5 5  Knee Flexion 5 5   Handgrip 5 5  Ankle Dorsiflexion 5 5       Ankle Plantarflexion 5 5     Tone: Normal in all four limbs    Bulk: Normal in all four limbs with no evidence of atrophy    Pronator drift: absent bilaterally    Sensation  · Light Touch: Intact distally in all four limbs  · Pinprick: Intact distally in all four limbs  · Vibration: Intact distally in all four limbs  · Proprioception: Intact distally in all four limbs    Reflexes     Right Left   Biceps 2 2   Brachioradialis 2 2   Triceps 2 2   Patellar 2 2   Achilles 2 2   ankle clonus none none     Toes down going bilaterally. Coordination  Rapid alternating movements normal in bilateral upper extremities  Finger to nose testing normal bilaterally  Heel to shin testing normal bilaterally    Gait  Deferred for safety/fall consideration      Labs  Recent Labs     04/28/22  0248 04/28/22  0248 04/28/22  0650   *   < > 131*   K 2.7*   < > 2.7*   CL 87*   < > 87*   CO2 24   < > 25   BUN 62*   < > 61*   CREATININE 7.5*   < > 7.4*   GLUCOSE 174*   < > 171*   CALCIUM 8.3*   < > 8.7   PROT 6.1*  --   --    LABALBU 3.1*  --   --    BILITOT 0.3  --   --    ALKPHOS 107*  --   --    AST 10  --   --    ALT 7  --   --    WBC  --   --  13.0*   RBC  --   --  3.71   HGB  --   --  11.4*   HCT  --   --  32.7*   MCV  --   --  88.1   MCH  --   --  30.7   MCHC  --   --  34.9*   RDW  --   --  13.8   PLT  --   --  381   MPV  --   --  10.5   HDL  --   --  27   LDLCALC  --   --  119*   LABA1C  --   --  6.5*    < > = values in this interval not displayed. Imaging  CT HEAD WO CONTRAST   Final Result   1. No large vessel occlusion in the head or neck. 2. Moderate atherosclerotic disease.    3. No acute intracranial hemorrhage or mass effect. CTA HEAD W CONTRAST   Final Result   1. No large vessel occlusion in the head or neck. 2. Moderate atherosclerotic disease. 3. No acute intracranial hemorrhage or mass effect. CTA NECK W CONTRAST   Final Result   1. No large vessel occlusion in the head or neck. 2. Moderate atherosclerotic disease. 3. No acute intracranial hemorrhage or mass effect. CTA PULMONARY W CONTRAST   Final Result   No evidence of pulmonary embolism or acute pulmonary abnormality. Cardiomegaly with suggestion of right heart dysfunction. Partially visualized ascites and somewhat cirrhotic liver morphology,   question if patient has congestive hepatopathy. XR CHEST PORTABLE   Final Result   No acute process.          MRI BRAIN WO CONTRAST    (Results Pending)         Electronically signed by Thomas French MD on 4/28/2022 at 9:47 AM

## 2022-04-28 NOTE — PROGRESS NOTES
This nurse received a call from ER unit at approx. 2130 stating patient needs dialysis. This nurse specified clarification on whether this is a hemo tx, new patient, or chronic pt. This nurse then was transferred to speak to a Dr. Brock Opitz who stated she spoke with a Dr. Alyse Oliver and peritoneal dialysis would be needed. This nurse contacted Dr. Chopra(consulting provider) and he stated he is not the Nephrologist and that Dr. Roxan Goldmann was. Notified Dr. Roxan Goldmann of patient in ER at this time.

## 2022-04-29 ENCOUNTER — APPOINTMENT (OUTPATIENT)
Dept: MRI IMAGING | Age: 82
End: 2022-04-29
Payer: MEDICARE

## 2022-04-29 VITALS
DIASTOLIC BLOOD PRESSURE: 75 MMHG | SYSTOLIC BLOOD PRESSURE: 156 MMHG | WEIGHT: 158.51 LBS | HEIGHT: 59 IN | HEART RATE: 62 BPM | RESPIRATION RATE: 18 BRPM | OXYGEN SATURATION: 98 % | TEMPERATURE: 98.2 F | BODY MASS INDEX: 31.96 KG/M2

## 2022-04-29 PROBLEM — G45.9 TIA (TRANSIENT ISCHEMIC ATTACK): Status: ACTIVE | Noted: 2022-04-29

## 2022-04-29 PROBLEM — E78.5 HYPERLIPIDEMIA: Status: ACTIVE | Noted: 2022-04-29

## 2022-04-29 LAB
ANION GAP SERPL CALCULATED.3IONS-SCNC: 18 MMOL/L (ref 7–16)
BUN BLDV-MCNC: 56 MG/DL (ref 6–23)
CALCIUM SERPL-MCNC: 8.4 MG/DL (ref 8.6–10.2)
CHLORIDE BLD-SCNC: 90 MMOL/L (ref 98–107)
CO2: 23 MMOL/L (ref 22–29)
CREAT SERPL-MCNC: 7.5 MG/DL (ref 0.5–1)
GFR AFRICAN AMERICAN: 6
GFR NON-AFRICAN AMERICAN: 5 ML/MIN/1.73
GLUCOSE BLD-MCNC: 151 MG/DL (ref 74–99)
LV EF: 55 %
LVEF MODALITY: NORMAL
MAGNESIUM: 1.7 MG/DL (ref 1.6–2.6)
METER GLUCOSE: 173 MG/DL (ref 74–99)
METER GLUCOSE: 175 MG/DL (ref 74–99)
METER GLUCOSE: 181 MG/DL (ref 74–99)
PHOSPHORUS: 4.5 MG/DL (ref 2.5–4.5)
POTASSIUM SERPL-SCNC: 3.1 MMOL/L (ref 3.5–5)
SODIUM BLD-SCNC: 131 MMOL/L (ref 132–146)

## 2022-04-29 PROCEDURE — 82962 GLUCOSE BLOOD TEST: CPT

## 2022-04-29 PROCEDURE — 6360000002 HC RX W HCPCS: Performed by: NURSE PRACTITIONER

## 2022-04-29 PROCEDURE — G0378 HOSPITAL OBSERVATION PER HR: HCPCS

## 2022-04-29 PROCEDURE — 6370000000 HC RX 637 (ALT 250 FOR IP): Performed by: INTERNAL MEDICINE

## 2022-04-29 PROCEDURE — 93306 TTE W/DOPPLER COMPLETE: CPT

## 2022-04-29 PROCEDURE — S5553 INSULIN LONG ACTING 5 U: HCPCS | Performed by: NURSE PRACTITIONER

## 2022-04-29 PROCEDURE — 36415 COLL VENOUS BLD VENIPUNCTURE: CPT

## 2022-04-29 PROCEDURE — 70551 MRI BRAIN STEM W/O DYE: CPT

## 2022-04-29 PROCEDURE — 2580000003 HC RX 258: Performed by: NURSE PRACTITIONER

## 2022-04-29 PROCEDURE — 96372 THER/PROPH/DIAG INJ SC/IM: CPT

## 2022-04-29 PROCEDURE — 80048 BASIC METABOLIC PNL TOTAL CA: CPT

## 2022-04-29 PROCEDURE — 6370000000 HC RX 637 (ALT 250 FOR IP): Performed by: NURSE PRACTITIONER

## 2022-04-29 PROCEDURE — 99225 PR SBSQ OBSERVATION CARE/DAY 25 MINUTES: CPT | Performed by: PHYSICIAN ASSISTANT

## 2022-04-29 PROCEDURE — 83735 ASSAY OF MAGNESIUM: CPT

## 2022-04-29 PROCEDURE — 84100 ASSAY OF PHOSPHORUS: CPT

## 2022-04-29 RX ORDER — POTASSIUM CHLORIDE 20 MEQ/1
20 TABLET, EXTENDED RELEASE ORAL ONCE
Status: COMPLETED | OUTPATIENT
Start: 2022-04-29 | End: 2022-04-29

## 2022-04-29 RX ORDER — ATORVASTATIN CALCIUM 20 MG/1
20 TABLET, FILM COATED ORAL NIGHTLY
Qty: 30 TABLET | Refills: 3 | Status: SHIPPED | OUTPATIENT
Start: 2022-04-29

## 2022-04-29 RX ORDER — LOSARTAN POTASSIUM 50 MG/1
100 TABLET ORAL DAILY
Status: DISCONTINUED | OUTPATIENT
Start: 2022-04-29 | End: 2022-04-30 | Stop reason: HOSPADM

## 2022-04-29 RX ORDER — ASPIRIN 81 MG/1
81 TABLET, CHEWABLE ORAL DAILY
Qty: 30 TABLET | Refills: 3 | COMMUNITY
Start: 2022-04-30

## 2022-04-29 RX ADMIN — APIXABAN 2.5 MG: 2.5 TABLET, FILM COATED ORAL at 08:48

## 2022-04-29 RX ADMIN — INSULIN LISPRO 1 UNITS: 100 INJECTION, SOLUTION INTRAVENOUS; SUBCUTANEOUS at 17:31

## 2022-04-29 RX ADMIN — SEVELAMER CARBONATE 1600 MG: 800 TABLET, FILM COATED ORAL at 14:00

## 2022-04-29 RX ADMIN — ISOSORBIDE MONONITRATE 30 MG: 30 TABLET, EXTENDED RELEASE ORAL at 08:48

## 2022-04-29 RX ADMIN — TRIMETHOBENZAMIDE HYDROCHLORIDE 200 MG: 100 INJECTION INTRAMUSCULAR at 15:58

## 2022-04-29 RX ADMIN — METOPROLOL SUCCINATE 50 MG: 50 TABLET, EXTENDED RELEASE ORAL at 08:48

## 2022-04-29 RX ADMIN — SEVELAMER CARBONATE 1600 MG: 800 TABLET, FILM COATED ORAL at 08:47

## 2022-04-29 RX ADMIN — SODIUM CHLORIDE, PRESERVATIVE FREE 10 ML: 5 INJECTION INTRAVENOUS at 08:49

## 2022-04-29 RX ADMIN — LEVOTHYROXINE SODIUM 50 MCG: 0.05 TABLET ORAL at 06:50

## 2022-04-29 RX ADMIN — ASPIRIN 81 MG 81 MG: 81 TABLET ORAL at 08:48

## 2022-04-29 RX ADMIN — DULOXETINE HYDROCHLORIDE 30 MG: 30 CAPSULE, DELAYED RELEASE ORAL at 08:48

## 2022-04-29 RX ADMIN — INSULIN LISPRO 1 UNITS: 100 INJECTION, SOLUTION INTRAVENOUS; SUBCUTANEOUS at 12:00

## 2022-04-29 RX ADMIN — POTASSIUM CHLORIDE 20 MEQ: 20 TABLET, EXTENDED RELEASE ORAL at 11:16

## 2022-04-29 RX ADMIN — LOSARTAN POTASSIUM 100 MG: 50 TABLET, FILM COATED ORAL at 08:48

## 2022-04-29 RX ADMIN — INSULIN LISPRO 1 UNITS: 100 INJECTION, SOLUTION INTRAVENOUS; SUBCUTANEOUS at 09:33

## 2022-04-29 RX ADMIN — INSULIN GLARGINE-YFGN 10 UNITS: 100 INJECTION, SOLUTION SUBCUTANEOUS at 09:34

## 2022-04-29 NOTE — PROGRESS NOTES
[facility] MD Joshua Manley, MD Renetta Baig, MD Ochoa Hensley, MD Lory Paez, QIANA Cantu, DELILAH  Progress Note    4/29/2022    SUBJECTIVE:   (-) c/o's  (-) sob/hand/cp/palp Appetite ok  Overall doing better no new complaints. PROBLEM LIST:    Principal Problem:    TIA (transient ischemic attack)  Active Problems:    ESRD on peritoneal dialysis ([facility]    Type 2 diabetes mellitus, with long-term current use of insulin ([facility])    Primary hypertension    Hypomagnesemia    Hypokalemia    Obesity (BMI 30-39. 9)    Hyperlipidemia    Type 2 diabetes mellitus with diabetic chronic kidney disease ([facility]  Resolved Problems:    * No resolved hospital problems. *         DIET:    ADULT DIET; Regular; 4 carb choices (60 gm/meal);  Low Sodium (2 gm)     MEDS (scheduled):    losartan  100 mg Oral Daily    aspirin  324 mg Oral Once    sodium chloride flush  5-40 mL IntraVENous 2 times per day    DULoxetine  30 mg Oral Daily    insulin glargine-yfgn  10 Units SubCUTAneous BID    isosorbide mononitrate  30 mg Oral Daily    levothyroxine  50 mcg Oral Daily    sevelamer  1,600 mg Oral TID    metoprolol succinate  50 mg Oral Daily    insulin lispro  0-6 Units SubCUTAneous TID WC    insulin lispro  0-3 Units SubCUTAneous Nightly    aspirin  81 mg Oral Daily    atorvastatin  40 mg Oral Nightly    apixaban  2.5 mg Oral BID       MEDS (infusions):   sodium chloride      dextrose         MEDS (prn):  sodium chloride flush, sodium chloride, bisacodyl, trimethobenzamide, glucose, glucose, dextrose, glucagon (rDNA), dextrose, perflutren lipid microspheres    PHYSICAL EXAM:     Patient Vitals for the past 24 hrs:   BP Temp Temp src Pulse Resp SpO2 Weight   04/29/22 1100 (!) 162/61 97.2 °F (36.2 °C) Temporal 69 18 98 % --   04/29/22 0807 (!) 150/67 97.9 °F (36.6 °C) Oral 66 18 97 % --   04/29/22 0730 -- -- -- -- -- -- 158 lb 8.2 oz (71.9 kg)   04/29/22 0623 -- -- -- 69 -- -- 158 lb 9.6 oz (71.9 kg)   04/29/22 0459 (!) 141/61 97.6 °F (36.4 °C) Oral 66 18 96 % --   04/29/22 0115 113/61 98.1 °F (36.7 °C) Oral 63 16 95 % --   04/28/22 2130 (!) 150/74 98 °F (36.7 °C) Temporal 67 16 94 % --   04/28/22 2000 (!) 138/51 97 °F (36.1 °C) Temporal 70 18 -- 162 lb 4.1 oz (73.6 kg)   @      Intake/Output Summary (Last 24 hours) at 4/29/2022 1536  Last data filed at 4/29/2022 0730  Gross per 24 hour   Intake --   Output -1 ml   Net 1 ml         Wt Readings from Last 3 Encounters:   04/29/22 158 lb 8.2 oz (71.9 kg)   06/18/19 169 lb 5 oz (76.8 kg)   11/03/18 170 lb 13.7 oz (77.5 kg)       Constitutional:  in no acute distress  HEENT: NC/AT, EOMI, sclera and conjunctiva are clear and anicteric, mucus membranes moist  Neck: Trachea midline, no JVD  Cardiovascular: S1, S2 regular rhythm, no murmur,or rub  Respiratory:  No crackles, no wheeze  Gastrointestinal:  Soft, nontender, nondistended, NABS  Ext: no edema, feet warm  Skin: dry, no rash  Neuro: awake, alert, interactive      DATA:    Recent Labs     04/27/22  1946 04/28/22  0650   WBC 12.6* 13.0*   HGB 12.1 11.4*   HCT 35.0 32.7*   MCV 90.0 88.1    381     Recent Labs     04/28/22  0135 04/28/22  0135 04/28/22  0248 04/28/22  0248 04/28/22  0650 04/28/22  1629 04/29/22  0653   *   < > 129*   < > 131* 126* 131*   K 2.6*   < > 2.7*   < > 2.7* 4.2 3.1*   CL 85*   < > 87*   < > 87* 87* 90*   CO2 25   < > 24   < > 25 24 23   MG 1.1*   < > 1.1*  --  1.9  --  1.7   PHOS  --   --   --   --   --   --  4.5   BUN 64*   < > 62*   < > 61* 63* 56*   CREATININE 7.3*   < > 7.5*   < > 7.4* 7.8* 7.5*   ALT 7  --  7  --   --   --   --    AST 9  --  10  --   --   --   --    BILITOT 0.3  --  0.3  --   --   --   --    ALKPHOS 116*  --  107*  --   --   --   --     < > = values in this interval not displayed. No results found for: LABPROT    ASSESSMENT / RECOMMENDATIONS:   A/R:  1.   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 She has employed [employer] for a number of years without complication. Patient was seen in her room with no new complaints she was getting echocardiogram completed [employer] has been going well overnight she has potential for being discharged today,  2. Anemia due to ESRD     3. 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 Hypertension, essential     5.   Hypokalemia     Continue [employer] support for solute and volume clearance  ALIA not warranted at this time  Supplement potassium (already ordered, has received 20 mEq IV over 2 hours, and 40 mill equivalents orally)  Recheck potassium this afternoon, spree supplement as warranted  Continue supportive care  Follow labs, BP    Electronically signed by Bri Iraheta MD on 4/29/2022 at 3:36 PM

## 2022-04-29 NOTE — PROGRESS NOTES
Notified Dr. Antonio Cain regarding pts k+ 3.1 and sodium 131. Pt also stating she only takes renvela 800 mg TID not 1600mg. Pts son in room and requesting to speak with him when he gets a chance.      Maribel Henriquez RN

## 2022-04-29 NOTE — PROGRESS NOTES
CLINICAL PHARMACY NOTE: MEDS TO BEDS    Total # of Prescriptions Filled: 1   The following medications were delivered to the patient:  · Atorvastatin 20mg    Additional Documentation:  Delivered to pt 4/29 4:25

## 2022-04-29 NOTE — FLOWSHEET NOTE
04/29/22 0730   Vitals   Weight 158 lb 8.2 oz (71.9 kg)   Cycler   Ultrafiltration (UF) (mL) -1 mL   CC[employer] completed, catheter de-accessed, pin intact, stay safe cap applied, clamps closed, aseptic technique, cl yellow effluent, tolerated well, report to [facility].

## 2022-04-29 NOTE — FLOWSHEET NOTE
04/28/22 2000   Vitals   BP (!) 138/51   Temp 97 °F (36.1 °C)   Temp Source Temporal   Pulse 70   Resp 18   Weight 162 lb 4.1 oz (73.6 kg)   Cycler   Verification of Prescription [employer]   Total Volume Programmed 7500 mL   Therapy Time (Hours:Minutes) 9   Fill Volume 2000 mL   Last Fill Volume 1500 mL   Number of Cycles 4   Bag Volume 5000 mL   Number of Bags Used 2   CCPD initiated using aseptic technique, dressing CDI, no c/o voiced, 1 drain/fill noted wo diff, stable at dc, call light wi reach

## 2022-04-29 NOTE — PROGRESS NOTES
[facility]  Neurology follow up     Date:  4/29/2022  Patient Name:  Kay Kohlre  YOB: 1940  MRN: [id number]       Assessment  TIA- presented as vision disturbance of L eye with numbness of L face and leg- resolved    MRI brain negative    On Eliquis 2.5 mg BID (CKD), ASA and statin at home     Plan  · Continue Eliquis,  aspirin and statin  · Could consider seeing if her [address] could be changed to another agent as she is on a reduced dose of Eliquis due to her CKD and age. If she would have recurrent stroke despite anticoagulation and unable to make changes, ? Consider eval for watchman device   · Okay to d/c from neuro POV - will sign off, please call with questions   · Follow up stroke clinic     History of Present Illness:  Kay Kohler is a 80 y.o. right handed female with past medical history of diabetes mellitus, gout, hypertension, peptic ulcer, CABG, hypothyroidism, A. fib presented to the ED with chief concern of numbness of the left cheek, blurring of vision of the right eye and left leg paresthesia. Patient symptoms started yesterday evening and went on to for 30 minutes. Concerned for stroke the family called EMS. Per her son she was cleared by EMS for any stroke. She denies headache, loss of consciousness, weakness of the upper or lower extremities, changes in urine or bowel habits. In the ED her NIH stroke scale was 0. She was worked up for TIA; CT head, CTA of the head and neck were negative for any acute finding. CT chest was also negative for PE. On further work-up she was found to have right bundle branch block, prolonged QTC, multiple electrolytes abnormality, sodium 129, potassium 2.7, chloride 87, BUN of 63, creatinine of 7.5, anion gap of 18, magnesium 1.1. Patient was admitted under hospitalist and nephrology and neurology were consulted. MRI geovanny was negative for acute findings. Patient denies any return of her symptoms. Son at bedside. All questions answered. No chest pain or palpitations  No SOB  No vertigo, lightheadedness or loss of consciousness  No falls, tripping or stumbling  No incontinence of bowels or bladder  No itching or bruising appreciated  No numbness, tingling or focal arm/leg weakness    ROS otherwise negative   Allergies: Allergies   Allergen Reactions    Sulfa Antibiotics Swelling     Swelling after being out in sun        Physical Examination  Vitals   Vitals:    04/29/22 0623 04/29/22 0730 04/29/22 0807 04/29/22 1100   BP:   (!) 150/67 (!) 162/61   Pulse: 69  66 69   Resp:   18 18   Temp:   97.9 °F (36.6 °C) 97.2 °F (36.2 °C)   TempSrc:   Oral Temporal   SpO2:   97% 98%   Weight: 158 lb 9.6 oz (71.9 kg) 158 lb 8.2 oz (71.9 kg)     Height:          General: Patient appears in no acute distress. Awake and oriented x 4. HEENT: Normocephalic, atraumatic  Chest: Clear to auscultation bilaterally  Heart: No murmurs appreciated  Extremities/Peripheral vascular: No edema/swelling noted. No cold limbs noted. Neurologic Examination    Mental Status  Alert, and oriented to person, place and time. Speech is fluent with intact comprehension. No evidence of memory impairment. Attention and concentration appeared normal.     Cranial Nerves  II. Visual fields full to confrontation bilaterally. III, IV, VI: Pupils equally round and reactive to light, 3 to 2 mm bilaterally. EOMs: full, no nystagmus. V. Facial sensation intact to light touch bilaterally  VII: Facial movements symmetric and strong  VIII: Hearing intact to voice  IX,X: Palate elevates symmetrically. No dysarthria  XI: Sternocleidomastoid and trapezius 5/5 bilaterally   XII: Tongue is midline    Motor    5/5 throughout   Normal tone and bulk   No abnormal movements   No drift     Sensation  · Light Touch: Intact distally in all four limbs    Reflexes    Toes down going bilaterally.     Coordination  Rapid alternating movements normal in bilateral upper extremities  Finger to nose testing normal bilaterally    Gait  Deferred for safety/fall consideration      Labs  Recent Labs     04/28/22  0248 04/28/22  0248 04/28/22  0650 04/28/22  1629 04/29/22  0653   *   < > 131*   < > 131*   K 2.7*   < > 2.7*   < > 3.1*   CL 87*   < > 87*   < > 90*   CO2 24   < > 25   < > 23   BUN 62*   < > 61*   < > 56*   CREATININE 7.5*   < > 7.4*   < > 7.5*   GLUCOSE 174*   < > 171*   < > 151*   CALCIUM 8.3*   < > 8.7   < > 8.4*   PROT 6.1*  --   --   --   --    LABALBU 3.1*  --   --   --   --    BILITOT 0.3  --   --   --   --    ALKPHOS 107*  --   --   --   --    AST 10  --   --   --   --    ALT 7  --   --   --   --    WBC  --   --  13.0*  --   --    RBC  --   --  3.71  --   --    HGB  --   --  11.4*  --   --    HCT  --   --  32.7*  --   --    MCV  --   --  88.1  --   --    MCH  --   --  30.7  --   --    MCHC  --   --  34.9*  --   --    RDW  --   --  13.8  --   --    PLT  --   --  381  --   --    MPV  --   --  10.5  --   --    HDL  --   --  27  --   --    LDLCALC  --   --  119*  --   --    LABA1C  --   --  6.5*  --   --     < > = values in this interval not displayed. Imaging  MRI BRAIN WO CONTRAST   Final Result   1. No acute infarct or acute intracranial process identified. 2. Mild chronic small vessel ischemic changes. CT HEAD WO CONTRAST   Final Result   1. No large vessel occlusion in the head or neck. 2. Moderate atherosclerotic disease. 3. No acute intracranial hemorrhage or mass effect. CTA HEAD W CONTRAST   Final Result   1. No large vessel occlusion in the head or neck. 2. Moderate atherosclerotic disease. 3. No acute intracranial hemorrhage or mass effect. CTA NECK W CONTRAST   Final Result   1. No large vessel occlusion in the head or neck. 2. Moderate atherosclerotic disease. 3. No acute intracranial hemorrhage or mass effect.          CTA PULMONARY W CONTRAST   Final Result   No evidence of pulmonary embolism or acute pulmonary abnormality. Cardiomegaly with suggestion of right heart dysfunction. Partially visualized ascites and somewhat cirrhotic liver morphology,   question if patient has congestive hepatopathy. XR CHEST PORTABLE   Final Result   No acute process.                Electronically signed by LAEXIS Smith on 4/29/2022 at 12:01 PM

## 2022-04-29 NOTE — DISCHARGE SUMMARY
Physician Discharge Summary     Patient ID:  Aiden Jorge  [id number]  80 y.o.  1940    Admit date: 4/27/2022    Discharge date and time:  4/29/2022     Admission Diagnoses:   Chief Complaint   Patient presents with    Numbness     numb/ting started at 5pm, \"felt different\" moved down to left arm, now resolved, DM , [email or web address]      TIA (transient ischemic attack)     Discharge Diagnoses:   Principal Problem:    TIA (transient ischemic attack)  Active Problems:    ESRD on peritoneal dialysis ([facility] [address]    Type 2 diabetes mellitus, with long-term current use of insulin ([facility] [address]    Primary hypertension    Hypomagnesemia    Hypokalemia    Obesity (BMI 30-39. 9)    Hyperlipidemia    Type 2 diabetes mellitus with diabetic chronic kidney disease ([facility] [address]  Resolved Problems:    * No resolved hospital problems. *       Consults: nephrology, neurology    Procedures: none    Hospital Course:   Patient presented with an episode of transient left-sided facial paresthesia. There were no signs of stroke on neurologic exam at the time of presentation. She was already taking baby aspirin and Eliquis though she was not on a statin. Neurology was consulted. MRI was negative for stroke. Neurology did not feel strongly about upgrading or changing her blood thinners so she will stay on a baby aspirin and Eliquis. I started her on a statin.      Discharge Exam:  Vitals:    04/29/22 0623 04/29/22 0730 04/29/22 0807 04/29/22 1100   BP:   (!) 150/67 (!) 162/61   Pulse: 69  66 69   Resp:   18 18   Temp:   97.9 °F (36.6 °C) 97.2 °F (36.2 °C)   TempSrc:   Oral Temporal   SpO2:   97% 98%   Weight: 158 lb 9.6 oz (71.9 kg) 158 lb 8.2 oz (71.9 kg)     Height:            General appearance: NAD, conversant  HEENT: AT/NC, MMM  Neck: FROM, supple  Lungs: Clear to auscultation, WOB normal  CV: RRR, 2/6 LILLIE  Abdomen: Soft, non-tender; no masses or HSM, +BS  Extremities: No peripheral edema or digital cyanosis  Skin: no rash, lesions or ulcers  Psych: Calm and cooperative  Neuro: Alert and interactive, face symmetric, handgrips 5/5 b/l, foot plantar/dorsiflexors/hip flexors 5/5 b/l    Condition:  Stable     Disposition: home    Patient Instructions:   Current Discharge Medication List      START taking these medications    Details   aspirin 81 MG chewable tablet Take 1 tablet by mouth daily  Qty: 30 tablet, Refills: 3      apixaban (ELIQUIS) 2.5 MG TABS tablet Take 1 tablet by mouth 2 times daily  Qty: 60 tablet, Refills: 0      atorvastatin (LIPITOR) 20 MG tablet Take 1 tablet by mouth nightly  Qty: 30 tablet, Refills: 3         CONTINUE these medications which have NOT CHANGED    Details   insulin glargine (LANTUS) 100 UNIT/ML injection vial Inject 10 Units into the skin 2 times daily  Qty: 1 vial, Refills: 3      losartan (COZAAR) 100 MG tablet Take 100 mg by mouth daily      metoprolol succinate (TOPROL XL) 50 MG extended release tablet Take 50 mg by mouth daily Take am day of surgery 10/31      DULoxetine (CYMBALTA) 20 MG extended release capsule Take 30 mg by mouth daily Take am day of surgery 10/31       sevelamer (RENVELA) 800 MG tablet Take 2 tablets by mouth 3 times daily       isosorbide mononitrate (IMDUR) 30 MG CR tablet Take 30 mg by mouth daily Take am day of surgery 10/31      levothyroxine (SYNTHROID) 50 MCG tablet Take 50 mcg by mouth Daily           Activity: activity as tolerated  Diet: regular diet    Follow-up with PCP in 1 week.     Note that over 30 minutes was spent in preparing discharge papers, discussing discharge with patient, medication review, etc.    Signed:  Tanja Mccabe MD    4/29/2022  2:22 PM

## 2022-04-29 NOTE — PROGRESS NOTES
Messaged Dr. Bibiana Choi to see if pt could d/c from nephro standpoint.      Nephro signed off    Left message with ECHO department regarding results pending d/c.    Maximilian Aguilar RN

## 2022-05-03 ENCOUNTER — TELEPHONE (OUTPATIENT)
Dept: NEUROLOGY | Age: 82
End: 2022-05-03

## 2022-07-19 ENCOUNTER — HOSPITAL ENCOUNTER (INPATIENT)
Age: 82
LOS: 7 days | Discharge: HOME | DRG: 177 | End: 2022-07-27
Attending: EMERGENCY MEDICINE | Admitting: INTERNAL MEDICINE
Payer: MEDICARE

## 2022-07-19 ENCOUNTER — APPOINTMENT (OUTPATIENT)
Dept: GENERAL RADIOLOGY | Age: 82
DRG: 177 | End: 2022-07-19
Payer: MEDICARE

## 2022-07-19 DIAGNOSIS — R50.9 FEVER, UNSPECIFIED FEVER CAUSE: ICD-10-CM

## 2022-07-19 DIAGNOSIS — Z79.01 CHRONIC ANTICOAGULATION: ICD-10-CM

## 2022-07-19 DIAGNOSIS — E87.20 LACTIC ACIDOSIS: ICD-10-CM

## 2022-07-19 DIAGNOSIS — G93.41 ACUTE METABOLIC ENCEPHALOPATHY: Primary | ICD-10-CM

## 2022-07-19 DIAGNOSIS — U07.1 COVID-19: ICD-10-CM

## 2022-07-19 PROCEDURE — 87077 CULTURE AEROBIC IDENTIFY: CPT

## 2022-07-19 PROCEDURE — 83690 ASSAY OF LIPASE: CPT

## 2022-07-19 PROCEDURE — 87150 DNA/RNA AMPLIFIED PROBE: CPT

## 2022-07-19 PROCEDURE — 82550 ASSAY OF CK (CPK): CPT

## 2022-07-19 PROCEDURE — 87635 SARS-COV-2 COVID-19 AMP PRB: CPT

## 2022-07-19 PROCEDURE — 84100 ASSAY OF PHOSPHORUS: CPT

## 2022-07-19 PROCEDURE — 71045 X-RAY EXAM CHEST 1 VIEW: CPT

## 2022-07-19 PROCEDURE — 85025 COMPLETE CBC W/AUTO DIFF WBC: CPT

## 2022-07-19 PROCEDURE — 87186 SC STD MICRODIL/AGAR DIL: CPT

## 2022-07-19 PROCEDURE — 80053 COMPREHEN METABOLIC PANEL: CPT

## 2022-07-19 PROCEDURE — 99285 EMERGENCY DEPT VISIT HI MDM: CPT

## 2022-07-19 PROCEDURE — 87040 BLOOD CULTURE FOR BACTERIA: CPT

## 2022-07-19 PROCEDURE — 83605 ASSAY OF LACTIC ACID: CPT

## 2022-07-19 RX ORDER — ACETAMINOPHEN 650 MG/1
650 SUPPOSITORY RECTAL ONCE
Status: COMPLETED | OUTPATIENT
Start: 2022-07-19 | End: 2022-07-20

## 2022-07-19 ASSESSMENT — PAIN - FUNCTIONAL ASSESSMENT: PAIN_FUNCTIONAL_ASSESSMENT: NONE - DENIES PAIN

## 2022-07-20 ENCOUNTER — APPOINTMENT (OUTPATIENT)
Dept: CT IMAGING | Age: 82
DRG: 177 | End: 2022-07-20
Payer: MEDICARE

## 2022-07-20 PROBLEM — E11.9 DIABETES MELLITUS (HCC): Status: ACTIVE | Noted: 2022-07-20

## 2022-07-20 PROBLEM — R41.82 AMS (ALTERED MENTAL STATUS): Status: ACTIVE | Noted: 2022-07-20

## 2022-07-20 PROBLEM — E87.20 LACTIC ACID ACIDOSIS: Status: ACTIVE | Noted: 2022-07-20

## 2022-07-20 PROBLEM — D72.829 LEUKOCYTOSIS: Status: ACTIVE | Noted: 2022-07-20

## 2022-07-20 LAB
ALBUMIN SERPL-MCNC: 3.3 G/DL (ref 3.5–5.2)
ALP BLD-CCNC: 148 U/L (ref 35–104)
ALT SERPL-CCNC: 24 U/L (ref 0–32)
AMMONIA: <10 UMOL/L (ref 11–51)
ANION GAP SERPL CALCULATED.3IONS-SCNC: 12 MMOL/L (ref 7–16)
ANION GAP SERPL CALCULATED.3IONS-SCNC: 23 MMOL/L (ref 7–16)
AST SERPL-CCNC: 28 U/L (ref 0–31)
BASOPHILS ABSOLUTE: 0.02 E9/L (ref 0–0.2)
BASOPHILS ABSOLUTE: 0.02 E9/L (ref 0–0.2)
BASOPHILS RELATIVE PERCENT: 0.1 % (ref 0–2)
BASOPHILS RELATIVE PERCENT: 0.2 % (ref 0–2)
BILIRUB SERPL-MCNC: 0.5 MG/DL (ref 0–1.2)
BUN BLDV-MCNC: 44 MG/DL (ref 6–23)
BUN BLDV-MCNC: 50 MG/DL (ref 6–23)
C-REACTIVE PROTEIN: 3.9 MG/DL (ref 0–0.4)
CALCIUM SERPL-MCNC: 8.1 MG/DL (ref 8.6–10.2)
CALCIUM SERPL-MCNC: 8.5 MG/DL (ref 8.6–10.2)
CHLORIDE BLD-SCNC: 85 MMOL/L (ref 98–107)
CHLORIDE BLD-SCNC: 90 MMOL/L (ref 98–107)
CHP ED QC CHECK: YES
CO2: 22 MMOL/L (ref 22–29)
CO2: 28 MMOL/L (ref 22–29)
CREAT SERPL-MCNC: 8.4 MG/DL (ref 0.5–1)
CREAT SERPL-MCNC: 9.5 MG/DL (ref 0.5–1)
D DIMER: 371 NG/ML DDU
EOSINOPHILS ABSOLUTE: 0 E9/L (ref 0.05–0.5)
EOSINOPHILS ABSOLUTE: 0 E9/L (ref 0.05–0.5)
EOSINOPHILS RELATIVE PERCENT: 0 % (ref 0–6)
EOSINOPHILS RELATIVE PERCENT: 0 % (ref 0–6)
FERRITIN: 1217 NG/ML
FIBRINOGEN: 416 MG/DL (ref 200–400)
GFR AFRICAN AMERICAN: 5
GFR AFRICAN AMERICAN: 6
GFR NON-AFRICAN AMERICAN: 4 ML/MIN/1.73
GFR NON-AFRICAN AMERICAN: 5 ML/MIN/1.73
GLUCOSE BLD-MCNC: 106 MG/DL
GLUCOSE BLD-MCNC: 155 MG/DL (ref 74–99)
GLUCOSE BLD-MCNC: 257 MG/DL (ref 74–99)
GLUCOSE BLD-MCNC: 270 MG/DL
HCT VFR BLD CALC: 33.8 % (ref 34–48)
HCT VFR BLD CALC: 34.1 % (ref 34–48)
HEMOGLOBIN: 11.2 G/DL (ref 11.5–15.5)
HEMOGLOBIN: 11.5 G/DL (ref 11.5–15.5)
IMMATURE GRANULOCYTES #: 0.06 E9/L
IMMATURE GRANULOCYTES #: 0.15 E9/L
IMMATURE GRANULOCYTES %: 0.5 % (ref 0–5)
IMMATURE GRANULOCYTES %: 1 % (ref 0–5)
LACTATE DEHYDROGENASE: 238 U/L (ref 135–214)
LACTIC ACID, SEPSIS: 2.2 MMOL/L (ref 0.5–1.9)
LACTIC ACID, SEPSIS: 4.6 MMOL/L (ref 0.5–1.9)
LIPASE: 39 U/L (ref 13–60)
LYMPHOCYTES ABSOLUTE: 0.67 E9/L (ref 1.5–4)
LYMPHOCYTES ABSOLUTE: 1.83 E9/L (ref 1.5–4)
LYMPHOCYTES RELATIVE PERCENT: 16.7 % (ref 20–42)
LYMPHOCYTES RELATIVE PERCENT: 4.5 % (ref 20–42)
MCH RBC QN AUTO: 29.2 PG (ref 26–35)
MCH RBC QN AUTO: 29.2 PG (ref 26–35)
MCHC RBC AUTO-ENTMCNC: 32.8 % (ref 32–34.5)
MCHC RBC AUTO-ENTMCNC: 34 % (ref 32–34.5)
MCV RBC AUTO: 85.8 FL (ref 80–99.9)
MCV RBC AUTO: 88.8 FL (ref 80–99.9)
METER GLUCOSE: 106 MG/DL (ref 74–99)
METER GLUCOSE: 270 MG/DL (ref 74–99)
MONOCYTES ABSOLUTE: 0.44 E9/L (ref 0.1–0.95)
MONOCYTES ABSOLUTE: 0.94 E9/L (ref 0.1–0.95)
MONOCYTES RELATIVE PERCENT: 3 % (ref 2–12)
MONOCYTES RELATIVE PERCENT: 8.6 % (ref 2–12)
NEUTROPHILS ABSOLUTE: 13.53 E9/L (ref 1.8–7.3)
NEUTROPHILS ABSOLUTE: 8.08 E9/L (ref 1.8–7.3)
NEUTROPHILS RELATIVE PERCENT: 74 % (ref 43–80)
NEUTROPHILS RELATIVE PERCENT: 91.4 % (ref 43–80)
PDW BLD-RTO: 13.6 FL (ref 11.5–15)
PDW BLD-RTO: 13.8 FL (ref 11.5–15)
PHOSPHORUS: 3.9 MG/DL (ref 2.5–4.5)
PLATELET # BLD: 216 E9/L (ref 130–450)
PLATELET # BLD: 309 E9/L (ref 130–450)
PMV BLD AUTO: 11 FL (ref 7–12)
PMV BLD AUTO: 11.4 FL (ref 7–12)
POTASSIUM REFLEX MAGNESIUM: 4.3 MMOL/L (ref 3.5–5)
POTASSIUM REFLEX MAGNESIUM: 4.4 MMOL/L (ref 3.5–5)
PROCALCITONIN: 0.54 NG/ML (ref 0–0.08)
RBC # BLD: 3.84 E12/L (ref 3.5–5.5)
RBC # BLD: 3.94 E12/L (ref 3.5–5.5)
SARS-COV-2, NAAT: DETECTED
SODIUM BLD-SCNC: 130 MMOL/L (ref 132–146)
SODIUM BLD-SCNC: 130 MMOL/L (ref 132–146)
TOTAL CK: 241 U/L (ref 20–180)
TOTAL PROTEIN: 7.3 G/DL (ref 6.4–8.3)
WBC # BLD: 10.9 E9/L (ref 4.5–11.5)
WBC # BLD: 14.8 E9/L (ref 4.5–11.5)

## 2022-07-20 PROCEDURE — 6360000002 HC RX W HCPCS: Performed by: NURSE PRACTITIONER

## 2022-07-20 PROCEDURE — 6370000000 HC RX 637 (ALT 250 FOR IP): Performed by: NURSE PRACTITIONER

## 2022-07-20 PROCEDURE — 83615 LACTATE (LD) (LDH) ENZYME: CPT

## 2022-07-20 PROCEDURE — 83605 ASSAY OF LACTIC ACID: CPT

## 2022-07-20 PROCEDURE — 6360000002 HC RX W HCPCS: Performed by: EMERGENCY MEDICINE

## 2022-07-20 PROCEDURE — 82140 ASSAY OF AMMONIA: CPT

## 2022-07-20 PROCEDURE — 6370000000 HC RX 637 (ALT 250 FOR IP): Performed by: STUDENT IN AN ORGANIZED HEALTH CARE EDUCATION/TRAINING PROGRAM

## 2022-07-20 PROCEDURE — 93005 ELECTROCARDIOGRAM TRACING: CPT | Performed by: EMERGENCY MEDICINE

## 2022-07-20 PROCEDURE — 82962 GLUCOSE BLOOD TEST: CPT

## 2022-07-20 PROCEDURE — 87324 CLOSTRIDIUM AG IA: CPT

## 2022-07-20 PROCEDURE — S5553 INSULIN LONG ACTING 5 U: HCPCS | Performed by: NURSE PRACTITIONER

## 2022-07-20 PROCEDURE — 2580000003 HC RX 258: Performed by: EMERGENCY MEDICINE

## 2022-07-20 PROCEDURE — 2140000000 HC CCU INTERMEDIATE R&B

## 2022-07-20 PROCEDURE — 85025 COMPLETE CBC W/AUTO DIFF WBC: CPT

## 2022-07-20 PROCEDURE — 80048 BASIC METABOLIC PNL TOTAL CA: CPT

## 2022-07-20 PROCEDURE — 70450 CT HEAD/BRAIN W/O DYE: CPT

## 2022-07-20 PROCEDURE — 74176 CT ABD & PELVIS W/O CONTRAST: CPT

## 2022-07-20 PROCEDURE — 84145 PROCALCITONIN (PCT): CPT

## 2022-07-20 PROCEDURE — 85378 FIBRIN DEGRADE SEMIQUANT: CPT

## 2022-07-20 PROCEDURE — 86140 C-REACTIVE PROTEIN: CPT

## 2022-07-20 PROCEDURE — 82728 ASSAY OF FERRITIN: CPT

## 2022-07-20 PROCEDURE — 90945 DIALYSIS ONE EVALUATION: CPT

## 2022-07-20 PROCEDURE — 87449 NOS EACH ORGANISM AG IA: CPT

## 2022-07-20 PROCEDURE — 2580000003 HC RX 258: Performed by: STUDENT IN AN ORGANIZED HEALTH CARE EDUCATION/TRAINING PROGRAM

## 2022-07-20 PROCEDURE — 85384 FIBRINOGEN ACTIVITY: CPT

## 2022-07-20 PROCEDURE — 6370000000 HC RX 637 (ALT 250 FOR IP): Performed by: INTERNAL MEDICINE

## 2022-07-20 RX ORDER — OMEPRAZOLE 20 MG/1
20 CAPSULE, DELAYED RELEASE ORAL DAILY
Status: DISCONTINUED | OUTPATIENT
Start: 2022-07-20 | End: 2022-07-20 | Stop reason: CLARIF

## 2022-07-20 RX ORDER — GUAIFENESIN 100 MG/5ML
200 SOLUTION ORAL EVERY 6 HOURS PRN
Status: DISCONTINUED | OUTPATIENT
Start: 2022-07-20 | End: 2022-07-27 | Stop reason: HOSPADM

## 2022-07-20 RX ORDER — OMEPRAZOLE 20 MG/1
20 CAPSULE, DELAYED RELEASE ORAL DAILY
COMMUNITY

## 2022-07-20 RX ORDER — AMLODIPINE BESYLATE 5 MG/1
5 TABLET ORAL DAILY
COMMUNITY

## 2022-07-20 RX ORDER — PANTOPRAZOLE SODIUM 40 MG/1
40 TABLET, DELAYED RELEASE ORAL
Status: DISCONTINUED | OUTPATIENT
Start: 2022-07-20 | End: 2022-07-27 | Stop reason: HOSPADM

## 2022-07-20 RX ORDER — SEVELAMER CARBONATE 800 MG/1
1600 TABLET, FILM COATED ORAL 3 TIMES DAILY
Status: DISCONTINUED | OUTPATIENT
Start: 2022-07-20 | End: 2022-07-20

## 2022-07-20 RX ORDER — CALCITRIOL 0.25 UG/1
0.25 CAPSULE, LIQUID FILLED ORAL DAILY
COMMUNITY
End: 2022-07-20

## 2022-07-20 RX ORDER — DEXTROSE MONOHYDRATE 100 MG/ML
INJECTION, SOLUTION INTRAVENOUS CONTINUOUS PRN
Status: DISCONTINUED | OUTPATIENT
Start: 2022-07-20 | End: 2022-07-27 | Stop reason: HOSPADM

## 2022-07-20 RX ORDER — FENTANYL CITRATE 50 UG/ML
50 INJECTION, SOLUTION INTRAMUSCULAR; INTRAVENOUS ONCE
Status: COMPLETED | OUTPATIENT
Start: 2022-07-20 | End: 2022-07-20

## 2022-07-20 RX ORDER — ASPIRIN 81 MG/1
81 TABLET, CHEWABLE ORAL DAILY
Status: DISCONTINUED | OUTPATIENT
Start: 2022-07-20 | End: 2022-07-27 | Stop reason: HOSPADM

## 2022-07-20 RX ORDER — ZINC SULFATE 50(220)MG
50 CAPSULE ORAL DAILY
Status: DISCONTINUED | OUTPATIENT
Start: 2022-07-20 | End: 2022-07-27 | Stop reason: HOSPADM

## 2022-07-20 RX ORDER — INSULIN GLARGINE-YFGN 100 [IU]/ML
10 INJECTION, SOLUTION SUBCUTANEOUS 2 TIMES DAILY
Status: DISCONTINUED | OUTPATIENT
Start: 2022-07-20 | End: 2022-07-27 | Stop reason: HOSPADM

## 2022-07-20 RX ORDER — DULOXETIN HYDROCHLORIDE 30 MG/1
30 CAPSULE, DELAYED RELEASE ORAL DAILY
Status: DISCONTINUED | OUTPATIENT
Start: 2022-07-20 | End: 2022-07-27 | Stop reason: HOSPADM

## 2022-07-20 RX ORDER — LEVOTHYROXINE SODIUM 0.05 MG/1
50 TABLET ORAL DAILY
Status: DISCONTINUED | OUTPATIENT
Start: 2022-07-20 | End: 2022-07-27 | Stop reason: HOSPADM

## 2022-07-20 RX ORDER — DEXTROSE MONOHYDRATE 25 G/50ML
12.5 INJECTION, SOLUTION INTRAVENOUS PRN
Status: DISCONTINUED | OUTPATIENT
Start: 2022-07-20 | End: 2022-07-26 | Stop reason: CLARIF

## 2022-07-20 RX ORDER — ATORVASTATIN CALCIUM 20 MG/1
20 TABLET, FILM COATED ORAL NIGHTLY
Status: DISCONTINUED | OUTPATIENT
Start: 2022-07-20 | End: 2022-07-27 | Stop reason: HOSPADM

## 2022-07-20 RX ORDER — METOPROLOL SUCCINATE 25 MG/1
50 TABLET, EXTENDED RELEASE ORAL DAILY
Status: DISCONTINUED | OUTPATIENT
Start: 2022-07-20 | End: 2022-07-20 | Stop reason: ALTCHOICE

## 2022-07-20 RX ORDER — CALCITRIOL 0.25 UG/1
0.25 CAPSULE, LIQUID FILLED ORAL DAILY
Status: DISCONTINUED | OUTPATIENT
Start: 2022-07-20 | End: 2022-07-20

## 2022-07-20 RX ORDER — AMLODIPINE BESYLATE 5 MG/1
5 TABLET ORAL DAILY
Status: DISCONTINUED | OUTPATIENT
Start: 2022-07-20 | End: 2022-07-27 | Stop reason: HOSPADM

## 2022-07-20 RX ORDER — METOPROLOL TARTRATE 50 MG/1
50 TABLET, FILM COATED ORAL DAILY
Status: DISCONTINUED | OUTPATIENT
Start: 2022-07-20 | End: 2022-07-27 | Stop reason: HOSPADM

## 2022-07-20 RX ORDER — INSULIN GLARGINE 100 [IU]/ML
18 INJECTION, SOLUTION SUBCUTANEOUS 2 TIMES DAILY
Status: ON HOLD | COMMUNITY
End: 2022-07-27 | Stop reason: SDUPTHER

## 2022-07-20 RX ORDER — ISOSORBIDE MONONITRATE 30 MG/1
30 TABLET, EXTENDED RELEASE ORAL DAILY
Status: DISCONTINUED | OUTPATIENT
Start: 2022-07-20 | End: 2022-07-27 | Stop reason: HOSPADM

## 2022-07-20 RX ORDER — POTASSIUM CHLORIDE 20 MEQ/1
20 TABLET, EXTENDED RELEASE ORAL DAILY
COMMUNITY

## 2022-07-20 RX ORDER — ALBUTEROL SULFATE 90 UG/1
2 AEROSOL, METERED RESPIRATORY (INHALATION) EVERY 6 HOURS PRN
Status: DISCONTINUED | OUTPATIENT
Start: 2022-07-20 | End: 2022-07-27 | Stop reason: HOSPADM

## 2022-07-20 RX ORDER — METOPROLOL TARTRATE 50 MG/1
50 TABLET, FILM COATED ORAL DAILY
Status: ON HOLD | COMMUNITY
End: 2022-07-27 | Stop reason: HOSPADM

## 2022-07-20 RX ORDER — 0.9 % SODIUM CHLORIDE 0.9 %
500 INTRAVENOUS SOLUTION INTRAVENOUS ONCE
Status: COMPLETED | OUTPATIENT
Start: 2022-07-20 | End: 2022-07-20

## 2022-07-20 RX ADMIN — FENTANYL CITRATE 50 MCG: 50 INJECTION, SOLUTION INTRAMUSCULAR; INTRAVENOUS at 12:59

## 2022-07-20 RX ADMIN — TRIMETHOBENZAMIDE HYDROCHLORIDE 200 MG: 100 INJECTION INTRAMUSCULAR at 12:59

## 2022-07-20 RX ADMIN — SODIUM CHLORIDE 500 ML: 9 INJECTION, SOLUTION INTRAVENOUS at 00:47

## 2022-07-20 RX ADMIN — ACETAMINOPHEN 650 MG: 650 SUPPOSITORY RECTAL at 00:43

## 2022-07-20 RX ADMIN — PIPERACILLIN AND TAZOBACTAM 4500 MG: 4; .5 INJECTION, POWDER, FOR SOLUTION INTRAVENOUS at 10:44

## 2022-07-20 RX ADMIN — ATORVASTATIN CALCIUM 20 MG: 20 TABLET, FILM COATED ORAL at 22:19

## 2022-07-20 RX ADMIN — ASPIRIN 81 MG CHEWABLE TABLET 81 MG: 81 TABLET CHEWABLE at 10:43

## 2022-07-20 RX ADMIN — PANTOPRAZOLE SODIUM 40 MG: 40 TABLET, DELAYED RELEASE ORAL at 10:43

## 2022-07-20 RX ADMIN — AMLODIPINE BESYLATE 5 MG: 5 TABLET ORAL at 11:31

## 2022-07-20 RX ADMIN — ISOSORBIDE MONONITRATE 30 MG: 30 TABLET, EXTENDED RELEASE ORAL at 10:43

## 2022-07-20 RX ADMIN — APIXABAN 2.5 MG: 2.5 TABLET, FILM COATED ORAL at 10:42

## 2022-07-20 RX ADMIN — APIXABAN 2.5 MG: 2.5 TABLET, FILM COATED ORAL at 22:19

## 2022-07-20 RX ADMIN — METOPROLOL TARTRATE 50 MG: 50 TABLET, FILM COATED ORAL at 11:31

## 2022-07-20 RX ADMIN — INSULIN GLARGINE-YFGN 10 UNITS: 100 INJECTION, SOLUTION SUBCUTANEOUS at 11:31

## 2022-07-20 RX ADMIN — LEVOTHYROXINE SODIUM 50 MCG: 0.05 TABLET ORAL at 11:31

## 2022-07-20 RX ADMIN — DULOXETINE HYDROCHLORIDE 30 MG: 30 CAPSULE, DELAYED RELEASE ORAL at 11:31

## 2022-07-20 RX ADMIN — Medication 50 MG: at 10:42

## 2022-07-20 ASSESSMENT — PAIN DESCRIPTION - LOCATION: LOCATION: ABDOMEN

## 2022-07-20 ASSESSMENT — PAIN - FUNCTIONAL ASSESSMENT: PAIN_FUNCTIONAL_ASSESSMENT: ADULT NONVERBAL PAIN SCALE (NPVS)

## 2022-07-20 ASSESSMENT — PAIN SCALES - GENERAL: PAINLEVEL_OUTOF10: 10

## 2022-07-20 NOTE — ED NOTES
Assumed care of patient. Patient sleeping, NAD. Son at bedside.      Brooke Salinas RN  07/20/22 0800

## 2022-07-20 NOTE — ED NOTES
Patient complains of severe nausea and abdominal pain. Meds given. Patient incontinent of a large amount of watery stool. Patient cleaned and changed.      Esteban Jean-Baptiste RN  07/20/22 7688

## 2022-07-20 NOTE — ED PROVIDER NOTES
Department of Emergency Medicine   ED Provider Note  Admit Date/RoomTime: 2022 10:47 PM  ED Room:           History of Present Illness:  22, Time: 10:41 PM EDT         Lloyd Shepard is a 80 y.o. female with history of end-stage renal disease on peritoneal dialysis, TIA, hypertension, type 2 diabetes, presenting to the ED for altered mental status, beginning at unknown time but last known well earlier this morning reportedly. The complaint has been persistent, severe in severity, and worsened by nothing. Per EMS patient is checked on by his son twice a day, he checked on her just prior to arrival and found her laying on the ground covered in her own feces. Patient is confused, not making any sense. She is on Eliquis 2.5 mg twice daily. She is unable to provide any history at this time, is confused and agitated and moving all 4 extremities, oriented only to herself. She is disheveled and covered in feces. Additional history obtained from patient's son who was at bedside later in ED course, he states that patient is normally awake, alert, oriented and has normal mental status. Review of Systems:     Unable to obtain ROS secondary to patient's clinical condition.    --------------------------------------------- PAST HISTORY ---------------------------------------------  Past Medical History:  has a past medical history of Anemia, Arthritis, CAD (coronary artery disease), Diabetes mellitus ([facility] [address] Gout, History of bleeding peptic ulcer, Hypertension, Peritoneal dialysis catheter in place [facility]), Peritoneal dialysis status ([facility] [address] Thyroid disease, Use of cane as ambulatory aid, and Wears glasses. Past Surgical History:  has a past surgical history that includes  section; Carpal tunnel release (Bilateral); Coronary artery bypass graft (approx ); back surgery; Upper gastrointestinal endoscopy; cardiovascular stress test; Cataract removal with implant (Bilateral);  Endoscopy, colon, diagnostic; joint replacement; and pr total knee arthroplasty (Right, 10/31/2018). Social History:  reports that she has never smoked. She has never used smokeless tobacco. She reports that she does not drink alcohol and does not use drugs. Family History: family history is not on file. The patients home medications have been reviewed. Allergies: Sulfa antibiotics        ---------------------------------------------------PHYSICAL EXAM--------------------------------------    Constitutional/General: Awake, alert, oriented to self, not oriented to place or time or situation. She is disheveled, covered in feces and screaming help me  Head: Normocephalic and atraumatic  Eyes: EOMI, conjunctiva normal, sclera non icteric  Mouth: Dry mucous membranes, uvula midline  Neck: Supple, no stridor, no meningeal signs  Respiratory: Lungs clear to auscultation bilaterally, no wheezes, rales, or rhonchi. Not in respiratory distress  Cardiovascular:  Tachycardic. Regular rhythm. No murmurs, no gallops, or rubs. 2+ distal pulses. Equal extremity pulses. Chest: No chest wall tenderness or deformity  GI:  Abdomen Soft, Non tender, Non distended. No rebound, guarding, or rigidity. No pulsatile masses. Peritoneal dialysis catheter in place without surrounding erythema. Musculoskeletal: Moves all extremities x 4. Warm and well perfused, no clubbing, cyanosis, or edema. Capillary refill <3 seconds  Integument: skin warm and dry. Dried feces scattered around body, hands and arms. Patient with irritation/excoriations around the perirectal region.   Neurologic: GCS 14, no focal deficits, symmetric strength 5/5 in the major muscle groups of upper and lower extremities bilaterally  Psychiatric: Agitated, anxious      -----------------------------------------PROCEDURES----------------------------------------------------      -------------------------------------------------- RESULTS -------------------------------------------------  I have personally reviewed all laboratory and imaging results for this patient. Results are listed below.      LABS:  Results for orders placed or performed during the hospital encounter of 07/19/22   COVID-19, Rapid    Specimen: Nasopharyngeal Swab   Result Value Ref Range    SARS-CoV-2, NAAT DETECTED (A) Not Detected   Lactate, Sepsis   Result Value Ref Range    Lactic Acid, Sepsis 4.6 (HH) 0.5 - 1.9 mmol/L   Lactate, Sepsis   Result Value Ref Range    Lactic Acid, Sepsis 2.2 (H) 0.5 - 1.9 mmol/L   CBC with Auto Differential   Result Value Ref Range    WBC 14.8 (H) 4.5 - 11.5 E9/L    RBC 3.94 3.50 - 5.50 E12/L    Hemoglobin 11.5 11.5 - 15.5 g/dL    Hematocrit 33.8 (L) 34.0 - 48.0 %    MCV 85.8 80.0 - 99.9 fL    MCH 29.2 26.0 - 35.0 pg    MCHC 34.0 32.0 - 34.5 %    RDW 13.6 11.5 - 15.0 fL    Platelets 149 110 - 579 E9/L    MPV 11.4 7.0 - 12.0 fL    Neutrophils % 91.4 (H) 43.0 - 80.0 %    Immature Granulocytes % 1.0 0.0 - 5.0 %    Lymphocytes % 4.5 (L) 20.0 - 42.0 %    Monocytes % 3.0 2.0 - 12.0 %    Eosinophils % 0.0 0.0 - 6.0 %    Basophils % 0.1 0.0 - 2.0 %    Neutrophils Absolute 13.53 (H) 1.80 - 7.30 E9/L    Immature Granulocytes # 0.15 E9/L    Lymphocytes Absolute 0.67 (L) 1.50 - 4.00 E9/L    Monocytes Absolute 0.44 0.10 - 0.95 E9/L    Eosinophils Absolute 0.00 (L) 0.05 - 0.50 E9/L    Basophils Absolute 0.02 0.00 - 0.20 E9/L   Comprehensive Metabolic Panel w/ Reflex to MG   Result Value Ref Range    Sodium 130 (L) 132 - 146 mmol/L    Potassium reflex Magnesium 4.3 3.5 - 5.0 mmol/L    Chloride 85 (L) 98 - 107 mmol/L    CO2 22 22 - 29 mmol/L    Anion Gap 23 (H) 7 - 16 mmol/L    Glucose 257 (H) 74 - 99 mg/dL    BUN 44 (H) 6 - 23 mg/dL    CREATININE 8.4 (HH) 0.5 - 1.0 mg/dL    GFR Non-African American 5 >=60 mL/min/1.73    GFR African American 6     Calcium 8.5 (L) 8.6 - 10.2 mg/dL    Total Protein 7.3 6.4 - 8.3 g/dL    Albumin 3.3 (L) 3.5 - 5.2 g/dL    Total Bilirubin 0.5 0.0 - 1.2 mg/dL    Alkaline Phosphatase 148 (H) 35 - 104 U/L    ALT 24 0 - 32 U/L    AST 28 0 - 31 U/L   Lipase   Result Value Ref Range    Lipase 39 13 - 60 U/L   CK   Result Value Ref Range    Total  (H) 20 - 180 U/L   Phosphorus   Result Value Ref Range    Phosphorus 3.9 2.5 - 4.5 mg/dL   Ammonia   Result Value Ref Range    Ammonia <10.0 (L) 11.0 - 51.0 umol/L   POCT glucose   Result Value Ref Range    Glucose 270 mg/dL    QC OK? yes    POCT Glucose   Result Value Ref Range    Meter Glucose 270 (H) 74 - 99 mg/dL       RADIOLOGY:  Interpreted by Radiologist unless otherwise specified  XR CHEST PORTABLE   Final Result   Bibasilar atelectasis. CT HEAD WO CONTRAST    (Results Pending)   CT ABDOMEN PELVIS WO CONTRAST Additional Contrast? None    (Results Pending)         EKG:    See ED Course for EKG documentation        ------------------------- NURSING NOTES AND VITALS REVIEWED ---------------------------   The nursing notes within the ED encounter and vital signs as below have been reviewed by myself. BP 91/86   Pulse 79   Temp 100.3 °F (37.9 °C) (Oral)   Resp 18   Ht 4' 11\" (1.499 m)   Wt 158 lb (71.7 kg)   SpO2 100%   BMI 31.91 kg/m²   Oxygen Saturation Interpretation: Normal    The patients available past medical records and past encounters were reviewed. ------------------------------ ED COURSE/MEDICAL DECISION MAKING----------------------  Medications   acetaminophen (TYLENOL) suppository 650 mg (650 mg Rectal Given 7/20/22 0043)   0.9 % sodium chloride bolus (0 mLs IntraVENous Stopped 7/20/22 0120)            Medical Decision Making: This is a an 49-year-old female with history of end-stage renal disease on peritoneal dialysis presenting to the emergency department for altered mental status. Patient was found laying in her feces, oriented only to herself. Patient moderately hypertensive, febrile, borderline tachycardic.   She is satting under percent SPO2 on room air no respiratory distress. No focal neurodeficits, moving all 4 extremities. Patient with no significant abdominal tenderness or pain. Patient positive for COVID-19, but not hypoxic. Patient does not make urine. Given patient's altered mental status, CT imaging obtained of the head which is still pending at this time. Given patient's alteration, fever, CT imaging is also obtained of the abdomen/pelvis which is also still pending. Patient with moderate lactic acidosis, given IV fluids here in the emergency department. Suspect patient's acute metabolic encephalopathy and fever related to COVID-19 infection. Patient with mild leukocytosis. Electrolytes within normal limits. EKG without acute ischemic changes. Given patient's acute metabolic encephalopathy, fever and COVID-19 infection, patient will be admitted for further work-up, treatment, monitoring. See ED COURSE for additional MDM. Labs & imaging reviewed and interpreted, see RESULTS above. Re-Evaluations:             ED Course as of 07/20/22 7351   Tue Jul 19, 2022   2308 Per chart review patient follows with Dr. Ruthy Boykin. [RH]   Wed Jul 20, 2022   0208 EKG: This EKG is signed and interpreted by me. Rate: 89  Rhythm: Atrial fibrillation  Interpretation: atrial fibrillation (chronic) and right bundle branch block  Comparison: stable as compared to patient's most recent EKG   [RH]   0508 Discussed case with April NP for Dr. Lucia Rogers, agreed to admit patient. [RH]      ED Course User Index  [RH] [address], DO         This patient's ED course included: a personal history and physicial examination, re-evaluation prior to disposition, multiple bedside re-evaluations, IV medications, cardiac monitoring, and continuous pulse oximetry    This patient has remained hemodynamically stable during their ED course. Consultations:  See ED Course    Counseling:    The emergency provider has spoken with the patient's son and discussed todays results, in addition to providing specific details for the plan of care and counseling regarding the diagnosis and prognosis. Questions are answered at this time and they are agreeable with the plan.       --------------------------------- IMPRESSION AND DISPOSITION ---------------------------------    IMPRESSION  1. Acute metabolic encephalopathy    2. COVID-19    3. Lactic acidosis        DISPOSITION  Disposition: Admit to telemetry  Patient condition is serious but stable    NOTE: This report was transcribed using voice recognition software. Every effort was made to ensure accuracy; however, inadvertent computerized transcription errors may be present. Also please note that patient was seen and examined by attending physician. Plan of care and disposition discussed with attending physician and they were immediately available or present for all procedures performed. -- Gal Pritchett D.O. PGY-3     Resident Physician     Emergency Medicine      7/20/2022 6:27 AM         Ryley Merriam Shallow, DO  Resident  07/20/22 0600    ATTENDING PROVIDER ATTESTATION:     I have personally performed and/or participated in the history, exam, medical decision making, and procedures and agree with all pertinent clinical information. I have also reviewed and agree with the past medical, family and social history unless otherwise noted. I have discussed this patient in detail with the resident, and provided the instruction and education regarding ams. My findings/Plan: I was the primary provider for patient . Patient presenting here because of altered mental status. Patient was found on the couch. Patient was noted to have stool on her legs as well and is in her upper body. Patient is on Eliquis. Patient does have history of being on peritoneal dialysis.   Patient unable to history she is really only oriented to person at most.  Patient heart is a regular and lung exam normal abdomen soft dialysis catheter noted. Patient is disoriented. Patient was noted to be febrile here in the emergency department. Patient did have blood cultures obtained as well as lab work. EKG was noted and is irregular. No acute findings though compared to prior. Patient is COVID-positive. Patient chest x-ray shows no obvious infiltrate. Patient given fluids as well as given Tylenol. Patient's vital signs noted and reviewed. Patient did undergo imaging of her head and abdomen. CTs are still pending. Patient will be admitted to monitored bed. Family is at bedside they were made aware of findings and plan. We did speak to on-call internal medicine. Patient will be admitted to intermediate bed.          Shannon Enriquez MD  07/20/22 [address], MD  07/20/22 6802

## 2022-07-20 NOTE — H&P
Sven Syed MD FACP  Internal medicine   History and Physical      CHIEF COMPLAINT: Altered mental status      HISTORY OF PRESENT ILLNESS:    Patient was seen in the emergency room earlier this morning at the main campus of [place]  Son at bedside  Admission details noted  Data reviewed in detail with the son  79-year-old woman with end-stage kidney disease on peritoneal dialysis at home  Yesterday son noted that patient was somewhat incoherent and did not answer his questions  It appears that this morning she is back to normal  She was able to answer my questions  Was no fever or chills  COVID tested positive but she [facility]    Past Medical History:    Past Medical History:   Diagnosis Date    Anemia     Arthritis     CAD (coronary artery disease)     Diabetes mellitus ([facility] [address]     Gout     History of bleeding peptic ulcer approx     Hypertension     Peritoneal dialysis catheter in place [facility])     Peritoneal dialysis status ([facility] [address]     at night for 8 hours    Thyroid disease     Use of cane as ambulatory aid     Wears glasses        Past Surgical History:    Past Surgical History:   Procedure Laterality Date    BACK SURGERY      CARDIOVASCULAR STRESS TEST      CARPAL TUNNEL RELEASE Bilateral     CATARACT REMOVAL WITH IMPLANT Bilateral      SECTION      CORONARY ARTERY BYPASS GRAFT  approx 2000    x 3; per Dr Blair Walters, [place], [address] [facility] [address]      left knee, right shoulder    [place] TOTAL KNEE ARTHROPLASTY Right 10/31/2018    RIGHT KNEE TOTAL ARTHROPLASTY +++BRIDGER++ PNB++ performed by Bruno Severin, MD at [address] [place] [address] ENDOSCOPY         Medications Prior to Admission:    Not in a hospital admission. Allergies:    Sulfa antibiotics    Social History:    reports that she has never smoked. She has never used smokeless tobacco. She reports that she does not drink alcohol and does not use drugs.     Family History:   family history is not acidosis    Leukocytosis         PLAN:  [facility]  Nephrology consult for resumption of peritoneal dialysis  Resume home medications  Sliding scale with insulin coverage  Monitor lactic acid and leukocytosis  No need for antibiotics at present  No need to treat COVID  Questions answered to their Tessa Ulloa MD  11:23 AM  7/20/2022

## 2022-07-20 NOTE — ED NOTES
Patient with another episode of watery stool.   C-diff specimen sent     [facility], RN  07/20/22 5987

## 2022-07-20 NOTE — CONSULTS
[facility] MD Kaiser Bruner, MD Alba Knowles MD [employer], DELILAH Mahajan  Consultation  Patient's Name: Rossy Pringle  7:25 PM  7/20/2022    Nephrologist: Kaiser Ratliff MD    Reason for Consult: ESRD management  Requesting Physician:  Lali Guaman MD    Chief Complaint: Mental status change    History Obtained From: Patient, chart    History of Present Ilness:    Mrs. Juan Manuel Villa is a pleasant 80 y.o. woman well-known to me from the outpatient practice where I followed her for advanced chronic kidney disease evolving into ESRD for a number of years. Dr. Tino Hill manages her outpatient peritoneal dialysis care at the [facility]. She does nightly CCPD at home. Yesterday she was found at home by her son noted she was confused, speaking incoherently. She was incontinent of stool yesterday at least once, she notes that she has had loose stools for the past 2 days. Her son notes that stool that he saw when he found her was \"the color of spaghetti. \"  She denies recent complications with the peritoneal dialysis treatments. Denies abdominal pain, cloudy or bloody fluid. No nausea or vomiting of late. Denies recent constipation. No peripheral swelling. BP on presentation was quite elevated, though has also been rather low since her arrival, frankly is rather labile. At the time of my visit she was resting comfortably, in good spirits, alert, awake, interactive and appropriate. Her son is at her bedside and notes that her mental status is improved markedly since her arrival last evening. Of fever chills or rigor. No nausea or vomiting. No anorexia or recent weight loss. No chest pain or palpitations. No shortness of breath or dyspnea exertion. No abdominal pain or cramping. Peritoneal effluent has been clear. No complications with peritoneal exchanges recently. No dysuria or hematuria. No melena or hematochezia. No recent constipation. No peripheral swelling. No new skin rash or lesion. No new medication of late. No NSAIDs. Past Medical History:   Diagnosis Date    Anemia     Arthritis     CAD (coronary artery disease)     Diabetes mellitus ([facility] [address]     Gout     History of bleeding peptic ulcer approx     Hypertension     Peritoneal dialysis catheter in place [facility])     Peritoneal dialysis status ([facility] [address]     at night for 8 hours    Thyroid disease     Use of cane as ambulatory aid     Wears glasses        Past Surgical History:   Procedure Laterality Date    BACK SURGERY      CARDIOVASCULAR STRESS TEST      CARPAL TUNNEL RELEASE Bilateral     CATARACT REMOVAL WITH IMPLANT Bilateral      SECTION      CORONARY ARTERY BYPASS GRAFT  approx 2000    x 3; per Dr Avelino Alicea, [place], [address] [facility] [address]      left knee, right shoulder    SD TOTAL KNEE ARTHROPLASTY Right 10/31/2018    RIGHT KNEE TOTAL ARTHROPLASTY +++BRIDGER++ PNB++ performed by Harry Hogan MD at [address]         History reviewed. No pertinent family history. reports that she has never smoked. She has never used smokeless tobacco. She reports that she does not drink alcohol and does not use drugs.     Allergies:  Sulfa antibiotics    Current Medications:    glucose chewable tablet 16 g, PRN  dextrose bolus 10% 125 mL, PRN   Or  dextrose bolus 10% 250 mL, PRN  glucagon (rDNA) injection 1 mg, PRN  dextrose 10 % infusion, Continuous PRN  trimethobenzamide (TIGAN) injection 200 mg, Q6H PRN  zinc sulfate (ZINCATE) capsule 50 mg, Daily  albuterol sulfate HFA (PROVENTIL;VENTOLIN;PROAIR) 108 (90 Base) MCG/ACT inhaler 2 puff, Q6H PRN  guaiFENesin (ROBITUSSIN) 100 MG/5ML oral solution 200 mg, Q6H PRN  aspirin chewable tablet 81 mg, Daily  atorvastatin (LIPITOR) tablet 20 mg, Nightly  DULoxetine (CYMBALTA) extended release capsule 30 mg, Daily  insulin glargine-yfgn (SEMGLEE-YFGN) injection vial 10 Units, BID  isosorbide mononitrate (IMDUR) extended release tablet 30 mg, Daily  levothyroxine (SYNTHROID) tablet 50 mcg, Daily  apixaban (ELIQUIS) tablet 2.5 mg, BID  dextrose 50 % IV solution, PRN  pantoprazole (PROTONIX) tablet 40 mg, QAM AC  amLODIPine (NORVASC) tablet 5 mg, Daily  metoprolol tartrate (LOPRESSOR) tablet 50 mg, Daily        Review of Systems:   Pertinent items are noted in HPI. Otherwise unremarkable    Physical exam:   BP (!) 155/79   Pulse 79   Temp 99.2 °F (37.3 °C) (Axillary)   Resp 25   Ht 4' 11\" (1.499 m)   Wt 158 lb (71.7 kg)   SpO2 98%   BMI 31.91 kg/m²   Age-appropriate elderly white woman in no apparent distress  NC/AT EOMI sclera and conjunctiva are clear and anicteric mucous membranes are moist  Neck soft supple trachea midline no bruit  CTA bilaterally  Regular rhythm normal S1 and S2  Abdomen soft nontender nondistended normoactive bowel sounds no mass hepatosplenomegaly CAPD catheter extends from his left of midline. Clean dry dressing.   No erythema or drainage  Distal extremities are without edema  Pulses are 1-2+ x4  No rash or lesion on visible portions of integument  Moves all 4 extremities  Cranial nerves II through XII grossly intact  Awake, alert, interactive and appropriate    Data:   Labs:  CBC with Differential:    Lab Results   Component Value Date/Time    WBC 10.9 07/20/2022 10:23 AM    RBC 3.84 07/20/2022 10:23 AM    HGB 11.2 07/20/2022 10:23 AM    HCT 34.1 07/20/2022 10:23 AM     07/20/2022 10:23 AM    MCV 88.8 07/20/2022 10:23 AM    MCH 29.2 07/20/2022 10:23 AM    MCHC 32.8 07/20/2022 10:23 AM    RDW 13.8 07/20/2022 10:23 AM    BANDSPCT 2 12/19/2015 04:23 PM    METASPCT 1 12/19/2015 04:23 PM    LYMPHOPCT 16.7 07/20/2022 10:23 AM    MONOPCT 8.6 07/20/2022 10:23 AM    MYELOPCT 1 12/19/2015 04:23 PM    BASOPCT 0.2 07/20/2022 10:23 AM    MONOSABS 0.94 07/20/2022 10:23 AM    LYMPHSABS 1.83 07/20/2022 10:23 AM    EOSABS 0.00 with mesenteric edema as well as abdominopelvic ascites. The presence of ascites is nonspecific in the setting of peritoneal dialysis. Colonic diverticulosis. XR CHEST PORTABLE   Final Result   Bibasilar atelectasis. 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 No sign or symptom of peritonitis. No leukocytosis and still might consider UTI, though has no dysuria or hematuria either. Recommendations  Continue CCPD for solute and volume clearance as per outpatient orders and dry weight  Will result 2.5% dextrose solutions for this evening exchanges  Check UA, CNS  Check Hemoccult of stool  Follow labs  Check peritoneal effluent cell count differential, gram stain and culture in the morning      Thank you for the opportunity to participate in the care of your pleasant patient. We look forward to following along with you.         Electronically signed by Kt Garcia MD on 7/20/2022

## 2022-07-20 NOTE — ED NOTES
Patient incontinent for large amount of watery stool. Patients labia and rectal area excoriated. Patient cleaned and changed. Patient would benefit with a FMS.   Will reach out to MD EDUARDO ESPINAL RN  07/20/22 9947

## 2022-07-21 LAB
ACINETOBACTER CALCOAC BAUMANNII COMPLEX BY PCR: NOT DETECTED
ALBUMIN SERPL-MCNC: 2.8 G/DL (ref 3.5–5.2)
ALP BLD-CCNC: 123 U/L (ref 35–104)
ALT SERPL-CCNC: 16 U/L (ref 0–32)
ANION GAP SERPL CALCULATED.3IONS-SCNC: 22 MMOL/L (ref 7–16)
AST SERPL-CCNC: 23 U/L (ref 0–31)
BACTEROIDES FRAGILIS BY PCR: NOT DETECTED
BILIRUB SERPL-MCNC: 0.3 MG/DL (ref 0–1.2)
BOTTLE TYPE: ABNORMAL
BUN BLDV-MCNC: 44 MG/DL (ref 6–23)
C DIFF TOXIN/ANTIGEN: NORMAL
CALCIUM SERPL-MCNC: 8.1 MG/DL (ref 8.6–10.2)
CANDIDA ALBICANS BY PCR: NOT DETECTED
CANDIDA AURIS BY PCR: NOT DETECTED
CANDIDA GLABRATA BY PCR: NOT DETECTED
CANDIDA KRUSEI BY PCR: NOT DETECTED
CANDIDA PARAPSILOSIS BY PCR: NOT DETECTED
CANDIDA TROPICALIS BY PCR: NOT DETECTED
CHLORIDE BLD-SCNC: 91 MMOL/L (ref 98–107)
CO2: 22 MMOL/L (ref 22–29)
CREAT SERPL-MCNC: 8.6 MG/DL (ref 0.5–1)
CRYPTOCOCCUS NEOFORMANS/GATTII BY PCR: NOT DETECTED
EKG ATRIAL RATE: 60 BPM
EKG Q-T INTERVAL: 450 MS
EKG QRS DURATION: 114 MS
EKG QTC CALCULATION (BAZETT): 547 MS
EKG R AXIS: 9 DEGREES
EKG T AXIS: 43 DEGREES
EKG VENTRICULAR RATE: 89 BPM
ENTEROBACTER CLOACAE COMPLEX BY PCR: NOT DETECTED
ENTEROBACTERALES BY PCR: NOT DETECTED
ENTEROCOCCUS FAECALIS BY PCR: NOT DETECTED
ENTEROCOCCUS FAECIUM BY PCR: NOT DETECTED
ESCHERICHIA COLI BY PCR: NOT DETECTED
GFR AFRICAN AMERICAN: 5
GFR NON-AFRICAN AMERICAN: 4 ML/MIN/1.73
GLUCOSE BLD-MCNC: 185 MG/DL (ref 74–99)
HAEMOPHILUS INFLUENZAE BY PCR: NOT DETECTED
HCT VFR BLD CALC: 38.8 % (ref 34–48)
HEMOGLOBIN: 12.4 G/DL (ref 11.5–15.5)
KLEBSIELLA AEROGENES BY PCR: NOT DETECTED
KLEBSIELLA OXYTOCA BY PCR: NOT DETECTED
KLEBSIELLA PNEUMONIAE GROUP BY PCR: NOT DETECTED
LISTERIA MONOCYTOGENES BY PCR: NOT DETECTED
MCH RBC QN AUTO: 29.5 PG (ref 26–35)
MCHC RBC AUTO-ENTMCNC: 32 % (ref 32–34.5)
MCV RBC AUTO: 92.4 FL (ref 80–99.9)
METER GLUCOSE: 133 MG/DL (ref 74–99)
METER GLUCOSE: 191 MG/DL (ref 74–99)
METER GLUCOSE: 193 MG/DL (ref 74–99)
METER GLUCOSE: 219 MG/DL (ref 74–99)
NEISSERIA MENINGITIDIS BY PCR: NOT DETECTED
ORDER NUMBER: ABNORMAL
PDW BLD-RTO: 14 FL (ref 11.5–15)
PLATELET # BLD: 241 E9/L (ref 130–450)
PMV BLD AUTO: 11.5 FL (ref 7–12)
POTASSIUM SERPL-SCNC: 3.6 MMOL/L (ref 3.5–5)
PROTEUS SPECIES BY PCR: NOT DETECTED
PSEUDOMONAS AERUGINOSA BY PCR: NOT DETECTED
RBC # BLD: 4.2 E12/L (ref 3.5–5.5)
SALMONELLA SPECIES BY PCR: NOT DETECTED
SERRATIA MARCESCENS BY PCR: NOT DETECTED
SODIUM BLD-SCNC: 135 MMOL/L (ref 132–146)
SOURCE OF BLOOD CULTURE: ABNORMAL
STAPHYLOCOCCUS AUREUS BY PCR: NOT DETECTED
STAPHYLOCOCCUS EPIDERMIDIS BY PCR: NOT DETECTED
STAPHYLOCOCCUS LUGDUNENSIS BY PCR: NOT DETECTED
STAPHYLOCOCCUS SPECIES BY PCR: DETECTED
STENOTROPHOMONAS MALTOPHILIA BY PCR: NOT DETECTED
STREPTOCOCCUS AGALACTIAE BY PCR: NOT DETECTED
STREPTOCOCCUS PNEUMONIAE BY PCR: NOT DETECTED
STREPTOCOCCUS PYOGENES  BY PCR: NOT DETECTED
STREPTOCOCCUS SPECIES BY PCR: NOT DETECTED
TOTAL PROTEIN: 6.3 G/DL (ref 6.4–8.3)
WBC # BLD: 13.5 E9/L (ref 4.5–11.5)

## 2022-07-21 PROCEDURE — 85027 COMPLETE CBC AUTOMATED: CPT

## 2022-07-21 PROCEDURE — 6370000000 HC RX 637 (ALT 250 FOR IP): Performed by: NURSE PRACTITIONER

## 2022-07-21 PROCEDURE — 6370000000 HC RX 637 (ALT 250 FOR IP): Performed by: INTERNAL MEDICINE

## 2022-07-21 PROCEDURE — 97165 OT EVAL LOW COMPLEX 30 MIN: CPT

## 2022-07-21 PROCEDURE — S5553 INSULIN LONG ACTING 5 U: HCPCS | Performed by: NURSE PRACTITIONER

## 2022-07-21 PROCEDURE — 2140000000 HC CCU INTERMEDIATE R&B

## 2022-07-21 PROCEDURE — 97530 THERAPEUTIC ACTIVITIES: CPT

## 2022-07-21 PROCEDURE — 36415 COLL VENOUS BLD VENIPUNCTURE: CPT

## 2022-07-21 PROCEDURE — 97535 SELF CARE MNGMENT TRAINING: CPT

## 2022-07-21 PROCEDURE — 82962 GLUCOSE BLOOD TEST: CPT

## 2022-07-21 PROCEDURE — 90945 DIALYSIS ONE EVALUATION: CPT

## 2022-07-21 PROCEDURE — 80053 COMPREHEN METABOLIC PANEL: CPT

## 2022-07-21 RX ADMIN — APIXABAN 2.5 MG: 2.5 TABLET, FILM COATED ORAL at 10:52

## 2022-07-21 RX ADMIN — ATORVASTATIN CALCIUM 20 MG: 20 TABLET, FILM COATED ORAL at 21:11

## 2022-07-21 RX ADMIN — AMLODIPINE BESYLATE 5 MG: 5 TABLET ORAL at 10:51

## 2022-07-21 RX ADMIN — INSULIN GLARGINE-YFGN 10 UNITS: 100 INJECTION, SOLUTION SUBCUTANEOUS at 21:11

## 2022-07-21 RX ADMIN — PANTOPRAZOLE SODIUM 40 MG: 40 TABLET, DELAYED RELEASE ORAL at 10:51

## 2022-07-21 RX ADMIN — ISOSORBIDE MONONITRATE 30 MG: 30 TABLET, EXTENDED RELEASE ORAL at 10:51

## 2022-07-21 RX ADMIN — METOPROLOL TARTRATE 50 MG: 50 TABLET, FILM COATED ORAL at 10:51

## 2022-07-21 RX ADMIN — DULOXETINE HYDROCHLORIDE 30 MG: 30 CAPSULE, DELAYED RELEASE ORAL at 20:30

## 2022-07-21 RX ADMIN — LEVOTHYROXINE SODIUM 50 MCG: 0.05 TABLET ORAL at 10:52

## 2022-07-21 RX ADMIN — INSULIN GLARGINE-YFGN 10 UNITS: 100 INJECTION, SOLUTION SUBCUTANEOUS at 10:51

## 2022-07-21 RX ADMIN — ASPIRIN 81 MG CHEWABLE TABLET 81 MG: 81 TABLET CHEWABLE at 10:52

## 2022-07-21 RX ADMIN — APIXABAN 2.5 MG: 2.5 TABLET, FILM COATED ORAL at 21:11

## 2022-07-21 RX ADMIN — Medication 50 MG: at 10:52

## 2022-07-21 ASSESSMENT — PAIN SCALES - GENERAL
PAINLEVEL_OUTOF10: 0

## 2022-07-21 NOTE — PROGRESS NOTES
Radha Kelly MD FACP  Internal medicine   Progress notes      CHIEF COMPLAINT: Altered mental status      HISTORY OF PRESENT ILLNESS:    2022  Patient was seen in the emergency room earlier this morning at the main campus of [facility]  Son at bedside  Admission details noted  Data reviewed in detail with the son  69-year-old woman with end-stage kidney disease on peritoneal dialysis at home  Yesterday son noted that patient was somewhat incoherent and did not answer his questions  It appears that this morning she is back to normal  She was able to answer my questions  Was no fever or chills  COVID tested positive but she has no symptoms from [place]  2022  Patient was seen on the floor earlier this morning  She was in isolation room  Mental status completely back at her baseline knee  Leukocytosis    Past Medical History:    Past Medical History:   Diagnosis Date    Anemia     Arthritis     CAD (coronary artery disease)     Diabetes mellitus ([facility] [address]     Gout     History of bleeding peptic ulcer approx     Hypertension     Peritoneal dialysis catheter in place [facility])     Peritoneal dialysis status ([facility] [address]     at night for 8 hours    Thyroid disease     Use of cane as ambulatory aid     Wears glasses        Past Surgical History:    Past Surgical History:   Procedure Laterality Date    BACK SURGERY      CARDIOVASCULAR STRESS TEST      CARPAL TUNNEL RELEASE Bilateral     CATARACT REMOVAL WITH IMPLANT Bilateral      SECTION      CORONARY ARTERY BYPASS GRAFT  approx 2000    x 3; per Dr Oswaldo Chandler, [place], [address] [facility] [address]      left knee, right shoulder    UT TOTAL KNEE ARTHROPLASTY Right 10/31/2018    RIGHT KNEE TOTAL ARTHROPLASTY +++BRIDGER++ PNB++ performed by Alan Dao MD at [address] [facility] ENDOSCOPY         Medications Prior to Admission:    Medications Prior to Admission: B Complex-C-Folic Acid (DIALYVITE 590 PO), Take by mouth  omeprazole catheter intact with clear fluid  Extremities:  No clubbing, cyanosis, or edema  Skin:  Warm and dry, no open lesions or rash  Neuro:  Cranial nerves 2-12 intact, no focal deficits  Breast: deferred  Rectal: deferred  Genitalia:  deferred    LABS:  Data reviewed      ASSESSMENT:    Leukocytosis and cholelithiasis  Transient disorientation  No signs of sepsis  End-stage kidney disease on peritoneal dialysis  Comorbidities present and past as listed below  Patient Active Problem List   Diagnosis    Peritoneal dialysis catheter infection ([facility] [address]    Sepsis ([facility] [address]    SIRS (systemic inflammatory response syndrome) ([facility] [address]    Type 2 diabetes mellitus with diabetic chronic kidney disease ([facility] [address]    Osteoarthritis of right knee    Arthritis of knee, right    History of peritoneal dialysis    End stage renal disease (HCC)    Altered mental status    Acute delirium    Essential hypertension, benign    ESRD on peritoneal dialysis ([facility] [address]    Type 2 diabetes mellitus, with long-term current use of insulin ([facility] [address]    Primary hypertension    Hypomagnesemia    Hypokalemia    Obesity (BMI 30-39. 9)    TIA (transient ischemic attack)    Hyperlipidemia    AMS (altered mental status)    Diabetes mellitus (HCC)    Lactic acid acidosis    Leukocytosis         PLAN:  Check HIDA scan  Nephrology consult for resumption of peritoneal dialysis  Resume home medications  Sliding scale with insulin coverage  Monitor lactic acid and leukocytosis  No need for antibiotics at present  No need to treat COVID  Questions answered to their Germaine Honeycutt MD  3:04 PM  7/21/2022

## 2022-07-21 NOTE — ED NOTES
Pt cleaned up of incontinent watery diarrhea, brief and pads changed. Pt has excoriated skin on bottom and vaginal area making it painful for the pt to be cleaned up.        Jim Mattson RN  07/21/22 4315

## 2022-07-21 NOTE — ED NOTES
Dialysis nurse stated that if pt moves to a room while getting peritoneal dialysis (runs for 9 hours and started at 2300 07/20/22) to simply unplug machine from wall and then plug it back once on unit while following the prompts on screen      Néstor Rios RN  07/21/22 0144

## 2022-07-21 NOTE — ED NOTES
Pt report called to RN and dialysis was also called, [phone], to ask if they can restart the dialysis once the pt gets to floor and dialysis nurse said they could do that. This was conveyed to oncoming RN in report. SBAR faxed.       Jim Mattson RN  07/21/22 2499

## 2022-07-21 NOTE — FLOWSHEET NOTE
07/21/22 1311   Vitals   /66   Temp 98.1 °F (36.7 °C)   Temp Source Temporal   Heart Rate 65   Resp 16   SpO2 99 %   Weight 143 lb 15.4 oz (65.3 kg)   Peritoneal Dialysis (CAPD manual)   Effluent Appearance Clear;Yellow   Cycler   Verification of Prescription CCPD   Total Volume Programmed 90252 mL   Therapy Time (Hours:Minutes) 8.5   Fill Volume 2300 mL   Last Fill Volume 1800 mL   Dextrose Setting Same (Nonextraneal)   Number of Cycles 5   Bag Volume 5000 mL   Number of Bags Used 3   Dianeal Solution Other (Comment)  (2.5% in 5000ml)   Ultrafiltration (UF) (mL) -314 mL   Average Dwell Time (Hours:Minutes) 1:40   Pt disconnected from PD using aseptic technique. No complications noted. PD effluent clear, yellow. Abdomen nnotender. UF -314ml. Report to floor RN given.

## 2022-07-21 NOTE — CARE COORDINATION
Patient presented to the ED after being found on the ground at home by her son and for altered mental status; admitted for lactic acidosis, fever, AMS and covid. Attempted to reach patient via bedside phone due to covid isolation for transition of care planning but no answer. Contacted patient's sons and daughter, but no answer; left a message to return the call. Per nursing rounds, patient from home alone, lactic elevated and she is on peritoneal dialysis. DONOVAN/MYNOR will continue to follow for discharge planning.     Kayleen Low, MSW, LSW [phone]

## 2022-07-21 NOTE — PROGRESS NOTES
[address]          Date:2022                                                   Patient Name: Lloyd Shepard     MRN: [id number]     : 1940     Room: [address] [place] [address]       Evaluating OT: Dionte GAN, OTR/L, [id number]      Referring Provider: KERRI Silva CNP    Specific Provider Orders/Date: OT eval and treat (22)    Diagnosis: Lactic acidosis [E87.2]  Chronic anticoagulation [Z79.01]  Fever, unspecified fever cause [O73.3]  Acute metabolic encephalopathy [T85.87]  AMS (altered mental status) [R41.82]  COVID-19 [U07.1]    Surgeries/Procedures: None this admission       Pt admitted with AMS; found down covered in feces. Pertinent Medical History:       has a past medical history of Anemia, Arthritis, CAD (coronary artery disease), Diabetes mellitus ([facility] [address] Gout, History of bleeding peptic ulcer, Hypertension, Peritoneal dialysis catheter in place [facility]), Peritoneal dialysis status ([facility] [address] Thyroid disease, Use of cane as ambulatory aid, and Wears glasses.          Precautions:  Fall Risk, BSC, COVID+, Droplet Plus     Assessment of current deficits    [x] Functional mobility  [x]ADLs  [x] Strength               [x]Cognition    [x] Functional transfers   [x] IADLs         [x] Safety Awareness   [x]Endurance    [x] Fine Coordination              [x] Balance      [] Vision/perception   []Sensation     []Gross Motor Coordination  [x] ROM  [] Delirium                   [] Motor Control     OT PLAN OF CARE   OT POC based on physician orders, patient diagnosis and results of clinical assessment    Frequency/Duration 2-5 days/wk for 2 weeks PRN   Specific OT Treatment Interventions to include:   * Instruction/training on adapted ADL techniques and AE recommendations to increase functional independence within precautions       * Training on energy conservation strategies, correct breathing pattern and techniques to improve independence/tolerance for self-care routine  * Functional transfer/mobility training/DME recommendations for increased independence, safety, and fall prevention  * Patient/Family education to increase follow through with safety techniques and functional independence  * Recommendation of environmental modifications for increased safety with functional transfers/mobility and ADLs  * Cognitive retraining/development of therapeutic activities to improve problem solving, judgement, memory, and attention for increased safety/participation in ADL/IADL tasks  * Sensory re-education to improve body/limb awareness, maintain/improve skin integrity, and improve hand/UE motor function  * Visual-perceptual training to improve environmental scanning, visual attention/focus, and oculomotor skills for increased safety/independence with functional transfers/mobility and ADLs  * Splinting/positioning for increased function, prevention of contractures, and improve skin integrity  * Therapeutic exercise to improve motor endurance, ROM, and functional strength for ADLs/functional transfers  * Therapeutic activities to facilitate/challenge dynamic balance, stand tolerance for increased safety and independence with ADLs  * Therapeutic activities to facilitate gross/fine motor skills for increased independence with ADLs  * Neuro-muscular re-education: facilitation of righting/equilibrium reactions, midline orientation, scapular stability/mobility, normalization of muscle tone, and facilitation of volitional active controled movement  * Positioning to improve skin integrity, interaction with environment and functional independence  * Delirium prevention/treatment  * Manual techniques for edema management    Recommended Adaptive Equipment/DME:  Shower chair, BSC, TBD     Home Living: Pt lives alone in 1 story home with 3-4 SANTIAGO and bed and bath on main floor with no reason to access basement. Bathroom setup: [address] owned: Sahil Crenshaw    Prior Level of Function: Ind with ADLs , Ind with IADLs; Used rollator for functional mobility. Driving: No  Occupation: None stated    Pain Level: 0/10  Cognition: A&O: 4/4; Follows 2 step directions   Memory:  F+   Sequencing: F+   Problem solving:  F+   Judgement/safety:  F+; pt limited by fear of falling. Vitals Assessed:  SpO2: 100%, 70 HR prior to activity  SpO2: 99%, 82 HR following activity      BP: NT     Functional Assessment:  AM-PAC Daily Activity Raw Score: 15/24   Initial Eval Status  Date: 7/21/22 Treatment Status  Date: STGs = LTGs  Time frame: 10-14 days   Feeding Set-up  Independent    Grooming Minimal Assist   While seated at EOB d/t limited ROM    Independent    UB Dressing Moderate Assist  To don shirt. Pt with limited ROM. S     LB Dressing Moderate Assist overall    S      Bathing Moderate Assist  Pt reportedly sponge bathes at home. SBA  In seated position     Toileting Maximal Assist  Pt unable to complete SPT, limited ROM/balance for posterior hygiene. SBA     Bed Mobility  Supine to sit: Minimal Assist   Sit to supine: Minimal Assist     Supine to sit: Independent   Sit to supine: Independent    Functional Transfers Minimal Assist with fww  For STS from EOB/Chair  Supervision    Functional Mobility Minimal Assist with fww  For 2-3 steps from EOB. Limited by fear of falls. Supervision    Balance Sitting:     Static: S    Dynamic:SBA  Standing: Min A    during functional activity  Sitting:     Static: Ind    Dynamic: S  Standing: S   Activity Tolerance Fair with light activity. Limited by fear of falling.   WFL   Visual/  Perceptual Glasses: Yes        Safety F+  G-     Hand Dominance R   AROM (PROM) Strength Additional Info:    RUE  WFL Grossly 3+/5 Fair  and wfl FMC/dexterity noted during ADL tasks       LUE AROM  Shoulder 0-30  Elbow/hand WFL Shoulder: 2+/5  Elbow: 3+/5  Hand: [facility] Fair  and wfl FMC/dexterity noted during ADL tasks       Hearing: WFL   Sensation:  No c/o numbness or tingling   Tone: WFL   Edema: Unremarkable    Comments: Patient cleared by nursing. Upon arrival patient supine in bed, educated on the role of OT, and agreeable to OT session. No family present for session today. OT facilitated ADLs, bed mobility, functional transfers with focus on safety, body mechanics, and balance. At end of session, patient supine in bed, properly positioned, oriented to call light, with call light to R side and phone within reach, all lines and tubes intact. Nursing notified. Overall patient demonstrated fair reception to education, decreased independence and safety during completion of ADL/functional transfer/mobility tasks. Pt would benefit from continued skilled OT to increase safety and independence with completion of ADL/IADL tasks for functional independence and quality of life. Treatment: OT treatment provided this date includes:   Instruction/training on safety and adapted techniques for completion of ADLs: Pt educated on LB adaptive techniques to increase independence in self-care. Instruction/training on safe functional mobility/transfer techniques: Pt educated on transfer safety with focus on safety, body mechanics, and symptom monitoring. Instruction/training on energy conservation/work simplification for completion of ADLs: Pt educated on taking rest breaks and adaptive techniques to increase independence with self-care ADLs and IADLs, work simplification to improve endurance. Proper Positioning/Alignment: for optimal healing, skin integrity, to prevent breakdown, decrease edema, and reduce risk of contracture. Skilled Monitoring of Vitals: to include spO2, and HR throughout session to maximize safety.   Therapeutic activity: to challenge dynamic sitting/standing balance and endurance to promote safety during ADL tasks and functional transfers and mobility. Rehab Potential: Good for established goals     Patient / Family Goal: to return [facility]. Patient and/or family were instructed on functional diagnosis, prognosis/goals and OT plan of care. Demonstrated fair understanding. Eval Complexity: Low    Time In: 9:05a  Time Out: 9:43a  Total Treatment Time: 23 min    Min Units   OT Eval Low 97165 x  1    OT Eval Medium 20235      OT Eval High 69588      OT Re-Eval [id number]       Therapeutic Ex 12445       Therapeutic Activities 04868 11  1   ADL/Self Care 21575 12 1    Orthotic Management 29853       Manual 93249     Neuro Re-Ed 59461       Non-Billable Time          Evaluation Time additionally includes thorough review of current medical information, gathering information on past medical history/social history and prior level of function, interpretation of standardized testing/informal observation of tasks, assessment of data and development of plan of care and goals.           ELVIA Sheppard,  OTR/L; [id number]

## 2022-07-21 NOTE — FLOWSHEET NOTE
07/21/22 1700   Cycler   Verification of Prescription CCPD   Total Volume Programmed 89712 mL   Therapy Time (Hours:Minutes) 8.5   Fill Volume 2300 mL   Last Fill Volume 1800 mL   Dextrose Setting Same (Nonextraneal)   Number of Cycles 5   Bag Volume 5000 mL   Number of Bags Used 3   Dianeal Solution   (2.5%)   CCPD programmed per orders/ verified. Tx started via PD cath using aseptic technique and dressing changed. Draining and filling with out complications. Remains in care of staff.

## 2022-07-21 NOTE — ED NOTES
Pt has no current complaints at this time.  Pt Aox3, family at bedside, stable, call bell within reach, and easy respers     Elyssa Duty, RN  07/20/22 4744

## 2022-07-21 NOTE — PROGRESS NOTES
levothyroxine  50 mcg Oral Daily    apixaban  2.5 mg Oral BID    pantoprazole  40 mg Oral QAM AC    amLODIPine  5 mg Oral Daily    metoprolol tartrate  50 mg Oral Daily       MEDS (infusions):   dextrose         MEDS (prn):  glucose, dextrose bolus **OR** dextrose bolus, glucagon (rDNA), dextrose, trimethobenzamide, albuterol sulfate HFA, guaiFENesin, dextrose    DIET:    ADULT DIET; Regular; 4 carb choices (60 gm/meal); Low Fat/Low Chol/High Fiber/2 gm Na; No Added Salt (3-4 gm); Low Potassium (Less than 3000 mg/day);  Low Phosphorus (Less than 1000 mg)  ADULT ORAL NUTRITION SUPPLEMENT; Breakfast; Renal Oral Supplement  Diet NPO      PHYSICAL EXAM:      Patient Vitals for the past 24 hrs:   BP Temp Temp src Pulse Resp SpO2 Height Weight   07/21/22 1554 127/65 97.5 °F (36.4 °C) Oral 65 16 100 % -- --   07/21/22 1311 122/66 98.1 °F (36.7 °C) Temporal 65 16 99 % -- 143 lb 15.4 oz (65.3 kg)   07/21/22 1040 (!) 123/56 97.8 °F (36.6 °C) Oral 59 16 99 % -- --   07/21/22 1026 -- -- -- -- -- -- 4' 11\" (1.499 m) --   07/21/22 0822 -- -- -- -- -- -- -- 144 lb 1 oz (65.3 kg)   07/21/22 0700 (!) 150/74 97.5 °F (36.4 °C) Oral 57 18 100 % -- --   07/21/22 0500 (!) 146/52 -- -- 54 16 93 % -- --   07/21/22 0430 (!) 122/48 -- -- 58 15 96 % -- --   07/21/22 0315 -- -- -- 60 19 95 % -- --   07/21/22 0200 121/63 -- -- 59 17 92 % -- --   07/21/22 0100 (!) 120/50 -- -- 52 20 94 % -- --   07/21/22 0000 (!) 136/51 -- -- 68 21 95 % -- --   07/20/22 2200 (!) 136/53 -- -- 54 21 96 % -- --   07/20/22 2100 (!) 142/69 -- -- 71 21 -- -- --   07/20/22 2000 131/71 -- -- 91 17 98 % -- --          Intake/Output Summary (Last 24 hours) at 7/21/2022 1924  Last data filed at 7/21/2022 1845  Gross per 24 hour   Intake 900 ml   Output -314 ml   Net 1214 ml       Wt Readings from Last 3 Encounters:   07/21/22 143 lb 15.4 oz (65.3 kg)   04/29/22 158 lb 8.2 oz (71.9 kg)   06/18/19 169 lb 5 oz (76.8 kg)       General:  in no acute distress  HEENT: NC/AT, EOMI, sclera and conjunctiva are clear and anicteric. Mucous membranes moist.  Cardiovascular: regular rate and rhythm, no murmurs, gallops, or rubs  Respiratory:  Clear, no rales, rhochi, or wheezes  Gastrointestinal: soft, nontender, nondistended, NABS  Ext: no cyanosis, clubbing, or edema bilateral lower extremities  Neuro: awake, alert, oriented x3. Moves all 4 extremities. Cranial nerves II through XII grossly intact. Skin: dry, no rash      DATA:      Recent Labs     07/19/22  2347 07/20/22  1023 07/21/22  1205   WBC 14.8* 10.9 13.5*   HGB 11.5 11.2* 12.4   HCT 33.8* 34.1 38.8   MCV 85.8 88.8 92.4    216 241     Recent Labs     07/19/22  2347 07/20/22  1023 07/21/22  1205   * 130* 135   K 4.3 4.4 3.6   CL 85* 90* 91*   CO2 22 28 22   BUN 44* 50* 44*   CREATININE 8.4* 9.5* 8.6*       Iron studies:  Lab Results   Component Value Date    FERRITIN 1,217 07/20/2022     Bone disease:  Lab Results   Component Value Date    MG 1.7 04/29/2022    PHOS 3.9 07/19/2022       DIALYSIS ORDERS:  Reviewed      Assessment  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 No sign or symptom of peritonitis. No leukocytosis and still might consider UTI, though has no dysuria or hematuria either.      Recommendations  Continue CCPD for solute and volume clearance as per outpatient orders and dry weight  Will result 2.5% dextrose solutions for this evening exchanges  Follow labs    Papito Ruiz MD, MD  7/21/2022

## 2022-07-22 ENCOUNTER — APPOINTMENT (OUTPATIENT)
Dept: NUCLEAR MEDICINE | Age: 82
DRG: 177 | End: 2022-07-22
Payer: MEDICARE

## 2022-07-22 LAB
ALBUMIN SERPL-MCNC: 2.6 G/DL (ref 3.5–5.2)
ALP BLD-CCNC: 106 U/L (ref 35–104)
ALT SERPL-CCNC: 14 U/L (ref 0–32)
ANION GAP SERPL CALCULATED.3IONS-SCNC: 14 MMOL/L (ref 7–16)
AST SERPL-CCNC: 18 U/L (ref 0–31)
BASOPHILS ABSOLUTE: 0.04 E9/L (ref 0–0.2)
BASOPHILS RELATIVE PERCENT: 0.3 % (ref 0–2)
BILIRUB SERPL-MCNC: 0.3 MG/DL (ref 0–1.2)
BUN BLDV-MCNC: 35 MG/DL (ref 6–23)
CALCIUM SERPL-MCNC: 7.8 MG/DL (ref 8.6–10.2)
CHLORIDE BLD-SCNC: 91 MMOL/L (ref 98–107)
CO2: 27 MMOL/L (ref 22–29)
CREAT SERPL-MCNC: 7.8 MG/DL (ref 0.5–1)
EOSINOPHILS ABSOLUTE: 0.43 E9/L (ref 0.05–0.5)
EOSINOPHILS RELATIVE PERCENT: 3.7 % (ref 0–6)
GFR AFRICAN AMERICAN: 6
GFR NON-AFRICAN AMERICAN: 5 ML/MIN/1.73
GLUCOSE BLD-MCNC: 128 MG/DL (ref 74–99)
HCT VFR BLD CALC: 32.8 % (ref 34–48)
HEMOGLOBIN: 10.9 G/DL (ref 11.5–15.5)
IMMATURE GRANULOCYTES #: 0.09 E9/L
IMMATURE GRANULOCYTES %: 0.8 % (ref 0–5)
LYMPHOCYTES ABSOLUTE: 2.27 E9/L (ref 1.5–4)
LYMPHOCYTES RELATIVE PERCENT: 19.4 % (ref 20–42)
MAGNESIUM: 1 MG/DL (ref 1.6–2.6)
MCH RBC QN AUTO: 29.6 PG (ref 26–35)
MCHC RBC AUTO-ENTMCNC: 33.2 % (ref 32–34.5)
MCV RBC AUTO: 89.1 FL (ref 80–99.9)
METER GLUCOSE: 143 MG/DL (ref 74–99)
METER GLUCOSE: 72 MG/DL (ref 74–99)
METER GLUCOSE: 74 MG/DL (ref 74–99)
METER GLUCOSE: 79 MG/DL (ref 74–99)
METER GLUCOSE: 89 MG/DL (ref 74–99)
METER GLUCOSE: 97 MG/DL (ref 74–99)
METER GLUCOSE: 98 MG/DL (ref 74–99)
MONOCYTES ABSOLUTE: 0.67 E9/L (ref 0.1–0.95)
MONOCYTES RELATIVE PERCENT: 5.7 % (ref 2–12)
NEUTROPHILS ABSOLUTE: 8.2 E9/L (ref 1.8–7.3)
NEUTROPHILS RELATIVE PERCENT: 70.1 % (ref 43–80)
PDW BLD-RTO: 13.6 FL (ref 11.5–15)
PHOSPHORUS: 3.1 MG/DL (ref 2.5–4.5)
PLATELET # BLD: 211 E9/L (ref 130–450)
PMV BLD AUTO: 11.4 FL (ref 7–12)
POTASSIUM SERPL-SCNC: 2.5 MMOL/L (ref 3.5–5)
RBC # BLD: 3.68 E12/L (ref 3.5–5.5)
SODIUM BLD-SCNC: 132 MMOL/L (ref 132–146)
TOTAL PROTEIN: 5.7 G/DL (ref 6.4–8.3)
WBC # BLD: 11.7 E9/L (ref 4.5–11.5)

## 2022-07-22 PROCEDURE — 3E1M39Z IRRIGATION OF PERITONEAL CAVITY USING DIALYSATE, PERCUTANEOUS APPROACH: ICD-10-PCS | Performed by: INTERNAL MEDICINE

## 2022-07-22 PROCEDURE — 97161 PT EVAL LOW COMPLEX 20 MIN: CPT

## 2022-07-22 PROCEDURE — 3430000000 HC RX DIAGNOSTIC RADIOPHARMACEUTICAL: Performed by: RADIOLOGY

## 2022-07-22 PROCEDURE — 78226 HEPATOBILIARY SYSTEM IMAGING: CPT

## 2022-07-22 PROCEDURE — 84100 ASSAY OF PHOSPHORUS: CPT

## 2022-07-22 PROCEDURE — 36415 COLL VENOUS BLD VENIPUNCTURE: CPT

## 2022-07-22 PROCEDURE — 2140000000 HC CCU INTERMEDIATE R&B

## 2022-07-22 PROCEDURE — 2580000003 HC RX 258: Performed by: INTERNAL MEDICINE

## 2022-07-22 PROCEDURE — 90945 DIALYSIS ONE EVALUATION: CPT

## 2022-07-22 PROCEDURE — 6370000000 HC RX 637 (ALT 250 FOR IP): Performed by: INTERNAL MEDICINE

## 2022-07-22 PROCEDURE — S5553 INSULIN LONG ACTING 5 U: HCPCS | Performed by: NURSE PRACTITIONER

## 2022-07-22 PROCEDURE — 6370000000 HC RX 637 (ALT 250 FOR IP): Performed by: NURSE PRACTITIONER

## 2022-07-22 PROCEDURE — 6360000002 HC RX W HCPCS: Performed by: INTERNAL MEDICINE

## 2022-07-22 PROCEDURE — 97530 THERAPEUTIC ACTIVITIES: CPT

## 2022-07-22 PROCEDURE — 83735 ASSAY OF MAGNESIUM: CPT

## 2022-07-22 PROCEDURE — 80053 COMPREHEN METABOLIC PANEL: CPT

## 2022-07-22 PROCEDURE — 85025 COMPLETE CBC W/AUTO DIFF WBC: CPT

## 2022-07-22 PROCEDURE — A9537 TC99M MEBROFENIN: HCPCS | Performed by: RADIOLOGY

## 2022-07-22 PROCEDURE — 82962 GLUCOSE BLOOD TEST: CPT

## 2022-07-22 RX ORDER — SODIUM CHLORIDE 0.9 % (FLUSH) 0.9 %
5-40 SYRINGE (ML) INJECTION EVERY 12 HOURS SCHEDULED
Status: DISCONTINUED | OUTPATIENT
Start: 2022-07-22 | End: 2022-07-27 | Stop reason: HOSPADM

## 2022-07-22 RX ORDER — MAGNESIUM SULFATE HEPTAHYDRATE 500 MG/ML
1000 INJECTION, SOLUTION INTRAMUSCULAR; INTRAVENOUS ONCE
Status: DISCONTINUED | OUTPATIENT
Start: 2022-07-22 | End: 2022-07-22 | Stop reason: CLARIF

## 2022-07-22 RX ORDER — MAGNESIUM SULFATE IN WATER 40 MG/ML
2000 INJECTION, SOLUTION INTRAVENOUS ONCE
Status: COMPLETED | OUTPATIENT
Start: 2022-07-22 | End: 2022-07-22

## 2022-07-22 RX ORDER — POTASSIUM CHLORIDE 20 MEQ/1
40 TABLET, EXTENDED RELEASE ORAL
Status: COMPLETED | OUTPATIENT
Start: 2022-07-22 | End: 2022-07-22

## 2022-07-22 RX ORDER — MAGNESIUM SULFATE 1 G/100ML
1000 INJECTION INTRAVENOUS ONCE
Status: COMPLETED | OUTPATIENT
Start: 2022-07-22 | End: 2022-07-22

## 2022-07-22 RX ORDER — SODIUM CHLORIDE 0.9 % (FLUSH) 0.9 %
5-40 SYRINGE (ML) INJECTION PRN
Status: DISCONTINUED | OUTPATIENT
Start: 2022-07-22 | End: 2022-07-27 | Stop reason: HOSPADM

## 2022-07-22 RX ORDER — SODIUM CHLORIDE 9 MG/ML
INJECTION, SOLUTION INTRAVENOUS PRN
Status: DISCONTINUED | OUTPATIENT
Start: 2022-07-22 | End: 2022-07-27 | Stop reason: HOSPADM

## 2022-07-22 RX ADMIN — MAGNESIUM SULFATE HEPTAHYDRATE 1000 MG: 1 INJECTION, SOLUTION INTRAVENOUS at 10:12

## 2022-07-22 RX ADMIN — POTASSIUM CHLORIDE 40 MEQ: 20 TABLET, EXTENDED RELEASE ORAL at 14:49

## 2022-07-22 RX ADMIN — DULOXETINE HYDROCHLORIDE 30 MG: 30 CAPSULE, DELAYED RELEASE ORAL at 10:13

## 2022-07-22 RX ADMIN — ASPIRIN 81 MG CHEWABLE TABLET 81 MG: 81 TABLET CHEWABLE at 10:12

## 2022-07-22 RX ADMIN — APIXABAN 2.5 MG: 2.5 TABLET, FILM COATED ORAL at 20:53

## 2022-07-22 RX ADMIN — SODIUM CHLORIDE, PRESERVATIVE FREE 10 ML: 5 INJECTION INTRAVENOUS at 18:05

## 2022-07-22 RX ADMIN — POTASSIUM CHLORIDE 40 MEQ: 20 TABLET, EXTENDED RELEASE ORAL at 18:05

## 2022-07-22 RX ADMIN — APIXABAN 2.5 MG: 2.5 TABLET, FILM COATED ORAL at 10:12

## 2022-07-22 RX ADMIN — Medication 10 ML: at 20:53

## 2022-07-22 RX ADMIN — ATORVASTATIN CALCIUM 20 MG: 20 TABLET, FILM COATED ORAL at 20:53

## 2022-07-22 RX ADMIN — MAGNESIUM SULFATE HEPTAHYDRATE 2000 MG: 40 INJECTION, SOLUTION INTRAVENOUS at 18:13

## 2022-07-22 RX ADMIN — INSULIN GLARGINE-YFGN 10 UNITS: 100 INJECTION, SOLUTION SUBCUTANEOUS at 20:53

## 2022-07-22 RX ADMIN — INSULIN GLARGINE-YFGN 10 UNITS: 100 INJECTION, SOLUTION SUBCUTANEOUS at 10:13

## 2022-07-22 RX ADMIN — POTASSIUM CHLORIDE 40 MEQ: 20 TABLET, EXTENDED RELEASE ORAL at 10:12

## 2022-07-22 RX ADMIN — Medication 50 MG: at 10:13

## 2022-07-22 RX ADMIN — AMLODIPINE BESYLATE 5 MG: 5 TABLET ORAL at 10:12

## 2022-07-22 RX ADMIN — PANTOPRAZOLE SODIUM 40 MG: 40 TABLET, DELAYED RELEASE ORAL at 06:28

## 2022-07-22 RX ADMIN — LEVOTHYROXINE SODIUM 50 MCG: 0.05 TABLET ORAL at 06:28

## 2022-07-22 RX ADMIN — Medication 7 MILLICURIE: at 11:06

## 2022-07-22 RX ADMIN — Medication 10 ML: at 10:13

## 2022-07-22 RX ADMIN — ISOSORBIDE MONONITRATE 30 MG: 30 TABLET, EXTENDED RELEASE ORAL at 10:12

## 2022-07-22 RX ADMIN — METOPROLOL TARTRATE 50 MG: 50 TABLET, FILM COATED ORAL at 10:16

## 2022-07-22 ASSESSMENT — PAIN SCALES - GENERAL
PAINLEVEL_OUTOF10: 0

## 2022-07-22 NOTE — PROGRESS NOTES
Pt alert and oriented. Pt verbalized an understanding of importance of turning and repositioning frequently. Pt is able to turn in bed and make changes in position independently.  Pt advised to change positions every 2 hours or more often

## 2022-07-22 NOTE — PROGRESS NOTES
Elizabeth Coronado MD FACP  Internal medicine   Progress notes      CHIEF COMPLAINT: Altered mental status      HISTORY OF PRESENT ILLNESS:    2022  Patient was seen in the emergency room earlier this morning at the main campus of [facility]  Son at bedside  Admission details noted  Data reviewed in detail with the son  68-year-old woman with end-stage kidney disease on peritoneal dialysis at home  Yesterday son noted that patient was somewhat incoherent and did not answer his questions  It appears that this morning she is back to normal  She was able to answer my questions  Was no fever or chills  COVID tested positive but she has no symptoms from [place]  2022  Patient was seen on the floor earlier this morning  She was in isolation room  Mental status completely back at her baseline  Leukocytosis  22  Patient was seen on the floor earlier this morning  Confused again  Afebrile  Leukocytosis improving  Hypokalemic this morning    Past Medical History:    Past Medical History:   Diagnosis Date    Anemia     Arthritis     CAD (coronary artery disease)     Diabetes mellitus ([facility]     Gout     History of bleeding peptic ulcer approx     Hypertension     Peritoneal dialysis catheter in place [facility])     Peritoneal dialysis status ([facility] [facility]     at night for 8 hours    Thyroid disease     Use of cane as ambulatory aid     Wears glasses        Past Surgical History:    Past Surgical History:   Procedure Laterality Date    BACK SURGERY      CARDIOVASCULAR STRESS TEST      CARPAL TUNNEL RELEASE Bilateral     CATARACT REMOVAL WITH IMPLANT Bilateral      SECTION      CORONARY ARTERY BYPASS GRAFT  approx 2000    x 3; per Dr Rich Cardoza, [place], [address] [facility] [address]      left knee, right shoulder    NE TOTAL KNEE ARTHROPLASTY Right 10/31/2018    RIGHT KNEE TOTAL ARTHROPLASTY +++BRIDGER++ PNB++ performed by Nilsa Portillo MD at [address] [place] [address] Prior to Admission:    Medications Prior to Admission: B Complex-C-Folic Acid (DIALYVITE 951 PO), Take by mouth  omeprazole (PRILOSEC) 20 MG delayed release capsule, Take 20 mg by mouth in the morning. Cetirizine HCl (ZYRTEC PO), Take by mouth  insulin glargine (LANTUS) 100 UNIT/ML injection vial, Inject 18 Units into the skin 2 times daily  metoprolol tartrate (LOPRESSOR) 50 MG tablet, Take 50 mg by mouth in the morning. potassium chloride (KLOR-CON M) 20 MEQ extended release tablet, Take 20 mEq by mouth in the morning. amLODIPine (NORVASC) 5 MG tablet, Take 5 mg by mouth in the morning. aspirin 81 MG chewable tablet, Take 1 tablet by mouth daily  apixaban (ELIQUIS) 2.5 MG TABS tablet, Take 1 tablet by mouth 2 times daily  atorvastatin (LIPITOR) 20 MG tablet, Take 1 tablet by mouth nightly  DULoxetine (CYMBALTA) 30 MG extended release capsule, Take 30 mg by mouth in the morning. isosorbide mononitrate (IMDUR) 30 MG CR tablet, Take 30 mg by mouth in the morning. levothyroxine (SYNTHROID) 50 MCG tablet, Take 50 mcg by mouth Daily    Allergies:    Sulfa antibiotics    Social History:    reports that she has never smoked. She has never used smokeless tobacco. She reports that she does not drink alcohol and does not use drugs. Family History:   family history is not on file. REVIEW OF SYSTEMS:  As above in the HPI, otherwise negative    PHYSICAL EXAM:    Vitals:  /64   Pulse 59   Temp 97.7 °F (36.5 °C) (Oral)   Resp 16   Ht 4' 11\" (1.499 m)   Wt 144 lb 2.9 oz (65.4 kg)   SpO2 100%   BMI 29.12 kg/m²     General:  Awake, alert, oriented X 3. Well developed, well nourished, well groomed. No apparent distress. HEENT:  Normocephalic, atraumatic. Pupils equal, round, reactive to light. No scleral icterus. No conjunctival injection. No nasal discharge.   Neck:  Supple  Heart:  RRR, no murmurs, gallops, rubs  Lungs:  CTA bilaterally, bilat symmetrical expansion, no wheeze, rales, or rhonchi  Abdomen: Bowel sounds present, soft, nontender, no masses, no organomegaly, no peritoneal signs  Peritoneal dialysis catheter intact with clear fluid  Extremities:  No clubbing, cyanosis, or edema  Skin:  Warm and dry, no open lesions or rash  Neuro:  Cranial nerves 2-12 intact, no focal deficits  Breast: deferred  Rectal: deferred  Genitalia:  deferred    LABS:  Data reviewed      ASSESSMENT:    Leukocytosis and cholelithiasis  Hypokalemic this morning  Transient disorientation  No signs of sepsis  End-stage kidney disease on peritoneal dialysis  Comorbidities present and past as listed below  Patient Active Problem List   Diagnosis    Peritoneal dialysis catheter infection ([facility]    Sepsis ([facility] [address]    SIRS (systemic inflammatory response syndrome) ([facility] [address] [facility]    Type 2 diabetes mellitus with diabetic chronic kidney disease ([facility] [facility]    Osteoarthritis of right knee    Arthritis of knee, right    History of peritoneal dialysis    End stage renal disease ([facility] [address] [facility]    Altered mental status    Acute delirium    Essential hypertension, benign    ESRD on peritoneal dialysis ([facility] [address]    Type 2 diabetes mellitus, with long-term current use of insulin ([facility] [facility]    Primary hypertension    Hypomagnesemia    Hypokalemia    Obesity (BMI 30-39. 9)    TIA (transient ischemic attack)    Hyperlipidemia    AMS (altered mental status)    Diabetes mellitus (HCC)    Lactic acid acidosis    Leukocytosis         PLAN:  Replace potassium and magnesium  Check HIDA scan  Nephrology consult for resumption of peritoneal dialysis  Resume home medications  Sliding scale with insulin coverage  Monitor lactic acid and leukocytosis  No need for antibiotics at present  No need to treat COVID  Questions answered to their Kathryn Mo MD  12:37 PM  7/22/2022

## 2022-07-22 NOTE — PROGRESS NOTES
Physical Therapy  Physical Therapy Initial Assessment     Name: Yao Norman  : 1940  MRN: [id number]      Date of Service: 2022    Evaluating PT:  Jesus Reilly, PT [place] [address] On license of [facility]    Room #:  8154/3393-I  Diagnosis:  Lactic acidosis [E87.2]  Chronic anticoagulation [Z79.01]  Fever, unspecified fever cause [Q20.8]  Acute metabolic encephalopathy [J15.01]  AMS (altered mental status) [R41.82]  COVID-19 [U07.1]  PMHx/PSHx:   has a past medical history of Anemia, Arthritis, CAD (coronary artery disease), Diabetes mellitus ([facility] [address] Gout, History of bleeding peptic ulcer, Hypertension, Peritoneal dialysis catheter in place [facility]), Peritoneal dialysis status ([facility] [address] Thyroid disease, Use of cane as ambulatory aid, and Wears glasses. Procedure/Surgery:   has a past surgical history that includes  section; Carpal tunnel release (Bilateral); Coronary artery bypass graft (approx ); back surgery; Upper gastrointestinal endoscopy; cardiovascular stress test; Cataract removal with implant (Bilateral); Endoscopy, colon, diagnostic; joint replacement; and pr total knee arthroplasty (Right, 10/31/2018). Precautions:  Falls, dialysis  Equipment Needs:  TBD    SUBJECTIVE:    Pt lives alone in a 1 story home with 4 stairs to enter and 2 rail. Bed is on 1st floor and bath is on 1st floor. Pt ambulated with rollator PTA. OBJECTIVE:   Initial Evaluation  Date: 22 Treatment Short Term/ Long Term   Goals   AM-PAC 6 Clicks 55/27     Was pt agreeable to Eval/treatment? yes     Does pt have pain? no     Bed Mobility  Rolling: Davon  Supine to sit: Davon  Sit to supine: ModA  Scooting: ModA  Rolling: Ind  Supine to sit: Ind  Sit to supine: Ind  Scooting: Ind   Transfers Sit to stand: ModA  Stand to sit: ModA  Stand pivot: NT  Sit to stand: Ind  Stand to sit: Ind  Stand pivot:  Ind   Ambulation    4 side steps with FWW ModA  10 feet with AAD Charito   Stair negotiation: ascended and descended  NT  4 steps with 2 rail Charito   ROM BUE:  See OT note. BLE:  WFL     Strength BUE:  See OT note. BLE:  Grossly WFL     Balance Sitting EOB:  SBA  Dynamic Standing:  FWW ModA  Sitting EOB:  Ind  Dynamic Standing:  AAD Charito     Pt is A & O x 3. Pt oriented to person, place, and month. Sensation:  Pt denies numbness and tingling to extremities  Edema:  None noted. Vitals: SPO2 WNLs throughout session on room air. Patient education  Pt educated on role of PT and sequencing to facilitate safe and correct functional mobility. Patient response to education:   Pt verbalized understanding Pt demonstrated skill Pt requires further education in this area   yes yes reminders     ASSESSMENT:    Conditions Requiring Skilled Therapeutic Intervention:    [x]Decreased strength     [x]Decreased ROM  [x]Decreased functional mobility  [x]Decreased balance   [x]Decreased endurance   []Decreased posture  []Decreased sensation  []Decreased coordination   []Decreased vision  [x]Decreased safety awareness   []Increased pain       Comments:  Patient positioned sleeping in semi-supine upon entry and agreeable to PT evaluation. Patient required increased time to participate as patient demonstrated being lethargic throughout session. Patient demonstrated supine<>sitting on the EOB with light assist at trunk and B LEs. During sitting EOB, patient reported dizziness. Patient then performed sit<>stand with significant lift assist at the trunk and use of FWW. Patient also required verbal and tactile cues to facilitate correct movement and hand placement. Patient then demonstrated a few side steps to the [facility] with same assist as noted above. Patient positioned semi-supine at the end of the session with call light in reach and all needs met. Treatment:  Patient practiced and was instructed in the following treatment:    Bed mobility- pt required verbal cues to facilitate safe and correct functional mobility. Manual assist required to complete task.   STS- pt required verbal cues to facilitate safe and correct functional mobility. Manual assist and FWW required to complete task. Side steps- pt required verbal cues to facilitate safe and correct functional mobility. Manual assist and FWW required to complete task. Pt's/ family goals   1. None reported. Prognosis is good for reaching above PT goals. Patient and or family understand(s) diagnosis, prognosis, and plan of care. yes    PHYSICAL THERAPY PLAN OF CARE:    PT POC is established based on physician order and patient diagnosis     Referring provider/PT Order:    07/20/22 1030  PT eval and treat      April KERRI Dahl - CNP     Diagnosis:  Lactic acidosis [E87.2]  Chronic anticoagulation [Z79.01]  Fever, unspecified fever cause [O37.2]  Acute metabolic encephalopathy [E53.56]  AMS (altered mental status) [R41.82]  COVID-19 [U07.1]  Specific instructions for next treatment:  Increase endurance to upright and increase ambulation as tolerated by patient. Current Treatment Recommendations:     [x] Strengthening to improve independence with functional mobility   [x] ROM to improve independence with functional mobility   [x] Balance Training to improve static/dynamic balance and to reduce fall risk  [x] Endurance Training to improve activity tolerance during functional mobility   [x] Transfer Training to improve safety and independence with all functional transfers   [x] Gait Training to improve gait mechanics, endurance and assess need for appropriate assistive device  [x] Stair Training in preparation for safe discharge home and/or into the community   [] Positioning to prevent skin breakdown and contractures  [x] Safety and Education Training   [x] Patient/Caregiver Education   [] HEP  [x] Other     PT long term treatment goals are located in above grid    Frequency of treatments: 2-5x/week x 1-2 weeks.     Time in  0855  Time out  0920    Total Treatment Time  10 minutes     Evaluation Time includes thorough review of current medical information, gathering information on past medical history/social history and prior level of function, completion of standardized testing/informal observation of tasks, assessment of data and education on plan of care and goals.     CPT codes:  [x] Low Complexity PT evaluation 84686  [] Moderate Complexity PT evaluation 89166  [] High Complexity PT evaluation 88456  [] PT Re-evaluation 63579  [] Gait training 51334 - minutes  [] Manual therapy 90158 - minutes  [x] Therapeutic activities 59329 10 minutes  [] Therapeutic exercises 60278 - minutes  [] Neuromuscular reeducation 44671 - minutes     María Garcia, Emeterio Polk [phone]

## 2022-07-22 NOTE — FLOWSHEET NOTE
07/22/22 0715   Vitals   /62   Temp 97.4 °F (36.3 °C)   Temp Source Temporal   Heart Rate 62   Resp 16   SpO2 99 %   Weight 144 lb 2.9 oz (65.4 kg)   Peritoneal Dialysis (CAPD manual)   Effluent Appearance Clear;Yellow   Cycler   Verification of Prescription CCPD   Total Volume Programmed 42776 mL   Therapy Time (Hours:Minutes) 8.5   Fill Volume 2300 mL   Last Fill Volume 1800 mL   Dextrose Setting Same (Nonextraneal)   Number of Cycles 5   Bag Volume 5000 mL   Number of Bags Used 3   Ultrafiltration (UF) (mL) -507 mL   Average Dwell Time (Hours:Minutes) :25   Pt disconnected from PD using aseptic technique. Abdomen nontender. No complications noted. PD effluent clear, yellow. UF -507ml. Report to primary nurse given.

## 2022-07-22 NOTE — PLAN OF CARE
Problem: Chronic Conditions and Co-morbidities  Goal: Patient's chronic conditions and co-morbidity symptoms are monitored and maintained or improved  7/22/2022 0106 by Sergo Rosales RN  Outcome: Progressing  7/22/2022 0102 by Sergo Rosales RN  Outcome: Progressing  Flowsheets (Taken 7/21/2022 2100)  Care Plan - Patient's Chronic Conditions and Co-Morbidity Symptoms are Monitored and Maintained or Improved: Monitor and assess patient's chronic conditions and comorbid symptoms for stability, deterioration, or improvement

## 2022-07-22 NOTE — PROGRESS NOTES
ID consult called in to Dr. Daniel De La Rosa on call for DR CONTRERAS [facility]

## 2022-07-22 NOTE — PROGRESS NOTES
[facility] MD Elmo Ramos MD Ed Mounts, MD Zachary Becket, QIANA Cantu, DELILAH  Progress Note  7/22/2022    SUBJECTIVE:  We are following this patient for end-stage renal failure . 7/21: Feeling much better. Appetite and intake have improved somewhat. Denies dyspnea at rest on room air. CCPD last night without complications. No abdominal pain or cramping, nausea or vomiting. Peritoneal effluent was clear this morning.    7/22: Intermittent nausea, some anorexia but no abdominal pain no vomiting, no diarrhea or constipation. No dysuria or hematuria. PROBLEM LIST:    Patient Active Problem List   Diagnosis    Peritoneal dialysis catheter infection ([address]    Sepsis ([address]    SIRS (systemic inflammatory response syndrome) ([address]    Type 2 diabetes mellitus with diabetic chronic kidney disease ([address]    Osteoarthritis of right knee    Arthritis of knee, right    History of peritoneal dialysis    End stage renal disease (HCC)    Altered mental status    Acute delirium    Essential hypertension, benign    ESRD on peritoneal dialysis ([address]    Type 2 diabetes mellitus, with long-term current use of insulin (HCC)    Primary hypertension    Hypomagnesemia    Hypokalemia    Obesity (BMI 30-39. 9)    TIA (transient ischemic attack)    Hyperlipidemia    AMS (altered mental status)    Diabetes mellitus (HCC)    Lactic acid acidosis    Leukocytosis        PAST MEDICAL HISTORY:    Past Medical History:   Diagnosis Date    Anemia     Arthritis     CAD (coronary artery disease)     Diabetes mellitus ([address]     Gout     History of bleeding peptic ulcer approx 2015    Hypertension     Peritoneal dialysis catheter in place [facility])     Peritoneal dialysis status ([address]     at night for 8 hours    Thyroid disease     Use of cane as ambulatory aid     Wears glasses        MEDS (scheduled):   potassium chloride  40 mEq Oral TID WC    sodium chloride flush  5-40 mL IntraVENous 2 times per day    magnesium sulfate  2,000 mg IntraVENous Once    zinc sulfate  50 mg Oral Daily    aspirin  81 mg Oral Daily    atorvastatin  20 mg Oral Nightly    DULoxetine  30 mg Oral Daily    insulin glargine-yfgn  10 Units SubCUTAneous BID    isosorbide mononitrate  30 mg Oral Daily    levothyroxine  50 mcg Oral Daily    apixaban  2.5 mg Oral BID    pantoprazole  40 mg Oral QAM AC    amLODIPine  5 mg Oral Daily    metoprolol tartrate  50 mg Oral Daily       MEDS (infusions):   sodium chloride      dextrose         MEDS (prn):  sodium chloride flush, sodium chloride, glucose, dextrose bolus **OR** dextrose bolus, glucagon (rDNA), dextrose, trimethobenzamide, albuterol sulfate HFA, guaiFENesin, dextrose    DIET:    ADULT DIET; Regular; 4 carb choices (60 gm/meal); Low Fat/Low Chol/High Fiber/2 gm Na; No Added Salt (3-4 gm); Low Potassium (Less than 3000 mg/day);  Low Phosphorus (Less than 1000 mg)  ADULT ORAL NUTRITION SUPPLEMENT; Breakfast; Renal Oral Supplement      PHYSICAL EXAM:      Patient Vitals for the past 24 hrs:   BP Temp Temp src Pulse Resp SpO2 Weight   07/22/22 1437 (!) 119/57 97.6 °F (36.4 °C) Oral 54 16 99 % --   07/22/22 1300 (!) 155/64 97.5 °F (36.4 °C) Oral (!) 46 16 99 % --   07/22/22 1000 134/64 97.7 °F (36.5 °C) Oral 59 16 100 % --   07/22/22 0715 110/62 97.4 °F (36.3 °C) Temporal 62 16 99 % 144 lb 2.9 oz (65.4 kg)   07/22/22 0400 (!) 108/50 97.1 °F (36.2 °C) Oral 60 16 -- --   07/22/22 0000 (!) 122/40 (!) 96.4 °F (35.8 °C) Oral 54 16 -- --   07/21/22 2100 (!) 90/45 (!) 96 °F (35.6 °C) Oral 64 16 -- --   07/21/22 2000 (!) 90/45 (!) 96 °F (35.6 °C) Oral 64 16 100 % --          Intake/Output Summary (Last 24 hours) at 7/22/2022 1630  Last data filed at 7/22/2022 1012  Gross per 24 hour   Intake 370 ml   Output -507 ml   Net 877 ml       Wt Readings from Last 3 Encounters:   07/22/22 144 lb 2.9 oz (65.4 kg)   04/29/22 158 lb 8.2 oz (71.9 kg)   06/18/19 169 lb 5 oz (76.8 kg)       General:  in no acute distress  HEENT: NC/AT, EOMI, sclera and conjunctiva are clear and anicteric. Mucous membranes moist.  Cardiovascular: regular rate and rhythm, no murmurs, gallops, or rubs  Respiratory:  Clear, no rales, rhochi, or wheezes  Gastrointestinal: soft, nontender, nondistended, NABS  Ext: no cyanosis, clubbing, or edema bilateral lower extremities  Neuro: awake, alert, oriented x3. Moves all 4 extremities. Cranial nerves II through XII grossly intact. Skin: dry, no rash      DATA:      Recent Labs     07/20/22  1023 07/21/22  1205 07/22/22  0454   WBC 10.9 13.5* 11.7*   HGB 11.2* 12.4 10.9*   HCT 34.1 38.8 32.8*   MCV 88.8 92.4 89.1    241 211     Recent Labs     07/20/22  1023 07/21/22  1205 07/22/22  0454   * 135 132   K 4.4 3.6 2.5*   CL 90* 91* 91*   CO2 28 22 27   BUN 50* 44* 35*   CREATININE 9.5* 8.6* 7.8*       Iron studies:  Lab Results   Component Value Date    FERRITIN 1,217 07/20/2022     Bone disease:  Lab Results   Component Value Date    MG 1.0 (L) 07/22/2022    PHOS 3.1 07/22/2022       DIALYSIS ORDERS:  Reviewed      Assessment  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 No sign or symptom of peritonitis. No leukocytosis and still might consider UTI, though has no dysuria or hematuria either. Hyponatremia, due to water overload.   Improving with ultrafiltration     Mental status improved  No complications CCPD therapy    Recommendations  Continue CCPD for solute and volume clearance as per outpatient orders and dry weight  Will  use 2.5% dextrose solutions for this evening exchanges  Follow labs  Okay for discharge from strictly renal standpoint    Jaleesa Mancuso MD, MD  7/22/2022

## 2022-07-22 NOTE — FLOWSHEET NOTE
07/22/22 1730   Cycler   Verification of Prescription CCPD   Total Volume Programmed 77308 mL   Therapy Time (Hours:Minutes) 8.5   Fill Volume 2800 mL   Last Fill Volume 1800 mL   Number of Cycles 5   Bag Volume 5000 mL   Number of Bags Used 3   Dianeal Solution   (Dextrose 2.5% in 5000 mL)   CCPD initiated, catheter accessed, drsg changed, aseptic technique, draining cl yellow effluent, tolerating well, report to RN.

## 2022-07-22 NOTE — CONSULTS
Department of Internal Medicine  Infectious Diseases   Consult Note      Reason for Consult: Bacteremia     Requesting Physician:  Dr Wolfe Milder:       This is an 80 yrs old female with hx of ESRD on PD, CAD,  DM, Gout, HTN presented to the ER with change in mental status , not feeling well . She reported nausea, denied abdomen pain, diarrhea .  She had fever of 101.9 K,   WBC was 14.8 K   CT abdomen and pelvis showed liver cirrhosis, and GB hydrops   Blood cx - Staph species   SARS CoV 2 +ve   Pt's not hypoxic          Past Medical History:      Past Medical History:   Diagnosis Date    Anemia     Arthritis     CAD (coronary artery disease)     Diabetes mellitus (HCC)     Gout     History of bleeding peptic ulcer approx     Hypertension     Peritoneal dialysis catheter in place [facility])     Peritoneal dialysis status ([address]     at night for 8 hours    Thyroid disease     Use of cane as ambulatory aid     Wears glasses        Past Surgical History:      Past Surgical History:   Procedure Laterality Date    BACK SURGERY      CARDIOVASCULAR STRESS TEST      CARPAL TUNNEL RELEASE Bilateral     CATARACT REMOVAL WITH IMPLANT Bilateral      SECTION      CORONARY ARTERY BYPASS GRAFT  approx 2000    x 3; per Dr Sarah Santos, [place], [address] [facility] [address]      left knee, right shoulder    [facility] TOTAL KNEE ARTHROPLASTY Right 10/31/2018    RIGHT KNEE TOTAL ARTHROPLASTY +++BRIDGER++ PNB++ performed by Claudia Gomez MD at [address] ENDOSCOPY           Current Medications:      Current Facility-Administered Medications   Medication Dose Route Frequency Provider Last Rate Last Admin    potassium chloride (KLOR-CON M) extended release tablet 40 mEq  40 mEq Oral TID WC Ashley Spring MD   40 mEq at 22 1012    sodium chloride flush 0.9 % injection 5-40 mL  5-40 mL IntraVENous 2 times per day Ashley Spring MD   10 mL at 22 1013    sodium chloride flush 0.9 % injection 5-40 mL  5-40 mL IntraVENous PRN Humera Perez MD        0.9 % sodium chloride infusion   IntraVENous PRN Humera Perez MD        glucose chewable tablet 16 g  4 tablet Oral PRN April Lataus, APRN - CNP        dextrose bolus 10% 125 mL  125 mL IntraVENous PRN April Lataus, APRN - CNP        Or    dextrose bolus 10% 250 mL  250 mL IntraVENous PRN April Storey-Kevin, APRN - CNP        glucagon (rDNA) injection 1 mg  1 mg SubCUTAneous PRN April Storey-Cornelius, APRN - CNP        dextrose 10 % infusion   IntraVENous Continuous PRN April Sanborn-Kevin, APRN - CNP        trimethobenzamide Leory Lipps) injection 200 mg  200 mg IntraMUSCular Q6H PRN April Champ-[place], APRN - CNP   200 mg at 07/20/22 1259    zinc sulfate (ZINCATE) capsule 50 mg  50 mg Oral Daily April Storey-Cornelius, APRN - CNP   50 mg at 07/22/22 1013    albuterol sulfate HFA (PROVENTIL;VENTOLIN;PROAIR) 108 (90 Base) MCG/ACT inhaler 2 puff  2 puff Inhalation Q6H PRN April Sanborn-Kevin, APRN - CNP        guaiFENesin (ROBITUSSIN) 100 MG/5ML oral solution 200 mg  200 mg Oral Q6H PRN April Champ-[place], APRN - CNP        aspirin chewable tablet 81 mg  81 mg Oral Daily April Storey-Kevin, APRN - CNP   81 mg at 07/22/22 1012    atorvastatin (LIPITOR) tablet 20 mg  20 mg Oral Nightly April Storey-Kevin, APRN - CNP   20 mg at 07/21/22 2111    DULoxetine (CYMBALTA) extended release capsule 30 mg  30 mg Oral Daily April Storey-Cornelius, APRN - CNP   30 mg at 07/22/22 1013    insulin glargine-[facility]) injection vial 10 Units  10 Units SubCUTAneous BID April Storey-KERRI Brown - CNP   10 Units at 07/22/22 1013    isosorbide mononitrate (IMDUR) extended release tablet 30 mg  30 mg Oral Daily April Storey-Kevin, APRN - CNP   30 mg at 07/22/22 1012    levothyroxine (SYNTHROID) tablet 50 mcg  50 mcg Oral Daily April KERRI Dahl - CNP   50 mcg at 07/22/22 5436    apixaban Ellender Layton) tablet 2.5 mg  2.5 mg Oral BID April StoreyVazquez, APRN - CNP   2.5 mg at 07/22/22 1012    dextrose 50 % IV solution  12.5 g IntraVENous PRN April LatimerCintiaus, APRN - CNP        pantoprazole (PROTONIX) tablet 40 mg  40 mg Oral QAM AC April LatimerCintiaus, APRN - CNP   40 mg at 07/22/22 5144    amLODIPine (NORVASC) tablet 5 mg  5 mg Oral Daily Smooth Candido Pierini, DO   5 mg at 07/22/22 1012    metoprolol tartrate (LOPRESSOR) tablet 50 mg  50 mg Oral Daily Smooth Candido Pierini, DO   50 mg at 07/22/22 1016       Allergies:  Sulfa antibiotics    Social History:      Social History     Socioeconomic History    Marital status:      Spouse name: Not on file    Number of children: Not on file    Years of education: Not on file    Highest education level: Not on file   Occupational History    Not on file   Tobacco Use    Smoking status: Never    Smokeless tobacco: Never   Vaping Use    Vaping Use: Never used   Substance and Sexual Activity    Alcohol use: No    Drug use: No    Sexual activity: Not on file   Other Topics Concern    Not on file   Social History Narrative    Not on file     Social Determinants of Health     Financial Resource Strain: Not on file   Food Insecurity: Not on file   Transportation Needs: Not on file   Physical Activity: Not on file   Stress: Not on file   Social Connections: Not on file   Intimate Partner Violence: Not on file   Housing Stability: Not on file         Family History:  Not pertinent to present illness       REVIEW OF SYSTEMS:    CONSTITUTIONAL:  Denies fever, chill or rigors, nausea or vomiting. HEENT: denies blurring of vision or double vision, denies hearing problem  RESPIRATORY: denies cough, shortness of breath, sputum expectoration, chest pain. CARDIOVASCULAR:  Denies palpitation  GASTROINTESTINAL:  Nausea   INTEGUMENT: denies wound , rash  HEMATOLOGIC/LYMPHATIC:  Denies lymph node swelling, gum bleeding or easy bruising.   MUSCULOSKELETAL:  Denies leg pain , joint pain , joint swelling  NEUROLOGICAL:  mental status change . PHYSICAL EXAM:      Vitals:     Vitals:    07/22/22 1300   BP: (!) 155/64   Pulse: (!) 46   Resp: 16   Temp: 97.5 °F (36.4 °C)   SpO2: 99%       General Appearance:    Awake, alert , no acute distress. Head:    Normocephalic, atraumatic   Eyes:    No pallor, no icterus,   Ears:    No obvious deformity or drainage.    Nose:   No nasal drainage   Throat:   Mucosa moist, no oral thrush   Neck:   Supple, no lymphadenopathy   Lungs:     Clear to auscultation bilaterally, no wheeze    Heart:    Regular rate and rhythm, systolic murmur    Abdomen:     Soft, non-tender, bowel sounds present, PD catheter in placer    Extremities:   No edema, no cyanosis    Pulses:   Dorsalis pedis palpable    Skin:   no rashes        DATA:      CBC with Differential:      Lab Results   Component Value Date/Time    WBC 11.7 07/22/2022 04:54 AM    RBC 3.68 07/22/2022 04:54 AM    HGB 10.9 07/22/2022 04:54 AM    HCT 32.8 07/22/2022 04:54 AM     07/22/2022 04:54 AM    MCV 89.1 07/22/2022 04:54 AM    MCH 29.6 07/22/2022 04:54 AM    MCHC 33.2 07/22/2022 04:54 AM    RDW 13.6 07/22/2022 04:54 AM    BANDSPCT 2 12/19/2015 04:23 PM    METASPCT 1 12/19/2015 04:23 PM    LYMPHOPCT 19.4 07/22/2022 04:54 AM    MONOPCT 5.7 07/22/2022 04:54 AM    MYELOPCT 1 12/19/2015 04:23 PM    BASOPCT 0.3 07/22/2022 04:54 AM    MONOSABS 0.67 07/22/2022 04:54 AM    LYMPHSABS 2.27 07/22/2022 04:54 AM    EOSABS 0.43 07/22/2022 04:54 AM    BASOSABS 0.04 07/22/2022 04:54 AM       CMP     Lab Results   Component Value Date/Time     07/22/2022 04:54 AM    K 2.5 07/22/2022 04:54 AM    K 4.4 07/20/2022 10:23 AM    CL 91 07/22/2022 04:54 AM    CO2 27 07/22/2022 04:54 AM    BUN 35 07/22/2022 04:54 AM    CREATININE 7.8 07/22/2022 04:54 AM    GFRAA 6 07/22/2022 04:54 AM    LABGLOM 5 07/22/2022 04:54 AM    GLUCOSE 128 07/22/2022 04:54 AM    PROT 5.7 07/22/2022 04:54 AM    LABALBU 2.6 07/22/2022 04:54 AM    CALCIUM 7.8 07/22/2022 04:54 AM    BILITOT 0.3 07/22/2022 04:54 AM    ALKPHOS 106 07/22/2022 04:54 AM    AST 18 07/22/2022 04:54 AM    ALT 14 07/22/2022 04:54 AM         Hepatic Function Panel:    Lab Results   Component Value Date/Time    ALKPHOS 106 07/22/2022 04:54 AM    ALT 14 07/22/2022 04:54 AM    AST 18 07/22/2022 04:54 AM    PROT 5.7 07/22/2022 04:54 AM    BILITOT 0.3 07/22/2022 04:54 AM    LABALBU 2.6 07/22/2022 04:54 AM       PT/INR:    Lab Results   Component Value Date/Time    PROTIME 13.6 04/27/2022 09:33 PM    INR 1.3 04/27/2022 09:33 PM       TSH:  No results found for: TSH    U/A:    Lab Results   Component Value Date/Time    COLORU DARK YELLOW 06/13/2019 10:55 AM    PHUR 8.5 06/13/2019 10:55 AM    CLARITYU CLOUDY 06/13/2019 10:55 AM    SPECGRAV 1.015 06/13/2019 10:55 AM    LEUKOCYTESUR MODERATE 06/13/2019 10:55 AM    UROBILINOGEN 0.2 06/13/2019 10:55 AM    BILIRUBINUR Negative 06/13/2019 10:55 AM    BLOODU LARGE 06/13/2019 10:55 AM    GLUCOSEU 250 06/13/2019 10:55 AM       ABG:  No results found for: UIY4ERF, BEART, M8SKVXBZ, PHART, THGBART, AHZ8INN, PO2ART, TMC7WSR    MICROBIOLOGY:    Blood culture -    , Blood 2  Abnormal   Gram stain performed from blood culture bottle media   Gram positive cocci   P      Organism Staphylococcus species Abnormal  P    Culture, Blood 2 Identification and sensitivity to follow   Evaluating for Staph lugdenensis  P      Resulting Agency [address] [place] [address] Lab        Specimen: Nasopharyngeal Swab Updated: 07/20/22 0033    SARS-CoV-2, NAAT DETECTED Abnormal            Radiology :    CT abdomen and pelvis -  Gallstones and borderline gallbladder hydrops. Consider correlation with   HIDA scan. Hepatic cirrhosis with mesenteric edema as well as abdominopelvic ascites. The presence of ascites is nonspecific in the setting of peritoneal dialysis.        IMPRESSION:     Staph species bacteremia   COVID 19 infection ( vaccinated, not boosted, no hypoxia )   3. Fever, leukocytosis , mental status change - improved       RECOMMENDATIONS:       Await final blood cx   Paxlovid -can't be given   Will ask pharmacy if molnupiravir is available   4.  Drop let plus isolation       Thank you Dr Jacinto Clements for the consult

## 2022-07-23 LAB
ALBUMIN SERPL-MCNC: 2.5 G/DL (ref 3.5–5.2)
ALP BLD-CCNC: 106 U/L (ref 35–104)
ALT SERPL-CCNC: 14 U/L (ref 0–32)
ANION GAP SERPL CALCULATED.3IONS-SCNC: 15 MMOL/L (ref 7–16)
AST SERPL-CCNC: 16 U/L (ref 0–31)
BASOPHILS ABSOLUTE: 0.05 E9/L (ref 0–0.2)
BASOPHILS RELATIVE PERCENT: 0.4 % (ref 0–2)
BILIRUB SERPL-MCNC: 0.2 MG/DL (ref 0–1.2)
BUN BLDV-MCNC: 30 MG/DL (ref 6–23)
CALCIUM SERPL-MCNC: 8 MG/DL (ref 8.6–10.2)
CHLORIDE BLD-SCNC: 96 MMOL/L (ref 98–107)
CO2: 25 MMOL/L (ref 22–29)
CREAT SERPL-MCNC: 7.4 MG/DL (ref 0.5–1)
EOSINOPHILS ABSOLUTE: 0.53 E9/L (ref 0.05–0.5)
EOSINOPHILS RELATIVE PERCENT: 4.2 % (ref 0–6)
GFR AFRICAN AMERICAN: 6
GFR NON-AFRICAN AMERICAN: 5 ML/MIN/1.73
GLUCOSE BLD-MCNC: 132 MG/DL (ref 74–99)
HCT VFR BLD CALC: 32 % (ref 34–48)
HEMOGLOBIN: 10.9 G/DL (ref 11.5–15.5)
IMMATURE GRANULOCYTES #: 0.12 E9/L
IMMATURE GRANULOCYTES %: 1 % (ref 0–5)
LYMPHOCYTES ABSOLUTE: 1.95 E9/L (ref 1.5–4)
LYMPHOCYTES RELATIVE PERCENT: 15.6 % (ref 20–42)
MCH RBC QN AUTO: 29.6 PG (ref 26–35)
MCHC RBC AUTO-ENTMCNC: 34.1 % (ref 32–34.5)
MCV RBC AUTO: 87 FL (ref 80–99.9)
METER GLUCOSE: 101 MG/DL (ref 74–99)
METER GLUCOSE: 109 MG/DL (ref 74–99)
METER GLUCOSE: 113 MG/DL (ref 74–99)
MONOCYTES ABSOLUTE: 0.62 E9/L (ref 0.1–0.95)
MONOCYTES RELATIVE PERCENT: 5 % (ref 2–12)
NEUTROPHILS ABSOLUTE: 9.22 E9/L (ref 1.8–7.3)
NEUTROPHILS RELATIVE PERCENT: 73.8 % (ref 43–80)
PDW BLD-RTO: 13.5 FL (ref 11.5–15)
PLATELET # BLD: 236 E9/L (ref 130–450)
PMV BLD AUTO: 11.2 FL (ref 7–12)
POTASSIUM SERPL-SCNC: 4.1 MMOL/L (ref 3.5–5)
RBC # BLD: 3.68 E12/L (ref 3.5–5.5)
SODIUM BLD-SCNC: 136 MMOL/L (ref 132–146)
TOTAL PROTEIN: 5.6 G/DL (ref 6.4–8.3)
WBC # BLD: 12.5 E9/L (ref 4.5–11.5)

## 2022-07-23 PROCEDURE — 85025 COMPLETE CBC W/AUTO DIFF WBC: CPT

## 2022-07-23 PROCEDURE — 90945 DIALYSIS ONE EVALUATION: CPT

## 2022-07-23 PROCEDURE — 87040 BLOOD CULTURE FOR BACTERIA: CPT

## 2022-07-23 PROCEDURE — 6370000000 HC RX 637 (ALT 250 FOR IP): Performed by: NURSE PRACTITIONER

## 2022-07-23 PROCEDURE — 6370000000 HC RX 637 (ALT 250 FOR IP): Performed by: INTERNAL MEDICINE

## 2022-07-23 PROCEDURE — S5553 INSULIN LONG ACTING 5 U: HCPCS | Performed by: NURSE PRACTITIONER

## 2022-07-23 PROCEDURE — 2580000003 HC RX 258: Performed by: INTERNAL MEDICINE

## 2022-07-23 PROCEDURE — 2140000000 HC CCU INTERMEDIATE R&B

## 2022-07-23 PROCEDURE — 82962 GLUCOSE BLOOD TEST: CPT

## 2022-07-23 PROCEDURE — 36415 COLL VENOUS BLD VENIPUNCTURE: CPT

## 2022-07-23 PROCEDURE — 80053 COMPREHEN METABOLIC PANEL: CPT

## 2022-07-23 RX ADMIN — ASPIRIN 81 MG CHEWABLE TABLET 81 MG: 81 TABLET CHEWABLE at 09:08

## 2022-07-23 RX ADMIN — AMLODIPINE BESYLATE 5 MG: 5 TABLET ORAL at 09:08

## 2022-07-23 RX ADMIN — Medication 10 ML: at 09:08

## 2022-07-23 RX ADMIN — APIXABAN 2.5 MG: 2.5 TABLET, FILM COATED ORAL at 09:08

## 2022-07-23 RX ADMIN — APIXABAN 2.5 MG: 2.5 TABLET, FILM COATED ORAL at 22:10

## 2022-07-23 RX ADMIN — METOPROLOL TARTRATE 50 MG: 50 TABLET, FILM COATED ORAL at 09:07

## 2022-07-23 RX ADMIN — Medication 50 MG: at 09:08

## 2022-07-23 RX ADMIN — INSULIN GLARGINE-YFGN 10 UNITS: 100 INJECTION, SOLUTION SUBCUTANEOUS at 08:45

## 2022-07-23 RX ADMIN — Medication 10 ML: at 22:11

## 2022-07-23 RX ADMIN — PANTOPRAZOLE SODIUM 40 MG: 40 TABLET, DELAYED RELEASE ORAL at 07:02

## 2022-07-23 RX ADMIN — ATORVASTATIN CALCIUM 20 MG: 20 TABLET, FILM COATED ORAL at 22:10

## 2022-07-23 RX ADMIN — ISOSORBIDE MONONITRATE 30 MG: 30 TABLET, EXTENDED RELEASE ORAL at 09:07

## 2022-07-23 RX ADMIN — DULOXETINE HYDROCHLORIDE 30 MG: 30 CAPSULE, DELAYED RELEASE ORAL at 09:08

## 2022-07-23 RX ADMIN — INSULIN GLARGINE-YFGN 10 UNITS: 100 INJECTION, SOLUTION SUBCUTANEOUS at 22:11

## 2022-07-23 RX ADMIN — LEVOTHYROXINE SODIUM 50 MCG: 0.05 TABLET ORAL at 06:32

## 2022-07-23 ASSESSMENT — PAIN SCALES - GENERAL
PAINLEVEL_OUTOF10: 0

## 2022-07-23 NOTE — PROGRESS NOTES
[facility] MD Sherry Gimenez MD Jamel Abide, MD Suzzanna Pepper, QIANA Cantu, DELILAH  Progress Note  7/23/2022    SUBJECTIVE:  We are following this patient for end-stage renal failure . 7/21: Feeling much better. Appetite and intake have improved somewhat. Denies dyspnea at rest on room air. CCPD last night without complications. No abdominal pain or cramping, nausea or vomiting. Peritoneal effluent was clear this morning.    7/22: Intermittent nausea, some anorexia but no abdominal pain no vomiting, no diarrhea or constipation. No dysuria or hematuria. 7/23: stable vitals    PROBLEM LIST:    Patient Active Problem List   Diagnosis    Peritoneal dialysis catheter infection ([address]    Sepsis ([address]    SIRS (systemic inflammatory response syndrome) ([address]    Type 2 diabetes mellitus with diabetic chronic kidney disease ([address]    Osteoarthritis of right knee    Arthritis of knee, right    History of peritoneal dialysis    End stage renal disease (HCC)    Altered mental status    Acute delirium    Essential hypertension, benign    ESRD on peritoneal dialysis ([address]    Type 2 diabetes mellitus, with long-term current use of insulin (HCC)    Primary hypertension    Hypomagnesemia    Hypokalemia    Obesity (BMI 30-39. 9)    TIA (transient ischemic attack)    Hyperlipidemia    AMS (altered mental status)    Diabetes mellitus (HCC)    Lactic acid acidosis    Leukocytosis        PAST MEDICAL HISTORY:    Past Medical History:   Diagnosis Date    Anemia     Arthritis     CAD (coronary artery disease)     Diabetes mellitus ([address]     Gout     History of bleeding peptic ulcer approx 2015    Hypertension     Peritoneal dialysis catheter in place [facility])     Peritoneal dialysis status ([address]     at night for 8 hours    Thyroid disease     Use of cane as ambulatory aid     Wears glasses        MEDS (scheduled):   sodium chloride flush  5-40 mL IntraVENous 2 times per day    zinc sulfate  50 mg Oral Daily    aspirin  81 mg Oral Daily    atorvastatin  20 mg Oral Nightly    DULoxetine  30 mg Oral Daily    insulin glargine-yfgn  10 Units SubCUTAneous BID    isosorbide mononitrate  30 mg Oral Daily    levothyroxine  50 mcg Oral Daily    apixaban  2.5 mg Oral BID    pantoprazole  40 mg Oral QAM AC    amLODIPine  5 mg Oral Daily    metoprolol tartrate  50 mg Oral Daily       MEDS (infusions):   sodium chloride      dextrose         MEDS (prn):  sodium chloride flush, sodium chloride, glucose, dextrose bolus **OR** dextrose bolus, glucagon (rDNA), dextrose, trimethobenzamide, albuterol sulfate HFA, guaiFENesin, dextrose    DIET:    ADULT DIET; Regular; 4 carb choices (60 gm/meal); Low Fat/Low Chol/High Fiber/2 gm Na; No Added Salt (3-4 gm); Low Potassium (Less than 3000 mg/day); Low Phosphorus (Less than 1000 mg)  ADULT ORAL NUTRITION SUPPLEMENT; Breakfast; Renal Oral Supplement      PHYSICAL EXAM:      Patient Vitals for the past 24 hrs:   BP Temp Temp src Pulse Resp SpO2 Weight   07/23/22 0900 (!) 133/99 97.5 °F (36.4 °C) Oral 66 18 99 % --   07/23/22 0715 116/60 97.1 °F (36.2 °C) Temporal 60 16 99 % 144 lb 2.9 oz (65.4 kg)   07/23/22 0430 (!) 118/55 (!) 96.5 °F (35.8 °C) Temporal 62 16 -- --   07/22/22 2345 (!) 116/58 97 °F (36.1 °C) Temporal 55 16 -- --   07/22/22 2045 (!) 115/40 97.3 °F (36.3 °C) Oral 58 16 -- --   07/22/22 1437 (!) 119/57 97.6 °F (36.4 °C) Oral 54 16 99 % --            Intake/Output Summary (Last 24 hours) at 7/23/2022 1316  Last data filed at 7/23/2022 0715  Gross per 24 hour   Intake 10 ml   Output -701 ml   Net 711 ml         Wt Readings from Last 3 Encounters:   07/23/22 144 lb 2.9 oz (65.4 kg)   04/29/22 158 lb 8.2 oz (71.9 kg)   06/18/19 169 lb 5 oz (76.8 kg)       General:  in no acute distress  HEENT: NC/AT, EOMI, sclera and conjunctiva are clear and anicteric.   Mucous membranes moist.  Cardiovascular: regular rate and rhythm, no murmurs, gallops, or rubs  Respiratory:  Clear, no rales, rhochi, or wheezes  Gastrointestinal: soft, nontender, nondistended, NABS  Ext: no cyanosis, clubbing, or edema bilateral lower extremities  Neuro: awake, alert, oriented x3. Moves all 4 extremities. Cranial nerves II through XII grossly intact. Skin: dry, no rash      DATA:      Recent Labs     07/21/22  1205 07/22/22  0454 07/23/22  0537   WBC 13.5* 11.7* 12.5*   HGB 12.4 10.9* 10.9*   HCT 38.8 32.8* 32.0*   MCV 92.4 89.1 87.0    211 236       Recent Labs     07/21/22  1205 07/22/22  0454 07/23/22  0537    132 136   K 3.6 2.5* 4.1   CL 91* 91* 96*   CO2 22 27 25   BUN 44* 35* 30*   CREATININE 8.6* 7.8* 7.4*         Iron studies:  Lab Results   Component Value Date    FERRITIN 1,217 07/20/2022     Bone disease:  Lab Results   Component Value Date    MG 1.0 (L) 07/22/2022    PHOS 3.1 07/22/2022       DIALYSIS ORDERS:  Reviewed      Assessment  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 No sign or symptom of peritonitis. No leukocytosis and still might consider UTI, though has no dysuria or hematuria either. Hyponatremia, due to water overload.   Improving with ultrafiltration  7    Staph hominis  bacteremia        COVID 19 infection ( vaccinated, not boosted, no hypoxia ) - non pneumonic manifestation - diarrhea , stool neg for C diff toxin      Mental status improved  No complications CCPD therapy    Recommendations  Continue CCPD for solute and volume clearance as per outpatient orders and dry weight  Will  use 2.5% dextrose solutions for this evening exchanges  Follow labs  Okay for discharge from strictly renal standpoint    Tania Gaming MD, MD  7/23/2022

## 2022-07-23 NOTE — PROGRESS NOTES
TOTAL ARTHROPLASTY +++BRIDGER++ PNB++ performed by Jackelyn Kent MD at [address] [facility] ENDOSCOPY         Medications Prior to Admission:    Medications Prior to Admission: B Complex-C-Folic Acid (DIALYVITE 312 PO), Take by mouth  omeprazole (PRILOSEC) 20 MG delayed release capsule, Take 20 mg by mouth in the morning. Cetirizine HCl (ZYRTEC PO), Take by mouth  insulin glargine (LANTUS) 100 UNIT/ML injection vial, Inject 18 Units into the skin 2 times daily  metoprolol tartrate (LOPRESSOR) 50 MG tablet, Take 50 mg by mouth in the morning. potassium chloride (KLOR-CON M) 20 MEQ extended release tablet, Take 20 mEq by mouth in the morning. amLODIPine (NORVASC) 5 MG tablet, Take 5 mg by mouth in the morning. aspirin 81 MG chewable tablet, Take 1 tablet by mouth daily  apixaban (ELIQUIS) 2.5 MG TABS tablet, Take 1 tablet by mouth 2 times daily  atorvastatin (LIPITOR) 20 MG tablet, Take 1 tablet by mouth nightly  DULoxetine (CYMBALTA) 30 MG extended release capsule, Take 30 mg by mouth in the morning. isosorbide mononitrate (IMDUR) 30 MG CR tablet, Take 30 mg by mouth in the morning. levothyroxine (SYNTHROID) 50 MCG tablet, Take 50 mcg by mouth Daily    Allergies:    Sulfa antibiotics    Social History:    reports that she has never smoked. She has never used smokeless tobacco. She reports that she does not drink alcohol and does not use drugs. Family History:   family history is not on file. REVIEW OF SYSTEMS:  As above in the HPI, otherwise negative    PHYSICAL EXAM:    Vitals:  BP (!) 94/47   Pulse 59   Temp 97.7 °F (36.5 °C) (Oral)   Resp 18   Ht 4' 11\" (1.499 m)   Wt 144 lb 2.9 oz (65.4 kg)   SpO2 97%   BMI 29.12 kg/m²     General:  Awake, alert, oriented X 3. Well developed, well nourished, well groomed. No apparent distress. HEENT:  Normocephalic, atraumatic. Pupils equal, round, reactive to light. No scleral icterus. No conjunctival injection. No nasal discharge.   Neck: Supple  Heart:  RRR, no murmurs, gallops, rubs  Lungs:  CTA bilaterally, bilat symmetrical expansion, no wheeze, rales, or rhonchi  Abdomen: Bowel sounds present, soft, nontender, no masses, no organomegaly, no peritoneal signs  Peritoneal dialysis catheter intact with clear fluid  Extremities:  No clubbing, cyanosis, or edema  Skin:  Warm and dry, no open lesions or rash  Neuro:  Cranial nerves 2-12 intact, no focal deficits  Breast: deferred  Rectal: deferred  Genitalia:  deferred    LABS:  Data reviewed      ASSESSMENT:    Leukocytosis and cholelithiasis/HIDA scan negative  Staphylococcal hominis bacteremia  Bradycardia from metoprolol  Transient disorientation  No signs of sepsis  End-stage kidney disease on peritoneal dialysis  Comorbidities present and past as listed below  Patient Active Problem List   Diagnosis    Peritoneal dialysis catheter infection ([address]    Sepsis ([address]    SIRS (systemic inflammatory response syndrome) ([address]    Type 2 diabetes mellitus with diabetic chronic kidney disease (HCC)    Osteoarthritis of right knee    Arthritis of knee, right    History of peritoneal dialysis    End stage renal disease (HCC)    Altered mental status    Acute delirium    Essential hypertension, benign    ESRD on peritoneal dialysis ([address]    Type 2 diabetes mellitus, with long-term current use of insulin ([address]    Primary hypertension    Hypomagnesemia    Hypokalemia    Obesity (BMI 30-39. 9)    TIA (transient ischemic attack)    Hyperlipidemia    AMS (altered mental status)    Diabetes mellitus (HCC)    Lactic acid acidosis    Leukocytosis         PLAN:  ID input appreciated  Replace potassium and magnesium  Hold metoprolol for today  Nephrology consult for resumption of peritoneal dialysis  Resume home medications  Sliding scale with insulin coverage  Monitor lactic acid and leukocytosis  No need for antibiotics at present  No need to treat COVID  Questions answered to their satisfaction      Trace Núñez, MD  3:35 PM  7/23/2022

## 2022-07-23 NOTE — PROGRESS NOTES
Department of Internal Medicine  Infectious Diseases   Progress  Note    C/C : CONS bacteremia         Current Facility-Administered Medications   Medication Dose Route Frequency Provider Last Rate Last Admin    sodium chloride flush 0.9 % injection 5-40 mL  5-40 mL IntraVENous 2 times per day Teena Kang MD   10 mL at 07/23/22 0908    sodium chloride flush 0.9 % injection 5-40 mL  5-40 mL IntraVENous PRN Teena Kang MD   10 mL at 07/22/22 1805    0.9 % sodium chloride infusion   IntraVENous PRN Teena Kang MD        glucose chewable tablet 16 g  4 tablet Oral PRN April Nabila, APRN - CNP        dextrose bolus 10% 125 mL  125 mL IntraVENous PRN April STEVE DahlN - CNP        Or    dextrose bolus 10% 250 mL  250 mL IntraVENous PRN April Nabila, APRN - CNP        glucagon (rDNA) injection 1 mg  1 mg SubCUTAneous PRN April Nabila, APRN - CNP        dextrose 10 % infusion   IntraVENous Continuous PRN April STEVE DahlN - CNP        trimethobenzamide Margarite Roch) injection 200 mg  200 mg IntraMUSCular Q6H PRN April Nabila, APRN - CNP   200 mg at 07/20/22 1259    zinc sulfate (ZINCATE) capsule 50 mg  50 mg Oral Daily April STEVE DahlN - CNP   50 mg at 07/23/22 0908    albuterol sulfate HFA (PROVENTIL;VENTOLIN;PROAIR) 108 (90 Base) MCG/ACT inhaler 2 puff  2 puff Inhalation Q6H PRN April STEVE DahlN - QIANA        guaiFENesin (ROBITUSSIN) 100 MG/5ML oral solution 200 mg  200 mg Oral Q6H PRN April Nabila, APRN - CNP        aspirin chewable tablet 81 mg  81 mg Oral Daily April STEVE DahlN - CNP   81 mg at 07/23/22 0908    atorvastatin (LIPITOR) tablet 20 mg  20 mg Oral Nightly April Nabila, APRN - CNP   20 mg at 07/22/22 2053    DULoxetine (CYMBALTA) extended release capsule 30 mg  30 mg Oral Daily April STEVE DahlN - CNP   30 mg at 07/23/22 0908    insulin glargine-yfgn (SEMGLEE-YFGN) injection vial rate and rhythm, systolic murmur    Abdomen:     Soft, non-tender, bowel sounds present, PD catheter in placer    Extremities:   No edema, no cyanosis    Pulses:   Dorsalis pedis palpable    Skin:   no rashes        DATA:      CBC with Differential:      Lab Results   Component Value Date/Time    WBC 12.5 07/23/2022 05:37 AM    RBC 3.68 07/23/2022 05:37 AM    HGB 10.9 07/23/2022 05:37 AM    HCT 32.0 07/23/2022 05:37 AM     07/23/2022 05:37 AM    MCV 87.0 07/23/2022 05:37 AM    MCH 29.6 07/23/2022 05:37 AM    MCHC 34.1 07/23/2022 05:37 AM    RDW 13.5 07/23/2022 05:37 AM    BANDSPCT 2 12/19/2015 04:23 PM    METASPCT 1 12/19/2015 04:23 PM    LYMPHOPCT 15.6 07/23/2022 05:37 AM    MONOPCT 5.0 07/23/2022 05:37 AM    MYELOPCT 1 12/19/2015 04:23 PM    BASOPCT 0.4 07/23/2022 05:37 AM    MONOSABS 0.62 07/23/2022 05:37 AM    LYMPHSABS 1.95 07/23/2022 05:37 AM    EOSABS 0.53 07/23/2022 05:37 AM    BASOSABS 0.05 07/23/2022 05:37 AM       CMP     Lab Results   Component Value Date/Time     07/23/2022 05:37 AM    K 4.1 07/23/2022 05:37 AM    K 4.4 07/20/2022 10:23 AM    CL 96 07/23/2022 05:37 AM    CO2 25 07/23/2022 05:37 AM    BUN 30 07/23/2022 05:37 AM    CREATININE 7.4 07/23/2022 05:37 AM    GFRAA 6 07/23/2022 05:37 AM    LABGLOM 5 07/23/2022 05:37 AM    GLUCOSE 132 07/23/2022 05:37 AM    PROT 5.6 07/23/2022 05:37 AM    LABALBU 2.5 07/23/2022 05:37 AM    CALCIUM 8.0 07/23/2022 05:37 AM    BILITOT 0.2 07/23/2022 05:37 AM    ALKPHOS 106 07/23/2022 05:37 AM    AST 16 07/23/2022 05:37 AM    ALT 14 07/23/2022 05:37 AM         Hepatic Function Panel:    Lab Results   Component Value Date/Time    ALKPHOS 106 07/23/2022 05:37 AM    ALT 14 07/23/2022 05:37 AM    AST 16 07/23/2022 05:37 AM    PROT 5.6 07/23/2022 05:37 AM    BILITOT 0.2 07/23/2022 05:37 AM    LABALBU 2.5 07/23/2022 05:37 AM       PT/INR:    Lab Results   Component Value Date/Time    PROTIME 13.6 04/27/2022 09:33 PM    INR 1.3 04/27/2022 09:33 PM       TSH: No results found for: TSH    U/A:    Lab Results   Component Value Date/Time    COLORU DARK YELLOW 06/13/2019 10:55 AM    PHUR 8.5 06/13/2019 10:55 AM    CLARITYU CLOUDY 06/13/2019 10:55 AM    SPECGRAV 1.015 06/13/2019 10:55 AM    LEUKOCYTESUR MODERATE 06/13/2019 10:55 AM    UROBILINOGEN 0.2 06/13/2019 10:55 AM    BILIRUBINUR Negative 06/13/2019 10:55 AM    BLOODU LARGE 06/13/2019 10:55 AM    GLUCOSEU 250 06/13/2019 10:55 AM       ABG:  No results found for: IAR0UZF, BEART, W4TQLAEA, PHART, THGBART, YMO2GON, PO2ART, PLZ2WVA    MICROBIOLOGY:    Blood culture -    , Blood 2  Abnormal   Gram stain performed from blood culture bottle media   Gram positive cocci   P      Organism Staphylococcus species Abnormal  P    Culture, Blood 2 Identification and sensitivity to follow   Evaluating for Staph lugdenensis  P      Resulting Agency [address] [place] [address] Lab        Specimen: Nasopharyngeal Swab Updated: 07/20/22 0033    SARS-CoV-2, NAAT DETECTED Abnormal            Radiology :    CT abdomen and pelvis -  Gallstones and borderline gallbladder hydrops. Consider correlation with   HIDA scan. Hepatic cirrhosis with mesenteric edema as well as abdominopelvic ascites. The presence of ascites is nonspecific in the setting of peritoneal dialysis. IMPRESSION:     Staph hominis  bacteremia   COVID 19 infection ( vaccinated, not boosted, no hypoxia ) - non pneumonic manifestation - diarrhea , stool neg for C diff toxin   3. Fever, leukocytosis , mental status change - improved       RECOMMENDATIONS:       Follow up cx   Paxlovid -can't be given   Molnupiravir is not available   4. Drop let plus isolation   5.   Monitor resp status

## 2022-07-23 NOTE — FLOWSHEET NOTE
07/23/22 0715   Vitals   /60   Temp 97.1 °F (36.2 °C)   Temp Source Temporal   Heart Rate 60   Resp 16   SpO2 99 %   Weight 144 lb 2.9 oz (65.4 kg)   Peritoneal Dialysis (CAPD manual)   Effluent Appearance Clear;Yellow   Cycler   Verification of Prescription CCPD   Total Volume Programmed 32575 mL   Therapy Time (Hours:Minutes) 8.5   Fill Volume 2800 mL   Last Fill Volume 1800 mL   Dextrose Setting Same (Nonextraneal)   Number of Cycles 5   Bag Volume 5000 mL   Number of Bags Used 3   Ultrafiltration (UF) (mL) -701 mL   Average Dwell Time (Hours:Minutes) :24   Pt disconnected from PD using strict aseptic technique. PD effluent clear, yellow. UF -701ml. Abdomen nontender. No complications noted. Report to primary RN given.

## 2022-07-24 LAB
ALBUMIN SERPL-MCNC: 2.5 G/DL (ref 3.5–5.2)
ALP BLD-CCNC: 125 U/L (ref 35–104)
ALT SERPL-CCNC: 16 U/L (ref 0–32)
ANION GAP SERPL CALCULATED.3IONS-SCNC: 15 MMOL/L (ref 7–16)
AST SERPL-CCNC: 17 U/L (ref 0–31)
BASOPHILS ABSOLUTE: 0.07 E9/L (ref 0–0.2)
BASOPHILS RELATIVE PERCENT: 0.4 % (ref 0–2)
BILIRUB SERPL-MCNC: 0.3 MG/DL (ref 0–1.2)
BUN BLDV-MCNC: 28 MG/DL (ref 6–23)
CALCIUM SERPL-MCNC: 8.8 MG/DL (ref 8.6–10.2)
CHLORIDE BLD-SCNC: 94 MMOL/L (ref 98–107)
CO2: 25 MMOL/L (ref 22–29)
CREAT SERPL-MCNC: 6.9 MG/DL (ref 0.5–1)
EOSINOPHILS ABSOLUTE: 0.57 E9/L (ref 0.05–0.5)
EOSINOPHILS RELATIVE PERCENT: 3.6 % (ref 0–6)
GFR AFRICAN AMERICAN: 7
GFR NON-AFRICAN AMERICAN: 6 ML/MIN/1.73
GLUCOSE BLD-MCNC: 134 MG/DL (ref 74–99)
HCT VFR BLD CALC: 36 % (ref 34–48)
HEMOGLOBIN: 12.1 G/DL (ref 11.5–15.5)
IMMATURE GRANULOCYTES #: 0.23 E9/L
IMMATURE GRANULOCYTES %: 1.4 % (ref 0–5)
LYMPHOCYTES ABSOLUTE: 1.96 E9/L (ref 1.5–4)
LYMPHOCYTES RELATIVE PERCENT: 12.3 % (ref 20–42)
MCH RBC QN AUTO: 29.4 PG (ref 26–35)
MCHC RBC AUTO-ENTMCNC: 33.6 % (ref 32–34.5)
MCV RBC AUTO: 87.6 FL (ref 80–99.9)
METER GLUCOSE: 116 MG/DL (ref 74–99)
METER GLUCOSE: 168 MG/DL (ref 74–99)
METER GLUCOSE: 76 MG/DL (ref 74–99)
METER GLUCOSE: 83 MG/DL (ref 74–99)
MONOCYTES ABSOLUTE: 0.85 E9/L (ref 0.1–0.95)
MONOCYTES RELATIVE PERCENT: 5.3 % (ref 2–12)
NEUTROPHILS ABSOLUTE: 12.26 E9/L (ref 1.8–7.3)
NEUTROPHILS RELATIVE PERCENT: 77 % (ref 43–80)
PDW BLD-RTO: 13.3 FL (ref 11.5–15)
PLATELET # BLD: 274 E9/L (ref 130–450)
PMV BLD AUTO: 11 FL (ref 7–12)
POTASSIUM SERPL-SCNC: 3.7 MMOL/L (ref 3.5–5)
RBC # BLD: 4.11 E12/L (ref 3.5–5.5)
SODIUM BLD-SCNC: 134 MMOL/L (ref 132–146)
TOTAL PROTEIN: 6.1 G/DL (ref 6.4–8.3)
WBC # BLD: 15.9 E9/L (ref 4.5–11.5)

## 2022-07-24 PROCEDURE — 87070 CULTURE OTHR SPECIMN AEROBIC: CPT

## 2022-07-24 PROCEDURE — 2140000000 HC CCU INTERMEDIATE R&B

## 2022-07-24 PROCEDURE — 2580000003 HC RX 258: Performed by: INTERNAL MEDICINE

## 2022-07-24 PROCEDURE — 80053 COMPREHEN METABOLIC PANEL: CPT

## 2022-07-24 PROCEDURE — 6370000000 HC RX 637 (ALT 250 FOR IP): Performed by: NURSE PRACTITIONER

## 2022-07-24 PROCEDURE — 85025 COMPLETE CBC W/AUTO DIFF WBC: CPT

## 2022-07-24 PROCEDURE — 83986 ASSAY PH BODY FLUID NOS: CPT

## 2022-07-24 PROCEDURE — 6370000000 HC RX 637 (ALT 250 FOR IP): Performed by: INTERNAL MEDICINE

## 2022-07-24 PROCEDURE — 82962 GLUCOSE BLOOD TEST: CPT

## 2022-07-24 PROCEDURE — 87205 SMEAR GRAM STAIN: CPT

## 2022-07-24 PROCEDURE — 36415 COLL VENOUS BLD VENIPUNCTURE: CPT

## 2022-07-24 PROCEDURE — 89051 BODY FLUID CELL COUNT: CPT

## 2022-07-24 PROCEDURE — 90945 DIALYSIS ONE EVALUATION: CPT

## 2022-07-24 PROCEDURE — S5553 INSULIN LONG ACTING 5 U: HCPCS | Performed by: NURSE PRACTITIONER

## 2022-07-24 PROCEDURE — 6360000002 HC RX W HCPCS: Performed by: NURSE PRACTITIONER

## 2022-07-24 RX ORDER — ONDANSETRON 2 MG/ML
4 INJECTION INTRAMUSCULAR; INTRAVENOUS EVERY 6 HOURS PRN
Status: DISCONTINUED | OUTPATIENT
Start: 2022-07-24 | End: 2022-07-24

## 2022-07-24 RX ORDER — DEXTROSE AND SODIUM CHLORIDE 5; .45 G/100ML; G/100ML
INJECTION, SOLUTION INTRAVENOUS CONTINUOUS
Status: DISCONTINUED | OUTPATIENT
Start: 2022-07-24 | End: 2022-07-27 | Stop reason: HOSPADM

## 2022-07-24 RX ORDER — ONDANSETRON 2 MG/ML
INJECTION INTRAMUSCULAR; INTRAVENOUS
Status: DISCONTINUED
Start: 2022-07-24 | End: 2022-07-24 | Stop reason: WASHOUT

## 2022-07-24 RX ADMIN — Medication 50 MG: at 08:56

## 2022-07-24 RX ADMIN — Medication 10 ML: at 09:08

## 2022-07-24 RX ADMIN — AMLODIPINE BESYLATE 5 MG: 5 TABLET ORAL at 08:49

## 2022-07-24 RX ADMIN — APIXABAN 2.5 MG: 2.5 TABLET, FILM COATED ORAL at 21:31

## 2022-07-24 RX ADMIN — APIXABAN 2.5 MG: 2.5 TABLET, FILM COATED ORAL at 08:49

## 2022-07-24 RX ADMIN — PANTOPRAZOLE SODIUM 40 MG: 40 TABLET, DELAYED RELEASE ORAL at 06:25

## 2022-07-24 RX ADMIN — ISOSORBIDE MONONITRATE 30 MG: 30 TABLET, EXTENDED RELEASE ORAL at 08:49

## 2022-07-24 RX ADMIN — LEVOTHYROXINE SODIUM 50 MCG: 0.05 TABLET ORAL at 06:25

## 2022-07-24 RX ADMIN — TRIMETHOBENZAMIDE HYDROCHLORIDE 200 MG: 100 INJECTION INTRAMUSCULAR at 11:50

## 2022-07-24 RX ADMIN — DEXTROSE AND SODIUM CHLORIDE: 5; 450 INJECTION, SOLUTION INTRAVENOUS at 13:06

## 2022-07-24 RX ADMIN — INSULIN GLARGINE-YFGN 10 UNITS: 100 INJECTION, SOLUTION SUBCUTANEOUS at 09:00

## 2022-07-24 RX ADMIN — INSULIN GLARGINE-YFGN 10 UNITS: 100 INJECTION, SOLUTION SUBCUTANEOUS at 21:31

## 2022-07-24 RX ADMIN — ASPIRIN 81 MG CHEWABLE TABLET 81 MG: 81 TABLET CHEWABLE at 08:48

## 2022-07-24 RX ADMIN — ATORVASTATIN CALCIUM 20 MG: 20 TABLET, FILM COATED ORAL at 21:31

## 2022-07-24 RX ADMIN — DULOXETINE HYDROCHLORIDE 30 MG: 30 CAPSULE, DELAYED RELEASE ORAL at 08:49

## 2022-07-24 ASSESSMENT — PAIN SCALES - GENERAL
PAINLEVEL_OUTOF10: 0
PAINLEVEL_OUTOF10: 0

## 2022-07-24 NOTE — PROGRESS NOTES
[facility] MD Papito Bradley, MD Mansoor Zarate, MD Patricia Schmitt, CNP   Jyoti Cantu, DELILAH  Progress Note  7/24/2022    SUBJECTIVE:  We are following this patient for end-stage renal failure . 7/21: Feeling much better. Appetite and intake have improved somewhat. Denies dyspnea at rest on room air. CCPD last night without complications. No abdominal pain or cramping, nausea or vomiting. Peritoneal effluent was clear this morning.    7/22: Intermittent nausea, some anorexia but no abdominal pain no vomiting, no diarrhea or constipation. No dysuria or hematuria. 7/23: stable vitals    7/24 : seen in her room on RA , euvolemic , abdomen soft NT . Reports nausea. Family at bedside . Pt mood down tearing . Support provided . Case d/w ID earlier and will get PD fluid sample  Poor po intake    PROBLEM LIST:    Patient Active Problem List   Diagnosis    Peritoneal dialysis catheter infection ([address]    Sepsis ([address]    SIRS (systemic inflammatory response syndrome) ([address]    Type 2 diabetes mellitus with diabetic chronic kidney disease ([address]    Osteoarthritis of right knee    Arthritis of knee, right    History of peritoneal dialysis    End stage renal disease (HCC)    Altered mental status    Acute delirium    Essential hypertension, benign    ESRD on peritoneal dialysis ([address]    Type 2 diabetes mellitus, with long-term current use of insulin (HCC)    Primary hypertension    Hypomagnesemia    Hypokalemia    Obesity (BMI 30-39. 9)    TIA (transient ischemic attack)    Hyperlipidemia    AMS (altered mental status)    Diabetes mellitus ([address]    Lactic acid acidosis    Leukocytosis        PAST MEDICAL HISTORY:    Past Medical History:   Diagnosis Date    Anemia     Arthritis     CAD (coronary artery disease)     Diabetes mellitus ([address]     Gout     History of bleeding peptic ulcer approx 2015    Hypertension     Peritoneal dialysis catheter in [facility])     Peritoneal dialysis status ([facility]     at night for 8 hours    Thyroid disease     Use of cane as ambulatory aid     Wears glasses        MEDS (scheduled):   sodium chloride flush  5-40 mL IntraVENous 2 times per day    zinc sulfate  50 mg Oral Daily    aspirin  81 mg Oral Daily    atorvastatin  20 mg Oral Nightly    DULoxetine  30 mg Oral Daily    insulin glargine-yfgn  10 Units SubCUTAneous BID    isosorbide mononitrate  30 mg Oral Daily    levothyroxine  50 mcg Oral Daily    apixaban  2.5 mg Oral BID    pantoprazole  40 mg Oral QAM AC    amLODIPine  5 mg Oral Daily    [Held by provider] metoprolol tartrate  50 mg Oral Daily       MEDS (infusions):   dextrose 5 % and 0.45 % NaCl 75 mL/hr at 07/24/22 1306    sodium chloride      dextrose         MEDS (prn):  sodium chloride flush, sodium chloride, glucose, dextrose bolus **OR** dextrose bolus, glucagon (rDNA), dextrose, trimethobenzamide, albuterol sulfate HFA, guaiFENesin, dextrose    DIET:    ADULT DIET; Regular; 4 carb choices (60 gm/meal); Low Fat/Low Chol/High Fiber/2 gm Na; No Added Salt (3-4 gm); Low Potassium (Less than 3000 mg/day);  Low Phosphorus (Less than 1000 mg)  ADULT ORAL NUTRITION SUPPLEMENT; Breakfast; Renal Oral Supplement      PHYSICAL EXAM:      Patient Vitals for the past 24 hrs:   BP Temp Temp src Pulse Resp SpO2   07/24/22 0938 (!) 150/74 97.5 °F (36.4 °C) -- 81 -- --   07/24/22 0845 (!) 150/74 97.5 °F (36.4 °C) Oral 81 -- 100 %   07/24/22 0300 108/62 -- -- 80 20 --   07/23/22 2020 (!) 110/54 97.4 °F (36.3 °C) Oral 88 20 --   07/23/22 2015 -- -- -- -- -- 98 %   07/23/22 1430 (!) 94/47 97.7 °F (36.5 °C) Oral 59 18 97 %            Intake/Output Summary (Last 24 hours) at 7/24/2022 1323  Last data filed at 7/24/2022 9962  Gross per 24 hour   Intake 100 ml   Output -66 ml   Net 166 ml         Wt Readings from Last 3 Encounters:   07/23/22 144 lb 2.9 oz (65.4 kg)   04/29/22 158 lb 8.2 oz (71.9 kg)   06/18/19 169 lb 5 oz (76.8 kg)       General:  in no acute distress  HEENT: NC/AT, EOMI, sclera and conjunctiva are clear and anicteric. Mucous membranes moist.  Cardiovascular: regular rate and rhythm, no murmurs, gallops, or rubs  Respiratory:  Clear, no rales, rhochi, or wheezes  Gastrointestinal: soft, nontender, nondistended, NABS  Ext: no cyanosis, clubbing, or edema bilateral lower extremities  Neuro: awake, alert, oriented x3. Moves all 4 extremities. Cranial nerves II through XII grossly intact. Skin: dry, no rash      DATA:      Recent Labs     07/22/22  0454 07/23/22  0537 07/24/22  0549   WBC 11.7* 12.5* 15.9*   HGB 10.9* 10.9* 12.1   HCT 32.8* 32.0* 36.0   MCV 89.1 87.0 87.6    236 274       Recent Labs     07/22/22 0454 07/23/22  0537 07/24/22  0549    136 134   K 2.5* 4.1 3.7   CL 91* 96* 94*   CO2 27 25 25   BUN 35* 30* 28*   CREATININE 7.8* 7.4* 6.9*         Iron studies:  Lab Results   Component Value Date    FERRITIN 1,217 07/20/2022     Bone disease:  Lab Results   Component Value Date    MG 1.0 (L) 07/22/2022    PHOS 3.1 07/22/2022       DIALYSIS ORDERS:  Reviewed      Assessment  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 No sign or symptom of peritonitis. No leukocytosis and still might consider UTI, though has no dysuria or hematuria either. Hyponatremia, due to water overload.   Improving with ultrafiltration  7    Staph hominis  bacteremia        COVID 19 infection ( vaccinated, not boosted, no hypoxia ) - non pneumonic manifestation - diarrhea , stool neg for C diff toxin      Mental status improved  No complications CCPD therapy    Recommendations  Continue CCPD for solute and volume clearance as per outpatient orders and dry weight  Will  use 2.5% dextrose solutions for this evening exchanges  Follow labs  Get PD fluid sample     Shorty aMnn MD, MD  7/24/2022

## 2022-07-24 NOTE — PLAN OF CARE
Problem: Chronic Conditions and Co-morbidities  Goal: Patient's chronic conditions and co-morbidity symptoms are monitored and maintained or improved  Outcome: Not Progressing  Flowsheets (Taken 7/23/2022 2016)  Care Plan - Patient's Chronic Conditions and Co-Morbidity Symptoms are Monitored and Maintained or Improved:   Monitor and assess patient's chronic conditions and comorbid symptoms for stability, deterioration, or improvement   Collaborate with multidisciplinary team to address chronic and comorbid conditions and prevent exacerbation or deterioration     Problem: Discharge Planning  Goal: Discharge to home or other facility with appropriate resources  Outcome: Not Progressing     Problem: Pain  Goal: Verbalizes/displays adequate comfort level or baseline comfort level  Outcome: Not Progressing  Flowsheets  Taken 7/23/2022 2020  Verbalizes/displays adequate comfort level or baseline comfort level:   Encourage patient to monitor pain and request assistance   Assess pain using appropriate pain scale  Taken 7/23/2022 2015  Verbalizes/displays adequate comfort level or baseline comfort level:   Encourage patient to monitor pain and request assistance   Assess pain using appropriate pain scale     Problem: Nutrition Deficit:  Goal: Optimize nutritional status  Outcome: Not Progressing     Problem: Safety - Adult  Goal: Free from fall injury  Outcome: Not Progressing     Problem: Skin/Tissue Integrity  Goal: Absence of new skin breakdown  Description: 1. Monitor for areas of redness and/or skin breakdown  2. Assess vascular access sites hourly  3. Every 4-6 hours minimum:  Change oxygen saturation probe site  4. Every 4-6 hours:  If on nasal continuous positive airway pressure, respiratory therapy assess nares and determine need for appliance change or resting period.   Outcome: Not Progressing     Problem: Chronic Conditions and Co-morbidities  Goal: Patient's chronic conditions and co-morbidity symptoms are monitored and maintained or improved  Outcome: Not Progressing  Flowsheets (Taken 7/23/2022 2016)  Care Plan - Patient's Chronic Conditions and Co-Morbidity Symptoms are Monitored and Maintained or Improved:   Monitor and assess patient's chronic conditions and comorbid symptoms for stability, deterioration, or improvement   Collaborate with multidisciplinary team to address chronic and comorbid conditions and prevent exacerbation or deterioration     Problem: Discharge Planning  Goal: Discharge to home or other facility with appropriate resources  Outcome: Not Progressing     Problem: Pain  Goal: Verbalizes/displays adequate comfort level or baseline comfort level  Outcome: Not Progressing  Flowsheets  Taken 7/23/2022 2020  Verbalizes/displays adequate comfort level or baseline comfort level:   Encourage patient to monitor pain and request assistance   Assess pain using appropriate pain scale  Taken 7/23/2022 2015  Verbalizes/displays adequate comfort level or baseline comfort level:   Encourage patient to monitor pain and request assistance   Assess pain using appropriate pain scale     Problem: Skin/Tissue Integrity  Goal: Absence of new skin breakdown  Description: 1. Monitor for areas of redness and/or skin breakdown  2. Assess vascular access sites hourly  3. Every 4-6 hours minimum:  Change oxygen saturation probe site  4. Every 4-6 hours:  If on nasal continuous positive airway pressure, respiratory therapy assess nares and determine need for appliance change or resting period.   Outcome: Not Progressing     Problem: Safety - Adult  Goal: Free from fall injury  Outcome: Not Progressing     Problem: Nutrition Deficit:  Goal: Optimize nutritional status  Outcome: Not Progressing     Problem: ABCDS Injury Assessment  Goal: Absence of physical injury  Outcome: Not Progressing

## 2022-07-24 NOTE — FLOWSHEET NOTE
07/24/22 0938   Vitals   BP (!) 150/74   Temp 97.5 °F (36.4 °C)   Heart Rate 81   Cycler   Ultrafiltration (UF) (mL) -66 mL   Ccpd completed using aspetic technique, stay safe cap applied no complications

## 2022-07-24 NOTE — PROGRESS NOTES
[facility] MD Paulette Rodriguez, MD Lizzy Monique, MD Bryant Marshall, CNP   Jyoti aCntu, DELILAH  Progress Note  7/24/2022    SUBJECTIVE:  We are following this patient for end-stage renal failure . 7/21: Feeling much better. Appetite and intake have improved somewhat. Denies dyspnea at rest on room air. CCPD last night without complications. No abdominal pain or cramping, nausea or vomiting. Peritoneal effluent was clear this morning.    7/22: Intermittent nausea, some anorexia but no abdominal pain no vomiting, no diarrhea or constipation. No dysuria or hematuria. 7/23: stable vitals    PROBLEM LIST:    Patient Active Problem List   Diagnosis    Peritoneal dialysis catheter infection ([address]    Sepsis ([address]    SIRS (systemic inflammatory response syndrome) ([address]    Type 2 diabetes mellitus with diabetic chronic kidney disease ([address]    Osteoarthritis of right knee    Arthritis of knee, right    History of peritoneal dialysis    End stage renal disease (HCC)    Altered mental status    Acute delirium    Essential hypertension, benign    ESRD on peritoneal dialysis ([address]    Type 2 diabetes mellitus, with long-term current use of insulin (HCC)    Primary hypertension    Hypomagnesemia    Hypokalemia    Obesity (BMI 30-39. 9)    TIA (transient ischemic attack)    Hyperlipidemia    AMS (altered mental status)    Diabetes mellitus (HCC)    Lactic acid acidosis    Leukocytosis        PAST MEDICAL HISTORY:    Past Medical History:   Diagnosis Date    Anemia     Arthritis     CAD (coronary artery disease)     Diabetes mellitus ([address]     Gout     History of bleeding peptic ulcer approx 2015    Hypertension     Peritoneal dialysis catheter in place [facility])     Peritoneal dialysis status ([address]     at night for 8 hours    Thyroid disease     Use of cane as ambulatory aid     Wears glasses        MEDS (scheduled):   sodium chloride flush  5-40 mL IntraVENous 2 times per day    zinc sulfate  50 mg Oral Daily    aspirin  81 mg Oral Daily    atorvastatin  20 mg Oral Nightly    DULoxetine  30 mg Oral Daily    insulin glargine-yfgn  10 Units SubCUTAneous BID    isosorbide mononitrate  30 mg Oral Daily    levothyroxine  50 mcg Oral Daily    apixaban  2.5 mg Oral BID    pantoprazole  40 mg Oral QAM AC    amLODIPine  5 mg Oral Daily    [Held by provider] metoprolol tartrate  50 mg Oral Daily       MEDS (infusions):   dextrose 5 % and 0.45 % NaCl      sodium chloride      dextrose         MEDS (prn):  sodium chloride flush, sodium chloride, glucose, dextrose bolus **OR** dextrose bolus, glucagon (rDNA), dextrose, trimethobenzamide, albuterol sulfate HFA, guaiFENesin, dextrose    DIET:    ADULT DIET; Regular; 4 carb choices (60 gm/meal); Low Fat/Low Chol/High Fiber/2 gm Na; No Added Salt (3-4 gm); Low Potassium (Less than 3000 mg/day); Low Phosphorus (Less than 1000 mg)  ADULT ORAL NUTRITION SUPPLEMENT; Breakfast; Renal Oral Supplement      PHYSICAL EXAM:      Patient Vitals for the past 24 hrs:   BP Temp Temp src Pulse Resp SpO2   07/24/22 0938 (!) 150/74 97.5 °F (36.4 °C) -- 81 -- --   07/24/22 0845 (!) 150/74 97.5 °F (36.4 °C) Oral 81 -- 100 %   07/24/22 0300 108/62 -- -- 80 20 --   07/23/22 2020 (!) 110/54 97.4 °F (36.3 °C) Oral 88 20 --   07/23/22 2015 -- -- -- -- -- 98 %   07/23/22 1430 (!) 94/47 97.7 °F (36.5 °C) Oral 59 18 97 %            Intake/Output Summary (Last 24 hours) at 7/24/2022 1253  Last data filed at 7/24/2022 7044  Gross per 24 hour   Intake 100 ml   Output -66 ml   Net 166 ml         Wt Readings from Last 3 Encounters:   07/23/22 144 lb 2.9 oz (65.4 kg)   04/29/22 158 lb 8.2 oz (71.9 kg)   06/18/19 169 lb 5 oz (76.8 kg)       General:  in no acute distress  HEENT: NC/AT, EOMI, sclera and conjunctiva are clear and anicteric.   Mucous membranes moist.  Cardiovascular: regular rate and rhythm, no murmurs, gallops, or rubs  Respiratory:  Clear, no rales, rhochi, or wheezes  Gastrointestinal: soft, nontender, nondistended, NABS  Ext: no cyanosis, clubbing, or edema bilateral lower extremities  Neuro: awake, alert, oriented x3. Moves all 4 extremities. Cranial nerves II through XII grossly intact. Skin: dry, no rash      DATA:      Recent Labs     07/22/22  0454 07/23/22  0537 07/24/22  0549   WBC 11.7* 12.5* 15.9*   HGB 10.9* 10.9* 12.1   HCT 32.8* 32.0* 36.0   MCV 89.1 87.0 87.6    236 274       Recent Labs     07/22/22  0454 07/23/22  0537 07/24/22  0549    136 134   K 2.5* 4.1 3.7   CL 91* 96* 94*   CO2 27 25 25   BUN 35* 30* 28*   CREATININE 7.8* 7.4* 6.9*         Iron studies:  Lab Results   Component Value Date    FERRITIN 1,217 07/20/2022     Bone disease:  Lab Results   Component Value Date    MG 1.0 (L) 07/22/2022    PHOS 3.1 07/22/2022       DIALYSIS ORDERS:  Reviewed      Assessment  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 No sign or symptom of peritonitis. No leukocytosis and still might consider UTI, though has no dysuria or hematuria either. Hyponatremia, due to water overload.   Improving with ultrafiltration  7    Staph hominis  bacteremia        COVID 19 infection ( vaccinated, not boosted, no hypoxia ) - non pneumonic manifestation - diarrhea , stool neg for C diff toxin      Mental status improved  No complications CCPD therapy    Recommendations  Continue CCPD for solute and volume clearance as per outpatient orders and dry weight  Will  use 2.5% dextrose solutions for this evening exchanges  Follow labs  Okay for discharge from strictly renal standpoint    Treovr Randolph MD, MD  7/24/2022

## 2022-07-24 NOTE — FLOWSHEET NOTE
07/24/22 1739   Vitals   BP (!) 144/76   Temp 98 °F (36.7 °C)   Heart Rate 82   Cycler   Verification of Prescription CCPD   Total Volume Programmed 68254 mL   Therapy Time (Hours:Minutes) 8.5   Fill Volume 2800 mL   Last Fill Volume 1800 mL   Dextrose Setting Same (Nonextraneal)   Number of Cycles 5   Bag Volume 5000 mL   Number of Bags Used 3   Ccpd initated using aspetic technique clear yellow effuent noted.  No complications

## 2022-07-24 NOTE — PROGRESS NOTES
ENDOSCOPY, COLON, DIAGNOSTIC      JOINT REPLACEMENT      left knee, right shoulder    NE TOTAL KNEE ARTHROPLASTY Right 10/31/2018    RIGHT KNEE TOTAL ARTHROPLASTY +++BRIDGER++ PNB++ performed by Nilsa Portillo MD at [address]         Medications Prior to Admission:    Medications Prior to Admission: B Complex-C-Folic Acid (DIALYVITE 870 PO), Take by mouth  omeprazole (PRILOSEC) 20 MG delayed release capsule, Take 20 mg by mouth in the morning. Cetirizine HCl (ZYRTEC PO), Take by mouth  insulin glargine (LANTUS) 100 UNIT/ML injection vial, Inject 18 Units into the skin 2 times daily  metoprolol tartrate (LOPRESSOR) 50 MG tablet, Take 50 mg by mouth in the morning. potassium chloride (KLOR-CON M) 20 MEQ extended release tablet, Take 20 mEq by mouth in the morning. amLODIPine (NORVASC) 5 MG tablet, Take 5 mg by mouth in the morning. aspirin 81 MG chewable tablet, Take 1 tablet by mouth daily  apixaban (ELIQUIS) 2.5 MG TABS tablet, Take 1 tablet by mouth 2 times daily  atorvastatin (LIPITOR) 20 MG tablet, Take 1 tablet by mouth nightly  DULoxetine (CYMBALTA) 30 MG extended release capsule, Take 30 mg by mouth in the morning. isosorbide mononitrate (IMDUR) 30 MG CR tablet, Take 30 mg by mouth in the morning. levothyroxine (SYNTHROID) 50 MCG tablet, Take 50 mcg by mouth Daily    Allergies:    Sulfa antibiotics    Social History:    reports that she has never smoked. She has never used smokeless tobacco. She reports that she does not drink alcohol and does not use drugs. Family History:   family history is not on file. REVIEW OF SYSTEMS:  As above in the HPI, otherwise negative    PHYSICAL EXAM:    Vitals:  BP (!) 150/74   Pulse 81   Temp 97.5 °F (36.4 °C)   Resp 20   Ht 4' 11\" (1.499 m)   Wt 144 lb 2.9 oz (65.4 kg)   SpO2 100%   BMI 29.12 kg/m²     General:  Awake, alert, oriented X 3. Well developed, well nourished, well groomed. No apparent distress.   HEENT: Normocephalic, atraumatic. Pupils equal, round, reactive to light. No scleral icterus. No conjunctival injection. No nasal discharge. Neck:  Supple  Heart:  RRR, systolic murmur at the left sternal border noted  Lungs:  CTA bilaterally, bilat symmetrical expansion, no wheeze, rales, or rhonchi  Abdomen: Bowel sounds present, soft, nontender, no masses, no organomegaly, no peritoneal signs  Peritoneal dialysis catheter intact with clear fluid  Extremities:  No clubbing, cyanosis, or edema  Skin:  Warm and dry, no open lesions or rash  Neuro:  Cranial nerves 2-12 intact, no focal deficits  Breast: deferred  Rectal: deferred  Genitalia:  deferred    LABS:  Data reviewed      ASSESSMENT:    Leukocytosis and cholelithiasis/HIDA scan negative  Staphylococcal hominis bacteremia  Bradycardia from metoprolol  Transient disorientation  No signs of sepsis  End-stage kidney disease on peritoneal dialysis  Comorbidities present and past as listed below  Patient Active Problem List   Diagnosis    Peritoneal dialysis catheter infection ([address]    Sepsis ([address]    SIRS (systemic inflammatory response syndrome) ([address]    Type 2 diabetes mellitus with diabetic chronic kidney disease (HCC)    Osteoarthritis of right knee    Arthritis of knee, right    History of peritoneal dialysis    End stage renal disease (HCC)    Altered mental status    Acute delirium    Essential hypertension, benign    ESRD on peritoneal dialysis ([address]    Type 2 diabetes mellitus, with long-term current use of insulin ([address]    Primary hypertension    Hypomagnesemia    Hypokalemia    Obesity (BMI 30-39. 9)    TIA (transient ischemic attack)    Hyperlipidemia    AMS (altered mental status)    Diabetes mellitus (HCC)    Lactic acid acidosis    Leukocytosis         PLAN:  Start IV hydration  Check echocardiogram  ID input appreciated    Hold metoprolol for today  Nephrology consult for resumption of peritoneal dialysis  Resume home medications  Sliding scale with insulin coverage  Monitor lactic acid and leukocytosis  On no antibiotics at present  No need to treat COVID  Questions answered to her satisfaction      Tate Harper MD  2:14 PM  7/24/2022

## 2022-07-24 NOTE — PROGRESS NOTES
Department of Internal Medicine  Infectious Diseases   Progress  Note    C/C : CONS bacteremia , COVID 19 infection     Denies fever or chills  Reports diarrhea, nausea  Afebrile       Current Facility-Administered Medications   Medication Dose Route Frequency Provider Last Rate Last Admin    ondansetron (ZOFRAN) injection 4 mg  4 mg IntraVENous Q6H PRN Main Rod MD        sodium chloride flush 0.9 % injection 5-40 mL  5-40 mL IntraVENous 2 times per day Main Rod MD   10 mL at 07/24/22 0908    sodium chloride flush 0.9 % injection 5-40 mL  5-40 mL IntraVENous PRN Main Rod MD   10 mL at 07/22/22 1805    0.9 % sodium chloride infusion   IntraVENous PRN Main Rod MD        glucose chewable tablet 16 g  4 tablet Oral PRN April Nabila, APRN - QIANA        dextrose bolus 10% 125 mL  125 mL IntraVENous PRN April Nabila, APRN - CNP        Or    dextrose bolus 10% 250 mL  250 mL IntraVENous PRN April Nabila, APRN - CNP        glucagon (rDNA) injection 1 mg  1 mg SubCUTAneous PRN April Nabila, APRN - CNP        dextrose 10 % infusion   IntraVENous Continuous PRN April Nabila, APRN - CNP        trimethobenzamide Nestora Killings) injection 200 mg  200 mg IntraMUSCular Q6H PRN April Nabila, APRN - CNP   200 mg at 07/20/22 1259    zinc sulfate (ZINCATE) capsule 50 mg  50 mg Oral Daily April Nabila, APRN - CNP   50 mg at 07/24/22 0856    albuterol sulfate HFA (PROVENTIL;VENTOLIN;PROAIR) 108 (90 Base) MCG/ACT inhaler 2 puff  2 puff Inhalation Q6H PRN April Nabila, APRN - CNP        guaiFENesin (ROBITUSSIN) 100 MG/5ML oral solution 200 mg  200 mg Oral Q6H PRN April Nabila, APRN - CNP        aspirin chewable tablet 81 mg  81 mg Oral Daily April STEVE DahlN - CNP   81 mg at 07/24/22 0848    atorvastatin (LIPITOR) tablet 20 mg  20 mg Oral Nightly April Nabila APRN - CNP   20 mg at 07/23/22 2210    DULoxetine (CYMBALTA) extended release capsule 30 mg  30 mg Oral Daily April NabilaKERRI CNP   30 mg at 07/24/22 0849    insulin glargine-yfgn [facility]) injection vial 10 Units  10 Units SubCUTAneous BID April STEVE DahlN - CNP   10 Units at 07/24/22 0900    isosorbide mononitrate (IMDUR) extended release tablet 30 mg  30 mg Oral Daily April Nabila APRN - CNP   30 mg at 07/24/22 0849    levothyroxine (SYNTHROID) tablet 50 mcg  50 mcg Oral Daily April Nabila APRN - CNP   50 mcg at 07/24/22 8605    apixaban (ELIQUIS) tablet 2.5 mg  2.5 mg Oral BID April Nabila APRN - CNP   2.5 mg at 07/24/22 0849    dextrose 50 % IV solution  12.5 g IntraVENous PRN April AlmaSTEVE cortezN - CNP        pantoprazole (PROTONIX) tablet 40 mg  40 mg Oral QAM AC April Nabila APRN - CNP   40 mg at 07/24/22 0625    amLODIPine (NORVASC) tablet 5 mg  5 mg Oral Daily Smooth Wil Grills, DO   5 mg at 07/24/22 0849    [Held by provider] metoprolol tartrate (LOPRESSOR) tablet 50 mg  50 mg Oral Daily Smooth Wil Grills, DO   50 mg at 07/23/22 3020       REVIEW OF SYSTEMS:    CONSTITUTIONAL:  Denies fever, chill or rigors, nausea or vomiting. HEENT: denies blurring of vision or double vision, denies hearing problem  RESPIRATORY: denies cough, shortness of breath, sputum expectoration, chest pain. CARDIOVASCULAR:  Denies palpitation  GASTROINTESTINAL:  Diarrhea   INTEGUMENT: denies wound , rash  HEMATOLOGIC/LYMPHATIC:  Denies lymph node swelling, gum bleeding or easy bruising. MUSCULOSKELETAL:  Denies leg pain , joint pain , joint swelling  NEUROLOGICAL:  mental status change . PHYSICAL EXAM:      Vitals:     BP (!) 150/74   Pulse 81   Temp 97.5 °F (36.4 °C)   Resp 20   Ht 4' 11\" (1.499 m)   Wt 144 lb 2.9 oz (65.4 kg)   SpO2 100%   BMI 29.12 kg/m²     General Appearance:    Awake, alert , no acute distress.    Head:    Normocephalic, atraumatic   Eyes:    No pallor, no icterus,   Ears:    No obvious deformity or drainage.    Nose:   No nasal drainage   Throat:   Mucosa moist, no oral thrush   Neck:   Supple, no lymphadenopathy   Lungs:     Clear to auscultation bilaterally, no wheeze    Heart:    Regular rate and rhythm, systolic murmur    Abdomen:     Soft,  tender on deep palpation,  bowel sounds present, PD catheter in place   Extremities:   No edema, no cyanosis    Pulses:   Dorsalis pedis palpable    Skin:   no rashes        DATA:      CBC with Differential:      Lab Results   Component Value Date/Time    WBC 15.9 07/24/2022 05:49 AM    RBC 4.11 07/24/2022 05:49 AM    HGB 12.1 07/24/2022 05:49 AM    HCT 36.0 07/24/2022 05:49 AM     07/24/2022 05:49 AM    MCV 87.6 07/24/2022 05:49 AM    MCH 29.4 07/24/2022 05:49 AM    MCHC 33.6 07/24/2022 05:49 AM    RDW 13.3 07/24/2022 05:49 AM    BANDSPCT 2 12/19/2015 04:23 PM    METASPCT 1 12/19/2015 04:23 PM    LYMPHOPCT 12.3 07/24/2022 05:49 AM    MONOPCT 5.3 07/24/2022 05:49 AM    MYELOPCT 1 12/19/2015 04:23 PM    BASOPCT 0.4 07/24/2022 05:49 AM    MONOSABS 0.85 07/24/2022 05:49 AM    LYMPHSABS 1.96 07/24/2022 05:49 AM    EOSABS 0.57 07/24/2022 05:49 AM    BASOSABS 0.07 07/24/2022 05:49 AM       CMP     Lab Results   Component Value Date/Time     07/24/2022 05:49 AM    K 3.7 07/24/2022 05:49 AM    K 4.4 07/20/2022 10:23 AM    CL 94 07/24/2022 05:49 AM    CO2 25 07/24/2022 05:49 AM    BUN 28 07/24/2022 05:49 AM    CREATININE 6.9 07/24/2022 05:49 AM    GFRAA 7 07/24/2022 05:49 AM    LABGLOM 6 07/24/2022 05:49 AM    GLUCOSE 134 07/24/2022 05:49 AM    PROT 6.1 07/24/2022 05:49 AM    LABALBU 2.5 07/24/2022 05:49 AM    CALCIUM 8.8 07/24/2022 05:49 AM    BILITOT 0.3 07/24/2022 05:49 AM    ALKPHOS 125 07/24/2022 05:49 AM    AST 17 07/24/2022 05:49 AM    ALT 16 07/24/2022 05:49 AM         Hepatic Function Panel:    Lab Results   Component Value Date/Time    ALKPHOS 125 07/24/2022 05:49 AM    ALT 16 07/24/2022 05:49 AM    AST 17 07/24/2022 05:49 AM    PROT 6.1 07/24/2022 05:49 AM    BILITOT 0.3 07/24/2022 05:49 AM    LABALBU 2.5 07/24/2022 05:49 AM       PT/INR:    Lab Results   Component Value Date/Time    PROTIME 13.6 04/27/2022 09:33 PM    INR 1.3 04/27/2022 09:33 PM       TSH:  No results found for: TSH    U/A:    Lab Results   Component Value Date/Time    COLORU DARK YELLOW 06/13/2019 10:55 AM    PHUR 8.5 06/13/2019 10:55 AM    CLARITYU CLOUDY 06/13/2019 10:55 AM    SPECGRAV 1.015 06/13/2019 10:55 AM    LEUKOCYTESUR MODERATE 06/13/2019 10:55 AM    UROBILINOGEN 0.2 06/13/2019 10:55 AM    BILIRUBINUR Negative 06/13/2019 10:55 AM    BLOODU LARGE 06/13/2019 10:55 AM    GLUCOSEU 250 06/13/2019 10:55 AM       ABG:  No results found for: YEA2BPT, BEART, G9MCVDQJ, PHART, THGBART, BPG3PGJ, PO2ART, BIK2YZY    MICROBIOLOGY:    Blood culture -    , Blood 2  Abnormal   Gram stain performed from blood culture bottle media   Gram positive cocci   P      Organism Staphylococcus species Abnormal  P    Culture, Blood 2 Identification and sensitivity to follow   Evaluating for Staph lugdenensis  P      Resulting Agency [address] [place] [address] Lab        Specimen: Nasopharyngeal Swab Updated: 07/20/22 0033    SARS-CoV-2, NAAT DETECTED Abnormal            Radiology :    CT abdomen and pelvis -  Gallstones and borderline gallbladder hydrops. Consider correlation with   HIDA scan. Hepatic cirrhosis with mesenteric edema as well as abdominopelvic ascites. The presence of ascites is nonspecific in the setting of peritoneal dialysis. IMPRESSION:     Staph hominis  bacteremia - follow up blood cx neg on 7/23   COVID 19 infection ( vaccinated, not boosted, no hypoxia ) - non pneumonic manifestation - diarrhea , stool neg for C diff toxin   3. Leukocytosis       RECOMMENDATIONS:       CBC with diff   Paxlovid -can't be given   Molnupiravir is not available   4. Drop let plus isolation   5.   Monitor resp status

## 2022-07-25 LAB
ANION GAP SERPL CALCULATED.3IONS-SCNC: 14 MMOL/L (ref 7–16)
APPEARANCE FLUID: CLEAR
BLOOD CULTURE, ROUTINE: NORMAL
BUN BLDV-MCNC: 26 MG/DL (ref 6–23)
C-REACTIVE PROTEIN: 4.4 MG/DL (ref 0–0.4)
CALCIUM SERPL-MCNC: 8.4 MG/DL (ref 8.6–10.2)
CELL COUNT FLUID TYPE: NORMAL
CHLORIDE BLD-SCNC: 95 MMOL/L (ref 98–107)
CO2: 25 MMOL/L (ref 22–29)
COLOR FLUID: COLORLESS
CREAT SERPL-MCNC: 6.7 MG/DL (ref 0.5–1)
D DIMER: 428 NG/ML DDU
GFR AFRICAN AMERICAN: 7
GFR NON-AFRICAN AMERICAN: 6 ML/MIN/1.73
GLUCOSE BLD-MCNC: 123 MG/DL (ref 74–99)
HCT VFR BLD CALC: 32.6 % (ref 34–48)
HEMOGLOBIN: 10.8 G/DL (ref 11.5–15.5)
MCH RBC QN AUTO: 29.4 PG (ref 26–35)
MCHC RBC AUTO-ENTMCNC: 33.1 % (ref 32–34.5)
MCV RBC AUTO: 88.8 FL (ref 80–99.9)
METER GLUCOSE: 110 MG/DL (ref 74–99)
METER GLUCOSE: 164 MG/DL (ref 74–99)
METER GLUCOSE: 168 MG/DL (ref 74–99)
METER GLUCOSE: 239 MG/DL (ref 74–99)
MONOCYTE, FLUID: 100 %
NEUTROPHIL, FLUID: 0 %
NUCLEATED CELLS FLUID: <3 /UL
ORGANISM: ABNORMAL
PDW BLD-RTO: 13.3 FL (ref 11.5–15)
PLATELET # BLD: 302 E9/L (ref 130–450)
PMV BLD AUTO: 10.6 FL (ref 7–12)
POTASSIUM SERPL-SCNC: 3.5 MMOL/L (ref 3.5–5)
PROCALCITONIN: 0.35 NG/ML (ref 0–0.08)
RBC # BLD: 3.67 E12/L (ref 3.5–5.5)
RBC FLUID: <2000 /UL
SODIUM BLD-SCNC: 134 MMOL/L (ref 132–146)
WBC # BLD: 16.1 E9/L (ref 4.5–11.5)

## 2022-07-25 PROCEDURE — 36415 COLL VENOUS BLD VENIPUNCTURE: CPT

## 2022-07-25 PROCEDURE — 82962 GLUCOSE BLOOD TEST: CPT

## 2022-07-25 PROCEDURE — 86140 C-REACTIVE PROTEIN: CPT

## 2022-07-25 PROCEDURE — 80048 BASIC METABOLIC PNL TOTAL CA: CPT

## 2022-07-25 PROCEDURE — 84145 PROCALCITONIN (PCT): CPT

## 2022-07-25 PROCEDURE — 90945 DIALYSIS ONE EVALUATION: CPT

## 2022-07-25 PROCEDURE — 2580000003 HC RX 258: Performed by: INTERNAL MEDICINE

## 2022-07-25 PROCEDURE — 2140000000 HC CCU INTERMEDIATE R&B

## 2022-07-25 PROCEDURE — 6370000000 HC RX 637 (ALT 250 FOR IP): Performed by: NURSE PRACTITIONER

## 2022-07-25 PROCEDURE — 85378 FIBRIN DEGRADE SEMIQUANT: CPT

## 2022-07-25 PROCEDURE — S5553 INSULIN LONG ACTING 5 U: HCPCS | Performed by: NURSE PRACTITIONER

## 2022-07-25 PROCEDURE — 6370000000 HC RX 637 (ALT 250 FOR IP): Performed by: INTERNAL MEDICINE

## 2022-07-25 PROCEDURE — 87070 CULTURE OTHR SPECIMN AEROBIC: CPT

## 2022-07-25 PROCEDURE — 87205 SMEAR GRAM STAIN: CPT

## 2022-07-25 PROCEDURE — 85027 COMPLETE CBC AUTOMATED: CPT

## 2022-07-25 RX ADMIN — ATORVASTATIN CALCIUM 20 MG: 20 TABLET, FILM COATED ORAL at 20:58

## 2022-07-25 RX ADMIN — APIXABAN 2.5 MG: 2.5 TABLET, FILM COATED ORAL at 20:58

## 2022-07-25 RX ADMIN — ASPIRIN 81 MG CHEWABLE TABLET 81 MG: 81 TABLET CHEWABLE at 09:15

## 2022-07-25 RX ADMIN — APIXABAN 2.5 MG: 2.5 TABLET, FILM COATED ORAL at 09:14

## 2022-07-25 RX ADMIN — PANTOPRAZOLE SODIUM 40 MG: 40 TABLET, DELAYED RELEASE ORAL at 07:31

## 2022-07-25 RX ADMIN — AMLODIPINE BESYLATE 5 MG: 5 TABLET ORAL at 09:14

## 2022-07-25 RX ADMIN — LEVOTHYROXINE SODIUM 50 MCG: 0.05 TABLET ORAL at 07:31

## 2022-07-25 RX ADMIN — ISOSORBIDE MONONITRATE 30 MG: 30 TABLET, EXTENDED RELEASE ORAL at 09:15

## 2022-07-25 RX ADMIN — DULOXETINE HYDROCHLORIDE 30 MG: 30 CAPSULE, DELAYED RELEASE ORAL at 09:15

## 2022-07-25 RX ADMIN — Medication 50 MG: at 09:14

## 2022-07-25 RX ADMIN — Medication 10 ML: at 21:09

## 2022-07-25 RX ADMIN — INSULIN GLARGINE-YFGN 10 UNITS: 100 INJECTION, SOLUTION SUBCUTANEOUS at 20:58

## 2022-07-25 ASSESSMENT — PAIN SCALES - GENERAL
PAINLEVEL_OUTOF10: 0
PAINLEVEL_OUTOF10: 0

## 2022-07-25 NOTE — PROGRESS NOTES
Department of Internal Medicine  Infectious Diseases   Progress  Note    C/C : CONS bacteremia , COVID 19 infection     Denies fever or chills  Reports  nausea  Afebrile       Current Facility-Administered Medications   Medication Dose Route Frequency Provider Last Rate Last Admin    dextrose 5 % and 0.45 % sodium chloride infusion   IntraVENous Continuous Radha Kelly MD 75 mL/hr at 07/24/22 1306 New Bag at 07/24/22 1306    perflutren lipid microspheres (DEFINITY) injection 1.65 mg  1.5 mL IntraVENous ONCE PRN Radha Kelly MD        sodium chloride flush 0.9 % injection 5-40 mL  5-40 mL IntraVENous 2 times per day Radha Kelly MD   10 mL at 07/24/22 0908    sodium chloride flush 0.9 % injection 5-40 mL  5-40 mL IntraVENous PRN Radha Kelly MD   10 mL at 07/22/22 1805    0.9 % sodium chloride infusion   IntraVENous PRN Radha Kelly MD        glucose chewable tablet 16 g  4 tablet Oral PRN April Nabila, APRN - CNP        dextrose bolus 10% 125 mL  125 mL IntraVENous PRN April STEVE DahlN - CNP        Or    dextrose bolus 10% 250 mL  250 mL IntraVENous PRN April Nabila, APRN - CNP        glucagon (rDNA) injection 1 mg  1 mg SubCUTAneous PRN April KERRI Dahl - CNP        dextrose 10 % infusion   IntraVENous Continuous PRN April Nabila, APRN - CNP        trimethobenzamide Shersharif Seo) injection 200 mg  200 mg IntraMUSCular Q6H PRN April Nabila, APRN - CNP   200 mg at 07/24/22 1150    zinc sulfate (ZINCATE) capsule 50 mg  50 mg Oral Daily April Nabila, APRN - CNP   50 mg at 07/25/22 0914    albuterol sulfate HFA (PROVENTIL;VENTOLIN;PROAIR) 108 (90 Base) MCG/ACT inhaler 2 puff  2 puff Inhalation Q6H PRN April STEVE DahlN - QIANA        guaiFENesin (ROBITUSSIN) 100 MG/5ML oral solution 200 mg  200 mg Oral Q6H PRN April Nabila, APRN - CNP        aspirin chewable tablet 81 mg  81 mg Oral Daily April KERRI Dahl - QIANA 81 mg at 07/25/22 0915    atorvastatin (LIPITOR) tablet 20 mg  20 mg Oral Nightly April STEVE DahlN - CNP   20 mg at 07/24/22 2131    DULoxetine (CYMBALTA) extended release capsule 30 mg  30 mg Oral Daily April STEVE DahlN - CNP   30 mg at 07/25/22 0915    insulin glargine-yfgn [facility]) injection vial 10 Units  10 Units SubCUTAneous BID April BenewahSTEVE ShuklaN - CNP   10 Units at 07/24/22 2131    isosorbide mononitrate (IMDUR) extended release tablet 30 mg  30 mg Oral Daily April STEVE DahlN - CNP   30 mg at 07/25/22 0915    levothyroxine (SYNTHROID) tablet 50 mcg  50 mcg Oral Daily April ChampVazquez, APRN - CNP   50 mcg at 07/25/22 0731    apixaban (ELIQUIS) tablet 2.5 mg  2.5 mg Oral BID April Champ-STEVE BrownN - CNP   2.5 mg at 07/25/22 0914    dextrose 50 % IV solution  12.5 g IntraVENous PRN April Nabila, APRN - CNP        pantoprazole (PROTONIX) tablet 40 mg  40 mg Oral QAM AC April Nabila APRN - CNP   40 mg at 07/25/22 0731    amLODIPine (NORVASC) tablet 5 mg  5 mg Oral Daily Smooth Stevens , DO   5 mg at 07/25/22 0914    [Held by provider] metoprolol tartrate (LOPRESSOR) tablet 50 mg  50 mg Oral Daily Smooth Mileslin , DO   50 mg at 07/23/22 9888       REVIEW OF SYSTEMS:    CONSTITUTIONAL:  Denies fever, chill or rigors, nausea or vomiting. HEENT: denies blurring of vision or double vision, denies hearing problem  RESPIRATORY: denies cough, shortness of breath, sputum expectoration, chest pain. CARDIOVASCULAR:  Denies palpitation  GASTROINTESTINAL:  Diarrhea   INTEGUMENT: denies wound , rash  HEMATOLOGIC/LYMPHATIC:  Denies lymph node swelling, gum bleeding or easy bruising. MUSCULOSKELETAL:  Denies leg pain , joint pain , joint swelling  NEUROLOGICAL:  mental status change .       PHYSICAL EXAM:      Vitals:     /79   Pulse 70   Temp 97.3 °F (36.3 °C) (Oral)   Resp 16   Ht 4' 11\" (1.499 m)   Wt 144 lb 2.9 oz (65.4 kg)   SpO2 98%   BMI 29.12 kg/m²     General Appearance:    Awake, alert , no acute distress. Head:    Normocephalic, atraumatic   Eyes:    No pallor, no icterus,   Ears:    No obvious deformity or drainage.    Nose:   No nasal drainage   Throat:   Mucosa moist, no oral thrush   Neck:   Supple, no lymphadenopathy   Lungs:     Clear to auscultation bilaterally, no wheeze    Heart:    Regular rate and rhythm, systolic murmur    Abdomen:     Soft,  non tender,  bowel sounds present, PD catheter in place   Extremities:   No edema, no cyanosis    Pulses:   Dorsalis pedis palpable    Skin:   no rashes        DATA:      CBC with Differential:      Lab Results   Component Value Date/Time    WBC 16.1 07/25/2022 10:44 AM    RBC 3.67 07/25/2022 10:44 AM    HGB 10.8 07/25/2022 10:44 AM    HCT 32.6 07/25/2022 10:44 AM     07/25/2022 10:44 AM    MCV 88.8 07/25/2022 10:44 AM    MCH 29.4 07/25/2022 10:44 AM    MCHC 33.1 07/25/2022 10:44 AM    RDW 13.3 07/25/2022 10:44 AM    BANDSPCT 2 12/19/2015 04:23 PM    METASPCT 1 12/19/2015 04:23 PM    LYMPHOPCT 12.3 07/24/2022 05:49 AM    MONOPCT 5.3 07/24/2022 05:49 AM    MYELOPCT 1 12/19/2015 04:23 PM    BASOPCT 0.4 07/24/2022 05:49 AM    MONOSABS 0.85 07/24/2022 05:49 AM    LYMPHSABS 1.96 07/24/2022 05:49 AM    EOSABS 0.57 07/24/2022 05:49 AM    BASOSABS 0.07 07/24/2022 05:49 AM       CMP     Lab Results   Component Value Date/Time     07/25/2022 10:44 AM    K 3.5 07/25/2022 10:44 AM    K 4.4 07/20/2022 10:23 AM    CL 95 07/25/2022 10:44 AM    CO2 25 07/25/2022 10:44 AM    BUN 26 07/25/2022 10:44 AM    CREATININE 6.7 07/25/2022 10:44 AM    GFRAA 7 07/25/2022 10:44 AM    LABGLOM 6 07/25/2022 10:44 AM    GLUCOSE 123 07/25/2022 10:44 AM    PROT 6.1 07/24/2022 05:49 AM    LABALBU 2.5 07/24/2022 05:49 AM    CALCIUM 8.4 07/25/2022 10:44 AM    BILITOT 0.3 07/24/2022 05:49 AM    ALKPHOS 125 07/24/2022 05:49 AM    AST 17 07/24/2022 05:49 AM    ALT 16 07/24/2022 05:49 AM Hepatic Function Panel:    Lab Results   Component Value Date/Time    [facility] SOUTH 125 07/24/2022 05:49 AM    ALT 16 07/24/2022 05:49 AM    AST 17 07/24/2022 05:49 AM    PROT 6.1 07/24/2022 05:49 AM    BILITOT 0.3 07/24/2022 05:49 AM    LABALBU 2.5 07/24/2022 05:49 AM       PT/INR:    Lab Results   Component Value Date/Time    PROTIME 13.6 04/27/2022 09:33 PM    INR 1.3 04/27/2022 09:33 PM       TSH:  No results found for: TSH    U/A:    Lab Results   Component Value Date/Time    COLORU DARK YELLOW 06/13/2019 10:55 AM    PHUR 8.5 06/13/2019 10:55 AM    CLARITYU CLOUDY 06/13/2019 10:55 AM    SPECGRAV 1.015 06/13/2019 10:55 AM    LEUKOCYTESUR MODERATE 06/13/2019 10:55 AM    UROBILINOGEN 0.2 06/13/2019 10:55 AM    BILIRUBINUR Negative 06/13/2019 10:55 AM    BLOODU LARGE 06/13/2019 10:55 AM    GLUCOSEU 250 06/13/2019 10:55 AM       ABG:  No results found for: SRE5OZX, BEART, R3GHTVLJ, PHART, THGBART, YVA1HSY, PO2ART, ABD4FZG    MICROBIOLOGY:    Blood culture -    , Blood 2  Abnormal   Gram stain performed from blood culture bottle media   Gram positive cocci   P      Organism Staphylococcus species Abnormal  P    Culture, Blood 2 Identification and sensitivity to follow   Evaluating for Staph lugdenensis  P      Resulting Agency [address] [place] [address] Lab        Specimen: Nasopharyngeal Swab Updated: 07/20/22 0033    SARS-CoV-2, NAAT DETECTED Abnormal          Cell Count Fluid Type Peritoneal     Color, Fluid Colorless    Appearance, Fluid Clear    Nucl Cell, Fluid <3 /uL    RBC, Fluid <2,000 /uL    Neutrophil Count, Fluid 0            Radiology :    CT abdomen and pelvis -  Gallstones and borderline gallbladder hydrops. Consider correlation with   HIDA scan. Hepatic cirrhosis with mesenteric edema as well as abdominopelvic ascites. The presence of ascites is nonspecific in the setting of peritoneal dialysis.      HIDA scan - no cholecystitis       IMPRESSION:     Staph hominis  bacteremia - follow up blood cx neg on 7/23   COVID 19 infection ( vaccinated, not boosted, no hypoxia ) - non pneumonic manifestation - diarrhea , stool neg for C diff toxin   3. Leukocytosis   4. No peritonitis       RECOMMENDATIONS:       CBC with diff   Paxlovid -can't be given   Molnupiravir is not available   4.   Drop let plus isolation   5.   Echo ( discussed with Dr John Dias )

## 2022-07-25 NOTE — CARE COORDINATION
Attempted to reach patient's family for discharge planning. Calls made to all emergency contacts listed, no answer; left a message to return the call. Per nursing rounds,  Rue Liban scan (-), blood cultures positive and ID following. SW/CM will continue to follow for discharge planning.     VINCENT Nguyen, LSW [phone]

## 2022-07-25 NOTE — PROGRESS NOTES
[facility] MD Mirian Estrada, MD Raelene Heimlich, MD Yadira Oleary, CNP   Jyoti Cantu, DELILAH  Progress Note  7/25/2022    SUBJECTIVE:  We are following this patient for end-stage renal failure . 7/21: Feeling much better. Appetite and intake have improved somewhat. Denies dyspnea at rest on room air. CCPD last night without complications. No abdominal pain or cramping, nausea or vomiting. Peritoneal effluent was clear this morning.    7/22: Intermittent nausea, some anorexia but no abdominal pain no vomiting, no diarrhea or constipation. No dysuria or hematuria. 7/23: stable vitals    7/25 PD fluid no signs of peritonitis . Bp stable    PROBLEM LIST:    Patient Active Problem List   Diagnosis    Peritoneal dialysis catheter infection ([address]    Sepsis ([address]    SIRS (systemic inflammatory response syndrome) ([address]    Type 2 diabetes mellitus with diabetic chronic kidney disease ([address]    Osteoarthritis of right knee    Arthritis of knee, right    History of peritoneal dialysis    End stage renal disease (HCC)    Altered mental status    Acute delirium    Essential hypertension, benign    ESRD on peritoneal dialysis ([address]    Type 2 diabetes mellitus, with long-term current use of insulin (HCC)    Primary hypertension    Hypomagnesemia    Hypokalemia    Obesity (BMI 30-39. 9)    TIA (transient ischemic attack)    Hyperlipidemia    AMS (altered mental status)    Diabetes mellitus (HCC)    Lactic acid acidosis    Leukocytosis        PAST MEDICAL HISTORY:    Past Medical History:   Diagnosis Date    Anemia     Arthritis     CAD (coronary artery disease)     Diabetes mellitus ([address]     Gout     History of bleeding peptic ulcer approx 2015    Hypertension     Peritoneal dialysis catheter in place [facility])     Peritoneal dialysis status ([address]     at night for 8 hours    Thyroid disease     Use of cane as ambulatory aid     Wears glasses        MEDS (scheduled):   sodium chloride flush  5-40 mL IntraVENous 2 times per day    zinc sulfate  50 mg Oral Daily    aspirin  81 mg Oral Daily    atorvastatin  20 mg Oral Nightly    DULoxetine  30 mg Oral Daily    insulin glargine-yfgn  10 Units SubCUTAneous BID    isosorbide mononitrate  30 mg Oral Daily    levothyroxine  50 mcg Oral Daily    apixaban  2.5 mg Oral BID    pantoprazole  40 mg Oral QAM AC    amLODIPine  5 mg Oral Daily    [Held by provider] metoprolol tartrate  50 mg Oral Daily       MEDS (infusions):   dextrose 5 % and 0.45 % NaCl 75 mL/hr at 07/24/22 1306    sodium chloride      dextrose         MEDS (prn):  perflutren lipid microspheres, sodium chloride flush, sodium chloride, glucose, dextrose bolus **OR** dextrose bolus, glucagon (rDNA), dextrose, trimethobenzamide, albuterol sulfate HFA, guaiFENesin, dextrose    DIET:    ADULT DIET; Regular; 4 carb choices (60 gm/meal); Low Fat/Low Chol/High Fiber/2 gm Na; No Added Salt (3-4 gm); Low Potassium (Less than 3000 mg/day); Low Phosphorus (Less than 1000 mg)  ADULT ORAL NUTRITION SUPPLEMENT; Breakfast; Renal Oral Supplement      PHYSICAL EXAM:      Patient Vitals for the past 24 hrs:   BP Temp Temp src Pulse Resp SpO2   07/25/22 0900 120/79 97.3 °F (36.3 °C) Oral 70 16 --   07/25/22 0230 130/80 97.5 °F (36.4 °C) Oral 88 19 98 %   07/24/22 2145 (!) 140/74 98.5 °F (36.9 °C) Oral 80 -- 94 %   07/24/22 1739 (!) 144/76 98 °F (36.7 °C) -- 82 -- --            Intake/Output Summary (Last 24 hours) at 7/25/2022 1221  Last data filed at 7/25/2022 0725  Gross per 24 hour   Intake 260 ml   Output 640 ml   Net -380 ml         Wt Readings from Last 3 Encounters:   07/23/22 144 lb 2.9 oz (65.4 kg)   04/29/22 158 lb 8.2 oz (71.9 kg)   06/18/19 169 lb 5 oz (76.8 kg)       General:  in no acute distress  HEENT: NC/AT, EOMI, sclera and conjunctiva are clear and anicteric.   Mucous membranes moist.  Cardiovascular: regular rate and rhythm, no murmurs, gallops, or rubs  Respiratory:  Clear, no rales, rhochi, or wheezes  Gastrointestinal: soft, nontender, nondistended, NABS  Ext: no cyanosis, clubbing, or edema bilateral lower extremities  Neuro: awake, alert, oriented x3. Moves all 4 extremities. Cranial nerves II through XII grossly intact. Skin: dry, no rash      DATA:      Recent Labs     07/23/22  0537 07/24/22  0549 07/25/22  1044   WBC 12.5* 15.9* 16.1*   HGB 10.9* 12.1 10.8*   HCT 32.0* 36.0 32.6*   MCV 87.0 87.6 88.8    274 302       Recent Labs     07/23/22  0537 07/24/22  0549 07/25/22  1044    134 134   K 4.1 3.7 3.5   CL 96* 94* 95*   CO2 25 25 25   BUN 30* 28* 26*   CREATININE 7.4* 6.9* 6.7*         Iron studies:  Lab Results   Component Value Date    FERRITIN 1,217 07/20/2022     Bone disease:  Lab Results   Component Value Date    MG 1.0 (L) 07/22/2022    PHOS 3.1 07/22/2022       DIALYSIS ORDERS:  Reviewed      Assessment  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 No sign or symptom of peritonitis. No leukocytosis and still might consider UTI, though has no dysuria or hematuria either. Hyponatremia, due to water overload.   Improving with ultrafiltration  7    Staph hominis  bacteremia        COVID 19 infection ( vaccinated, not boosted, no hypoxia ) - non pneumonic manifestation - diarrhea , stool neg for C diff toxin      Mental status improved  No complications CCPD therapy    Recommendations  Continue CCPD for solute and volume clearance as per outpatient orders and dry weight  PD fluids no signs of peritonitis    Mirna Ruiz MD, MD  7/25/2022

## 2022-07-25 NOTE — PROGRESS NOTES
[facility]                                Progress note    Subjective: The patient is resting on exam  easily arouse able  No acute events overnight. Indicates she did not sleep all night secondary to IVs beeping and therefore feels very tired and groggy today    Objective:    /80   Pulse 80   Temp 97 °F (36.1 °C)   Resp 18   Ht 4' 11\" (1.499 m)   Wt 144 lb 2.9 oz (65.4 kg)   SpO2 98%   BMI 29.12 kg/m²     In: 260 [P.O.:100; I.V.:160]  Out: 640   In: 260   Out: 640     General appearance: NAD, conversant  HEENT: AT/NC, MMM  Neck: FROM, supple  Lungs: Clear to auscultation  CV: RRR, no MRGs  Vasc: Radial pulses 2+  Abdomen: Soft, non-tender; no masses or HSM, CAPD catheter  Extremities: No peripheral edema or digital cyanosis  Skin: no rash, lesions or ulcers  Psych: Alert and oriented to person, place and time  Neuro: Alert and interactive     Recent Labs     07/23/22  0537 07/24/22  0549 07/25/22  1044   WBC 12.5* 15.9* 16.1*   HGB 10.9* 12.1 10.8*   HCT 32.0* 36.0 32.6*    274 302       Recent Labs     07/23/22  0537 07/24/22  0549 07/25/22  1044    134 134   K 4.1 3.7 3.5   CL 96* 94* 95*   CO2 25 25 25   BUN 30* 28* 26*   CREATININE 7.4* 6.9* 6.7*   CALCIUM 8.0* 8.8 8.4*       Assessment:    Active Problems:    ESRD on peritoneal dialysis (HCC)    Primary hypertension    Obesity (BMI 30-39. 9)    Hyperlipidemia    AMS (altered mental status)    Diabetes mellitus (HCC)    Lactic acid acidosis    Leukocytosis  Resolved Problems:    * No resolved hospital problems.  *      Plan:  80year old female admitted to telemetry unit with    ESRD  CAPD  Consult nephrology  Monitor labs    Covid  Patient is asymptomatic not requiring any O2 needs at this time  Supportive care  Monitor inflammatory markers  Not a candidate for paxlovid     Staph hominis bacteremia-positive blood culture  Echo pending  ID following  Currently off antibiotic therapy    Son at bedside all questions answered. Groggy today    DVT Prophylaxis   PT/OT  Discharge planning       Tadeo Xavier KERRI Camarena - CNP  7:26 PM  7/25/2022     Above note edited to reflect my thoughts     I personally saw, examined and provided care for the patient. Radiographs, labs and medication list were reviewed by me independently. The case was discussed in detail and plans for care were established. Review of Alexandra MANNING- CNP, documentation was conducted and revisions were made as appropriate directly by me. I agree with the above documented exam, problem list, and plan of care.      Bryan Hendricks MD  8:45 PM  7/25/2022

## 2022-07-25 NOTE — PLAN OF CARE

## 2022-07-25 NOTE — FLOWSHEET NOTE
07/25/22 0725   Cycler   Ultrafiltration (UF) (mL) 639 mL   CCPD completed, catheter de-accessed, pin intact, stay safe cap applied, clamps closed, aseptic technique, cl yellow effluent, tolerated well, report to RN. Specimen for cell count and culture hand delivered to lab.

## 2022-07-26 LAB
ANION GAP SERPL CALCULATED.3IONS-SCNC: 14 MMOL/L (ref 7–16)
BUN BLDV-MCNC: 27 MG/DL (ref 6–23)
CALCIUM SERPL-MCNC: 8.4 MG/DL (ref 8.6–10.2)
CHLORIDE BLD-SCNC: 93 MMOL/L (ref 98–107)
CO2: 27 MMOL/L (ref 22–29)
CREAT SERPL-MCNC: 6.4 MG/DL (ref 0.5–1)
GFR AFRICAN AMERICAN: 8
GFR NON-AFRICAN AMERICAN: 6 ML/MIN/1.73
GLUCOSE BLD-MCNC: 103 MG/DL (ref 74–99)
GRAM STAIN ORDERABLE: NORMAL
HCT VFR BLD CALC: 29.3 % (ref 34–48)
HEMOGLOBIN: 9.8 G/DL (ref 11.5–15.5)
MCH RBC QN AUTO: 29.3 PG (ref 26–35)
MCHC RBC AUTO-ENTMCNC: 33.4 % (ref 32–34.5)
MCV RBC AUTO: 87.5 FL (ref 80–99.9)
METER GLUCOSE: 109 MG/DL (ref 74–99)
METER GLUCOSE: 117 MG/DL (ref 74–99)
METER GLUCOSE: 169 MG/DL (ref 74–99)
PDW BLD-RTO: 13.5 FL (ref 11.5–15)
PLATELET # BLD: 314 E9/L (ref 130–450)
PMV BLD AUTO: 10.6 FL (ref 7–12)
POTASSIUM SERPL-SCNC: 3.5 MMOL/L (ref 3.5–5)
RBC # BLD: 3.35 E12/L (ref 3.5–5.5)
SODIUM BLD-SCNC: 134 MMOL/L (ref 132–146)
WBC # BLD: 14.7 E9/L (ref 4.5–11.5)

## 2022-07-26 PROCEDURE — 6360000002 HC RX W HCPCS: Performed by: NURSE PRACTITIONER

## 2022-07-26 PROCEDURE — 85027 COMPLETE CBC AUTOMATED: CPT

## 2022-07-26 PROCEDURE — S5553 INSULIN LONG ACTING 5 U: HCPCS | Performed by: NURSE PRACTITIONER

## 2022-07-26 PROCEDURE — 2580000003 HC RX 258: Performed by: INTERNAL MEDICINE

## 2022-07-26 PROCEDURE — 2140000000 HC CCU INTERMEDIATE R&B

## 2022-07-26 PROCEDURE — 6370000000 HC RX 637 (ALT 250 FOR IP): Performed by: NURSE PRACTITIONER

## 2022-07-26 PROCEDURE — 36415 COLL VENOUS BLD VENIPUNCTURE: CPT

## 2022-07-26 PROCEDURE — 80048 BASIC METABOLIC PNL TOTAL CA: CPT

## 2022-07-26 PROCEDURE — 90945 DIALYSIS ONE EVALUATION: CPT

## 2022-07-26 PROCEDURE — 82962 GLUCOSE BLOOD TEST: CPT

## 2022-07-26 PROCEDURE — 6370000000 HC RX 637 (ALT 250 FOR IP): Performed by: INTERNAL MEDICINE

## 2022-07-26 RX ADMIN — DULOXETINE HYDROCHLORIDE 30 MG: 30 CAPSULE, DELAYED RELEASE ORAL at 09:27

## 2022-07-26 RX ADMIN — ASPIRIN 81 MG CHEWABLE TABLET 81 MG: 81 TABLET CHEWABLE at 09:46

## 2022-07-26 RX ADMIN — INSULIN GLARGINE-YFGN 10 UNITS: 100 INJECTION, SOLUTION SUBCUTANEOUS at 09:38

## 2022-07-26 RX ADMIN — LEVOTHYROXINE SODIUM 50 MCG: 0.05 TABLET ORAL at 06:53

## 2022-07-26 RX ADMIN — ATORVASTATIN CALCIUM 20 MG: 20 TABLET, FILM COATED ORAL at 21:59

## 2022-07-26 RX ADMIN — ISOSORBIDE MONONITRATE 30 MG: 30 TABLET, EXTENDED RELEASE ORAL at 09:35

## 2022-07-26 RX ADMIN — TRIMETHOBENZAMIDE HYDROCHLORIDE 200 MG: 100 INJECTION INTRAMUSCULAR at 10:35

## 2022-07-26 RX ADMIN — APIXABAN 2.5 MG: 2.5 TABLET, FILM COATED ORAL at 09:27

## 2022-07-26 RX ADMIN — PANTOPRAZOLE SODIUM 40 MG: 40 TABLET, DELAYED RELEASE ORAL at 06:53

## 2022-07-26 RX ADMIN — Medication 10 ML: at 09:36

## 2022-07-26 RX ADMIN — AMLODIPINE BESYLATE 5 MG: 5 TABLET ORAL at 09:36

## 2022-07-26 RX ADMIN — INSULIN GLARGINE-YFGN 10 UNITS: 100 INJECTION, SOLUTION SUBCUTANEOUS at 21:40

## 2022-07-26 RX ADMIN — Medication 50 MG: at 09:35

## 2022-07-26 RX ADMIN — Medication 10 ML: at 21:59

## 2022-07-26 RX ADMIN — APIXABAN 2.5 MG: 2.5 TABLET, FILM COATED ORAL at 21:59

## 2022-07-26 ASSESSMENT — PAIN SCALES - GENERAL
PAINLEVEL_OUTOF10: 0
PAINLEVEL_OUTOF10: 0

## 2022-07-26 NOTE — PROGRESS NOTES
Department of Internal Medicine  Infectious Diseases   Progress  Note    C/C : CONS bacteremia , COVID 19 infection     Denies fever or chills  Reports  nausea , weakness   Afebrile       Current Facility-Administered Medications   Medication Dose Route Frequency Provider Last Rate Last Admin    dextrose 5 % and 0.45 % sodium chloride infusion   IntraVENous Continuous Genesis Robb MD 75 mL/hr at 07/24/22 1306 New Bag at 07/24/22 1306    perflutren lipid microspheres (DEFINITY) injection 1.65 mg  1.5 mL IntraVENous ONCE PRN Genesis Robb MD        sodium chloride flush 0.9 % injection 5-40 mL  5-40 mL IntraVENous 2 times per day Genesis Robb MD   10 mL at 07/26/22 0936    sodium chloride flush 0.9 % injection 5-40 mL  5-40 mL IntraVENous PRN Genesis Robb MD   10 mL at 07/22/22 1805    0.9 % sodium chloride infusion   IntraVENous PRN Genesis Robb MD        glucose chewable tablet 16 g  4 tablet Oral PRN April Nabila, APRN - CNP        dextrose bolus 10% 125 mL  125 mL IntraVENous PRN April Lataus, APRN - CNP        Or    dextrose bolus 10% 250 mL  250 mL IntraVENous PRN April Champ-Kevin, APRN - CNP        glucagon (rDNA) injection 1 mg  1 mg SubCUTAneous PRN April Nabila, APRN - CNP        dextrose 10 % infusion   IntraVENous Continuous PRN April Lataus, APRN - CNP        trimethobenzamide [address]) injection 200 mg  200 mg IntraMUSCular Q6H PRN April Nabila, APRN - CNP   200 mg at 07/26/22 1035    zinc sulfate (ZINCATE) capsule 50 mg  50 mg Oral Daily April Nabila, APRN - CNP   50 mg at 07/26/22 0935    albuterol sulfate HFA (PROVENTIL;VENTOLIN;PROAIR) 108 (90 Base) MCG/ACT inhaler 2 puff  2 puff Inhalation Q6H PRN April Nabila, APRN - QIANA        guaiFENesin (ROBITUSSIN) 100 MG/5ML oral solution 200 mg  200 mg Oral Q6H PRN April Nabila, APRN - CNP        aspirin chewable tablet 81 mg  81 mg Oral Daily April Nabila APRN - CNP   81 mg at 07/26/22 0946    atorvastatin (LIPITOR) tablet 20 mg  20 mg Oral Nightly April Storey-Cornelius, APRN - CNP   20 mg at 07/25/22 2058    DULoxetine (CYMBALTA) extended release capsule 30 mg  30 mg Oral Daily April Storey-Cornelius, APRN - CNP   30 mg at 07/26/22 0927    insulin glargine-yfgn [facility]) injection vial 10 Units  10 Units SubCUTAneous BID April Storey-Cornelius APRN - CNP   10 Units at 07/26/22 1065    isosorbide mononitrate (IMDUR) extended release tablet 30 mg  30 mg Oral Daily April Storey-Cornelius, APRN - CNP   30 mg at 07/26/22 0935    levothyroxine (SYNTHROID) tablet 50 mcg  50 mcg Oral Daily April Storey-Cornelius, APRN - CNP   50 mcg at 07/26/22 4179    apixaban (ELIQUIS) tablet 2.5 mg  2.5 mg Oral BID April Storey-Cornelius, APRN - CNP   2.5 mg at 07/26/22 0927    pantoprazole (PROTONIX) tablet 40 mg  40 mg Oral QAM AC April Storey-Cornelius, APRN - CNP   40 mg at 07/26/22 0653    amLODIPine (NORVASC) tablet 5 mg  5 mg Oral Daily Smooth Twilla Maizes, DO   5 mg at 07/26/22 8981    [Held by provider] metoprolol tartrate (LOPRESSOR) tablet 50 mg  50 mg Oral Daily Smooth Twilla Maizes, DO   50 mg at 07/23/22 6399       REVIEW OF SYSTEMS:    CONSTITUTIONAL:  Denies fever, chill or rigors, nausea or vomiting. HEENT: denies blurring of vision or double vision, denies hearing problem  RESPIRATORY: denies cough, shortness of breath, sputum expectoration. CARDIOVASCULAR:  Denies palpitation  GASTROINTESTINAL: Nausea, poor appetite   INTEGUMENT: denies wound , rash  HEMATOLOGIC/LYMPHATIC:  Denies lymph node swelling, gum bleeding or easy bruising. MUSCULOSKELETAL:  Denies leg pain , joint pain , joint swelling  NEUROLOGICAL:  mental status change .       PHYSICAL EXAM:      Vitals:     BP (!) 142/67   Pulse 74   Temp 97.6 °F (36.4 °C) (Oral)   Resp 18   Ht 4' 11\" (1.499 m)   Wt 143 lb 4.8 oz (65 kg)   SpO2 95%   BMI 28.94 kg/m²     General Appearance:    Awake, alert , no acute distress. Head:    Normocephalic, atraumatic   Eyes:    No pallor, no icterus,   Ears:    No obvious deformity or drainage.    Nose:   No nasal drainage   Throat:   Mucosa moist, no oral thrush   Neck:   Supple, no lymphadenopathy   Lungs:     Clear to auscultation bilaterally, no wheeze    Heart:    Regular rate and rhythm, systolic murmur    Abdomen:     Soft,  non tender,  bowel sounds present, PD catheter in place   Extremities:   No edema, no cyanosis    Pulses:   Dorsalis pedis palpable    Skin:   no rashes        DATA:      CBC with Differential:      Lab Results   Component Value Date/Time    WBC 14.7 07/26/2022 08:44 AM    RBC 3.35 07/26/2022 08:44 AM    HGB 9.8 07/26/2022 08:44 AM    HCT 29.3 07/26/2022 08:44 AM     07/26/2022 08:44 AM    MCV 87.5 07/26/2022 08:44 AM    MCH 29.3 07/26/2022 08:44 AM    MCHC 33.4 07/26/2022 08:44 AM    RDW 13.5 07/26/2022 08:44 AM    BANDSPCT 2 12/19/2015 04:23 PM    METASPCT 1 12/19/2015 04:23 PM    LYMPHOPCT 12.3 07/24/2022 05:49 AM    MONOPCT 5.3 07/24/2022 05:49 AM    MYELOPCT 1 12/19/2015 04:23 PM    BASOPCT 0.4 07/24/2022 05:49 AM    MONOSABS 0.85 07/24/2022 05:49 AM    LYMPHSABS 1.96 07/24/2022 05:49 AM    EOSABS 0.57 07/24/2022 05:49 AM    BASOSABS 0.07 07/24/2022 05:49 AM       CMP     Lab Results   Component Value Date/Time     07/26/2022 08:44 AM    K 3.5 07/26/2022 08:44 AM    K 4.4 07/20/2022 10:23 AM    CL 93 07/26/2022 08:44 AM    CO2 27 07/26/2022 08:44 AM    BUN 27 07/26/2022 08:44 AM    CREATININE 6.4 07/26/2022 08:44 AM    GFRAA 8 07/26/2022 08:44 AM    LABGLOM 6 07/26/2022 08:44 AM    GLUCOSE 103 07/26/2022 08:44 AM    PROT 6.1 07/24/2022 05:49 AM    LABALBU 2.5 07/24/2022 05:49 AM    CALCIUM 8.4 07/26/2022 08:44 AM    BILITOT 0.3 07/24/2022 05:49 AM    ALKPHOS 125 07/24/2022 05:49 AM    AST 17 07/24/2022 05:49 AM    ALT 16 07/24/2022 05:49 AM         Hepatic Function Panel:    Lab Results   Component Value Date/Time    ALKPHOS 125 07/24/2022 05:49 AM    ALT 16 07/24/2022 05:49 AM    AST 17 07/24/2022 05:49 AM    PROT 6.1 07/24/2022 05:49 AM    BILITOT 0.3 07/24/2022 05:49 AM    LABALBU 2.5 07/24/2022 05:49 AM       PT/INR:    Lab Results   Component Value Date/Time    PROTIME 13.6 04/27/2022 09:33 PM    INR 1.3 04/27/2022 09:33 PM       TSH:  No results found for: TSH    U/A:    Lab Results   Component Value Date/Time    COLORU DARK YELLOW 06/13/2019 10:55 AM    PHUR 8.5 06/13/2019 10:55 AM    CLARITYU CLOUDY 06/13/2019 10:55 AM    SPECGRAV 1.015 06/13/2019 10:55 AM    LEUKOCYTESUR MODERATE 06/13/2019 10:55 AM    UROBILINOGEN 0.2 06/13/2019 10:55 AM    BILIRUBINUR Negative 06/13/2019 10:55 AM    BLOODU LARGE 06/13/2019 10:55 AM    GLUCOSEU 250 06/13/2019 10:55 AM       ABG:  No results found for: NWS4WCP, BEART, P7QWSERM, PHART, THGBART, AVD5JBR, PO2ART, DWU4DJO    MICROBIOLOGY:    Blood culture -    , Blood 2  Abnormal   Gram stain performed from blood culture bottle media   Gram positive cocci   P      Organism Staphylococcus species Abnormal  P    Culture, Blood 2 Identification and sensitivity to follow   Evaluating for Staph lugdenensis  P      Resulting Agency [address] [place] [address] Lab        Specimen: Nasopharyngeal Swab Updated: 07/20/22 0033    SARS-CoV-2, NAAT DETECTED Abnormal          Cell Count Fluid Type Peritoneal     Color, Fluid Colorless    Appearance, Fluid Clear    Nucl Cell, Fluid <3 /uL    RBC, Fluid <2,000 /uL    Neutrophil Count, Fluid 0            Radiology :    CT abdomen and pelvis -  Gallstones and borderline gallbladder hydrops. Consider correlation with   HIDA scan. Hepatic cirrhosis with mesenteric edema as well as abdominopelvic ascites. The presence of ascites is nonspecific in the setting of peritoneal dialysis.      HIDA scan - no cholecystitis       IMPRESSION:     Staph hominis  bacteremia - follow up blood cx neg on 7/23   COVID 19 infection ( vaccinated, not boosted, no hypoxia ) - non pneumonic manifestation - diarrhea , stool neg for C diff toxin   3. Leukocytosis   4.  No peritonitis       RECOMMENDATIONS:       Off abx   Supportive care

## 2022-07-26 NOTE — FLOWSHEET NOTE
CCPD completed using aseptic technique with 748 mL of clear yellow effluent noted.    07/26/22 0700   Vitals   BP (!) 140/60   Temp 97.2 °F (36.2 °C)   Heart Rate 78   Resp 18   Weight 143 lb 4.8 oz (65 kg)   Cycler   Ultrafiltration (UF) (mL) 748 mL

## 2022-07-26 NOTE — CARE COORDINATION
Attempted to reach patient via bedside phone due to covid isolation for transition of care planning but no answer. Several attempts have been made to reach all emergency contacts listed but no one answers or returns the call. Nurse reported the patient's son was at bedside. SW provided nurse with her contact number and requested family call as soon as possible for discharge planning. SW awaiting call from the family.     Rodríguez Gutierrez, MSW, LSW [phone]

## 2022-07-26 NOTE — FLOWSHEET NOTE
CCPD initiated using aseptic technique and draining clear yellow effluent. Dressing changed.    07/26/22 1450   Cycler   Verification of Prescription CCPD   Total Volume Programmed 73491 mL   Therapy Time (Hours:Minutes) 8.5   Fill Volume 2300 mL   Last Fill Volume 1800 mL   Dextrose Setting Same (Nonextraneal)   Number of Cycles 5   Bag Volume 5000 mL   Number of Bags Used 3   Dianeal Solution   (2.5% in 5000ml)

## 2022-07-26 NOTE — PROGRESS NOTES
ambulatory aid     Wears glasses        MEDS (scheduled):   sodium chloride flush  5-40 mL IntraVENous 2 times per day    zinc sulfate  50 mg Oral Daily    aspirin  81 mg Oral Daily    atorvastatin  20 mg Oral Nightly    DULoxetine  30 mg Oral Daily    insulin glargine-yfgn  10 Units SubCUTAneous BID    isosorbide mononitrate  30 mg Oral Daily    levothyroxine  50 mcg Oral Daily    apixaban  2.5 mg Oral BID    pantoprazole  40 mg Oral QAM AC    amLODIPine  5 mg Oral Daily    [Held by provider] metoprolol tartrate  50 mg Oral Daily       MEDS (infusions):   dextrose 5 % and 0.45 % NaCl 75 mL/hr at 07/24/22 1306    sodium chloride      dextrose         MEDS (prn):  perflutren lipid microspheres, sodium chloride flush, sodium chloride, glucose, dextrose bolus **OR** dextrose bolus, glucagon (rDNA), dextrose, trimethobenzamide, albuterol sulfate HFA, guaiFENesin    DIET:    ADULT DIET; Regular; 4 carb choices (60 gm/meal); Low Fat/Low Chol/High Fiber/2 gm Na; No Added Salt (3-4 gm); Low Potassium (Less than 3000 mg/day);  Low Phosphorus (Less than 1000 mg)  ADULT ORAL NUTRITION SUPPLEMENT; Breakfast; Renal Oral Supplement      PHYSICAL EXAM:      Patient Vitals for the past 24 hrs:   BP Temp Temp src Pulse Resp SpO2 Weight   07/26/22 1450 (!) 142/67 97.6 °F (36.4 °C) -- 74 18 -- 143 lb 11.8 oz (65.2 kg)   07/26/22 0915 (!) 142/67 97.6 °F (36.4 °C) Oral 74 18 95 % --   07/26/22 0700 (!) 140/60 97.2 °F (36.2 °C) -- 78 18 -- 143 lb 4.8 oz (65 kg)   07/26/22 0541 -- -- -- -- -- -- 144 lb 12.8 oz (65.7 kg)   07/26/22 0431 (!) 146/58 98.7 °F (37.1 °C) Oral 82 18 100 % --   07/26/22 0033 (!) 111/37 98.6 °F (37 °C) Oral 80 16 100 % --   07/25/22 2100 (!) 106/52 98.2 °F (36.8 °C) Oral 78 18 -- --            Intake/Output Summary (Last 24 hours) at 7/26/2022 1551  Last data filed at 7/26/2022 0700  Gross per 24 hour   Intake --   Output 748 ml   Net -748 ml         Wt Readings from Last 3 Encounters:   07/26/22 143 lb 11.8 oz (65.2 kg)   04/29/22 158 lb 8.2 oz (71.9 kg)   06/18/19 169 lb 5 oz (76.8 kg)       General:  in no acute distress  HEENT: NC/AT, EOMI, sclera and conjunctiva are clear and anicteric. Mucous membranes moist.  Cardiovascular: regular rate and rhythm, no murmurs, gallops, or rubs  Respiratory:  Clear, no rales, rhochi, or wheezes  Gastrointestinal: soft, nontender, nondistended, NABS  Ext: no cyanosis, clubbing, or edema bilateral lower extremities  Neuro: awake, alert, oriented x3. Moves all 4 extremities. Cranial nerves II through XII grossly intact. Skin: dry, no rash      DATA:      Recent Labs     07/24/22  0549 07/25/22  1044 07/26/22  0844   WBC 15.9* 16.1* 14.7*   HGB 12.1 10.8* 9.8*   HCT 36.0 32.6* 29.3*   MCV 87.6 88.8 87.5    302 314       Recent Labs     07/24/22  0549 07/25/22  1044 07/26/22  0844    134 134   K 3.7 3.5 3.5   CL 94* 95* 93*   CO2 25 25 27   BUN 28* 26* 27*   CREATININE 6.9* 6.7* 6.4*         Iron studies:  Lab Results   Component Value Date    FERRITIN 1,217 07/20/2022     Bone disease:  Lab Results   Component Value Date    MG 1.0 (L) 07/22/2022    PHOS 3.1 07/22/2022       DIALYSIS ORDERS:  Reviewed      Assessment  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 No sign or symptom of peritonitis. No leukocytosis and still might consider UTI, though has no dysuria or hematuria either. Hyponatremia, due to water overload.   Improving with ultrafiltration  7    Staph hominis  bacteremia        COVID 19 infection ( vaccinated, not boosted, no hypoxia ) - non pneumonic manifestation - diarrhea , stool neg for C diff toxin      Mental status improved  No complications CCPD therapy    Recommendations  Continue CCPD for solute and volume clearance as per outpatient orders and dry weight  PD fluids no signs of peritonitis    Damien Reynoso MD, MD  7/26/2022

## 2022-07-26 NOTE — PROGRESS NOTES
[facility]                                Progress note    Subjective: The patient is alert and oriented. No acute events overnight. Denies chest pain, sob    Objective:    BP (!) 142/67   Pulse 74   Temp 97.6 °F (36.4 °C)   Resp 18   Ht 4' 11\" (1.499 m)   Wt 143 lb 11.8 oz (65.2 kg)   SpO2 95%   BMI 29.03 kg/m²     In: -   Out: 748   In: -   Out: 748     General appearance: NAD, conversant  HEENT: AT/NC, MMM  Neck: FROM, supple  Lungs: Clear to auscultation  CV: RRR, no MRGs  Vasc: Radial pulses 2+  Abdomen: Soft, non-tender; no masses or HSM, CAPD catheter  Extremities: No peripheral edema or digital cyanosis  Skin: no rash, lesions or ulcers  Psych: Alert and oriented to person, place and time  Neuro: Alert and interactive     Recent Labs     07/24/22  0549 07/25/22  1044 07/26/22  0844   WBC 15.9* 16.1* 14.7*   HGB 12.1 10.8* 9.8*   HCT 36.0 32.6* 29.3*    302 314       Recent Labs     07/24/22  0549 07/25/22  1044 07/26/22  0844    134 134   K 3.7 3.5 3.5   CL 94* 95* 93*   CO2 25 25 27   BUN 28* 26* 27*   CREATININE 6.9* 6.7* 6.4*   CALCIUM 8.8 8.4* 8.4*       Assessment:    Active Problems:    ESRD on peritoneal dialysis (HCC)    Primary hypertension    Obesity (BMI 30-39. 9)    Hyperlipidemia    AMS (altered mental status)    Diabetes mellitus (HCC)    Lactic acid acidosis    Leukocytosis  Resolved Problems:    * No resolved hospital problems. *      Plan:  80year old female with known history of end-stage renal disease on peritoneal dialysis admitted to telemetry unit with complaints of diffuse abdominal pain nausea and vomiting    ESRD  CAPD  Consult nephrology  Monitor labs  Fluids no sign of peritonitis    Covid  Patient is asymptomatic not requiring any O2 needs at this time  Supportive care  Monitor inflammatory markers  Not a candidate for paxlovid     Staph hominis bacteremia-positive blood culture  Echo - EF 55%, mild to moderate mitral regurg.   ID following - appreciate input  Currently off antibiotic therapy      DVT Prophylaxis   PT/OT  Discharge planning       KERRI Grande CNP  7:21 PM  7/26/2022     Above note edited to reflect my thoughts     I personally saw, examined and provided care for the patient. Radiographs, labs and medication list were reviewed by me independently. The case was discussed in detail and plans for care were established. Review of Alexandra BLAIR CNP, documentation was conducted and revisions were made as appropriate directly by me. I agree with the above documented exam, problem list, and plan of care.      Sole Jimenez MD  8:18 PM  7/26/2022

## 2022-07-27 VITALS
HEIGHT: 59 IN | WEIGHT: 143.2 LBS | DIASTOLIC BLOOD PRESSURE: 68 MMHG | RESPIRATION RATE: 16 BRPM | SYSTOLIC BLOOD PRESSURE: 137 MMHG | OXYGEN SATURATION: 97 % | BODY MASS INDEX: 28.87 KG/M2 | TEMPERATURE: 97.8 F | HEART RATE: 74 BPM

## 2022-07-27 LAB
BODY FLUID CULTURE, STERILE: NORMAL
BODY FLUID CULTURE, STERILE: NORMAL
GRAM STAIN RESULT: NORMAL
GRAM STAIN RESULT: NORMAL
METER GLUCOSE: 113 MG/DL (ref 74–99)
METER GLUCOSE: 75 MG/DL (ref 74–99)
REPORT: NORMAL
THIS TEST SENT TO: NORMAL

## 2022-07-27 PROCEDURE — S5553 INSULIN LONG ACTING 5 U: HCPCS | Performed by: NURSE PRACTITIONER

## 2022-07-27 PROCEDURE — 82962 GLUCOSE BLOOD TEST: CPT

## 2022-07-27 PROCEDURE — 6370000000 HC RX 637 (ALT 250 FOR IP): Performed by: NURSE PRACTITIONER

## 2022-07-27 PROCEDURE — 6370000000 HC RX 637 (ALT 250 FOR IP): Performed by: INTERNAL MEDICINE

## 2022-07-27 RX ORDER — GUAIFENESIN 100 MG/5ML
200 SOLUTION ORAL EVERY 6 HOURS PRN
Qty: 1200 ML | Refills: 0 | Status: SHIPPED | OUTPATIENT
Start: 2022-07-27

## 2022-07-27 RX ORDER — INSULIN GLARGINE 100 [IU]/ML
10 INJECTION, SOLUTION SUBCUTANEOUS 2 TIMES DAILY
Qty: 10 ML | Refills: 3 | Status: SHIPPED | OUTPATIENT
Start: 2022-07-27

## 2022-07-27 RX ORDER — ZINC SULFATE 50(220)MG
50 CAPSULE ORAL DAILY
Qty: 30 CAPSULE | Refills: 3 | COMMUNITY
Start: 2022-07-28 | End: 2022-07-30

## 2022-07-27 RX ADMIN — PANTOPRAZOLE SODIUM 40 MG: 40 TABLET, DELAYED RELEASE ORAL at 06:37

## 2022-07-27 RX ADMIN — DULOXETINE HYDROCHLORIDE 30 MG: 30 CAPSULE, DELAYED RELEASE ORAL at 10:15

## 2022-07-27 RX ADMIN — AMLODIPINE BESYLATE 5 MG: 5 TABLET ORAL at 10:16

## 2022-07-27 RX ADMIN — ASPIRIN 81 MG CHEWABLE TABLET 81 MG: 81 TABLET CHEWABLE at 10:15

## 2022-07-27 RX ADMIN — INSULIN GLARGINE-YFGN 10 UNITS: 100 INJECTION, SOLUTION SUBCUTANEOUS at 10:35

## 2022-07-27 RX ADMIN — ISOSORBIDE MONONITRATE 30 MG: 30 TABLET, EXTENDED RELEASE ORAL at 10:16

## 2022-07-27 RX ADMIN — Medication 50 MG: at 10:16

## 2022-07-27 RX ADMIN — APIXABAN 2.5 MG: 2.5 TABLET, FILM COATED ORAL at 10:16

## 2022-07-27 RX ADMIN — LEVOTHYROXINE SODIUM 50 MCG: 0.05 TABLET ORAL at 06:37

## 2022-07-27 NOTE — CARE COORDINATION
Met with patient's son, Sanjeev Galindo for transition of care planning. He reports the patient lives alone in a home, is independent and ambulates with a walker. Patient on peritoneal dialysis ([facility]), uses [facility]	[place] and PCP is  Dr. Todd Blancas. Plan is home, interested in home care services being set up; no preference on agency. Sanjeev Galindo will transport home when medically cleared for discharge. Will need home care orders for skilled nursing, PT/OT. Referral made to Keenan Vieira with [facility] with [facility] care; she will review and follow-up regarding acceptance. The Plan for Transition of Care is related to the following treatment goals: discharge planning    The Patient and/or patient representative Gildardo Peters was provided with a choice of provider and agrees with the discharge plan. [x] Yes [] No    Freedom of choice list was provided with basic dialogue that supports the patient's individualized plan of care/goals, treatment preferences and shares the quality data associated with the providers.  [x] Yes [] No     Venkata Noel, MSW, LSW [phone]

## 2022-07-27 NOTE — PROGRESS NOTES
[facility] MD Taryn Sheridan MD Issac Sandman, MD Annah Alma, QIANA Cantu, DELILAH  Progress Note  7/27/2022    SUBJECTIVE:  We are following this patient for end-stage renal failure . 7/21: Feeling much better. Appetite and intake have improved somewhat. Denies dyspnea at rest on room air. CCPD last night without complications. No abdominal pain or cramping, nausea or vomiting. Peritoneal effluent was clear this morning.    7/22: Intermittent nausea, some anorexia but no abdominal pain no vomiting, no diarrhea or constipation. No dysuria or hematuria. 7/23: stable vitals    7/25 PD fluid no signs of peritonitis . Bp stable    7/26 : continue to reprot some nasuea   Vitals stable  Off Abx per ID     7/27 : seen in her room appear comfortable . Bp stable . On RA . PROBLEM LIST:    Patient Active Problem List   Diagnosis    Peritoneal dialysis catheter infection ([address]    Sepsis ([address]    SIRS (systemic inflammatory response syndrome) ([address]    Type 2 diabetes mellitus with diabetic chronic kidney disease ([address]    Osteoarthritis of right knee    Arthritis of knee, right    History of peritoneal dialysis    End stage renal disease (HCC)    Altered mental status    Acute delirium    Essential hypertension, benign    ESRD on peritoneal dialysis ([address]    Type 2 diabetes mellitus, with long-term current use of insulin (HCC)    Primary hypertension    Hypomagnesemia    Hypokalemia    Obesity (BMI 30-39. 9)    TIA (transient ischemic attack)    Hyperlipidemia    AMS (altered mental status)    Diabetes mellitus (HCC)    Lactic acid acidosis    Leukocytosis        PAST MEDICAL HISTORY:    Past Medical History:   Diagnosis Date    Anemia     Arthritis     CAD (coronary artery disease)     Diabetes mellitus ([address]     Gout     History of bleeding peptic ulcer approx 2015    Hypertension     Peritoneal dialysis catheter in place [facility])     Peritoneal dialysis status ([facility]     at night for 8 hours    Thyroid disease     Use of cane as ambulatory aid     Wears glasses        MEDS (scheduled):   sodium chloride flush  5-40 mL IntraVENous 2 times per day    zinc sulfate  50 mg Oral Daily    aspirin  81 mg Oral Daily    atorvastatin  20 mg Oral Nightly    DULoxetine  30 mg Oral Daily    insulin glargine-yfgn  10 Units SubCUTAneous BID    isosorbide mononitrate  30 mg Oral Daily    levothyroxine  50 mcg Oral Daily    apixaban  2.5 mg Oral BID    pantoprazole  40 mg Oral QAM AC    amLODIPine  5 mg Oral Daily    [Held by provider] metoprolol tartrate  50 mg Oral Daily       MEDS (infusions):   dextrose 5 % and 0.45 % NaCl 75 mL/hr at 07/24/22 1306    sodium chloride      dextrose         MEDS (prn):  perflutren lipid microspheres, sodium chloride flush, sodium chloride, glucose, dextrose bolus **OR** dextrose bolus, glucagon (rDNA), dextrose, trimethobenzamide, albuterol sulfate HFA, guaiFENesin    DIET:    ADULT DIET; Regular; 4 carb choices (60 gm/meal); Low Fat/Low Chol/High Fiber/2 gm Na; No Added Salt (3-4 gm); Low Potassium (Less than 3000 mg/day); Low Phosphorus (Less than 1000 mg)  ADULT ORAL NUTRITION SUPPLEMENT; Breakfast, Dinner; Renal Oral Supplement  ADULT ORAL NUTRITION SUPPLEMENT; Lunch;  Fortified Pudding Oral Supplement      PHYSICAL EXAM:      Patient Vitals for the past 24 hrs:   BP Temp Temp src Pulse Resp SpO2 Height Weight   07/27/22 1007 -- -- -- -- -- -- 4' 11\" (1.499 m) --   07/27/22 0845 137/68 97.8 °F (36.6 °C) Oral 74 16 97 % -- --   07/27/22 0500 -- -- -- -- -- -- -- 143 lb 3.2 oz (65 kg)   07/27/22 0400 (!) 139/47 98.2 °F (36.8 °C) Oral 94 16 95 % -- --   07/27/22 0000 (!) 122/54 97.9 °F (36.6 °C) Oral 95 16 95 % -- --   07/26/22 2145 114/60 97.8 °F (36.6 °C) Oral 76 16 97 % -- --   07/26/22 2000 114/60 97.8 °F (36.6 °C) Oral 76 16 97 % -- --   07/26/22 1450 (!) 142/67 97.6 °F (36.4 °C) -- 74 18 -- -- 143 lb 11.8 oz (65.2 kg)            Intake/Output therapy    Recommendations  Continue CCPD for solute and volume clearance as per outpatient orders and dry weight  PD fluids no signs of peritonitis    Vianey Sosa MD, MD  7/27/2022

## 2022-07-27 NOTE — PROGRESS NOTES
Department of Internal Medicine  Infectious Diseases   Progress  Note    C/C : CONS bacteremia , COVID 19 infection     Denies fever or chills  Feels better today   Denies abdomen pain , nausea   Afebrile       Current Facility-Administered Medications   Medication Dose Route Frequency Provider Last Rate Last Admin    dextrose 5 % and 0.45 % sodium chloride infusion   IntraVENous Continuous Tammy Rodriguez MD 75 mL/hr at 07/24/22 1306 New Bag at 07/24/22 1306    perflutren lipid microspheres (DEFINITY) injection 1.65 mg  1.5 mL IntraVENous ONCE PRN Tammy Rodriguez MD        sodium chloride flush 0.9 % injection 5-40 mL  5-40 mL IntraVENous 2 times per day Tammy Rodriguez MD   10 mL at 07/26/22 2159    sodium chloride flush 0.9 % injection 5-40 mL  5-40 mL IntraVENous PRN Tammy Rodriguez MD   10 mL at 07/22/22 1805    0.9 % sodium chloride infusion   IntraVENous PRN Tammy Rodriguez MD        glucose chewable tablet 16 g  4 tablet Oral PRN April Champ-Kevin, APRN - CNP        dextrose bolus 10% 125 mL  125 mL IntraVENous PRN April Champ-Whitefish, APRN - CNP        Or    dextrose bolus 10% 250 mL  250 mL IntraVENous PRN April Champ-Whitefish, APRN - CNP        glucagon (rDNA) injection 1 mg  1 mg SubCUTAneous PRN April Champ-Kevin, APRN - CNP        dextrose 10 % infusion   IntraVENous Continuous PRN April Champ-Kevin, APRN - CNP        trimethobenzamide Ted Ewings) injection 200 mg  200 mg IntraMUSCular Q6H PRN April Champ-Whitefish, APRN - CNP   200 mg at 07/26/22 1035    zinc sulfate (ZINCATE) capsule 50 mg  50 mg Oral Daily April Nabila, APRN - CNP   50 mg at 07/27/22 1016    albuterol sulfate HFA (PROVENTIL;VENTOLIN;PROAIR) 108 (90 Base) MCG/ACT inhaler 2 puff  2 puff Inhalation Q6H PRN April Nabila, APRN - CNP        guaiFENesin (ROBITUSSIN) 100 MG/5ML oral solution 200 mg  200 mg Oral Q6H PRN April Nabila, APRN - CNP        aspirin chewable tablet 81 mg  81 mg Oral Daily April Almalius, APRN - CNP   81 mg at 07/27/22 1015    atorvastatin (LIPITOR) tablet 20 mg  20 mg Oral Nightly April STEVE DahlN - CNP   20 mg at 07/26/22 2159    DULoxetine (CYMBALTA) extended release capsule 30 mg  30 mg Oral Daily April Nabila APRN - CNP   30 mg at 07/27/22 1015    insulin glargine-yfgn [facility]) injection vial 10 Units  10 Units SubCUTAneous BID April STEVE DahlN - CNP   10 Units at 07/27/22 1035    isosorbide mononitrate (IMDUR) extended release tablet 30 mg  30 mg Oral Daily April Nabila APRN - CNP   30 mg at 07/27/22 1016    levothyroxine (SYNTHROID) tablet 50 mcg  50 mcg Oral Daily April Nabila APRN - CNP   50 mcg at 07/27/22 5883    apixaban (ELIQUIS) tablet 2.5 mg  2.5 mg Oral BID April ChampVazquez APRN - CNP   2.5 mg at 07/27/22 1016    pantoprazole (PROTONIX) tablet 40 mg  40 mg Oral QAM AC April Nabila, APRN - CNP   40 mg at 07/27/22 1166    amLODIPine (NORVASC) tablet 5 mg  5 mg Oral Daily Smooth [place], DO   5 mg at 07/27/22 1016    [Held by provider] metoprolol tartrate (LOPRESSOR) tablet 50 mg  50 mg Oral Daily Smooth [place], DO   50 mg at 07/23/22 0907       REVIEW OF SYSTEMS:    CONSTITUTIONAL:  Denies fever, chill or rigors, nausea or vomiting. HEENT: denies blurring of vision or double vision, denies hearing problem  RESPIRATORY: denies cough, shortness of breath, sputum expectoration. CARDIOVASCULAR:  Denies palpitation  GASTROINTESTINAL: Nausea, poor appetite   INTEGUMENT: denies wound , rash  HEMATOLOGIC/LYMPHATIC:  Denies lymph node swelling, gum bleeding or easy bruising. MUSCULOSKELETAL:  Denies leg pain , joint pain , joint swelling  NEUROLOGICAL:  mental status change .       PHYSICAL EXAM:      Vitals:     /68   Pulse 74   Temp 97.8 °F (36.6 °C) (Oral)   Resp 16   Ht 4' 11\" (1.499 m)   Wt 143 lb 3.2 oz (65 kg)   SpO2 97%   BMI 28.92 kg/m²     General Appearance: Awake, alert , no acute distress. Head:    Normocephalic, atraumatic   Eyes:    No pallor, no icterus,   Ears:    No obvious deformity or drainage.    Nose:   No nasal drainage   Throat:   Mucosa moist, no oral thrush   Neck:   Supple, no lymphadenopathy   Lungs:     Clear to auscultation bilaterally, no wheeze    Heart:    Regular rate and rhythm, systolic murmur    Abdomen:     Soft,  non tender,  bowel sounds present, PD catheter in place   Extremities:   No edema, no cyanosis    Pulses:   Dorsalis pedis palpable    Skin:   no rashes        DATA:      CBC with Differential:      Lab Results   Component Value Date/Time    WBC 14.7 07/26/2022 08:44 AM    RBC 3.35 07/26/2022 08:44 AM    HGB 9.8 07/26/2022 08:44 AM    HCT 29.3 07/26/2022 08:44 AM     07/26/2022 08:44 AM    MCV 87.5 07/26/2022 08:44 AM    MCH 29.3 07/26/2022 08:44 AM    MCHC 33.4 07/26/2022 08:44 AM    RDW 13.5 07/26/2022 08:44 AM    BANDSPCT 2 12/19/2015 04:23 PM    METASPCT 1 12/19/2015 04:23 PM    LYMPHOPCT 12.3 07/24/2022 05:49 AM    MONOPCT 5.3 07/24/2022 05:49 AM    MYELOPCT 1 12/19/2015 04:23 PM    BASOPCT 0.4 07/24/2022 05:49 AM    MONOSABS 0.85 07/24/2022 05:49 AM    LYMPHSABS 1.96 07/24/2022 05:49 AM    EOSABS 0.57 07/24/2022 05:49 AM    BASOSABS 0.07 07/24/2022 05:49 AM       CMP     Lab Results   Component Value Date/Time     07/26/2022 08:44 AM    K 3.5 07/26/2022 08:44 AM    K 4.4 07/20/2022 10:23 AM    CL 93 07/26/2022 08:44 AM    CO2 27 07/26/2022 08:44 AM    BUN 27 07/26/2022 08:44 AM    CREATININE 6.4 07/26/2022 08:44 AM    GFRAA 8 07/26/2022 08:44 AM    LABGLOM 6 07/26/2022 08:44 AM    GLUCOSE 103 07/26/2022 08:44 AM    PROT 6.1 07/24/2022 05:49 AM    LABALBU 2.5 07/24/2022 05:49 AM    CALCIUM 8.4 07/26/2022 08:44 AM    BILITOT 0.3 07/24/2022 05:49 AM    ALKPHOS 125 07/24/2022 05:49 AM    AST 17 07/24/2022 05:49 AM    ALT 16 07/24/2022 05:49 AM         Hepatic Function Panel:    Lab Results   Component Value Date/Time    ALKPHOS 125 07/24/2022 05:49 AM    ALT 16 07/24/2022 05:49 AM    AST 17 07/24/2022 05:49 AM    PROT 6.1 07/24/2022 05:49 AM    BILITOT 0.3 07/24/2022 05:49 AM    LABALBU 2.5 07/24/2022 05:49 AM       PT/INR:    Lab Results   Component Value Date/Time    PROTIME 13.6 04/27/2022 09:33 PM    INR 1.3 04/27/2022 09:33 PM       TSH:  No results found for: TSH    U/A:    Lab Results   Component Value Date/Time    COLORU DARK YELLOW 06/13/2019 10:55 AM    PHUR 8.5 06/13/2019 10:55 AM    CLARITYU CLOUDY 06/13/2019 10:55 AM    SPECGRAV 1.015 06/13/2019 10:55 AM    LEUKOCYTESUR MODERATE 06/13/2019 10:55 AM    UROBILINOGEN 0.2 06/13/2019 10:55 AM    BILIRUBINUR Negative 06/13/2019 10:55 AM    BLOODU LARGE 06/13/2019 10:55 AM    GLUCOSEU 250 06/13/2019 10:55 AM       ABG:  No results found for: SWT5EHL, BEART, M0LWWGLJ, PHART, THGBART, VMQ3IVX, PO2ART, FCE6HYN    MICROBIOLOGY:    Blood culture -    , Blood 2  Abnormal   Gram stain performed from blood culture bottle media   Gram positive cocci   P      Organism Staphylococcus species Abnormal  P    Culture, Blood 2 Identification and sensitivity to follow   Evaluating for Staph lugdenensis  P      Resulting Agency [facility]        Specimen: Nasopharyngeal Swab Updated: 07/20/22 0033    SARS-CoV-2, NAAT DETECTED Abnormal          Cell Count Fluid Type Peritoneal     Color, Fluid Colorless    Appearance, Fluid Clear    Nucl Cell, Fluid <3 /uL    RBC, Fluid <2,000 /uL    Neutrophil Count, Fluid 0            Radiology :    CT abdomen and pelvis -  Gallstones and borderline gallbladder hydrops. Consider correlation with   HIDA scan. Hepatic cirrhosis with mesenteric edema as well as abdominopelvic ascites. The presence of ascites is nonspecific in the setting of peritoneal dialysis.      HIDA scan - no cholecystitis       IMPRESSION:     Staph hominis  bacteremia - follow up blood cx neg on 7/23   COVID 19 infection ( vaccinated, not boosted, no hypoxia ) - non pneumonic manifestation - diarrhea , stool neg for C diff toxin   3. Leukocytosis   4.  No peritonitis       RECOMMENDATIONS:       Off abx   Supportive care

## 2022-07-27 NOTE — PROGRESS NOTES
Comprehensive Nutrition Assessment    Type and Reason for Visit:  Reassess    Nutrition Recommendations/Plan:   Recommend and start Nepro renal supplement BID and Boost Pudding daily to help meet increased nutritional needs from known losses from dialysis. Malnutrition Assessment:  Malnutrition Status: At risk for malnutrition (Comment) (07/27/22 1012)    Context:  Acute Illness     Findings of the 6 clinical characteristics of malnutrition:  Energy Intake:  50% or less of estimated energy requirements for 5 or more days  Weight Loss:  Unable to assess (d/t fluid shifts ; hx of PD)     Body Fat Loss:  Unable to assess (d/t COVID isolation)     Muscle Mass Loss:  Unable to assess (d/t COVID isolation)    Fluid Accumulation:  No significant fluid accumulation     Strength:  Not Performed    Nutrition Assessment:    Patient adm w/ AMS and acute metabolic encephalopathy ; pt also COVID-19 positive ; adm w/ abd pain and N/V ;  noted bacteremia ; hx of ESRD w/ PD ; hx of DM/CAD/TIA ; will continue and modify ONS    Nutrition Related Findings:    +I&Os (+1.3 L), no edema, active BS, rounded abd, PD cath, excoriation to buttocks Wound Type: None       Current Nutrition Intake & Therapies:    Average Meal Intake: 26-50%  Average Supplements Intake: 26-50%  ADULT DIET; Regular; 4 carb choices (60 gm/meal); Low Fat/Low Chol/High Fiber/2 gm Na; No Added Salt (3-4 gm); Low Potassium (Less than 3000 mg/day); Low Phosphorus (Less than 1000 mg)  ADULT ORAL NUTRITION SUPPLEMENT; Breakfast; Renal Oral Supplement    Anthropometric Measures:  Height: 4' 11\" (149.9 cm)  Ideal Body Weight (IBW): 95 lbs (43 kg)    Admission Body Weight: 144 lb (65.3 kg) (7/21, bedscale)  Current Body Weight: 143 lb (64.9 kg) (7/27, actual), 150.5 % IBW.  Weight Source: Bed Scale  Current BMI (kg/m2): 28.9  Usual Body Weight:  (LUIZA true weight hx or loss d/t possible fluid shifts / hx of ESRD on PD)     Weight Adjustment For: No Adjustment BMI Categories: Overweight (BMI 25.0-29. 9)    Estimated Daily Nutrient Needs:  Energy Requirements Based On: Formula  Weight Used for Energy Requirements: Current  Energy (kcal/day): 4277-9828 (REE 1021 x 1.3 SF)  Weight Used for Protein Requirements: Ideal  Protein (g/day): 65-80 (1.5-1.8g/kg IBW)  Method Used for Fluid Requirements: Standard Renal  Fluid (ml/day): per renal    Nutrition Diagnosis:   Inadequate oral intake related to inadequate protein-energy intake (2/2 COVID-19) as evidenced by poor intake prior to admission, intake 26-50%    Nutrition Interventions:   Food and/or Nutrient Delivery: Continue Current Diet, Continue Oral Nutrition Supplement, Modify Oral Nutrition Supplement  Nutrition Education/Counseling: Education not indicated  Coordination of Nutrition Care: No recommendation at this time       Goals:  Previous Goal Met: Progressing toward Goal(s)  Goals: PO intake 50% or greater, by next RD assessment       Nutrition Monitoring and Evaluation:   Behavioral-Environmental Outcomes: None Identified  Food/Nutrient Intake Outcomes: Food and Nutrient Intake, Supplement Intake  Physical Signs/Symptoms Outcomes: Biochemical Data, Chewing or Swallowing, GI Status, Nausea or Vomiting, Fluid Status or Edema, Hemodynamic Status, Meal Time Behavior, Nutrition Focused Physical Findings, Skin, Weight    Discharge Planning:     Too soon to determine     Brea Other, RD, LD  Contact: [phone]

## 2022-07-27 NOTE — CARE COORDINATION
Received a call from [facility] and they are unable to accept the referral for home care due to staffing. Call placed to [address] [place] [address] Drive, spoke with [place], provided referral; she will check staffing and follow-up. Will need home care orders for skilled nursing/PT/OT. 3:45P  Received a call from [place] with [facility] and she is able to accept the referral and start of care to begin on Friday, 7/29/22.     Zachary Ghosh, MSW, LSW [phone]

## 2022-07-27 NOTE — FLOWSHEET NOTE
07/27/22 0700   Peritoneal Dialysis Catheter Right lower abdomen   No Placement Date or Time found. Catheter Location: Right lower abdomen   Status Deaccessed   Site Condition No Complications   Peritoneal Dialysis (CAPD manual)   Effluent Appearance Clear;Light;Yellow   Cycler   Ultrafiltration (UF) (mL) 672 mL   CCPD completed with out complications. PD cath de-accessed using aseptic technique. Net fluid removed 672ml, clear light yellow effluent noted. Remains in care of staff.

## 2022-07-28 ENCOUNTER — CARE COORDINATION (OUTPATIENT)
Dept: CASE MANAGEMENT | Age: 82
End: 2022-07-28

## 2022-07-28 DIAGNOSIS — U07.1 COVID: Primary | ICD-10-CM

## 2022-07-28 LAB — BLOOD CULTURE, ROUTINE: NORMAL

## 2022-07-28 PROCEDURE — 1111F DSCHRG MED/CURRENT MED MERGE: CPT | Performed by: INTERNAL MEDICINE

## 2022-07-28 NOTE — CARE COORDINATION
[facility] Transitions Initial Follow Up Call    Call within 2 business days of discharge: Yes    Patient: Efren Luna Patient : 1940   MRN: <[id number]>  Reason for Admission: 2022 - 2022 [facility]. Bacteremia, CV-19 (pos 22), ESRD on PD. Discharge Date: 22 RARS: Readmission Risk Score: 20.2  NR  CV-19    Last Discharge [facility]       Date Complaint Diagnosis Description Type Department Provider    22 Altered Mental Status Acute metabolic encephalopathy . .. ED to Hosp-Admission (Discharged) (ADMITTED) Anam Chavez MD; Zach Alford,...          PCP/office routed to schedule 7 day hosp fu. [address]     START taking:  guaiFENesin (ROBITUSSIN)  zinc sulfate (ZINCATE)  Start taking on: 2022  CHANGE how you take:  insulin glargine (LANTUS)  STOP taking:  metoprolol tartrate 50 MG tablet (LOPRESSOR)    Patient contacted regarding COVID-19 risk, exposure, diagnosis, pulse oximeter ordered at discharge, and monoclonal antibody infusion follow up. Discussed COVID-19 related testing which was available at this time. Test results were positive. Patient informed of results, if available? Yes. Care Transition Nurse contacted the family by telephone to perform post discharge assessment. Call within 2 business days of discharge: Yes. Verified name and  with family as identifiers. Provided introduction to self, and explanation of the CTN/ACM role, and reason for call due to risk factors for infection and/or exposure to COVID-19. Symptoms reviewed with family who verbalized the following symptoms: Son/William reports pt had low FBS this AM of 58 and provided honey to bring up. Will retest and trackt pepper. On home PD. Denies any acute resp concerns, fever, chills, or flu-like symptoms. States normal temp this AM. No agreesive cough or congestion. Has fatigue/gen weakness. No home O2 use.  Has/taking meds as directed. Stopped Lopressor and advised to take daily home BP/HR and to advise physician of trending SBP less than 100 or greater than 150 and HR less than 60 or greater than 100, v/u. Advised to report trending high/low BS w/ goal of 100-120, v/u. hospitals to see today. States first case of known covid infection and has had covid vaxx x2. Due to no new or worsening symptoms encounter was not routed to provider for escalation. Discussed follow-up appointments. If no appointment was previously scheduled, appointment scheduling offered: Yes. Son to schedule 1 wk hosp fu. [address] follow up appointment(s): No future appointments. Non-[facility] follow up appointment(s):     Non-face-to-face services provided:  Education of patient/family/caregiver/guardian to support self-management-Covid. Reviewed covid complication to call 219. Advance Care Planning:   Does patient have an Advance Directive: Matthew Cifuentes primary decision maker [phone]     Educated patient about risk for severe COVID-19 due to risk factors according to CDC guidelines. CTN reviewed discharge instructions, medical action plan and red flag symptoms with the family who verbalized understanding. Discussed COVID vaccination status: Yes. Education provided on COVID-19 vaccination as appropriate. Discussed exposure protocols and quarantine with CDC Guidelines. Family was given an opportunity to verbalize any questions and concerns and agrees to contact CTN or health care provider for questions related to their healthcare. Reviewed and educated family on any new and changed medications related to discharge diagnosis     Was patient discharged with a pulse oximeter? no      CTN provided contact information. Plan for follow-up call in 5-7 days based on severity of symptoms and risk factors.      Care Transitions 24 Hour Call    Do you have a copy of your discharge instructions?: Yes  Do you have all of your prescriptions and are they filled?: Yes  Have you scheduled your follow up appointment?: No  Do you feel like you have everything you need to keep you well at home?: Yes  Care Transitions Interventions         Follow Up  No future appointments.     Sabine Bach RN

## 2022-08-10 NOTE — DISCHARGE SUMMARY
[facility]   Discharge summary   Patient ID:  Efren Luna  [id number]  80 y.o.  1940    Admit date: 7/19/2022    Discharge date and time: 7/27/2022    Admission Diagnoses:   Patient Active Problem List   Diagnosis    Peritoneal dialysis catheter infection ([facility]    Sepsis ([facility]    SIRS (systemic inflammatory response syndrome) ([facility]    Type 2 diabetes mellitus with diabetic chronic kidney disease ([facility]    Osteoarthritis of right knee    Arthritis of knee, right    History of peritoneal dialysis    End stage renal disease (HCC)    Altered mental status    Acute delirium    Essential hypertension, benign    ESRD on peritoneal dialysis ([facility]    Type 2 diabetes mellitus, with long-term current use of insulin (HCC)    Primary hypertension    Hypomagnesemia    Hypokalemia    Obesity (BMI 30-39. 9)    TIA (transient ischemic attack)    Hyperlipidemia    AMS (altered mental status)    Diabetes mellitus ([facility]    Lactic acid acidosis    Leukocytosis       Discharge Diagnoses: ESRD    Consults: ID and nephrology    Procedures: None    Hospital Course: The patient is a 80 y.o. female of Charley Ragsdale MD     80year old female with known history of end-stage renal disease on peritoneal dialysis admitted to telemetry unit with complaints of diffuse abdominal pain nausea and vomiting     ESRD  CAPD  Consult nephrology  Monitor labs  Fluids no sign of peritonitis     Covid  Patient is asymptomatic not requiring any O2 needs at this time  Supportive care  Monitor inflammatory markers  Not a candidate for paxlovid     Staph hominis bacteremia-positive blood culture  Echo - EF 55%, mild to moderate mitral regurg. ID following - appreciate input  Currently off antibiotic therapy      Patient discharged home in stable conditions with medications listed below. Patient instructed to follow up with all consultants and PCP within 3 weeks of discharge.       No results for input(s): WBC, HGB, HCT, PLT in the last 72 with mesenteric edema as well as abdominopelvic ascites. The presence of ascites is nonspecific in the setting of peritoneal dialysis. Colonic diverticulosis. CT HEAD WO CONTRAST    Result Date: 7/20/2022  EXAMINATION: CT OF THE HEAD WITHOUT CONTRAST  7/20/2022 1:58 am TECHNIQUE: CT of the head was performed without the administration of intravenous contrast. Automated exposure control, iterative reconstruction, and/or weight based adjustment of the mA/kV was utilized to reduce the radiation dose to as low as reasonably achievable. COMPARISON: None. HISTORY: ORDERING SYSTEM PROVIDED HISTORY: AMS TECHNOLOGIST PROVIDED HISTORY: Reason for exam:->AMS Has a \"code stroke\" or \"stroke alert\" been called? ->No Decision Support Exception - unselect if not a suspected or confirmed emergency medical condition->Emergency Medical Condition (MA) What reading provider will be dictating this exam?->CRC FINDINGS: BRAIN/VENTRICLES: There is no acute intracranial hemorrhage, mass effect or midline shift. No abnormal extra-axial fluid collection. The gray-white differentiation is maintained without evidence of an acute infarct. There is no evidence of hydrocephalus. The ventricles, cisterns and sulci are prominent consistent with atrophy. There is decreased attenuation within the periventricular white matter consistent with periventricular leukomalacia. ORBITS: The visualized portion of the orbits demonstrate no acute abnormality. SINUSES: The visualized paranasal sinuses and mastoid air cells demonstrate no acute abnormality. SOFT TISSUES/SKULL:  No acute abnormality of the visualized skull or soft tissues. 1. There is no acute intracranial abnormality. Specifically, there is no intracranial hemorrhage.  2. Atrophy and periventricular leukomalacia,     XR CHEST PORTABLE    Result Date: 7/20/2022  EXAMINATION: ONE XRAY VIEW OF THE CHEST 7/19/2022 10:55 pm COMPARISON: 04/27/2022 HISTORY: ORDERING SYSTEM PROVIDED HISTORY: hypoxia TECHNOLOGIST PROVIDED HISTORY: Reason for exam:->hypoxia FINDINGS: The cardiomediastinal silhouette is unchanged. The lungs are clear except for bibasilar atelectasis. No pneumothorax or pleural effusion. Partially visualized right shoulder arthroplasty. Bibasilar atelectasis. Discharge Exam:    HEENT: NCAT,  PERRLA, No JVD  Heart:  RRR, no murmurs, gallops, or rubs. Lungs:  CTA bilaterally, no wheeze, rales or rhonchi  Abd: bowel sounds present, nontender, nondistended, no masses  Extrem:  No clubbing, cyanosis, or edema    Disposition: home     Patient Condition at Discharge: Stable    Patient Instructions:      Medication List        START taking these medications      guaiFENesin 100 MG/5ML Soln oral solution  Commonly known as: ROBITUSSIN  Take 10 mLs by mouth every 6 hours as needed for Cough     zinc sulfate 220 (50 Zn) MG capsule  Commonly known as: ZINCATE  Take 1 capsule by mouth in the morning for 2 doses. CHANGE how you take these medications      insulin glargine 100 UNIT/ML injection vial  Commonly known as: LANTUS  Inject 10 Units into the skin in the morning and 10 Units before bedtime.   What changed: how much to take            CONTINUE taking these medications      amLODIPine 5 MG tablet  Commonly known as: NORVASC     apixaban 2.5 MG Tabs tablet  Commonly known as: ELIQUIS  Take 1 tablet by mouth 2 times daily     aspirin 81 MG chewable tablet  Take 1 tablet by mouth daily     atorvastatin 20 MG tablet  Commonly known as: LIPITOR  Take 1 tablet by mouth nightly     DIALYVITE 800 PO     DULoxetine 30 MG extended release capsule  Commonly known as: CYMBALTA     isosorbide mononitrate 30 MG extended release tablet  Commonly known as: IMDUR     levothyroxine 50 MCG tablet  Commonly known as: SYNTHROID     omeprazole 20 MG delayed release capsule  Commonly known as: PRILOSEC     potassium chloride 20 MEQ extended release tablet  Commonly known as: KLOR-CON M     ZYRTEC PO            STOP taking these medications      metoprolol tartrate 50 MG tablet  Commonly known as: LOPRESSOR               Where to Get Your Medications        These medications were sent to [facility] [address] [address]      Phone: [phone]   guaiFENesin 100 MG/5ML Soln oral solution  insulin glargine 100 UNIT/ML injection vial       You can get these medications from any pharmacy    You don't need a prescription for these medications  zinc sulfate 220 (50 Zn) MG capsule       Activity: activity as tolerated  Diet: renal diet    Pt has been advised to: Follow-up with Nyla Huang MD in 1 week.   Follow-up with consultants as recommended by them    Note that over 30 minutes was spent in preparing discharge papers, discussing discharge with patient, medication review, etc.    Signed:  Juan C Castillo MD  7/27/2022

## 2022-08-16 NOTE — PROGRESS NOTES
Physician Progress Note      PATIENT:               Suman Hsieh  CSN #:                  [id number]  :                       1940  ADMIT DATE:       2022 10:47 PM  [address] DATE:        2022 3:44 PM  RESPONDING  PROVIDER #:        MARIFER Kaufman MD          QUERY TEXT:    Dear Dr. Lois De La Vega,    [address] admitted with COVID-19. Pt noted to have altered mental status. If   possible, please document in the progress notes and discharge summary if you   are evaluating and / or treating any of the following: The medical record reflects the following:  Risk Factors: ESRD  Clinical Indicators: Pt with known ESRD,  Admitted with COVID and Acidosis,   Labs: BUN 27 calcium 8.4 Chl 93 creatinine 6.4 glucose 103 sodium 130, Pt is   reported confused with increased AMS, DM  Treatment: Close pt monitoring, serial labs    Thank you,  Leanna CHRISTOPHER, RN  Clinical Documentation Improvement  [email or web address]  Cell Phone: [phone]  Options provided:  -- Metabolic encephalopathy  -- Delirium due to ##, (Please specify cause).   -- Delirium  -- Other - I will add my own diagnosis  -- Disagree - Not applicable / Not valid  -- Disagree - Clinically unable to determine / Unknown  -- Refer to Clinical Documentation Reviewer    PROVIDER RESPONSE TEXT:    Patient has delirium due to covid 19 infection    Query created by: Herberth Lopez on 2022 1:52 PM      Electronically signed by:  MARIFER Kaufman MD 2022 10:40 AM

## 2022-09-16 ENCOUNTER — HOSPITAL ENCOUNTER (INPATIENT)
Age: 82
LOS: 16 days | Discharge: NURSING, REHAB OR LONG-TERM CARE | DRG: 853 | End: 2022-10-03
Attending: EMERGENCY MEDICINE | Admitting: INTERNAL MEDICINE
Payer: MEDICARE

## 2022-09-16 ENCOUNTER — APPOINTMENT (OUTPATIENT)
Dept: CT IMAGING | Age: 82
DRG: 853 | End: 2022-09-16
Payer: MEDICARE

## 2022-09-16 ENCOUNTER — APPOINTMENT (OUTPATIENT)
Dept: ULTRASOUND IMAGING | Age: 82
DRG: 853 | End: 2022-09-16
Payer: MEDICARE

## 2022-09-16 ENCOUNTER — APPOINTMENT (OUTPATIENT)
Dept: GENERAL RADIOLOGY | Age: 82
DRG: 853 | End: 2022-09-16
Payer: MEDICARE

## 2022-09-16 DIAGNOSIS — R52 PAIN: ICD-10-CM

## 2022-09-16 DIAGNOSIS — R41.82 ALTERED MENTAL STATUS, UNSPECIFIED ALTERED MENTAL STATUS TYPE: Primary | ICD-10-CM

## 2022-09-16 DIAGNOSIS — G89.18 POST-OP PAIN: ICD-10-CM

## 2022-09-16 LAB
ACETAMINOPHEN LEVEL: <5 MCG/ML (ref 10–30)
ALBUMIN SERPL-MCNC: 3.5 G/DL (ref 3.5–5.2)
ALP BLD-CCNC: 201 U/L (ref 35–104)
ALT SERPL-CCNC: 8 U/L (ref 0–32)
ANION GAP SERPL CALCULATED.3IONS-SCNC: 17 MMOL/L (ref 7–16)
ANION GAP SERPL CALCULATED.3IONS-SCNC: 22 MMOL/L (ref 7–16)
APPEARANCE FLUID: CLEAR
APTT: 29.1 SEC (ref 24.5–35.1)
AST SERPL-CCNC: 12 U/L (ref 0–31)
B.E.: 5.1 MMOL/L (ref -3–3)
B.E.: 5.2 MMOL/L (ref -3–3)
BASOPHILS ABSOLUTE: 0.05 E9/L (ref 0–0.2)
BASOPHILS RELATIVE PERCENT: 0.3 % (ref 0–2)
BILIRUB SERPL-MCNC: 0.5 MG/DL (ref 0–1.2)
BUN BLDV-MCNC: 50 MG/DL (ref 6–23)
BUN BLDV-MCNC: 54 MG/DL (ref 6–23)
CALCIUM SERPL-MCNC: 9.5 MG/DL (ref 8.6–10.2)
CALCIUM SERPL-MCNC: 9.7 MG/DL (ref 8.6–10.2)
CELL COUNT FLUID TYPE: NORMAL
CHLORIDE BLD-SCNC: 88 MMOL/L (ref 98–107)
CHLORIDE BLD-SCNC: 91 MMOL/L (ref 98–107)
CHP ED QC CHECK: NORMAL
CHP ED QC CHECK: NORMAL
CO2: 22 MMOL/L (ref 22–29)
CO2: 25 MMOL/L (ref 22–29)
COHB: 0.5 % (ref 0–1.5)
COHB: 0.8 % (ref 0–1.5)
COLOR FLUID: NORMAL
CREAT SERPL-MCNC: 6.2 MG/DL (ref 0.5–1)
CREAT SERPL-MCNC: 6.5 MG/DL (ref 0.5–1)
CRITICAL: ABNORMAL
CRITICAL: ABNORMAL
DATE ANALYZED: ABNORMAL
DATE ANALYZED: ABNORMAL
DATE OF COLLECTION: ABNORMAL
DATE OF COLLECTION: ABNORMAL
EKG ATRIAL RATE: 197 BPM
EKG Q-T INTERVAL: 424 MS
EKG QRS DURATION: 112 MS
EKG QTC CALCULATION (BAZETT): 530 MS
EKG R AXIS: 64 DEGREES
EKG T AXIS: 25 DEGREES
EKG VENTRICULAR RATE: 94 BPM
EOSINOPHILS ABSOLUTE: 0.07 E9/L (ref 0.05–0.5)
EOSINOPHILS RELATIVE PERCENT: 0.4 % (ref 0–6)
ETHANOL: <10 MG/DL (ref 0–0.08)
FIO2: 21 %
FIO2: 21 %
GFR AFRICAN AMERICAN: 7
GFR AFRICAN AMERICAN: 7
GFR AFRICAN AMERICAN: 8
GFR NON-AFRICAN AMERICAN: 6 ML/MIN/1.73
GLUCOSE BLD-MCNC: 105 MG/DL (ref 74–99)
GLUCOSE BLD-MCNC: 115 MG/DL (ref 74–99)
GLUCOSE BLD-MCNC: 123 MG/DL
GLUCOSE BLD-MCNC: 203 MG/DL
GLUCOSE BLD-MCNC: 203 MG/DL (ref 74–99)
HBA1C MFR BLD: 5.4 % (ref 4–5.6)
HCO3: 24.4 MMOL/L (ref 22–26)
HCO3: 24.5 MMOL/L (ref 22–26)
HCT VFR BLD CALC: 36.1 % (ref 34–48)
HEMOGLOBIN: 12.5 G/DL (ref 11.5–15.5)
HHB: 2.1 % (ref 0–5)
HHB: 2.4 % (ref 0–5)
IMMATURE GRANULOCYTES #: 0.1 E9/L
IMMATURE GRANULOCYTES %: 0.6 % (ref 0–5)
INR BLD: 1.2
LAB: ABNORMAL
LAB: ABNORMAL
LACTIC ACID: 3.3 MMOL/L (ref 0.5–2.2)
LYMPHOCYTES ABSOLUTE: 1.6 E9/L (ref 1.5–4)
LYMPHOCYTES RELATIVE PERCENT: 9.4 % (ref 20–42)
Lab: ABNORMAL
Lab: ABNORMAL
MAGNESIUM: 1.3 MG/DL (ref 1.6–2.6)
MCH RBC QN AUTO: 29.7 PG (ref 26–35)
MCHC RBC AUTO-ENTMCNC: 34.6 % (ref 32–34.5)
MCV RBC AUTO: 85.7 FL (ref 80–99.9)
METER GLUCOSE: 123 MG/DL (ref 74–99)
METER GLUCOSE: 203 MG/DL (ref 74–99)
METHB: 0.1 % (ref 0–1.5)
METHB: 0.2 % (ref 0–1.5)
MODE: ABNORMAL
MODE: ABNORMAL
MONOCYTE, FLUID: 75 %
MONOCYTES ABSOLUTE: 0.9 E9/L (ref 0.1–0.95)
MONOCYTES RELATIVE PERCENT: 5.3 % (ref 2–12)
NEUTROPHIL, FLUID: 25 %
NEUTROPHILS ABSOLUTE: 14.28 E9/L (ref 1.8–7.3)
NEUTROPHILS RELATIVE PERCENT: 84 % (ref 43–80)
NUCLEATED CELLS FLUID: 4 /UL
O2 CONTENT: 18.1 ML/DL
O2 CONTENT: 18.6 ML/DL
O2 SATURATION: 97.6 % (ref 92–98.5)
O2 SATURATION: 97.9 % (ref 92–98.5)
O2HB: 96.9 % (ref 94–97)
O2HB: 97 % (ref 94–97)
OPERATOR ID: 5100
OPERATOR ID: 5100
PATIENT TEMP: 37 C
PATIENT TEMP: 37 C
PCO2: 22.6 MMHG (ref 35–45)
PCO2: 23.1 MMHG (ref 35–45)
PDW BLD-RTO: 14.4 FL (ref 11.5–15)
PERFORMED ON: ABNORMAL
PFO2: 4.02 MMHG/%
PFO2: 4.27 MMHG/%
PH BLOOD GAS: 7.64 (ref 7.35–7.45)
PH BLOOD GAS: 7.65 (ref 7.35–7.45)
PLATELET # BLD: 373 E9/L (ref 130–450)
PMV BLD AUTO: 10.5 FL (ref 7–12)
PO2: 84.5 MMHG (ref 75–100)
PO2: 89.6 MMHG (ref 75–100)
POC CHLORIDE: 95 MMOL/L (ref 100–108)
POC CREATININE: 6.9 MG/DL (ref 0.5–1)
POC POTASSIUM: 3 MMOL/L (ref 3.5–5)
POC SODIUM: 129 MMOL/L (ref 132–146)
POTASSIUM SERPL-SCNC: 3.04 MMOL/L (ref 3.5–5)
POTASSIUM SERPL-SCNC: 3.3 MMOL/L (ref 3.5–5)
POTASSIUM SERPL-SCNC: 3.8 MMOL/L (ref 3.5–5)
PROTHROMBIN TIME: 12.6 SEC (ref 9.3–12.4)
RBC # BLD: 4.21 E12/L (ref 3.5–5.5)
RBC FLUID: <2000 /UL
SALICYLATE, SERUM: <0.3 MG/DL (ref 0–30)
SODIUM BLD-SCNC: 130 MMOL/L (ref 132–146)
SODIUM BLD-SCNC: 135 MMOL/L (ref 132–146)
SOURCE, BLOOD GAS: ABNORMAL
SOURCE, BLOOD GAS: ABNORMAL
THB: 13.2 G/DL (ref 11.5–16.5)
THB: 13.6 G/DL (ref 11.5–16.5)
TIME ANALYZED: 1216
TIME ANALYZED: 1220
TOTAL PROTEIN: 7.5 G/DL (ref 6.4–8.3)
TRICYCLIC ANTIDEPRESSANTS SCREEN SERUM: NEGATIVE NG/ML
TROPONIN, HIGH SENSITIVITY: 164 NG/L (ref 0–9)
TROPONIN, HIGH SENSITIVITY: 183 NG/L (ref 0–9)
WBC # BLD: 17 E9/L (ref 4.5–11.5)

## 2022-09-16 PROCEDURE — 80053 COMPREHEN METABOLIC PANEL: CPT

## 2022-09-16 PROCEDURE — 84132 ASSAY OF SERUM POTASSIUM: CPT

## 2022-09-16 PROCEDURE — 74176 CT ABD & PELVIS W/O CONTRAST: CPT

## 2022-09-16 PROCEDURE — 87070 CULTURE OTHR SPECIMN AEROBIC: CPT

## 2022-09-16 PROCEDURE — 82947 ASSAY GLUCOSE BLOOD QUANT: CPT

## 2022-09-16 PROCEDURE — 85025 COMPLETE CBC W/AUTO DIFF WBC: CPT

## 2022-09-16 PROCEDURE — 82077 ASSAY SPEC XCP UR&BREATH IA: CPT

## 2022-09-16 PROCEDURE — 80179 DRUG ASSAY SALICYLATE: CPT

## 2022-09-16 PROCEDURE — 99285 EMERGENCY DEPT VISIT HI MDM: CPT

## 2022-09-16 PROCEDURE — 85610 PROTHROMBIN TIME: CPT

## 2022-09-16 PROCEDURE — 96375 TX/PRO/DX INJ NEW DRUG ADDON: CPT

## 2022-09-16 PROCEDURE — 82435 ASSAY OF BLOOD CHLORIDE: CPT

## 2022-09-16 PROCEDURE — 93005 ELECTROCARDIOGRAM TRACING: CPT | Performed by: EMERGENCY MEDICINE

## 2022-09-16 PROCEDURE — 80307 DRUG TEST PRSMV CHEM ANLYZR: CPT

## 2022-09-16 PROCEDURE — 83605 ASSAY OF LACTIC ACID: CPT

## 2022-09-16 PROCEDURE — 82962 GLUCOSE BLOOD TEST: CPT

## 2022-09-16 PROCEDURE — 83036 HEMOGLOBIN GLYCOSYLATED A1C: CPT

## 2022-09-16 PROCEDURE — 96374 THER/PROPH/DIAG INJ IV PUSH: CPT

## 2022-09-16 PROCEDURE — 87205 SMEAR GRAM STAIN: CPT

## 2022-09-16 PROCEDURE — 84484 ASSAY OF TROPONIN QUANT: CPT

## 2022-09-16 PROCEDURE — 83735 ASSAY OF MAGNESIUM: CPT

## 2022-09-16 PROCEDURE — 71045 X-RAY EXAM CHEST 1 VIEW: CPT

## 2022-09-16 PROCEDURE — G0378 HOSPITAL OBSERVATION PER HR: HCPCS

## 2022-09-16 PROCEDURE — 96365 THER/PROPH/DIAG IV INF INIT: CPT

## 2022-09-16 PROCEDURE — 76705 ECHO EXAM OF ABDOMEN: CPT

## 2022-09-16 PROCEDURE — 85730 THROMBOPLASTIN TIME PARTIAL: CPT

## 2022-09-16 PROCEDURE — 96366 THER/PROPH/DIAG IV INF ADDON: CPT

## 2022-09-16 PROCEDURE — 82565 ASSAY OF CREATININE: CPT

## 2022-09-16 PROCEDURE — 6360000002 HC RX W HCPCS: Performed by: EMERGENCY MEDICINE

## 2022-09-16 PROCEDURE — 82805 BLOOD GASES W/O2 SATURATION: CPT

## 2022-09-16 PROCEDURE — 89051 BODY FLUID CELL COUNT: CPT

## 2022-09-16 PROCEDURE — 84295 ASSAY OF SERUM SODIUM: CPT

## 2022-09-16 PROCEDURE — 87040 BLOOD CULTURE FOR BACTERIA: CPT

## 2022-09-16 PROCEDURE — 6370000000 HC RX 637 (ALT 250 FOR IP): Performed by: INTERNAL MEDICINE

## 2022-09-16 PROCEDURE — 80048 BASIC METABOLIC PNL TOTAL CA: CPT

## 2022-09-16 PROCEDURE — 80143 DRUG ASSAY ACETAMINOPHEN: CPT

## 2022-09-16 PROCEDURE — 70450 CT HEAD/BRAIN W/O DYE: CPT

## 2022-09-16 RX ORDER — ONDANSETRON 2 MG/ML
4 INJECTION INTRAMUSCULAR; INTRAVENOUS ONCE
Status: COMPLETED | OUTPATIENT
Start: 2022-09-16 | End: 2022-09-16

## 2022-09-16 RX ORDER — FENTANYL CITRATE 50 UG/ML
25 INJECTION, SOLUTION INTRAMUSCULAR; INTRAVENOUS ONCE
Status: DISCONTINUED | OUTPATIENT
Start: 2022-09-16 | End: 2022-09-16

## 2022-09-16 RX ORDER — FENTANYL CITRATE 50 UG/ML
50 INJECTION, SOLUTION INTRAMUSCULAR; INTRAVENOUS ONCE
Status: DISCONTINUED | OUTPATIENT
Start: 2022-09-16 | End: 2022-09-16

## 2022-09-16 RX ORDER — MAGNESIUM SULFATE IN WATER 40 MG/ML
2000 INJECTION, SOLUTION INTRAVENOUS ONCE
Status: COMPLETED | OUTPATIENT
Start: 2022-09-16 | End: 2022-09-16

## 2022-09-16 RX ORDER — INSULIN LISPRO 100 [IU]/ML
0-10 INJECTION, SOLUTION INTRAVENOUS; SUBCUTANEOUS
Status: DISCONTINUED | OUTPATIENT
Start: 2022-09-16 | End: 2022-09-20

## 2022-09-16 RX ORDER — AMLODIPINE BESYLATE 5 MG/1
5 TABLET ORAL DAILY
Status: DISCONTINUED | OUTPATIENT
Start: 2022-09-16 | End: 2022-09-24

## 2022-09-16 RX ORDER — ATORVASTATIN CALCIUM 20 MG/1
20 TABLET, FILM COATED ORAL NIGHTLY
Status: DISCONTINUED | OUTPATIENT
Start: 2022-09-16 | End: 2022-10-03 | Stop reason: HOSPADM

## 2022-09-16 RX ORDER — DULOXETIN HYDROCHLORIDE 30 MG/1
30 CAPSULE, DELAYED RELEASE ORAL DAILY
Status: DISCONTINUED | OUTPATIENT
Start: 2022-09-16 | End: 2022-10-03 | Stop reason: HOSPADM

## 2022-09-16 RX ORDER — MORPHINE SULFATE 4 MG/ML
4 INJECTION, SOLUTION INTRAMUSCULAR; INTRAVENOUS ONCE
Status: COMPLETED | OUTPATIENT
Start: 2022-09-16 | End: 2022-09-16

## 2022-09-16 RX ORDER — ACETAMINOPHEN 325 MG/1
650 TABLET ORAL EVERY 4 HOURS PRN
Status: DISCONTINUED | OUTPATIENT
Start: 2022-09-16 | End: 2022-10-03 | Stop reason: HOSPADM

## 2022-09-16 RX ORDER — ONDANSETRON 2 MG/ML
4 INJECTION INTRAMUSCULAR; INTRAVENOUS EVERY 6 HOURS PRN
Status: DISCONTINUED | OUTPATIENT
Start: 2022-09-16 | End: 2022-09-16

## 2022-09-16 RX ORDER — ASPIRIN 81 MG/1
81 TABLET, CHEWABLE ORAL DAILY
Status: DISCONTINUED | OUTPATIENT
Start: 2022-09-16 | End: 2022-10-03 | Stop reason: HOSPADM

## 2022-09-16 RX ORDER — LEVOTHYROXINE SODIUM 0.05 MG/1
50 TABLET ORAL DAILY
Status: DISCONTINUED | OUTPATIENT
Start: 2022-09-16 | End: 2022-10-03 | Stop reason: HOSPADM

## 2022-09-16 RX ORDER — DOCUSATE SODIUM 100 MG/1
100 CAPSULE, LIQUID FILLED ORAL NIGHTLY
Status: DISCONTINUED | OUTPATIENT
Start: 2022-09-16 | End: 2022-10-03 | Stop reason: HOSPADM

## 2022-09-16 RX ORDER — PANTOPRAZOLE SODIUM 40 MG/1
40 TABLET, DELAYED RELEASE ORAL
Status: DISCONTINUED | OUTPATIENT
Start: 2022-09-17 | End: 2022-10-03 | Stop reason: HOSPADM

## 2022-09-16 RX ORDER — ISOSORBIDE MONONITRATE 30 MG/1
30 TABLET, EXTENDED RELEASE ORAL DAILY
Status: DISCONTINUED | OUTPATIENT
Start: 2022-09-16 | End: 2022-10-03 | Stop reason: HOSPADM

## 2022-09-16 RX ORDER — DEXTROSE MONOHYDRATE 100 MG/ML
INJECTION, SOLUTION INTRAVENOUS CONTINUOUS PRN
Status: DISCONTINUED | OUTPATIENT
Start: 2022-09-16 | End: 2022-10-03 | Stop reason: HOSPADM

## 2022-09-16 RX ADMIN — AMLODIPINE BESYLATE 5 MG: 5 TABLET ORAL at 19:10

## 2022-09-16 RX ADMIN — MORPHINE SULFATE 4 MG: 4 INJECTION, SOLUTION INTRAMUSCULAR; INTRAVENOUS at 14:37

## 2022-09-16 RX ADMIN — MAGNESIUM SULFATE HEPTAHYDRATE 2000 MG: 40 INJECTION, SOLUTION INTRAVENOUS at 14:31

## 2022-09-16 RX ADMIN — ONDANSETRON HYDROCHLORIDE 4 MG: 2 SOLUTION INTRAMUSCULAR; INTRAVENOUS at 19:52

## 2022-09-16 ASSESSMENT — PAIN SCALES - GENERAL: PAINLEVEL_OUTOF10: 10

## 2022-09-16 ASSESSMENT — PAIN DESCRIPTION - LOCATION: LOCATION: ABDOMEN

## 2022-09-16 ASSESSMENT — PAIN - FUNCTIONAL ASSESSMENT: PAIN_FUNCTIONAL_ASSESSMENT: NONE - DENIES PAIN

## 2022-09-16 NOTE — ED NOTES
Patient able to swallow thin liquids appropriately once swallowed patient was unable to keep liquids down patient stated stomach was upset      Hu Dee RN  09/16/22 [id number]

## 2022-09-16 NOTE — PROGRESS NOTES
Discontinue Zofran. Tigan 200 mg IM every 6 hours as needed for nausea, vomiting.   Dr Johnson Cesar [facility],9/16/2022 6:33 PM

## 2022-09-16 NOTE — PROGRESS NOTES
PERITONEAL FLUID COLLECTED USING ASEPTIC TECHNIQUE FOR CELL COUNT WITH DIFFERENTIAL AND CULTURE AS ORDERED BY NEPHROLOGY MD. FLUID NOTED TO BE CLEAR AND YELLOW. DRESSING ABSENT. SITE CLEANED WITH CHLORHEXIDINE, DRESSED WITH 4X4 AND SECURED WITH PAPER TAPE. PT TOLERATED PROCEDURE. WELL. PER NEPHROLOGY, HOLD PD CYCLER FOR TONIGHT. REPORT GIVEN TO Siri Joy RN. PT DEAN LÓPEZ AT THIS TIME.

## 2022-09-16 NOTE — ED PROVIDER NOTES
Department of Emergency Medicine   ED  Provider Note  Admit Date/RoomTime: 2022 11:18 AM  ED Room: /          History of Present Illness:  22, Time: 1:51 PM EDT  Chief Complaint   Patient presents with    Altered Mental Status     Son reports she is usually Alert & oriented. Today she is more lethargic & twitching, which is new. She receives daily home dialysis & had her treatment this morning                Gabriel Rios is a 80 y.o. female presenting to the ED for altered mental status. Last well-known is Wednesday morning. Son states that since Wednesday she is gradually gotten more confused. She seems to have trouble getting her words out. She is intermittently twitching. This is all new for her. She is on peritoneal dialysis, she has been getting her treatments at home. She does live alone. She is having trouble ambulating today, so they called EMS. Patient denies any pain. She just states she does not feel right. She denies any fever, chills, nausea, vomiting, cough, sputum, neck pain or stiffness, paresthesias, lethargy, or any other symptoms or complaints. Review of Systems:   Pertinent positives and negatives are stated within HPI, all other systems reviewed and are negative.        --------------------------------------------- PAST HISTORY ---------------------------------------------  Past Medical History:  has a past medical history of Anemia, Arthritis, CAD (coronary artery disease), Diabetes mellitus ([facility] [address] Gout, History of bleeding peptic ulcer, Hypertension, Peritoneal dialysis catheter in place [facility]), Peritoneal dialysis status ([facility] [address] Thyroid disease, Use of cane as ambulatory aid, and Wears glasses. Past Surgical History:  has a past surgical history that includes  section; Carpal tunnel release (Bilateral); Coronary artery bypass graft (approx ); back surgery;  Upper gastrointestinal endoscopy; cardiovascular stress test; Cataract removal with implant (Bilateral); Endoscopy, colon, diagnostic; joint replacement; and pr total knee arthroplasty (Right, 10/31/2018). Social History:  reports that she has never smoked. She has never used smokeless tobacco. She reports that she does not drink alcohol and does not use drugs. Family History: family history is not on file. . Unless otherwise noted, family history is non contributory    The patients home medications have been reviewed. Allergies: Sulfa antibiotics        ---------------------------------------------------PHYSICAL EXAM--------------------------------------    Constitutional/General: Alert and oriented x 2  Head: Normocephalic and atraumatic  Eyes: PERRL, EOMI, sclera non icteric  Mouth: Oropharynx clear, handling secretions, no trismus, no asymmetry of the posterior oropharynx or uvular edema  Neck: Supple, full ROM, no stridor, no meningeal signs  Respiratory: Lungs clear to auscultation bilaterally, no wheezes, rales, or rhonchi. Not in respiratory distress  Cardiovascular:  Regular rate. Regular rhythm. 2+ distal pulses. Equal extremity pulses. Chest: No chest wall tenderness  GI:  Abdomen Soft, Non tender, Non distended. No rebound, guarding, or rigidity. No pulsatile masses. Musculoskeletal: Moves all extremities x 4. Warm and well perfused, no clubbing, cyanosis, or edema. Capillary refill <3 seconds  Integument: skin warm and dry. No rashes. Neurologic: GCS 15, no focal deficits, symmetric strength 5/5 in the upper and lower extremities bilaterally  Psychiatric: Normal Affect          -------------------------------------------------- RESULTS -------------------------------------------------  I have personally reviewed all laboratory and imaging results for this patient. Results are listed below.      LABS: (Lab results interpreted by me)  Results for orders placed or performed during the hospital encounter of 09/16/22   CBC with Auto Differential   Result Value Ref Range    WBC 17.0 (H) 4.5 - 11.5 E9/L    RBC 4.21 3.50 - 5.50 E12/L    Hemoglobin 12.5 11.5 - 15.5 g/dL    Hematocrit 36.1 34.0 - 48.0 %    MCV 85.7 80.0 - 99.9 fL    MCH 29.7 26.0 - 35.0 pg    MCHC 34.6 (H) 32.0 - 34.5 %    RDW 14.4 11.5 - 15.0 fL    Platelets 034 147 - 291 E9/L    MPV 10.5 7.0 - 12.0 fL    Neutrophils % 84.0 (H) 43.0 - 80.0 %    Immature Granulocytes % 0.6 0.0 - 5.0 %    Lymphocytes % 9.4 (L) 20.0 - 42.0 %    Monocytes % 5.3 2.0 - 12.0 %    Eosinophils % 0.4 0.0 - 6.0 %    Basophils % 0.3 0.0 - 2.0 %    Neutrophils Absolute 14.28 (H) 1.80 - 7.30 E9/L    Immature Granulocytes # 0.10 E9/L    Lymphocytes Absolute 1.60 1.50 - 4.00 E9/L    Monocytes Absolute 0.90 0.10 - 0.95 E9/L    Eosinophils Absolute 0.07 0.05 - 0.50 E9/L    Basophils Absolute 0.05 0.00 - 0.20 E9/L   Comprehensive Metabolic Panel   Result Value Ref Range    Sodium 135 132 - 146 mmol/L    Potassium 3.3 (L) 3.5 - 5.0 mmol/L    Chloride 91 (L) 98 - 107 mmol/L    CO2 22 22 - 29 mmol/L    Anion Gap 22 (H) 7 - 16 mmol/L    Glucose 115 (H) 74 - 99 mg/dL    BUN 50 (H) 6 - 23 mg/dL    Creatinine 6.2 (H) 0.5 - 1.0 mg/dL    GFR Non-African American 6 >=60 mL/min/1.73    GFR African American 8     Calcium 9.7 8.6 - 10.2 mg/dL    Total Protein 7.5 6.4 - 8.3 g/dL    Albumin 3.5 3.5 - 5.2 g/dL    Total Bilirubin 0.5 0.0 - 1.2 mg/dL    Alkaline Phosphatase 201 (H) 35 - 104 U/L    ALT 8 0 - 32 U/L    AST 12 0 - 31 U/L   Troponin   Result Value Ref Range    Troponin, High Sensitivity 183 (H) 0 - 9 ng/L   APTT   Result Value Ref Range    aPTT 29.1 24.5 - 35.1 sec   Protime-INR   Result Value Ref Range    Protime 12.6 (H) 9.3 - 12.4 sec    INR 1.2    Magnesium   Result Value Ref Range    Magnesium 1.3 (L) 1.6 - 2.6 mg/dL   Blood Gas, Arterial   Result Value Ref Range    Date Analyzed 20220916     Time Analyzed 1216     Source: Blood Arterial     pH, Blood Gas 7.652 (HH) 7.350 - 7.450    PCO2 22.6 (L) 35.0 - 45.0 mmHg    PO2 84.5 75.0 - 100.0 mmHg    HCO3 24.4 22.0 - 26.0 mmol/L    B.E. 5.2 (H) -3.0 - 3.0 mmol/L    O2 Sat 97.6 92.0 - 98.5 %    PO2/FIO2 4.02 mmHg/%    O2Hb 97.0 94.0 - 97.0 %    COHb 0.5 0.0 - 1.5 %    MetHb 0.1 0.0 - 1.5 %    O2 Content 18.1 mL/dL    HHb 2.4 0.0 - 5.0 %    tHb (est) 13.2 11.5 - 16.5 g/dL    Mode RA     FIO2 21.0 %    Date Of Collection      Time Collected      Pt Temp 37.0 C     ID 5100     Lab 09634     Critical(s) Notified Handed report to /RN    Blood Gas, Arterial   Result Value Ref Range    Date Analyzed 20220916     Time Analyzed 1220     Source: Blood Arterial     pH, Blood Gas 7.643 (HH) 7.350 - 7.450    PCO2 23.1 (L) 35.0 - 45.0 mmHg    PO2 89.6 75.0 - 100.0 mmHg    HCO3 24.5 22.0 - 26.0 mmol/L    B.E. 5.1 (H) -3.0 - 3.0 mmol/L    O2 Sat 97.9 92.0 - 98.5 %    PO2/FIO2 4.27 mmHg/%    O2Hb 96.9 94.0 - 97.0 %    COHb 0.8 0.0 - 1.5 %    MetHb 0.2 0.0 - 1.5 %    O2 Content 18.6 mL/dL    HHb 2.1 0.0 - 5.0 %    tHb (est) 13.6 11.5 - 16.5 g/dL    Potassium 3.04 (L) 3.50 - 5.00 mmol/L    Mode RA     FIO2 21.0 %    Date Of Collection      Time Collected      Pt Temp 37.0 C     ID 5100     Lab 21487     Critical(s) Notified Handed report to Dr/RN    Serum Drug Screen   Result Value Ref Range    Ethanol Lvl <10 mg/dL    Acetaminophen Level <5.0 (L) 10.0 - 60.7 mcg/mL    Salicylate, Serum <0.8 0.0 - 30.0 mg/dL    TCA Scrn NEGATIVE Cutoff:300 ng/mL   Lactic Acid   Result Value Ref Range    Lactic Acid 3.3 (H) 0.5 - 2.2 mmol/L   POCT Glucose   Result Value Ref Range    Glucose 123 mg/dL    QC OK? ok    POCT Glucose   Result Value Ref Range    Meter Glucose 123 (H) 74 - 99 mg/dL   POCT Venous   Result Value Ref Range    POC Sodium 129 (L) 132 - 146 mmol/L    POC Potassium 3.0 (L) 3.5 - 5.0 mmol/L    POC Chloride 95 (L) 100 - 108 mmol/L    POC Glucose 105 (H) 74 - 99 mg/dl    POC Creatinine 6.9 (H) 0.5 - 1.0 mg/dL    GFR Non-African American 6 >=60 mL/min/1.73    GFR  7     Performed on SEE BELOW    POCT Glucose Result Value Ref Range    Glucose 203 mg/dL    QC OK? ok    POCT Glucose   Result Value Ref Range    Meter Glucose 203 (H) 74 - 99 mg/dL   EKG 12 Lead   Result Value Ref Range    Ventricular Rate 94 BPM    Atrial Rate 197 BPM    QRS Duration 112 ms    Q-T Interval 424 ms    QTc Calculation (Bazett) 530 ms    R Axis 64 degrees    T Axis 25 degrees   ,       RADIOLOGY:  Interpreted by Radiologist unless otherwise specified  CT ABDOMEN PELVIS WO CONTRAST Additional Contrast? None   Final Result   Markedly distended gallbladder with cholelithiasis and mild edematous wall   thickening concerning for possible acute cholecystitis or gallbladder   hydrops. The appearance is similar to the prior exam on 07/20/2022. Consider right upper quadrant ultrasound for further evaluation, if   clinically indicated. Small-moderate volume of ascites with stable peritoneal dialysis catheter   seen. No evidence of bowel obstruction. XR CHEST PORTABLE   Final Result   Increased opacification and bronchovascular prominence in comparison to the   prior study. CT HEAD WO CONTRAST   Final Result   1. There is no acute intracranial abnormality. Specifically, there is no   intracranial hemorrhage. 2. Atrophy and periventricular leukomalacia,   .         US GALLBLADDER RUQ    (Results Pending)         EKG Interpretation  Interpreted by emergency department physician, Dr. iSmran Byrd     A fib, rate 94, no STEMI, no significant change         ------------------------- NURSING NOTES AND VITALS REVIEWED ---------------------------   The nursing notes within the ED encounter and vital signs as below have been reviewed by myself  BP (!) 211/88   Pulse 71   Temp 98.9 °F (37.2 °C)   Resp 24   Ht 5' (1.524 m)   Wt 145 lb (65.8 kg)   SpO2 99%   BMI 28.32 kg/m²     Oxygen Saturation Interpretation: Normal    The patients available past medical records and past encounters were reviewed.         ------------------------------ ED COURSE/MEDICAL DECISION MAKING----------------------  Medications   amLODIPine (NORVASC) tablet 5 mg (5 mg Oral Given 9/16/22 1910)   apixaban (ELIQUIS) tablet 2.5 mg (has no administration in time range)   aspirin chewable tablet 81 mg (has no administration in time range)   atorvastatin (LIPITOR) tablet 20 mg (has no administration in time range)   DULoxetine (CYMBALTA) extended release capsule 30 mg (has no administration in time range)   isosorbide mononitrate (IMDUR) extended release tablet 30 mg (has no administration in time range)   levothyroxine (SYNTHROID) tablet 50 mcg (has no administration in time range)   docusate sodium (COLACE) capsule 100 mg (has no administration in time range)   pantoprazole (PROTONIX) tablet 40 mg (has no administration in time range)   insulin lispro (HUMALOG) injection vial 0-10 Units (0 Units SubCUTAneous Not Given 9/16/22 1919)   glucose chewable tablet 16 g (has no administration in time range)   dextrose bolus 10% 125 mL (has no administration in time range)     Or   dextrose bolus 10% 250 mL (has no administration in time range)   glucagon (rDNA) injection 1 mg (has no administration in time range)   dextrose 10 % infusion (has no administration in time range)   acetaminophen (TYLENOL) tablet 650 mg (has no administration in time range)   trimethobenzamide (TIGAN) injection 200 mg (has no administration in time range)   ondansetron (ZOFRAN) injection 4 mg (has no administration in time range)   magnesium sulfate 2000 mg in 50 mL IVPB premix (0 mg IntraVENous Stopped 9/16/22 1726)   morphine injection 4 mg (4 mg IntraVENous Given 9/16/22 1437)           The cardiac monitor revealed a fib with a heart rate in the 80s as interpreted by me. The cardiac monitor was ordered secondary to the patient's AMS and to monitor the patient for dysrhythmia. CPT 02793         Medical Decision Making:    Patient presents with a last well-known of 2 days ago. Labs and imaging reviewed. On reevaluation, patient's resting. Blood pressure is improving. However, she now complains of abdominal pain. CT obtained, shows possible acute cholecystitis however, CT of the gallbladder is consistent with previous studies. Right upper quadrant ultrasound is ordered and pending. Discussed with Dr. Joaquin Mcmillan, he will arrange for a analysis of her peritoneal fluid. Discussed with the hospitalist, patient will be admitted. Counseling: The emergency provider has spoken with the patient and discussed todays results, in addition to providing specific details for the plan of care and counseling regarding the diagnosis and prognosis. Questions are answered at this time and they are agreeable with the plan.       --------------------------------- IMPRESSION AND DISPOSITION ---------------------------------    IMPRESSION  1. Altered mental status, unspecified altered mental status type        DISPOSITION  Disposition: Admit to telemetry  Patient condition is stable        NOTE: This report was transcribed using voice recognition software.  Every effort was made to ensure accuracy; however, inadvertent computerized transcription errors may be present        Agustin Carranza MD  09/16/22 1954

## 2022-09-16 NOTE — LETTER
[place] Department Medicaid  CERTIFICATION OF NECESSITY  FOR NON-EMERGENCY TRANSPORTATION   BY GROUND AMBULANCE      Individual Information   1. Name: Elver Irving 2. [place] Medicaid Billing Number:   3. Address: [place] [address]      Transportation Provider Information   4. Provider Name:   5. [place] Medicaid Provider Number: National Provider Identifier (NPI):     Certification  7. Criteria:  During transport, this individual requires:  [x] Medical treatment or continuous     supervision by an EMT. [] The administration or regulation of oxygen by another person. [] Supervised protective restraint. 8. Period Beginning Date:   5. Length  [x] Not more than 1 day(s)  [] One Year     Additional Information Relevant to Certification   10. Comments or Explanations, If Necessary or Appropriate     Altered mental status (alert to self only), maximum assist x 2 for transfers, decreased strength, balance, endurance, functional mobility and safety awareness     Certifying Practitioner Information   11. Name of Practitioner: Estephania Albert MD   12. [place] Medicaid Provider Number, If Applicable: [address] Provider Identifier (NPI):     Signature Information   14. Date of Signature: 13. Name of Person Signin. Signature and Professional Designation:     [facility] [id number]  Rev. 2015    [id number]  89[facility] Encounter Date/Time: 2022 1118    Hospital Account: [de-identified]    MRN: [id number]    Patient: Elver Irving    Contact Serial #: [id number]      ENCOUNTER          Patient Class: I Private Enc?   No Unit RM BD: [address] Service: MED   Encounter DX: Change in mental status *   ADM Provider: Estephania Albert MD   Procedure:     ATT Provider: Estephania Albert MD   REF Provider:        Admission DX: Change in mental status, Altered mental status, unspecified altered mental status type and DX codes: R41.82, R41.82      PATIENT                 Name: Elver Irving : 1940 (82 yrs)   Address: [address] [place] [address] Sex: Female   City: [address] [place] [address]         Marital Status:    Employer: RETIRED         Mu-ism: Quaker   Primary Care Provider: Keerthi Ding MD         Primary Phone: [phone]   EMERGENCY CONTACT   Contact Name Legal Guardian? Relationship to Patient Home Phone Work Phone   1. William Page  2. Olga Page  No Child  Daughter-in-Law [phone]            GUARANTOR            Guarantor: Washburn Carina     : 1940   Address: [address] [place] [address] Sex: Female     [place] [address]     Relation to Patient: Self       Home Phone: [phone]   Guarantor ID: [id number]       Work Phone:     Guarantor Employer: RETIRED         Status: RETIRED      COVERAGE        PRIMARY INSURANCE   Payor: MEDICARE Plan: MEDICARE PART A AND B   Payor Address: [place] [id number],  [facility], [facility] [place] [address]       Group Number:   Insurance Type: INDEMNITY   Subscriber Name: Brea Meyer : 1940   Subscriber ID: [id number] Pat. Rel. to Sub: Self   SECONDARY INSURANCE   Payor: BCBS Plan: ANTHEM MEDICARE SUPP   Payor Address:   [id number], [address]          Group Number: [id number] Insurance Type: INDEMNITY   Subscriber Name: Brea Rota : 1940   Subscriber ID: [id number] Nonda Alla.  Rel. to Sub: SELF         CSN: [id number]

## 2022-09-17 LAB
ALBUMIN SERPL-MCNC: 2.8 G/DL (ref 3.5–5.2)
ALP BLD-CCNC: 249 U/L (ref 35–104)
ALT SERPL-CCNC: 48 U/L (ref 0–32)
AMMONIA: 16 UMOL/L (ref 11–51)
ANION GAP SERPL CALCULATED.3IONS-SCNC: 16 MMOL/L (ref 7–16)
AST SERPL-CCNC: 65 U/L (ref 0–31)
BILIRUB SERPL-MCNC: 0.4 MG/DL (ref 0–1.2)
BUN BLDV-MCNC: 58 MG/DL (ref 6–23)
CALCIUM SERPL-MCNC: 9.1 MG/DL (ref 8.6–10.2)
CHLORIDE BLD-SCNC: 89 MMOL/L (ref 98–107)
CO2: 26 MMOL/L (ref 22–29)
CREAT SERPL-MCNC: 7 MG/DL (ref 0.5–1)
GFR AFRICAN AMERICAN: 7
GFR NON-AFRICAN AMERICAN: 6 ML/MIN/1.73
GLUCOSE BLD-MCNC: 144 MG/DL (ref 74–99)
HCT VFR BLD CALC: 36 % (ref 34–48)
HEMOGLOBIN: 11.9 G/DL (ref 11.5–15.5)
MAGNESIUM: 1.9 MG/DL (ref 1.6–2.6)
MCH RBC QN AUTO: 29.9 PG (ref 26–35)
MCHC RBC AUTO-ENTMCNC: 33.1 % (ref 32–34.5)
MCV RBC AUTO: 90.5 FL (ref 80–99.9)
METER GLUCOSE: 149 MG/DL (ref 74–99)
METER GLUCOSE: 157 MG/DL (ref 74–99)
METER GLUCOSE: 231 MG/DL (ref 74–99)
METER GLUCOSE: 256 MG/DL (ref 74–99)
PDW BLD-RTO: 14.6 FL (ref 11.5–15)
PHOSPHORUS: 6 MG/DL (ref 2.5–4.5)
PLATELET # BLD: 322 E9/L (ref 130–450)
PMV BLD AUTO: 10.6 FL (ref 7–12)
POTASSIUM SERPL-SCNC: 4.3 MMOL/L (ref 3.5–5)
RBC # BLD: 3.98 E12/L (ref 3.5–5.5)
SODIUM BLD-SCNC: 131 MMOL/L (ref 132–146)
TOTAL PROTEIN: 6.7 G/DL (ref 6.4–8.3)
TSH SERPL DL<=0.05 MIU/L-ACNC: 3.91 UIU/ML (ref 0.27–4.2)
WBC # BLD: 19.8 E9/L (ref 4.5–11.5)

## 2022-09-17 PROCEDURE — 82962 GLUCOSE BLOOD TEST: CPT

## 2022-09-17 PROCEDURE — 6370000000 HC RX 637 (ALT 250 FOR IP): Performed by: INTERNAL MEDICINE

## 2022-09-17 PROCEDURE — 84443 ASSAY THYROID STIM HORMONE: CPT

## 2022-09-17 PROCEDURE — 84100 ASSAY OF PHOSPHORUS: CPT

## 2022-09-17 PROCEDURE — 36415 COLL VENOUS BLD VENIPUNCTURE: CPT

## 2022-09-17 PROCEDURE — 82140 ASSAY OF AMMONIA: CPT

## 2022-09-17 PROCEDURE — 83735 ASSAY OF MAGNESIUM: CPT

## 2022-09-17 PROCEDURE — 1200000000 HC SEMI PRIVATE

## 2022-09-17 PROCEDURE — 85027 COMPLETE CBC AUTOMATED: CPT

## 2022-09-17 PROCEDURE — 90945 DIALYSIS ONE EVALUATION: CPT

## 2022-09-17 PROCEDURE — 80053 COMPREHEN METABOLIC PANEL: CPT

## 2022-09-17 RX ADMIN — APIXABAN 2.5 MG: 2.5 TABLET, FILM COATED ORAL at 21:52

## 2022-09-17 RX ADMIN — ATORVASTATIN CALCIUM 20 MG: 20 TABLET, FILM COATED ORAL at 21:52

## 2022-09-17 RX ADMIN — AMLODIPINE BESYLATE 5 MG: 5 TABLET ORAL at 10:37

## 2022-09-17 RX ADMIN — INSULIN LISPRO 6 UNITS: 100 INJECTION, SOLUTION INTRAVENOUS; SUBCUTANEOUS at 21:53

## 2022-09-17 RX ADMIN — ASPIRIN 81 MG 81 MG: 81 TABLET ORAL at 10:37

## 2022-09-17 RX ADMIN — INSULIN LISPRO 4 UNITS: 100 INJECTION, SOLUTION INTRAVENOUS; SUBCUTANEOUS at 18:46

## 2022-09-17 RX ADMIN — DULOXETINE HYDROCHLORIDE 30 MG: 30 CAPSULE, DELAYED RELEASE ORAL at 10:37

## 2022-09-17 RX ADMIN — ISOSORBIDE MONONITRATE 30 MG: 30 TABLET, EXTENDED RELEASE ORAL at 10:37

## 2022-09-17 RX ADMIN — DOCUSATE SODIUM 100 MG: 100 CAPSULE, LIQUID FILLED ORAL at 21:52

## 2022-09-17 RX ADMIN — APIXABAN 2.5 MG: 2.5 TABLET, FILM COATED ORAL at 10:37

## 2022-09-17 ASSESSMENT — PAIN SCALES - GENERAL: PAINLEVEL_OUTOF10: 0

## 2022-09-17 NOTE — FLOWSHEET NOTE
09/17/22 1820   Vitals   /62   Temp 98.8 °F (37.1 °C)   Heart Rate 82   Resp 20   Cycler   Verification of Prescription CCPD   Total Volume Programmed 42301 mL   Fill Volume 2300 mL   Last Fill Volume 1800 mL   Dextrose Setting Same (Nonextraneal)   Number of Cycles 5   Bag Volume 5000 mL   Number of Bags Used 3   Dianeal Solution   (2.5 in 5000)   Ccpd initated using aspetic technique, no complications

## 2022-09-17 NOTE — ED NOTES
Attempted to call report to [phone] [phone] with no answer. SBAR faxed to floor.       Amy Landeros RN  09/17/22 5583

## 2022-09-17 NOTE — CONSULTS
[address]                  [address] [place],  [facility]                                  CONSULTATION    PATIENT NAME: Jonn Hawthorne                  :        1940  MED REC NO:   [id number]                            ROOM:       8422  ACCOUNT NO:   [de-identified]                           ADMIT DATE: 2022  PROVIDER:     Lana Marquez MD    CONSULT DATE:  2022    REASON FOR CONSULTATION:  End-stage renal disease. HISTORY OF PRESENT ILLNESS:  The patient is an 80-year-old female known  to our service. She presented to the hospital with chief complaint of  altered mental status. She has history of end-stage renal disease. She is on peritoneal  dialysis. Coronary artery disease, diabetes, hypertension. She was seen in her room. She is on room air. She is _____. She is  alert. She continued to be disoriented. She is pleasant. She appears  euvolemic. Abdomen site is soft. Her PD catheter is clean. No drainage. No  tenderness. Her vitals are stable. No fever was reported. No reported nausea. No vomiting. No diarrhea. No breathing problem. Case discussed with her family at bedside. No recent change in  medication. No issues reported at home. REVIEW OF SYSTEMS:  12-point done, negative except for those mentioned  above. ALLERGIES:  Include SULFA ANTIBIOTICS. PAST MEDICAL HISTORY:  Includes end-stage renal disease. PAST SURGICAL HISTORY:  Includes PD catheter placement. SOCIAL HISTORY:  No smoking. No drinking. FAMILY HISTORY:  Reviewed and noncontributory. MEDICATIONS:  Include levothyroxine, Cymbalta. PHYSICAL EXAMINATION:  VITAL SIGNS:  Blood pressure _____/58. GENERAL:  She is in no apparent distress. HEAD:  Atraumatic, normocephalic. NECK:  Supple, symmetrical.  EYES:  Pupils are round and reactive to light bilaterally. HEART:  Normal rate. ABDOMEN:  Soft and nontender. Bowel sounds active. No organomegaly. LUNGS:  Good airflow bilaterally. SKIN:  Dry. PSYCHIATRIC:  Cooperative. NEUROLOGIC:  Cranial nerves grossly intact, II through XII. BLOOD WORK:  WBC is 19.8 with left shift. Creatinine 7.0, potassium  4.3. ASSESSMENT:  1.  End-stage renal disease, on peritoneal dialysis. 2.  Encephalopathy, etiology unclear, metabolic versus ongoing  infection. 3.  Leukocytosis. 4.  Anemia of chronic disease. 5.  Hypomagnesemia. 6.  Mineral bone disease. PLAN:  1. Continue peritoneal dialysis per script. 2.  PD fluid sample was reviewed and no signs of peritonitis. 3.  Followup cultures. 4.  Check ammonia. Check TSH. 5.  Continue supportive care.         [address] Carr MD    D: 09/17/2022 15:56:10       T: 09/17/2022 17:05:25     [email or web address]  Job#: [id number]     Doc#: [id number]    CC:

## 2022-09-17 NOTE — ED NOTES
Report given and care transferred to [facility], RN as of 0300.      [place], [address] [facility]  09/17/22 5460

## 2022-09-17 NOTE — ED NOTES
Rounded on patient. Patient resting in bed. Vital signs updated and morning labs sent. Patient denies any needs at this time.       Greg Felty, RN  09/17/22 2324

## 2022-09-17 NOTE — H&P
[address] [facility]  History and Physical      CHIEF COMPLAINT:  AMS    Reason for Admission:  AMS    History Obtained From:  patient, family member - son    HISTORY OF PRESENT ILLNESS:      The patient is a 80 y.o. female of Connie Barnett MD with significant past medical history of Anemia, Arthritis, CAD, DM, Hx of bleeding peptic ulcer, HTN, ESRD on peritoneal dialysis daily who presents with altered mental status. History is obtained from her son as patient is not answering questions appropriately. He reports she has been gradually more confused since Wednesday. He noticed on Monday that her blood pressure was elevated and has remained persistently elevated all week. She was also having some involuntary twitching of the extremities. She gets peritoneal dialysis daily but it was held yesterday. She denies any pain, fever, chills, N/V, cough, abdominal pain, numbness/weakness. ED course : BP elevated on arrival. EKG showed a fib with rate of 94 and no acute changes, WBC 17.0, H/H normal, K+ 3.3, BUN/Cr 50/6.2, Troponin 183, Mg 1.3 and replaced, lactic acid 3.3, CT A/P with ascites and stable peritoneal dialysis catheter without evidence of bowel obstruction. CT head negative for acute abnormality.     All labs personally reviewed   All imaging personally reviewed     Past Medical History:        Diagnosis Date    Anemia     Arthritis     CAD (coronary artery disease)     Diabetes mellitus ([facility] [address]     Gout     History of bleeding peptic ulcer approx     Hypertension     Peritoneal dialysis catheter in place [facility])     Peritoneal dialysis status ([address]     at night for 8 hours    Thyroid disease     Use of cane as ambulatory aid     Wears glasses      Past Surgical History:        Procedure Laterality Date    BACK SURGERY      CARDIOVASCULAR STRESS TEST      CARPAL TUNNEL RELEASE Bilateral     CATARACT REMOVAL WITH IMPLANT Bilateral      SECTION      CORONARY ARTERY BYPASS GRAFT  approx  x 3; per Dr Cristy Maher, [place], [id number] [facility] [address]      left knee, right shoulder    SD TOTAL KNEE ARTHROPLASTY Right 10/31/2018    RIGHT KNEE TOTAL ARTHROPLASTY +++BRIDGER++ PNB++ performed by Mindi Baez MD at [address] [place] [address]           Medications Prior to Admission:    Medications Prior to Admission: insulin glargine (LANTUS) 100 UNIT/ML injection vial, Inject 10 Units into the skin in the morning and 10 Units before bedtime. guaiFENesin (ROBITUSSIN) 100 MG/5ML SOLN oral solution, Take 10 mLs by mouth every 6 hours as needed for Cough  zinc sulfate (ZINCATE) 220 (50 Zn) MG capsule, Take 1 capsule by mouth in the morning for 2 doses. B Complex-C-Folic Acid (DIALYVITE 914 PO), Take by mouth  omeprazole (PRILOSEC) 20 MG delayed release capsule, Take 20 mg by mouth in the morning. Cetirizine HCl (ZYRTEC PO), Take by mouth (Patient not taking: Reported on 7/28/2022)  potassium chloride (KLOR-CON M) 20 MEQ extended release tablet, Take 20 mEq by mouth in the morning. amLODIPine (NORVASC) 5 MG tablet, Take 5 mg by mouth in the morning. aspirin 81 MG chewable tablet, Take 1 tablet by mouth daily  apixaban (ELIQUIS) 2.5 MG TABS tablet, Take 1 tablet by mouth 2 times daily  atorvastatin (LIPITOR) 20 MG tablet, Take 1 tablet by mouth nightly  DULoxetine (CYMBALTA) 30 MG extended release capsule, Take 30 mg by mouth in the morning. isosorbide mononitrate (IMDUR) 30 MG CR tablet, Take 30 mg by mouth in the morning. levothyroxine (SYNTHROID) 50 MCG tablet, Take 50 mcg by mouth Daily    Allergies:  Sulfa antibiotics    Social History:   TOBACCO:   reports that she has never smoked. She has never used smokeless tobacco.  ETOH:   reports no history of alcohol use. MARITAL STATUS:    OCCUPATION:      Family History:   History reviewed. No pertinent family history. REVIEW OF SYSTEMS:    Pertinent ROS as noted in HPI. 10 point ROS otherwise negative.     Vitals:  BP (!) 136/58   Pulse 87   Temp 99 °F (37.2 °C) (Temporal)   Resp 20   Ht 5' (1.524 m)   Wt 145 lb (65.8 kg)   SpO2 97%   BMI 28.32 kg/m²     PHYSICAL EXAM:  General Appearance:  awake, alert, in no acute distress, not answering questions appropriately  Head/face:  NCAT  Eyes:  No gross abnormalities. Lungs:  Normal expansion. Clear to auscultation. No rales, rhonchi, or wheezing. Heart:  Heart sounds are normal.  Regular rate and rhythm without murmur, gallop or rub. Abdomen:  Soft, non-tender, normal bowel sounds. No bruits, organomegaly or masses. Extremities: Extremities warm to touch, pink, with no edema. Neurologic:  Alert, not oriented, no focal deficits    DATA:     Recent Labs     09/16/22  1141 09/17/22  0549   WBC 17.0* 19.8*   HGB 12.5 11.9    322     Recent Labs     09/16/22  1141 09/16/22  1215 09/16/22  1220 09/16/22  1932 09/17/22  0549     --   --  130* 131*   K 3.3*  --  3.04* 3.8 4.3   BUN 50*  --   --  54* 58*   CREATININE 6.2* 6.9*  --  6.5* 7.0*     Recent Labs     09/16/22  1141 09/17/22  0549   PROT 7.5 6.7   INR 1.2  --      Recent Labs     09/16/22  1141 09/17/22  0549   AST 12 65*   ALT 8 48*   ALKPHOS 201* 249*   BILITOT 0.5 0.4     No results for input(s): BNP in the last 72 hours. No results for input(s): CKTOTAL, CKMB, CKMBINDEX, TROPONINI in the last 72 hours. ASSESSMENT:      Principal Problem:    Change in mental status  Resolved Problems:    * No resolved hospital problems. *        PLAN:    # Altered mental status  - unclear etiology  - CXR without evidence of infection, CT A/P with concern for possible cholecystitis but US gallbladder reveals no wall thickening or pericholecystic fluid.  CT head negative for acute abnormality  - lactic acid and WBC elevated  - blood cultures pending  - peritoneal body fluid culture pending  - UA and urine culture need to be collected - patient does not produce urine  - ESRD with electrolyte disturbances noted  -

## 2022-09-18 ENCOUNTER — APPOINTMENT (OUTPATIENT)
Dept: CT IMAGING | Age: 82
DRG: 853 | End: 2022-09-18
Payer: MEDICARE

## 2022-09-18 LAB
ANION GAP SERPL CALCULATED.3IONS-SCNC: 16 MMOL/L (ref 7–16)
BUN BLDV-MCNC: 49 MG/DL (ref 6–23)
C-REACTIVE PROTEIN: 20.9 MG/DL (ref 0–0.4)
CALCIUM SERPL-MCNC: 8.7 MG/DL (ref 8.6–10.2)
CHLORIDE BLD-SCNC: 87 MMOL/L (ref 98–107)
CO2: 25 MMOL/L (ref 22–29)
CREAT SERPL-MCNC: 6.6 MG/DL (ref 0.5–1)
GFR AFRICAN AMERICAN: 7
GFR NON-AFRICAN AMERICAN: 6 ML/MIN/1.73
GLUCOSE BLD-MCNC: 284 MG/DL (ref 74–99)
HCT VFR BLD CALC: 32.1 % (ref 34–48)
HEMOGLOBIN: 10.6 G/DL (ref 11.5–15.5)
LACTIC ACID: 2.2 MMOL/L (ref 0.5–2.2)
MAGNESIUM: 1.7 MG/DL (ref 1.6–2.6)
MCH RBC QN AUTO: 30 PG (ref 26–35)
MCHC RBC AUTO-ENTMCNC: 33 % (ref 32–34.5)
MCV RBC AUTO: 90.9 FL (ref 80–99.9)
METER GLUCOSE: 129 MG/DL (ref 74–99)
METER GLUCOSE: 282 MG/DL (ref 74–99)
METER GLUCOSE: 344 MG/DL (ref 74–99)
PDW BLD-RTO: 14.4 FL (ref 11.5–15)
PLATELET # BLD: 272 E9/L (ref 130–450)
PMV BLD AUTO: 10.6 FL (ref 7–12)
POTASSIUM SERPL-SCNC: 3.6 MMOL/L (ref 3.5–5)
PROCALCITONIN: 1.82 NG/ML (ref 0–0.08)
RBC # BLD: 3.53 E12/L (ref 3.5–5.5)
SARS-COV-2, NAAT: NOT DETECTED
SEDIMENTATION RATE, ERYTHROCYTE: 106 MM/HR (ref 0–20)
SODIUM BLD-SCNC: 128 MMOL/L (ref 132–146)
WBC # BLD: 16.9 E9/L (ref 4.5–11.5)

## 2022-09-18 PROCEDURE — 1200000000 HC SEMI PRIVATE

## 2022-09-18 PROCEDURE — 36415 COLL VENOUS BLD VENIPUNCTURE: CPT

## 2022-09-18 PROCEDURE — 83605 ASSAY OF LACTIC ACID: CPT

## 2022-09-18 PROCEDURE — 86140 C-REACTIVE PROTEIN: CPT

## 2022-09-18 PROCEDURE — 2580000003 HC RX 258: Performed by: FAMILY MEDICINE

## 2022-09-18 PROCEDURE — 6360000002 HC RX W HCPCS: Performed by: PHYSICIAN ASSISTANT

## 2022-09-18 PROCEDURE — 90945 DIALYSIS ONE EVALUATION: CPT

## 2022-09-18 PROCEDURE — 6370000000 HC RX 637 (ALT 250 FOR IP): Performed by: PHYSICIAN ASSISTANT

## 2022-09-18 PROCEDURE — 6370000000 HC RX 637 (ALT 250 FOR IP): Performed by: INTERNAL MEDICINE

## 2022-09-18 PROCEDURE — 87635 SARS-COV-2 COVID-19 AMP PRB: CPT

## 2022-09-18 PROCEDURE — 70450 CT HEAD/BRAIN W/O DYE: CPT

## 2022-09-18 PROCEDURE — C9113 INJ PANTOPRAZOLE SODIUM, VIA: HCPCS | Performed by: FAMILY MEDICINE

## 2022-09-18 PROCEDURE — 6360000002 HC RX W HCPCS: Performed by: FAMILY MEDICINE

## 2022-09-18 PROCEDURE — 82962 GLUCOSE BLOOD TEST: CPT

## 2022-09-18 PROCEDURE — 85027 COMPLETE CBC AUTOMATED: CPT

## 2022-09-18 PROCEDURE — 84145 PROCALCITONIN (PCT): CPT

## 2022-09-18 PROCEDURE — 85651 RBC SED RATE NONAUTOMATED: CPT

## 2022-09-18 PROCEDURE — A4216 STERILE WATER/SALINE, 10 ML: HCPCS | Performed by: FAMILY MEDICINE

## 2022-09-18 PROCEDURE — 83735 ASSAY OF MAGNESIUM: CPT

## 2022-09-18 PROCEDURE — 80048 BASIC METABOLIC PNL TOTAL CA: CPT

## 2022-09-18 RX ORDER — MAGNESIUM SULFATE 1 G/100ML
1000 INJECTION INTRAVENOUS ONCE
Status: COMPLETED | OUTPATIENT
Start: 2022-09-18 | End: 2022-09-19

## 2022-09-18 RX ORDER — ACETAMINOPHEN 650 MG/1
650 SUPPOSITORY RECTAL EVERY 4 HOURS PRN
Status: DISCONTINUED | OUTPATIENT
Start: 2022-09-18 | End: 2022-10-03 | Stop reason: HOSPADM

## 2022-09-18 RX ADMIN — INSULIN LISPRO 6 UNITS: 100 INJECTION, SOLUTION INTRAVENOUS; SUBCUTANEOUS at 09:01

## 2022-09-18 RX ADMIN — ACETAMINOPHEN 650 MG: 650 SUPPOSITORY RECTAL at 19:27

## 2022-09-18 RX ADMIN — INSULIN LISPRO 8 UNITS: 100 INJECTION, SOLUTION INTRAVENOUS; SUBCUTANEOUS at 22:46

## 2022-09-18 RX ADMIN — SODIUM CHLORIDE, PRESERVATIVE FREE 40 MG: 5 INJECTION INTRAVENOUS at 15:00

## 2022-09-18 RX ADMIN — MAGNESIUM SULFATE IN DEXTROSE 1000 MG: 10 INJECTION, SOLUTION INTRAVENOUS at 22:44

## 2022-09-18 NOTE — PROGRESS NOTES
[facility] MD Vincenzo Valdovinos MD. [place] MD   Progress Note    2022    SUBJECTIVE:     PROBLEM LIST:    Principal Problem:    Change in mental status  Resolved Problems:    * No resolved hospital problems. *       DIET:    ADULT DIET; Regular; 5 carb choices (75 gm/meal)       Allergies : Sulfa antibiotics    Past Medical History:   Diagnosis Date    Anemia     Arthritis     CAD (coronary artery disease)     Diabetes mellitus ([facility] [address]     Gout     History of bleeding peptic ulcer approx     Hypertension     Peritoneal dialysis catheter in place [facility])     Peritoneal dialysis status ([facility] [address]     at night for 8 hours    Thyroid disease     Use of cane as ambulatory aid     Wears glasses        Past Surgical History:   Procedure Laterality Date    BACK SURGERY      CARDIOVASCULAR STRESS TEST      CARPAL TUNNEL RELEASE Bilateral     CATARACT REMOVAL WITH IMPLANT Bilateral      SECTION      CORONARY ARTERY BYPASS GRAFT  approx 2000    x 3; per Dr Marianela Vasques, [place], [address] [facility] [address]      left knee, right shoulder    [place] TOTAL KNEE ARTHROPLASTY Right 10/31/2018    RIGHT KNEE TOTAL ARTHROPLASTY +++BRIDGER++ PNB++ performed by Scarlett Mullins MD at [facility]         History reviewed. No pertinent family history. reports that she has never smoked. She has never used smokeless tobacco. She reports that she does not drink alcohol and does not use drugs. Review of Systems:   Constitutional: no fevers , no chills , feels ok   Eyes: no eye pain , no itching , no drainage  Ears, nose, mouth, throat, and face: no ear ,nose pain , hearing is ok ,no nasal drainage   Respiratory: no sob ,no cough ,no wheezing . Cardiovascular: no chest pain , no palpitation ,no sob . Gastrointestinal: no nausea, vomiting , constipation , no abdominal pain . Genitourinary:no urinary retention , no burning , dysuria .  No polyuria Hematologic/lymphatic: no bleeding , no cougulation issues . Musculoskeletal:no joint pain , no swelling . Neurological: no headaches ,no weakness , no numbness . Endocrine: no thirst , no weight issues .      MEDS (scheduled):    pantoprazole (PROTONIX) 40 mg injection  40 mg IntraVENous Daily    amLODIPine  5 mg Oral Daily    apixaban  2.5 mg Oral BID    aspirin  81 mg Oral Daily    atorvastatin  20 mg Oral Nightly    DULoxetine  30 mg Oral Daily    isosorbide mononitrate  30 mg Oral Daily    levothyroxine  50 mcg Oral Daily    docusate sodium  100 mg Oral Nightly    [Held by provider] pantoprazole  40 mg Oral QAM AC    insulin lispro  0-10 Units SubCUTAneous 4x Daily AC & HS       MEDS (infusions):   dextrose         MEDS (prn):  glucose, dextrose bolus **OR** dextrose bolus, glucagon (rDNA), dextrose, acetaminophen, trimethobenzamide    PHYSICAL EXAM:     Patient Vitals for the past 24 hrs:   BP Temp Temp src Pulse Resp SpO2   09/18/22 0800 128/62 97.9 °F (36.6 °C) Oral 94 18 96 %   09/17/22 2000 124/71 98.1 °F (36.7 °C) Oral 82 16 92 %   09/17/22 1820 133/62 98.8 °F (37.1 °C) -- 82 20 --   @      Intake/Output Summary (Last 24 hours) at 9/18/2022 1452  Last data filed at 9/18/2022 0800  Gross per 24 hour   Intake 240 ml   Output 1791 ml   Net -1551 ml         Wt Readings from Last 3 Encounters:   09/16/22 145 lb (65.8 kg)   07/27/22 143 lb 3.2 oz (65 kg)   04/29/22 158 lb 8.2 oz (71.9 kg)       Constitutional:  in no acute distress  Oral: mucus membranes moist  Neck: no JVD  Cardiovascular: S1, S2 regular rhythm, no murmur,or rub  Respiratory:  No crackles, no wheeze  Gastrointestinal:  Soft, nontender, nondistended, NABS  Ext: no edema, feet warm  Skin: dry, no rash  Neuro: awake, alert, interactive      DATA:    Recent Labs     09/16/22  1141 09/17/22  0549 09/18/22  0430   WBC 17.0* 19.8* 16.9*   HGB 12.5 11.9 10.6*   HCT 36.1 36.0 32.1*   MCV 85.7 90.5 90.9    322 272     Recent Labs 09/16/22  1141 09/16/22  1215 09/16/22  1932 09/17/22  0549 09/18/22  0430     --  130* 131* 128*   K 3.3*   < > 3.8 4.3 3.6   CL 91*  --  88* 89* 87*   CO2 22  --  25 26 25   MG 1.3*  --   --  1.9  --    PHOS  --   --   --  6.0*  --    BUN 50*  --  54* 58* 49*   CREATININE 6.2*   < > 6.5* 7.0* 6.6*   ALT 8  --   --  48*  --    AST 12  --   --  65*  --    BILITOT 0.5  --   --  0.4  --    ALKPHOS 201*  --   --  249*  --     < > = values in this interval not displayed. No results found for: LABPROT    ASSESSMENT / RECOMMENDATIONS:      1.  End-stage renal disease, on peritoneal dialysis. 2.  Encephalopathy, etiology unclear, metabolic versus ongoing  infection. 3.  Leukocytosis. 4.  Anemia of chronic disease. 5.  Hypomagnesemia. 6.  Mineral bone disease. PLAN:  1. Continue peritoneal dialysis per script. 2.  PD fluid sample was reviewed and no signs of peritonitis. 3.  Followup cultures. 4.  Check ammonia. Check TSH. 5.  Continue supportive care.        Electronically signed by Reid Lees MD on 9/18/2022 at 2:52 PM

## 2022-09-18 NOTE — FLOWSHEET NOTE
09/18/22 0800   Vitals   /62   Temp 97.9 °F (36.6 °C)   Temp Source Oral   Heart Rate 94   Resp 18   SpO2 96 %   Cycler   Verification of Prescription CCPD   Total Volume Programmed 92776 mL   Therapy Time (Hours:Minutes) 8:29   Fill Volume 2300 mL   Last Fill Volume 1795 mL   Dextrose Setting Same (Nonextraneal)   Number of Cycles 5   Bag Volume 5000 mL   Number of Bags Used 3   I Drain (mL) 1238 mL   Ultrafiltration (UF) (mL) 553 mL   Ccpd complete. Disconnected line and capped catheter with iodine cap. No issues or pain overnight.

## 2022-09-18 NOTE — PROGRESS NOTES
Subjective:    Patient seen and examined at bedside this morning. She is lethargic but answering questions more appropriately today. C/o some continued abdominal pain. No acute overnight events. Nursing staff, dialysis nurse and son at bedside, updated on plan of care. Objective:    /62   Pulse 94   Temp 97.9 °F (36.6 °C) (Oral)   Resp 18   Ht 5' (1.524 m)   Wt 145 lb (65.8 kg)   SpO2 96%   BMI 28.32 kg/m²     In: 240 [P.O.:240]  Out: 1791   In: 240   Out: 1791     General Appearance:  lethargic, NAD  Head/face:  NCAT  Eyes:  No gross abnormalities. Lungs:  Normal expansion. Clear to auscultation. No rales, rhonchi, or wheezing. Heart:  Heart sounds are normal.  Regular rate and rhythm without murmur, gallop or rub. Abdomen:  Soft, non-tender, normal bowel sounds. No bruits, organomegaly or masses. Extremities: Extremities warm to touch, pink, with no edema. Neurologic:  Lethargic, oriented x 2, no focal deficits, mild involuntary twitching of extremities - improved from admission     Recent Labs     09/16/22  1141 09/17/22  0549 09/18/22  0430   WBC 17.0* 19.8* 16.9*   HGB 12.5 11.9 10.6*   HCT 36.1 36.0 32.1*    322 272       Recent Labs     09/16/22  1932 09/17/22  0549 09/18/22  0430   * 131* 128*   K 3.8 4.3 3.6   CL 88* 89* 87*   CO2 25 26 25   BUN 54* 58* 49*   CREATININE 6.5* 7.0* 6.6*   CALCIUM 9.5 9.1 8.7       Assessment:    Principal Problem:    Change in mental status  Resolved Problems:    * No resolved hospital problems. *      Plan:    PLAN:     # Altered mental status  - unclear etiology - likely metabolic. More lethargic on exam this morning and with involuntary twitching  - CT head STAT ordered  - consult to neuro placed, appreciate input  - CT A/P with concern for possible cholecystitis but US gallbladder reveals no wall thickening or pericholecystic fluid.    - lactic acid normal this AM and WBC down to 16.9 from 19.8  - blood cultures x 2 are negative x 24 hours, follow  - ammonia level normal  - peritoneal body fluid culture pending  - UA and urine culture need to be collected - patient does not produce urine  - ESRD with electrolyte disturbances noted  - Consult to Nephrology placed, patient gets daily peritoneal dialysis at home  - vitals show elevated BP without fever or oxygen requirement  - re-start home antihypertensives - currently BP controlled at 136/58  - ISS on board  - elevated troponins without EKG changes or chest pain - likely related to renal failure - noted elevations 04/22 chronically. Trending down 183 > 164  - follow labs     Medication for other comorbidities continue as appropriate dose adjustment as necessary.      DVT prophylaxis  PT OT  Discharge plan     Code status : Full      Leopoldo Collado MD  9:04 AM  9/18/2022

## 2022-09-18 NOTE — CONSULTS
NEUROLOGY CONSULT NOTE      Requesting Physician: Gina Solo MD    Reason for Consult:  Evaluate for AMS. History of Present Illness:  Wu Lim is a 80 y.o. female  with h/o CAD, HTN, DM, thyroid disease, anemia, and ESRD on peritoneal dialysis who was admitted to [facility] on 9/16/2022 with presentation of altered mental status. Her last known well was morning of 9/14/2022 in patient who is normally alert and verbally responsive according to her son. Since that time there has been a gradual progression of cognitive dysfunction with marked her confusion, presentation. It was associated with onset of increased lethargy and intermittent twitching but was new for this patient. Patient was also having significant problems with ambulation which prompted call to EMS. Prior to this patient had been living at home independently and receiving peritoneal dialysis overnight without difficulty. Patient reported she had not been feeling well prior to presentation. There was no report of any recent illness, infection, trauma, or changes in medications or general health. Patient denied any fever, chills, shortness of breath, chest pain, palpitations, headache, abdominal pain, nausea, vomiting, bowel or bladder dysfunction, weakness, numbness, vision change, dizziness or vertigo. On ED evaluation, presenting blood pressure was 214/94 with pulse of 102 and temperature of 98.9 °F.  Labs were remarkable for high-sensitivity troponin-183, potassium-3.3, creatinine-6.2, WBC-17.0, neutrophils percent-84, neutrophils absolute-14.28, magnesium-1.3, arterial blood gas pH-7.62, arterial PCO2-22.6, lactic acid-3.3, and negative serum drug screen. The scan of the head was completed showing no acute intracranial abnormalities. There was note of atrophy and periventricular leukomalacia.       Past Medical History:        Diagnosis Date    Anemia     Arthritis     CAD (coronary artery disease)     Diabetes mellitus ([facility] [address]     Gout     History of bleeding peptic ulcer approx     Hypertension     Peritoneal dialysis catheter in place [facility])     Peritoneal dialysis status ([facility] [address]     at night for 8 hours    Thyroid disease     Use of cane as ambulatory aid     Wears glasses            Procedure Laterality Date    BACK SURGERY      CARDIOVASCULAR STRESS TEST      CARPAL TUNNEL RELEASE Bilateral     CATARACT REMOVAL WITH IMPLANT Bilateral      SECTION      CORONARY ARTERY BYPASS GRAFT  approx 2000    x 3; per Dr Criss Mora, [place], [address] [facility] [address]      left knee, right shoulder    [place] TOTAL KNEE ARTHROPLASTY Right 10/31/2018    RIGHT KNEE TOTAL ARTHROPLASTY +++BRIDGER++ PNB++ performed by Evert Morocho MD at [address]         Social History:  Social History     Tobacco Use   Smoking Status Never   Smokeless Tobacco Never     Social History     Substance and Sexual Activity   Alcohol Use No     Social History     Substance and Sexual Activity   Drug Use No         Family History:   History reviewed. No pertinent family history. Review of Systems:  All systems reviewed are negative except what is mentioned in history of present illness. Allergies:     Allergies   Allergen Reactions    Sulfa Antibiotics Swelling     Swelling after being out in sun        Current Medications:   pantoprazole (PROTONIX) 40 mg in sodium chloride (PF) 10 mL injection, Daily  amLODIPine (NORVASC) tablet 5 mg, Daily  apixaban (ELIQUIS) tablet 2.5 mg, BID  aspirin chewable tablet 81 mg, Daily  atorvastatin (LIPITOR) tablet 20 mg, Nightly  DULoxetine (CYMBALTA) extended release capsule 30 mg, Daily  isosorbide mononitrate (IMDUR) extended release tablet 30 mg, Daily  levothyroxine (SYNTHROID) tablet 50 mcg, Daily  docusate sodium (COLACE) capsule 100 mg, Nightly  [Held by provider] pantoprazole (PROTONIX) tablet 40 mg, QAM AC  insulin lispro (HUMALOG) injection vial 0-10 Units, 4x Daily AC & HS  glucose chewable tablet 16 g, PRN  dextrose bolus 10% 125 mL, PRN   Or  dextrose bolus 10% 250 mL, PRN  glucagon (rDNA) injection 1 mg, PRN  dextrose 10 % infusion, Continuous PRN  acetaminophen (TYLENOL) tablet 650 mg, Q4H PRN  trimethobenzamide (TIGAN) injection 200 mg, Q6H PRN       Physical Exam:  /62   Pulse 94   Temp 97.9 °F (36.6 °C) (Oral)   Resp 18   Ht 5' (1.524 m)   Wt 145 lb (65.8 kg)   SpO2 96%   BMI 28.32 kg/m²  I Body mass index is 28.32 kg/m². I   Wt Readings from Last 1 Encounters:   09/16/22 145 lb (65.8 kg)          HEENT: Normocephalic, atraumatic, no lesions or abnormalities noted. Neck:  supple with full ROM; no masses, nodes or bruits; no cervical tenderness on palpation. Lungs:  clear to auscultation  bilaterally     CV: RRR without gallops or murmurs     Extremities: no c/c/e          Neurologic Exam     Mental Status:  Patient was alert, responsive, oriented, appropriate, answering questions, and following commands. Speech was fluent with normal sensorium and cognition. Cranial Nerves: Pupils were equal round and reactive to light and accommodation;    Visual fields were full on confrontation;  Funduscopic exam was normal; no pappilledema   Extraocular movements were intact; no nystagmus; Intact facial sensation to temp, pinprick, and light touch; Symmetric facial movements with good lip and eye closure bilaterally; Hearing was intact; Cristobal was midline;    Normal palatal elevation with midline uvula  Sternocleidomastoid  / Trapezius strength of 5/5. Tongue was midline with no atrophy or fasciculations    Motor Exam:  Strength was 5/5 throughout; normal tone and bulk; no atrophy or fasiculations noted. Sensory Exam:  Normal sensation to light touch, pin-prick, and temperature; No sensory extinction.        Cerebella Exam:  Intact finger-nose-finger, rapidly alternating movements, fine motor movements, and heel-down-shin maneuvers; No cerebellar rebound or drift; No tremors   Normal tandem gait. Negative Romberg      Gait:  Normal gait pattern with intact heel/toe walking. (Gait was not tested. Reflexes: normal and symmetric bilaterally; Babinski is negative     Labs:    CBC:   Recent Labs     09/16/22  1141 09/17/22  0549 09/18/22  0430   WBC 17.0* 19.8* 16.9*   HGB 12.5 11.9 10.6*    322 272   MCV 85.7 90.5 90.9   MCH 29.7 29.9 30.0   MCHC 34.6* 33.1 33.0   RDW 14.4 14.6 14.4     CMP:  Recent Labs     09/16/22  1932 09/17/22  0549 09/18/22  0430   * 131* 128*   K 3.8 4.3 3.6   CL 88* 89* 87*   CO2 25 26 25   BUN 54* 58* 49*   CREATININE 6.5* 7.0* 6.6*   GFRAA 7 7 7   LABGLOM 6 6 6   GLUCOSE 203* 144* 284*   CALCIUM 9.5 9.1 8.7     Liver:   Recent Labs     09/17/22  0549   AST 65*   ALT 48*   ALKPHOS 249*   PROT 6.7   LABALBU 2.8*   BILITOT 0.4     INR:   Recent Labs     09/16/22  1141   PROTIME 12.6*   INR 1.2       ToxicologyNo results for input(s): PHENYTOIN, CARBTOT, PHENOBARB, VALPROATE, LAMOTRIG in the last 72 hours. Invalid input(s):  KEPPRA  No results for input(s): AMPMETHURSCR, BARBTQTU, BDZQTU, CANNABQUANT, COCMETQTU, OPIAU, PCPQUANT in the last 72 hours. Radiology:  CT ABDOMEN PELVIS WO CONTRAST Additional Contrast? None    Result Date: 9/16/2022  EXAMINATION: CT OF THE ABDOMEN AND PELVIS WITHOUT CONTRAST 9/16/2022 3:10 pm TECHNIQUE: CT of the abdomen and pelvis was performed without the administration of intravenous contrast. Multiplanar reformatted images are provided for review. Automated exposure control, iterative reconstruction, and/or weight based adjustment of the mA/kV was utilized to reduce the radiation dose to as low as reasonably achievable.  COMPARISON: CT abdomen and pelvis without 07/20/2022 HISTORY: ORDERING SYSTEM PROVIDED HISTORY: ab pain, vomiting TECHNOLOGIST PROVIDED HISTORY: Reason for exam:->ab pain, vomiting Additional Contrast?->None Decision Support Exception - reconstruction, and/or weight based adjustment of the mA/kV was utilized to reduce the radiation dose to as low as reasonably achievable. COMPARISON: None. HISTORY: ORDERING SYSTEM PROVIDED HISTORY: braden PUENTES TECHNOLOGIST PROVIDED HISTORY: Reason for exam:->braden PUENTES Has a \"code stroke\" or \"stroke alert\" been called? ->No What reading provider will be dictating this exam?->CRC FINDINGS: BRAIN/VENTRICLES: There is no acute intracranial hemorrhage, mass effect or midline shift. No abnormal extra-axial fluid collection. The gray-white differentiation is maintained without evidence of an acute infarct. There is no evidence of hydrocephalus. Diffuse volume loss. The ORBITS: The visualized portion of the orbits demonstrate no acute abnormality. SINUSES: The visualized paranasal sinuses and mastoid air cells demonstrate no acute abnormality. SOFT TISSUES/SKULL:  No acute abnormality of the visualized skull or soft tissues. No acute intracranial abnormality. CT HEAD WO CONTRAST    Result Date: 9/16/2022  EXAMINATION: CT OF THE HEAD WITHOUT CONTRAST  9/16/2022 1:03 pm TECHNIQUE: CT of the head was performed without the administration of intravenous contrast. Automated exposure control, iterative reconstruction, and/or weight based adjustment of the mA/kV was utilized to reduce the radiation dose to as low as reasonably achievable. COMPARISON: None. HISTORY: ORDERING SYSTEM PROVIDED HISTORY: court TECHNOLOGIST PROVIDED HISTORY: Has a \"code stroke\" or \"stroke alert\" been called? ->No Reason for exam:->ams Decision Support Exception - unselect if not a suspected or confirmed emergency medical condition->Emergency Medical Condition (MA) What reading provider will be dictating this exam?->CRC FINDINGS: BRAIN/VENTRICLES: There is no acute intracranial hemorrhage, mass effect or midline shift. No abnormal extra-axial fluid collection.   The gray-white differentiation is maintained without evidence of an acute infarct. There is no evidence of hydrocephalus. The ventricles, cisterns and sulci are prominent consistent with atrophy. There is decreased attenuation within the periventricular white matter consistent with periventricular leukomalacia. ORBITS: The visualized portion of the orbits demonstrate no acute abnormality. SINUSES: The visualized paranasal sinuses and mastoid air cells demonstrate no acute abnormality. SOFT TISSUES/SKULL:  No acute abnormality of the visualized skull or soft tissues. 1.  There is no acute intracranial abnormality. Specifically, there is no intracranial hemorrhage. 2. Atrophy and periventricular leukomalacia, .     US GALLBLADDER RUQ    Result Date: 9/16/2022  EXAMINATION: RIGHT UPPER QUADRANT ULTRASOUND 9/16/2022 8:27 pm COMPARISON: None. HISTORY: ORDERING SYSTEM PROVIDED HISTORY: ab pain TECHNOLOGIST PROVIDED HISTORY: Reason for exam:->ab pain What reading provider will be dictating this exam?->CRC FINDINGS: LIVER:  The liver demonstrates normal echogenicity without evidence of intrahepatic biliary ductal dilatation. BILIARY SYSTEM:  Gallbladder distended with multiple echogenic foci demonstrating shadowing of cholelithiasis. No wall thickening or biliary dilatation. Common bile duct is within normal limits measuring 7 mm. RIGHT KIDNEY: The right kidney is grossly unremarkable without evidence of hydronephrosis. PANCREAS:  Visualized portions of the pancreas are unremarkable. OTHER: No evidence of right upper quadrant ascites.      Distended gallbladder with numerous echogenic areas demonstrating shadowing of cholelithiasis and intermixed sludge however no wall thickening or pericholecystic fluid     XR CHEST PORTABLE    Result Date: 9/16/2022  EXAMINATION: ONE XRAY VIEW OF THE CHEST 9/16/2022 4:22 pm COMPARISON: 07/19/2022 HISTORY: ORDERING SYSTEM PROVIDED HISTORY: weakness, Possible Stroke TECHNOLOGIST PROVIDED HISTORY: Reason for exam:->weakness, Possible Stroke What reading provider will be dictating this exam?->CRC FINDINGS: Sternal wires are seen. EKG leads are seen superimposed over the chest. Poor inspiratory effort is seen that limits evaluation. The cardiomediastinal silhouette is slightly prominent in size. Prominence of the bronchovascular and interstitial lung markings is seen that demonstrates prominence in comparison to the prior study, increased hazziness is seen overlying the left hemithorax. Peribronchial cuffing and bilateral hilar prominence is seen. The right costophrenic angle is unremarkable, mild haziness with blunting of the left costophrenic angle is seen The lungs are without acute focal process. There is no pneumothorax. The osseous structures are without acute process. Increased opacification and bronchovascular prominence in comparison to the prior study. The patient's records from referring provider and available information in the EHR was reviewed. Impression:  Acute alteration in mental status  Metabolic encephalopathy  Myoclonus  Hypoxia with respiratory insufficiency  Low-grade fever  ESRD on primary dialysis  History of atrial fibrillation    80-year-old female with history of end-stage renal disease on peritoneal dialysis along with history of A. fib now admitted with altered mental status. Patient presented on 9/16/2022 with acute alteration in mental status. Patient is seated overnight peritoneal dialysis and was extremely athletic and presentation. According to family she has been fairly independent living alone with no difficulty with ADLs. She was barely arousable on presentation restraints. On admission her overnight peritoneal dialysis was held and patient was given supplemental magnesium with near normal function noted following day according to the family. She was much more awake and responsive and able to interact with family. She ate well that day.   Peritoneal dialysis was continued overnight on 9/17/2022 with decreased responsiveness today similar pattern of behavior as prior to presentation. On evaluation today, she is currently undergoing peritoneal dialysis and seems to be fairly lethargic and difficult to arouse. She did not respond to questioning and was unable to stay awake sufficiently to follow commands. There was witnessed myoclonic type jerks, but they were asynchronous. There was no clear focal weakness or sensory changes on examination, but this was limited by patient's decreased level of consciousness and poor cooperation. There was no no abnormal movements noted other than her myoclonus. When awakened she was able to follow some commands, but this was inconsistent. Family indicated that she does recognize family members and is able to state her names at times. Her breathing pattern was irregular during the visit and O2 saturation was checked on pulse ox showing O2 sat of 84 to 86%. Patient placed on supplemental O2 and became much more responsive afterwards. She does have a history of A. fib but normal rate noted with pulse ox testing and blood pressure was within normal range. According to family, patient's blood pressure was significantly elevated on admission. When more awake patient was very appropriate and able to recognize all family members present. She even displayed unusual sense of humor according to family. Clinical findings suggests likely metabolic encephalopathy and will need to consider her dialysate as a possible contributing factor. She now has a low-grade fever and intermittent hypoxia based on the noted assessment which may be contributing factors. Patient was given magnesium at one-point with improvement in mental status suggesting metabolic derangement may be a factor. She is now on O2 at 3 L/min family should have her respiratory status reassessed with consideration of ABG along with pulse ox monitoring.     Principal Problem:    Change in mental status  Resolved Problems:    * No resolved hospital problems. *      Recommendations:                                            EEG for evaluation of altered mental status with myoclonus. Pulse ox monitoring for respiratory insufficiency with consideration of NAG  Recheck magnesium and other electrolytes. Evaluation of fever per medical team  Further pending EEG results. It was my pleasure to evaluate Elton Page today. Please call with questions.       Electronically signed by Donnie Lopez MD on 9/18/2022 at 3:58 PM

## 2022-09-19 ENCOUNTER — APPOINTMENT (OUTPATIENT)
Dept: NEUROLOGY | Age: 82
DRG: 853 | End: 2022-09-19
Payer: MEDICARE

## 2022-09-19 LAB
ALBUMIN SERPL-MCNC: 2.5 G/DL (ref 3.5–5.2)
ALP BLD-CCNC: 935 U/L (ref 35–104)
ALT SERPL-CCNC: 332 U/L (ref 0–32)
ANION GAP SERPL CALCULATED.3IONS-SCNC: 17 MMOL/L (ref 7–16)
AST SERPL-CCNC: 492 U/L (ref 0–31)
BILIRUB SERPL-MCNC: 2 MG/DL (ref 0–1.2)
BILIRUBIN DIRECT: 1.1 MG/DL (ref 0–0.3)
BILIRUBIN, INDIRECT: 0.9 MG/DL (ref 0–1)
BODY FLUID CULTURE, STERILE: NORMAL
BUN BLDV-MCNC: 47 MG/DL (ref 6–23)
CALCIUM SERPL-MCNC: 8.8 MG/DL (ref 8.6–10.2)
CHLORIDE BLD-SCNC: 88 MMOL/L (ref 98–107)
CO2: 23 MMOL/L (ref 22–29)
CREAT SERPL-MCNC: 6.2 MG/DL (ref 0.5–1)
GFR AFRICAN AMERICAN: 8
GFR NON-AFRICAN AMERICAN: 6 ML/MIN/1.73
GLUCOSE BLD-MCNC: 328 MG/DL (ref 74–99)
GRAM STAIN RESULT: NORMAL
HCT VFR BLD CALC: 31.5 % (ref 34–48)
HEMOGLOBIN: 10.3 G/DL (ref 11.5–15.5)
LIPASE: 22 U/L (ref 13–60)
MCH RBC QN AUTO: 29.5 PG (ref 26–35)
MCHC RBC AUTO-ENTMCNC: 32.7 % (ref 32–34.5)
MCV RBC AUTO: 90.3 FL (ref 80–99.9)
METER GLUCOSE: 175 MG/DL (ref 74–99)
METER GLUCOSE: 197 MG/DL (ref 74–99)
METER GLUCOSE: 254 MG/DL (ref 74–99)
PDW BLD-RTO: 14.4 FL (ref 11.5–15)
PLATELET # BLD: 297 E9/L (ref 130–450)
PMV BLD AUTO: 11.3 FL (ref 7–12)
POTASSIUM SERPL-SCNC: 3.5 MMOL/L (ref 3.5–5)
RBC # BLD: 3.49 E12/L (ref 3.5–5.5)
SODIUM BLD-SCNC: 128 MMOL/L (ref 132–146)
TOTAL PROTEIN: 6.5 G/DL (ref 6.4–8.3)
WBC # BLD: 23.7 E9/L (ref 4.5–11.5)

## 2022-09-19 PROCEDURE — 6360000002 HC RX W HCPCS: Performed by: FAMILY MEDICINE

## 2022-09-19 PROCEDURE — 36415 COLL VENOUS BLD VENIPUNCTURE: CPT

## 2022-09-19 PROCEDURE — 2580000003 HC RX 258: Performed by: FAMILY MEDICINE

## 2022-09-19 PROCEDURE — C9113 INJ PANTOPRAZOLE SODIUM, VIA: HCPCS | Performed by: FAMILY MEDICINE

## 2022-09-19 PROCEDURE — 80048 BASIC METABOLIC PNL TOTAL CA: CPT

## 2022-09-19 PROCEDURE — 6360000002 HC RX W HCPCS: Performed by: INTERNAL MEDICINE

## 2022-09-19 PROCEDURE — 6370000000 HC RX 637 (ALT 250 FOR IP): Performed by: INTERNAL MEDICINE

## 2022-09-19 PROCEDURE — 85027 COMPLETE CBC AUTOMATED: CPT

## 2022-09-19 PROCEDURE — 2580000003 HC RX 258: Performed by: INTERNAL MEDICINE

## 2022-09-19 PROCEDURE — A4216 STERILE WATER/SALINE, 10 ML: HCPCS | Performed by: FAMILY MEDICINE

## 2022-09-19 PROCEDURE — 82962 GLUCOSE BLOOD TEST: CPT

## 2022-09-19 PROCEDURE — 95819 EEG AWAKE AND ASLEEP: CPT | Performed by: PSYCHIATRY & NEUROLOGY

## 2022-09-19 PROCEDURE — 1200000000 HC SEMI PRIVATE

## 2022-09-19 PROCEDURE — 83690 ASSAY OF LIPASE: CPT

## 2022-09-19 PROCEDURE — 99232 SBSQ HOSP IP/OBS MODERATE 35: CPT

## 2022-09-19 PROCEDURE — 80076 HEPATIC FUNCTION PANEL: CPT

## 2022-09-19 PROCEDURE — 95819 EEG AWAKE AND ASLEEP: CPT

## 2022-09-19 PROCEDURE — 90945 DIALYSIS ONE EVALUATION: CPT

## 2022-09-19 PROCEDURE — 6370000000 HC RX 637 (ALT 250 FOR IP): Performed by: PSYCHIATRY & NEUROLOGY

## 2022-09-19 RX ORDER — LEVETIRACETAM 250 MG/1
250 TABLET ORAL 2 TIMES DAILY
Status: DISCONTINUED | OUTPATIENT
Start: 2022-09-19 | End: 2022-09-29

## 2022-09-19 RX ADMIN — PIPERACILLIN AND TAZOBACTAM 4500 MG: 4; .5 INJECTION, POWDER, LYOPHILIZED, FOR SOLUTION INTRAVENOUS at 14:00

## 2022-09-19 RX ADMIN — TRIMETHOBENZAMIDE HYDROCHLORIDE 200 MG: 100 INJECTION INTRAMUSCULAR at 08:55

## 2022-09-19 RX ADMIN — APIXABAN 2.5 MG: 2.5 TABLET, FILM COATED ORAL at 09:02

## 2022-09-19 RX ADMIN — DOCUSATE SODIUM 100 MG: 100 CAPSULE, LIQUID FILLED ORAL at 21:17

## 2022-09-19 RX ADMIN — ISOSORBIDE MONONITRATE 30 MG: 30 TABLET, EXTENDED RELEASE ORAL at 09:06

## 2022-09-19 RX ADMIN — AMLODIPINE BESYLATE 5 MG: 5 TABLET ORAL at 09:02

## 2022-09-19 RX ADMIN — SODIUM CHLORIDE, PRESERVATIVE FREE 40 MG: 5 INJECTION INTRAVENOUS at 09:07

## 2022-09-19 RX ADMIN — LEVOTHYROXINE SODIUM 50 MCG: 0.05 TABLET ORAL at 09:06

## 2022-09-19 RX ADMIN — ASPIRIN 81 MG 81 MG: 81 TABLET ORAL at 09:06

## 2022-09-19 RX ADMIN — INSULIN LISPRO 6 UNITS: 100 INJECTION, SOLUTION INTRAVENOUS; SUBCUTANEOUS at 21:12

## 2022-09-19 RX ADMIN — DULOXETINE HYDROCHLORIDE 30 MG: 30 CAPSULE, DELAYED RELEASE ORAL at 09:06

## 2022-09-19 RX ADMIN — LEVETIRACETAM 250 MG: 250 TABLET, FILM COATED ORAL at 21:17

## 2022-09-19 NOTE — PROGRESS NOTES
[facility] MD Raffy Valle MD. Joshua Hernández MD   Progress Note    2022    SUBJECTIVE:   For EEG today   Charts reviewed . PROBLEM LIST:    Principal Problem:    Change in mental status  Resolved Problems:    * No resolved hospital problems. *       DIET:    Diet NPO Exceptions are: Sips of Water with Meds       Allergies : Sulfa antibiotics    Past Medical History:   Diagnosis Date    Anemia     Arthritis     CAD (coronary artery disease)     Diabetes mellitus ([facility] [address]     Gout     History of bleeding peptic ulcer approx     Hypertension     Peritoneal dialysis catheter in place [facility])     Peritoneal dialysis status ([facility] [address]     at night for 8 hours    Thyroid disease     Use of cane as ambulatory aid     Wears glasses        Past Surgical History:   Procedure Laterality Date    BACK SURGERY      CARDIOVASCULAR STRESS TEST      CARPAL TUNNEL RELEASE Bilateral     CATARACT REMOVAL WITH IMPLANT Bilateral      SECTION      CORONARY ARTERY BYPASS GRAFT  approx 2000    x 3; per Dr Georgette Garcia, [place], [address] [facility] [address]      left knee, right shoulder    [place] TOTAL KNEE ARTHROPLASTY Right 10/31/2018    RIGHT KNEE TOTAL ARTHROPLASTY +++BRIDGER++ PNB++ performed by Merlin Hillman MD at [address] [place] [address]         History reviewed. No pertinent family history. reports that she has never smoked. She has never used smokeless tobacco. She reports that she does not drink alcohol and does not use drugs. Review of Systems:   Constitutional: no fevers , no chills , feels ok   Eyes: no eye pain , no itching , no drainage  Ears, nose, mouth, throat, and face: no ear ,nose pain , hearing is ok ,no nasal drainage   Respiratory: no sob ,no cough ,no wheezing . Cardiovascular: no chest pain , no palpitation ,no sob . Gastrointestinal: no nausea, vomiting , constipation , no abdominal pain .    Genitourinary:no urinary retention , no burning , dysuria . No polyuria   Hematologic/lymphatic: no bleeding , no cougulation issues . Musculoskeletal:no joint pain , no swelling . Neurological: no headaches ,no weakness , no numbness . Endocrine: no thirst , no weight issues .      MEDS (scheduled):    piperacillin-tazobactam  4,500 mg IntraVENous Q12H    pantoprazole (PROTONIX) 40 mg injection  40 mg IntraVENous Daily    amLODIPine  5 mg Oral Daily    apixaban  2.5 mg Oral BID    aspirin  81 mg Oral Daily    [Held by provider] atorvastatin  20 mg Oral Nightly    DULoxetine  30 mg Oral Daily    isosorbide mononitrate  30 mg Oral Daily    levothyroxine  50 mcg Oral Daily    docusate sodium  100 mg Oral Nightly    [Held by provider] pantoprazole  40 mg Oral QAM AC    insulin lispro  0-10 Units SubCUTAneous 4x Daily AC & HS       MEDS (infusions):   dextrose         MEDS (prn):  acetaminophen, glucose, dextrose bolus **OR** dextrose bolus, glucagon (rDNA), dextrose, acetaminophen, trimethobenzamide    PHYSICAL EXAM:     Patient Vitals for the past 24 hrs:   BP Temp Temp src Pulse Resp SpO2 Weight   09/19/22 0811 128/64 -- -- 86 -- 99 % --   09/19/22 0700 114/60 -- -- 85 18 -- 143 lb 4.8 oz (65 kg)   09/18/22 1900 -- (!) 101.7 °F (38.7 °C) -- -- -- 96 % --   09/18/22 1816 121/68 100.4 °F (38 °C) Oral 95 18 90 % --     @      Intake/Output Summary (Last 24 hours) at 9/19/2022 1307  Last data filed at 9/19/2022 0700  Gross per 24 hour   Intake --   Output 593 ml   Net -593 ml           Wt Readings from Last 3 Encounters:   09/19/22 143 lb 4.8 oz (65 kg)   07/27/22 143 lb 3.2 oz (65 kg)   04/29/22 158 lb 8.2 oz (71.9 kg)       Constitutional:  in no acute distress  Oral: mucus membranes moist  Neck: no JVD  Cardiovascular: S1, S2 regular rhythm, no murmur,or rub  Respiratory:  No crackles, no wheeze  Gastrointestinal:  Soft, nontender, nondistended, NABS  Ext: no edema, feet warm  Skin: dry, no rash  Neuro: awake, alert, interactive      DATA:    Recent Labs     09/17/22  0549 09/18/22  0430 09/19/22 0437   WBC 19.8* 16.9* 23.7*   HGB 11.9 10.6* 10.3*   HCT 36.0 32.1* 31.5*   MCV 90.5 90.9 90.3    272 297       Recent Labs     09/17/22  0549 09/18/22  0430 09/18/22 0440 09/19/22 0437   * 128*  --  128*   K 4.3 3.6  --  3.5   CL 89* 87*  --  88*   CO2 26 25  --  23   MG 1.9  --  1.7  --    PHOS 6.0*  --   --   --    BUN 58* 49*  --  47*   CREATININE 7.0* 6.6*  --  6.2*   ALT 48*  --   --  332*   AST 65*  --   --  492*   BILIDIR  --   --   --  1.1*   BILITOT 0.4  --   --  2.0*   ALKPHOS 249*  --   --  935*         No results found for: LABPROT    ASSESSMENT / RECOMMENDATIONS:      1.  End-stage renal disease, on peritoneal dialysis. 2.  Encephalopathy, etiology unclear, metabolic versus ongoing  infection. 3.  Leukocytosis. 4.  Anemia of chronic disease. 5.  Hypomagnesemia. 6.  Mineral bone disease. PLAN:  1. Continue peritoneal dialysis per script. 2.  PD fluid sample was reviewed and no signs of peritonitis. 3.  Followup cultures.   4   FU EEG results       Electronically signed by Ino Mandel MD on 9/19/2022 at 1:07 PM

## 2022-09-19 NOTE — PROGRESS NOTES
Bladder scanned patient- 537mL. 3 attempts made to straight cath for urinalysis unsuccessful.  Dr notified

## 2022-09-19 NOTE — CARE COORDINATION
9/19 Care Coordination. Pt was admit from ED on 9/17 for AMS and twitching. CTH negative. Pt receives CCPD at home. Nephro consulted, fluid samples obtained, no signs of peritonitis. Blood cx 24H no growth. COVID negative. CT AP revealed markedly distended gallbladder with cholelithiasis, GSGY consulted. Hepatobiliary scan pending (to be completed 9/20). Neuro consulted, EEG pending. PT/OT/SLP evals pending. Receiving Zosyn IV Q12H. WBC today, 23.7. Pt is alert to self only. Met with pt's son/HCPOA Mary Ellen Haddad at bedside to discuss transition of care planning. Pt's PCP is Breanna Caputo and she uses [address] in [facility]. Pt lives alone in a 1 story home with 3 S[address]IAG[address]. DME owned is a FWW, cane, and glucometer. CCPD is through [employer]. Hx of HHC with [facility] and [facility]. Hx of GAIL at [facility] per documentation, Mary Ellen Haddad could not recall name. Plan on discharge is pending PT/OT evals/recs. Provided Mary Ellen Haddad with [place] [address] provider list and [facility] GAIL list to review. CCPD facilities highlighted. SALLY McintyreN, RN  [facility] Case Management   Cell: [phone]     The Plan for Transition of Care is related to the following treatment goals: HHC/GAIL    The Patient and/or patient representative Mary Ellen Haddad (son) was provided with a choice of provider and agrees   with the discharge plan. [x] Yes [] No    Freedom of choice list was provided with basic dialogue that supports the patient's individualized plan of care/goals, treatment preferences and shares the quality data associated with the providers.  [x] Yes [] No

## 2022-09-19 NOTE — CONSULTS
GENERAL SURGERY  CONSULT NOTE  9/19/2022    Physician Consulted: Dr. Lesley Monroe  Reason for Consult: Elevated LFT's and concern for acute cholecystitis  Referring Physician: Dr. Myra Moura    NICK Stauffer is a 80 y.o. female with history of ESRD on peritoneal dialysis, CAD, insulin-dependent diabetes, hypertension, A. fib on Eliquis who initially presented to the ED on 9/16 secondary to AMS. Patient was initially evaluated in the ED and noted to have a leukocytosis. Patient started on broad-spectrum antibiotics with Zosyn while working up reason for altered mental status and leukocytosis. Patient had blood cultures drawn which are showing a 24 hours no growth. Patient also had fluid culture from peritoneal dialysis catheter which is showing no growth thus far with final cultures pending. Patient underwent CT abdomen and pelvis without contrast in the ED which demonstrated a markedly distended gallbladder with cholelithiasis and thickened wall possible acute cholecystitis but largely unchanged from CT abdomen pelvis on 7/20. Secondary to this scan being without IV contrast secondary to renal disease patient underwent right upper quadrant ultrasound which again demonstrated a distended gallbladder with numerous cholelithiasis and sludge without wall thickening or pericholecystic fluid negative for acute cholecystitis at that time; common bile duct is also noted to measure 7 mm within normal limits. LFTs initially in the ED demonstrated AST/ALT normal at 12/8, alk phos mildly elevated 201, bilirubin normal at 0.5. Repeat LFTs today demonstrated alk phos elevated at 935, AST/ALT elevated at 492/332, bilirubin elevated at 2.0 with direct elevated at 1.1. Patient was also noted to have an uptrending leukocytosis to 23.2 and febrile to 101.7 T-max overnight. General surgery was consulted for concerns for acute cholecystitis and elevated LFTs.     Patient is alert but confused denies any complaints currently. Past Medical History:   Diagnosis Date    Anemia     Arthritis     CAD (coronary artery disease)     Diabetes mellitus ([facility] [address]     Gout     History of bleeding peptic ulcer approx     Hypertension     Peritoneal dialysis catheter in place [facility])     Peritoneal dialysis status ([facility] [address]     at night for 8 hours    Thyroid disease     Use of cane as ambulatory aid     Wears glasses        Past Surgical History:   Procedure Laterality Date    BACK SURGERY      CARDIOVASCULAR STRESS TEST      CARPAL TUNNEL RELEASE Bilateral     CATARACT REMOVAL WITH IMPLANT Bilateral      SECTION      CORONARY ARTERY BYPASS GRAFT  approx 2000    x 3; per Dr Dyan Zurita, [place], [address] [facility] [address]      left knee, right shoulder    [place] TOTAL KNEE ARTHROPLASTY Right 10/31/2018    RIGHT KNEE TOTAL ARTHROPLASTY +++BRIDGER++ PNB++ performed by Titus Hernandez MD at [address] [place] [address]         Medications Prior to Admission:    Prior to Admission medications    Medication Sig Start Date End Date Taking? Authorizing Provider   insulin glargine (LANTUS) 100 UNIT/ML injection vial Inject 10 Units into the skin in the morning and 10 Units before bedtime. 22   KERRI Virgen CNP   guaiFENesin (ROBITUSSIN) 100 MG/5ML SOLN oral solution Take 10 mLs by mouth every 6 hours as needed for Cough 22   KERRI Virgen CNP   zinc sulfate (ZINCATE) 220 (50 Zn) MG capsule Take 1 capsule by mouth in the morning for 2 doses. 22  KERRI Virgen CNP   B Complex-C-Folic Acid (DIALYVITE 903 PO) Take by mouth    Historical Provider, MD   omeprazole (PRILOSEC) 20 MG delayed release capsule Take 20 mg by mouth in the morning.     Historical Provider, MD   Cetirizine HCl (ZYRTEC PO) Take by mouth  Patient not taking: Reported on 2022    Historical Provider, MD   potassium chloride (KLOR-CON M) 20 MEQ extended release tablet Take 20 mEq by mouth in the morning. Historical Provider, MD   amLODIPine (NORVASC) 5 MG tablet Take 5 mg by mouth in the morning. Historical Provider, MD   aspirin 81 MG chewable tablet Take 1 tablet by mouth daily 4/30/22   Juancho Jean MD   apixaban (ELIQUIS) 2.5 MG TABS tablet Take 1 tablet by mouth 2 times daily 4/29/22   Juancho Jean MD   atorvastatin (LIPITOR) 20 MG tablet Take 1 tablet by mouth nightly 4/29/22   Juancho Jean MD   DULoxetine (CYMBALTA) 30 MG extended release capsule Take 30 mg by mouth in the morning. Historical Provider, MD   isosorbide mononitrate (IMDUR) 30 MG CR tablet Take 30 mg by mouth in the morning. Historical Provider, MD   levothyroxine (SYNTHROID) 50 MCG tablet Take 50 mcg by mouth Daily    Historical Provider, MD       Allergies   Allergen Reactions    Sulfa Antibiotics Swelling     Swelling after being out in sun       History reviewed. No pertinent family history. Social History     Tobacco Use    Smoking status: Never    Smokeless tobacco: Never   Vaping Use    Vaping Use: Never used   Substance Use Topics    Alcohol use: No    Drug use: No         Review of Systems   General ROS: positive for  - chills and fever  Hematological and Lymphatic ROS: negative for - bleeding problems, blood transfusions, bruising, fatigue, or jaundice  Respiratory ROS: no cough, shortness of breath, or wheezing  Cardiovascular ROS: no chest pain or dyspnea on exertion  Gastrointestinal ROS: positive for - abdominal pain  Genito-Urinary ROS: no dysuria, trouble voiding that is different from baseline, or hematuria   Musculoskeletal ROS: negative      PHYSICAL EXAM:    Vitals:    09/19/22 0811   BP: 128/64   Pulse: 86   Resp:    Temp:    SpO2: 99%       General Appearance: Awake, alert, pleasantly confused. Difficulty answering questions regarding remote past medical history  Skin:  Skin color, texture, turgor normal. No rashes or lesions. Head/face:  NCAT  Eyes:  No gross abnormalities. , PERRL, EOMI, and Sclera nonicteric  Lungs: Normal chest wall expansion bilaterally  Heart:  Heart sounds are normal.  Regular rate and rhythm without murmur, gallop or rub. Abdomen: Moderate right upper quadrant tenderness to deep palpation without rebound/guarding/rigidity. Positive Fallon sign  Extremities: Extremities warm to touch, pink, with nonpitting edema bilateral lower extremity    LABS:    CBC  Recent Labs     09/19/22 0437   WBC 23.7*   HGB 10.3*   HCT 31.5*        BMP  Recent Labs     09/19/22 0437   *   K 3.5   CL 88*   CO2 23   BUN 47*   CREATININE 6.2*   CALCIUM 8.8     Liver Function  Recent Labs     09/19/22 0437   LIPASE 22   BILITOT 2.0*   BILIDIR 1.1*   *   *   ALKPHOS 935*   PROT 6.5   LABALBU 2.5*     No results for input(s): LACTATE in the last 72 hours. No results for input(s): INR, PTT in the last 72 hours. Invalid input(s): PT    RADIOLOGY    CT ABDOMEN PELVIS WO CONTRAST Additional Contrast? None    Result Date: 9/16/2022  EXAMINATION: CT OF THE ABDOMEN AND PELVIS WITHOUT CONTRAST 9/16/2022 3:10 pm TECHNIQUE: CT of the abdomen and pelvis was performed without the administration of intravenous contrast. Multiplanar reformatted images are provided for review. Automated exposure control, iterative reconstruction, and/or weight based adjustment of the mA/kV was utilized to reduce the radiation dose to as low as reasonably achievable. COMPARISON: CT abdomen and pelvis without 07/20/2022 HISTORY: ORDERING SYSTEM PROVIDED HISTORY: ab pain, vomiting TECHNOLOGIST PROVIDED HISTORY: Reason for exam:->ab pain, vomiting Additional Contrast?->None Decision Support Exception - unselect if not a suspected or confirmed emergency medical condition->Emergency Medical Condition (MA) What reading provider will be dictating this exam?->CRC FINDINGS: Lower Chest:  Visualized portion of the lower chest demonstrates no acute abnormality. Organs:  There is increased noncontrast attenuation of the liver. No focal hepatic lesion is noted. The gallbladder is significantly distended with gallstones and mild edematous wall thickening, which appears similar to the prior exam.  No biliary ductal dilatation is seen. GI/Bowel: Stomach and duodenal sweep demonstrate no acute abnormality. There is no evidence of bowel obstruction. No evidence of abnormal bowel wall thickening or distension. There is diverticulosis without evidence of diverticulitis. Pelvis:  Bladder is unremarkable in appearance. The uterus and adnexa are without acute abnormality. Peritoneal dialysis catheter is seen in the anterior pelvis, which is stable in position without evidence of kinking. Peritoneum/Retroperitoneum: A small-moderate volume of ascites is identified, which is less compared to the prior exam.  No free air is seen. No evidence of lymphadenopathy. Aorta is normal in caliber. Bones/Soft Tissues:  No acute abnormality of the visualized osseous structures. Markedly distended gallbladder with cholelithiasis and mild edematous wall thickening concerning for possible acute cholecystitis or gallbladder hydrops. The appearance is similar to the prior exam on 07/20/2022. Consider right upper quadrant ultrasound for further evaluation, if clinically indicated. Small-moderate volume of ascites with stable peritoneal dialysis catheter seen. No evidence of bowel obstruction. CT HEAD WO CONTRAST    Result Date: 9/16/2022  EXAMINATION: CT OF THE HEAD WITHOUT CONTRAST  9/16/2022 1:03 pm TECHNIQUE: CT of the head was performed without the administration of intravenous contrast. Automated exposure control, iterative reconstruction, and/or weight based adjustment of the mA/kV was utilized to reduce the radiation dose to as low as reasonably achievable. COMPARISON: None. HISTORY: ORDERING SYSTEM PROVIDED HISTORY: [facility] TECHNOLOGIST PROVIDED HISTORY: Has a \"code stroke\" or \"stroke alert\" been called? ->No Reason for exam:->ams Decision Support Exception - unselect if not a suspected or confirmed emergency medical condition->Emergency Medical Condition (MA) What reading provider will be dictating this exam?->CRC FINDINGS: BRAIN/VENTRICLES: There is no acute intracranial hemorrhage, mass effect or midline shift. No abnormal extra-axial fluid collection. The gray-white differentiation is maintained without evidence of an acute infarct. There is no evidence of hydrocephalus. The ventricles, cisterns and sulci are prominent consistent with atrophy. There is decreased attenuation within the periventricular white matter consistent with periventricular leukomalacia. ORBITS: The visualized portion of the orbits demonstrate no acute abnormality. SINUSES: The visualized paranasal sinuses and mastoid air cells demonstrate no acute abnormality. SOFT TISSUES/SKULL:  No acute abnormality of the visualized skull or soft tissues. 1.  There is no acute intracranial abnormality. Specifically, there is no intracranial hemorrhage. 2. Atrophy and periventricular leukomalacia, .     US GALLBLADDER RUQ    Result Date: 9/16/2022  EXAMINATION: RIGHT UPPER QUADRANT ULTRASOUND 9/16/2022 8:27 pm COMPARISON: None. HISTORY: ORDERING SYSTEM PROVIDED HISTORY: ab pain TECHNOLOGIST PROVIDED HISTORY: Reason for exam:->ab pain What reading provider will be dictating this exam?->CRC FINDINGS: LIVER:  The liver demonstrates normal echogenicity without evidence of intrahepatic biliary ductal dilatation. BILIARY SYSTEM:  Gallbladder distended with multiple echogenic foci demonstrating shadowing of cholelithiasis. No wall thickening or biliary dilatation. Common bile duct is within normal limits measuring 7 mm. RIGHT KIDNEY: The right kidney is grossly unremarkable without evidence of hydronephrosis. PANCREAS:  Visualized portions of the pancreas are unremarkable. OTHER: No evidence of right upper quadrant ascites.      Distended gallbladder with numerous echogenic areas demonstrating shadowing of cholelithiasis and intermixed sludge however no wall thickening or pericholecystic fluid     XR CHEST PORTABLE    Result Date: 9/16/2022  EXAMINATION: ONE XRAY VIEW OF THE CHEST 9/16/2022 4:22 pm COMPARISON: 07/19/2022 HISTORY: ORDERING SYSTEM PROVIDED HISTORY: weakness, Possible Stroke TECHNOLOGIST PROVIDED HISTORY: Reason for exam:->weakness, Possible Stroke What reading provider will be dictating this exam?->CRC FINDINGS: Sternal wires are seen. EKG leads are seen superimposed over the chest. Poor inspiratory effort is seen that limits evaluation. The cardiomediastinal silhouette is slightly prominent in size. Prominence of the bronchovascular and interstitial lung markings is seen that demonstrates prominence in comparison to the prior study, increased hazziness is seen overlying the left hemithorax. Peribronchial cuffing and bilateral hilar prominence is seen. The right costophrenic angle is unremarkable, mild haziness with blunting of the left costophrenic angle is seen The lungs are without acute focal process. There is no pneumothorax. The osseous structures are without acute process. Increased opacification and bronchovascular prominence in comparison to the prior study. ASSESSMENT:  80 y.o. female history of ESRD on peritoneal dialysis, CAD, DM, A. fib on Eliquis currently admitted with altered mental status. Currently with likely choledocholithiasis and leukocytosis. PLAN:  -Continue Zosyn for antibiotic coverage  -Plan for IR request for percutaneous cholecystostomy tube at this point with concern for possible biliary obstruction with elevated direct bilirubin and increasing leukocytosis and the patient is likely not a great surgical candidate at this point.  -Hold Eliquis in preparation for possible IR percutaneous cholecystostomy tube. -Continue patient NPO.   -Remainder of AMS work-up per primary/neurology currently.  -Discussed with Dr. Jimi Amador Electronically signed by Stacy Vee DO on 9/19/22 at 2:45 PM EDT DISPLAY PLAN FREE TEXT

## 2022-09-19 NOTE — PROCEDURES
[address] Report    MRN: [id number]   PATIENT NAME: Alejandra Gonzalez   DATE OF REPORT: 2022  DATE OF SERVICE: 2022    PHYSICIAN NAME: Quyen Lawrence DO  Referring Physician: KERRI Briseno CNP      Patient's : 1940   Patient's Age: 80 y.o. Gender: female     PROCEDURE: Routine EEG with video      Clinical Interpretation: This abnormal study showed evidence of:    A potential for generalized photosensitive epilepsy    Structural abnormalities should be considered for the findings above and appropriate imaging obtained if clinically indicated. No definite seizures were noted during this study. ____________________________  Electronically signed by: Quyen Lawrence DO, 2022 12:02 PM      Patient Clinical Information   Reason for Study: Patient undergoing evaluation for altered mental status  Patient State: Somnolent  Primary neurological diagnosis: Altered mental status   Primary indication for monitoring: Diagnosis of nonconvulsive seizures    Pertinent Medications and Treatments    Duloxetine     Sedatives administered: No  Intubated: No  Pharmacological paralytic: No    Reporting Period  Start of Study: 1143, 2022   End of Study:  1210, 2022       EEG Description  Digital video and scalp EEG monitoring was performed using the standard protocol for this laboratory. Scalp electrodes were applied in the international 10/20 system. Multiple digital montage arrangements were utilized for evaluation. EKG and video were recorded. Background:      Occipital rhythm (posterior dominant rhythm or PDR): Present   Frequency: 7.5 Hz  Voltage: Medium   Organization: fair   Reactivity to eye opening/closure: fair    Drowsiness: Present - mildly excessive generalized irregular delta activity  Sleep: Absent    Comments:  In the waking state the background is composed at times of generalized irregular theta activity    Technical and Activation Procedures:  Hyperventilation: Not done        Photic stimulation: Done - physiologic driving noted. Noted to have generalized periodic discharges provoked by photic stimulation at a rate of 2-3.5 Hz. Reactivity to stimulation: Yes    Abnormalities:    I. Seizures? No    II. Rhythmic or Periodic Patterns? As above. Also seen in absence of photic stimulation rarely    III. Other Abnormalities?         No

## 2022-09-19 NOTE — PROGRESS NOTES
Nuris Mcgovern is a 80 y.o. right handed female     Neurology is following for Altered mental status    [facility] of  anemia, hypertension, arthritis, diabetes, thyroid diease, peritoneal dialysis     Patient presented to the ER for altered mental status on 9/16. Son states that since Wednesday she has gradually gotten more confused. She seems to have trouble getting her words out. She is intermittently twitching. This was all new for her. She is on peritoneal dialysis, and she has been getting her treatments at home. She lives alone. She was having trouble ambulating , so they called EMS. Patient denies any pain. She just stated she does not feel right. She denies any fever, chills, nausea, vomiting, cough, sputum, neck pain or stiffness, paresthesias, lethargy, or any other symptoms or complaints. Yesterday she was having difficulty staying awake and following commands during exam by Dr Terrance Bhandari. He repeated her CT Head which demonstrated no acute abnormalities. He noticed some myoclonic jerks. Today she is lying in bed with her son by her bedside. She had difficulty following commands and he son said this is not her normal baseline. She is extremely hard of hearing. Her son said she is improving and the patient said she feels better.     ROS negative unless stated below        Allergies as of 09/16/2022 - Fully Reviewed 09/16/2022   Allergen Reaction Noted    Sulfa antibiotics Swelling 08/07/2015       Objective:     /64   Pulse 86   Temp (!) 101.7 °F (38.7 °C)   Resp 18   Ht 5' (1.524 m)   Wt 143 lb 4.8 oz (65 kg)   SpO2 99%   BMI 27.99 kg/m²      General appearance: alert, appears stated age and cooperative  Head: Normocephalic, without obvious abnormality, atraumatic  Extremities: no cyanosis or edema  Pulses: 2+ and symmetric  Skin: no rashes or lesions    Mental Status: Alert, oriented, thought content appropriate, but intermittently confused    Speech: clear  Language: appropriate    Cranial Nerves:  I: smell    II: visual acuity     II: visual fields Full   II: pupils MEGHNA   III,VII: ptosis None   III,IV,VI: extraocular muscles  EOMI without nystagmus    V: mastication Normal   V: facial light touch sensation  Normal   V,VII: corneal reflex  Present   VII: facial muscle function - upper     VII: facial muscle function - lower Normal   VIII: hearing Normal   IX: soft palate elevation  Normal   IX,X: gag reflex    XI: trapezius strength  5/5   XI: sternocleidomastoid strength 5/5   XI: neck extension strength  5/5   XII: tongue strength  Normal     Motor:  Decreased ROM in her left arm due to injury  4-/5 left arm 4+/5 left leg  4+/5 right arm and leg    Normal bulk and tone    Sensory:  Normal to LT and PP  Vibration normal in the ankles     Coordination:   FN, FFM and GABRIELA symmetrical with her right arm, difficult to perform with her left arm due to her decreased ROM    Gait:  Deferred for patient safety    DTR:   2+ throughout    No Babinski    No Yuen's     Laboratory/Radiology:     CBC with Differential:    Lab Results   Component Value Date/Time    WBC 23.7 09/19/2022 04:37 AM    RBC 3.49 09/19/2022 04:37 AM    HGB 10.3 09/19/2022 04:37 AM    HCT 31.5 09/19/2022 04:37 AM     09/19/2022 04:37 AM    MCV 90.3 09/19/2022 04:37 AM    MCH 29.5 09/19/2022 04:37 AM    MCHC 32.7 09/19/2022 04:37 AM    RDW 14.4 09/19/2022 04:37 AM    BANDSPCT 2 12/19/2015 04:23 PM    METASPCT 1 12/19/2015 04:23 PM    LYMPHOPCT 9.4 09/16/2022 11:41 AM    MONOPCT 5.3 09/16/2022 11:41 AM    MYELOPCT 1 12/19/2015 04:23 PM    BASOPCT 0.3 09/16/2022 11:41 AM    MONOSABS 0.90 09/16/2022 11:41 AM    LYMPHSABS 1.60 09/16/2022 11:41 AM    EOSABS 0.07 09/16/2022 11:41 AM    BASOSABS 0.05 09/16/2022 11:41 AM     BMP:    Lab Results   Component Value Date/Time     09/19/2022 04:37 AM    K 3.5 09/19/2022 04:37 AM    K 4.4 07/20/2022 10:23 AM    CL 88 09/19/2022 04:37 AM    CO2 23 09/19/2022 04:37 AM

## 2022-09-19 NOTE — PROGRESS NOTES
OT SESSION ATTEMPT     Date:2022  Patient Name: Keira Steve  MRN: [id number]  : 1940  Room: [address] [place] [address]     Occupational therapy orders received/chart review completed and OT session attempted this date:    [] unavailable due to other medical staff currently with pt   [] on hold, await MRI/ neurosurgical recommendations. [] on hold per nursing staff secondary to lab / radiology results    [] declined Occupational Therapy  this date due to ___. Benefits of participation in therapy reviewed with pt. [x] off unit   [] Other:     Will reattempt OT eval at a later time/date.     Kellen Charter, [address] OTR/L #[id number]

## 2022-09-19 NOTE — FLOWSHEET NOTE
09/19/22 1735   Vitals   BP (!) 143/58   Temp 97.6 °F (36.4 °C)   Temp Source Axillary   Heart Rate 75   Resp 18   Cycler   Verification of Prescription CCPD   Total Volume Programmed 89643 mL   Therapy Time (Hours:Minutes) 11   Fill Volume 2300 mL   Last Fill Volume 1800 mL   Dextrose Setting Same (Nonextraneal)   Number of Cycles 7   Bag Volume 5000 mL   Number of Bags Used 4   I Drain (mL) 1800 mL   Patient connected to machine. She is feeling nauseous and the RN is aware, patient drained and filled.

## 2022-09-19 NOTE — PROGRESS NOTES
Subjective:   Patient alert and wake but with some intermit confusion  Complains of some mild abdominal pain  Febrile overnight  Just returned from EEG     Objective:    /64   Pulse 86   Temp (!) 101.7 °F (38.7 °C)   Resp 18   Ht 5' (1.524 m)   Wt 143 lb 4.8 oz (65 kg)   SpO2 99%   BMI 27.99 kg/m²     In: -   Out: 2384   In: -   Out: 2384     General Appearance:  awake and alert, with confusion   Head/face:  NCAT  Eyes:  No gross abnormalities. Lungs:  Normal expansion. Clear to auscultation. No rales, rhonchi, or wheezing. Heart:  Heart sounds are normal.  Regular rate and rhythm without murmur, gallop or rub. Abdomen:  Soft, mildly tender, normal bowel sounds. No bruits, organomegaly or masses. PD cath,   Extremities: Extremities warm to touch, pink, with no edema. Neurologic:  Lethargic, oriented x 2, no focal deficits, mild involuntary twitching of extremities - improved from admission     Recent Labs     09/17/22  0549 09/18/22  0430 09/19/22  0437   WBC 19.8* 16.9* 23.7*   HGB 11.9 10.6* 10.3*   HCT 36.0 32.1* 31.5*    272 297         Recent Labs     09/17/22  0549 09/18/22  0430 09/19/22  0437   * 128* 128*   K 4.3 3.6 3.5   CL 89* 87* 88*   CO2 26 25 23   BUN 58* 49* 47*   CREATININE 7.0* 6.6* 6.2*   CALCIUM 9.1 8.7 8.8         Assessment:    Principal Problem:    Change in mental status  Resolved Problems:    * No resolved hospital problems. *      Plan:    PLAN:     # Altered mental status  - unclear etiology - likely metabolic.  More lethargic on exam this morning and with involuntary twitching  - CT head STAT ordered > negative   - consult to neuro placed, appreciate input  -EEG completed, pending read  -Worsening leukocytosis 23.7 from 16.9  - blood cultures x 2 are negative x 24 hours, follow  - peritoneal body fluid culture NGTD  - UA and urine culture need to be collected - patient does not produce urine    Worsening LFTs  -CT A/P with concern for possible cholecystitis but US gallbladder reveals no wall thickening or pericholecystic fluid. -HIDA scan planned for 9/20  -General surgery consulted  -Lipitor held     ESRD on PD  -Nephro following  -PD per nephro     Diabetes mellitus   -Continue to monitor blood glucose levels  -Currently on medium dose ISS    Elevated troponin  -without EKG changes or chest pain - likely related to renal failure - noted elevations 04/22 chronically. Trending down 183 > 164  - follow labs     Medication for other comorbidities continue as appropriate dose adjustment as necessary. DVT prophylaxis heparin   PT OT  Discharge plan      KERRI Garibay CNP  12:19 PM  9/19/2022     Televisit  Initiate broad-spectrum antibiotic with Zosyn secondary to progressively climbing white count  Initial CT abdomen and right upper quadrant ultrasound unremarkable for acute cholecystitis  Obtain HIDA scan  Consult general surgery  Continue to monitor sodium-may be contributing to altered mental status  For EEG by neurology    Above note edited to reflect my thoughts     I personally saw, examined and provided care for the patient. Radiographs, labs and medication list were reviewed by me independently. The case was discussed in detail and plans for care were established. Review of KELLEE Garibay   , documentation was conducted and revisions were made as appropriate directly by me. I agree with the above documented exam, problem list, and plan of care.      Millie Espinosa MD  9/19/2022

## 2022-09-19 NOTE — DIALYSIS
Rec'd call from ANA CRISTINA Valentin re: PD machine beeping with error r/t weight. Advised to clear any items from the top of the machine and reset but errors were still occurring. Arrived on unit and moved machine off wall as the line was pinched and not allowing the heater bag to fill. Reset and waited 15 min for additional alarms but dwell 4 of 4 was progressing without issue. Report to Guanaco Miles RN and left number to call with additional issues.

## 2022-09-20 ENCOUNTER — ANESTHESIA EVENT (OUTPATIENT)
Dept: OPERATING ROOM | Age: 82
DRG: 853 | End: 2022-09-20
Payer: MEDICARE

## 2022-09-20 ENCOUNTER — APPOINTMENT (OUTPATIENT)
Dept: NUCLEAR MEDICINE | Age: 82
DRG: 853 | End: 2022-09-20
Payer: MEDICARE

## 2022-09-20 LAB
ALBUMIN SERPL-MCNC: 1.7 G/DL (ref 3.5–5.2)
ALP BLD-CCNC: 760 U/L (ref 35–104)
ALT SERPL-CCNC: 202 U/L (ref 0–32)
ANION GAP SERPL CALCULATED.3IONS-SCNC: 15 MMOL/L (ref 7–16)
AST SERPL-CCNC: 170 U/L (ref 0–31)
BILIRUB SERPL-MCNC: 1.2 MG/DL (ref 0–1.2)
BILIRUBIN DIRECT: 1 MG/DL (ref 0–0.3)
BILIRUBIN, INDIRECT: 0.2 MG/DL (ref 0–1)
BUN BLDV-MCNC: 43 MG/DL (ref 6–23)
CALCIUM SERPL-MCNC: 8.7 MG/DL (ref 8.6–10.2)
CHLORIDE BLD-SCNC: 88 MMOL/L (ref 98–107)
CO2: 23 MMOL/L (ref 22–29)
CREAT SERPL-MCNC: 5.8 MG/DL (ref 0.5–1)
GFR AFRICAN AMERICAN: 8
GFR NON-AFRICAN AMERICAN: 7 ML/MIN/1.73
GLUCOSE BLD-MCNC: 383 MG/DL (ref 74–99)
HCT VFR BLD CALC: 31 % (ref 34–48)
HEMOGLOBIN: 10.3 G/DL (ref 11.5–15.5)
MCH RBC QN AUTO: 30.4 PG (ref 26–35)
MCHC RBC AUTO-ENTMCNC: 33.2 % (ref 32–34.5)
MCV RBC AUTO: 91.4 FL (ref 80–99.9)
METER GLUCOSE: 142 MG/DL (ref 74–99)
METER GLUCOSE: 266 MG/DL (ref 74–99)
METER GLUCOSE: 330 MG/DL (ref 74–99)
PDW BLD-RTO: 14.3 FL (ref 11.5–15)
PLATELET # BLD: 264 E9/L (ref 130–450)
PMV BLD AUTO: 11 FL (ref 7–12)
POTASSIUM SERPL-SCNC: 3.3 MMOL/L (ref 3.5–5)
RBC # BLD: 3.39 E12/L (ref 3.5–5.5)
SODIUM BLD-SCNC: 126 MMOL/L (ref 132–146)
TOTAL PROTEIN: 5.9 G/DL (ref 6.4–8.3)
WBC # BLD: 12.6 E9/L (ref 4.5–11.5)

## 2022-09-20 PROCEDURE — 3430000000 HC RX DIAGNOSTIC RADIOPHARMACEUTICAL: Performed by: RADIOLOGY

## 2022-09-20 PROCEDURE — 80048 BASIC METABOLIC PNL TOTAL CA: CPT

## 2022-09-20 PROCEDURE — 1200000000 HC SEMI PRIVATE

## 2022-09-20 PROCEDURE — A4216 STERILE WATER/SALINE, 10 ML: HCPCS | Performed by: FAMILY MEDICINE

## 2022-09-20 PROCEDURE — 6360000002 HC RX W HCPCS: Performed by: INTERNAL MEDICINE

## 2022-09-20 PROCEDURE — C9113 INJ PANTOPRAZOLE SODIUM, VIA: HCPCS | Performed by: FAMILY MEDICINE

## 2022-09-20 PROCEDURE — 6370000000 HC RX 637 (ALT 250 FOR IP): Performed by: NURSE PRACTITIONER

## 2022-09-20 PROCEDURE — 2580000003 HC RX 258: Performed by: FAMILY MEDICINE

## 2022-09-20 PROCEDURE — 85027 COMPLETE CBC AUTOMATED: CPT

## 2022-09-20 PROCEDURE — 97129 THER IVNTJ 1ST 15 MIN: CPT

## 2022-09-20 PROCEDURE — 99232 SBSQ HOSP IP/OBS MODERATE 35: CPT

## 2022-09-20 PROCEDURE — 6370000000 HC RX 637 (ALT 250 FOR IP): Performed by: INTERNAL MEDICINE

## 2022-09-20 PROCEDURE — A9537 TC99M MEBROFENIN: HCPCS | Performed by: RADIOLOGY

## 2022-09-20 PROCEDURE — 92523 SPEECH SOUND LANG COMPREHEN: CPT

## 2022-09-20 PROCEDURE — 2580000003 HC RX 258: Performed by: INTERNAL MEDICINE

## 2022-09-20 PROCEDURE — 36415 COLL VENOUS BLD VENIPUNCTURE: CPT

## 2022-09-20 PROCEDURE — 80076 HEPATIC FUNCTION PANEL: CPT

## 2022-09-20 PROCEDURE — 6370000000 HC RX 637 (ALT 250 FOR IP): Performed by: PSYCHIATRY & NEUROLOGY

## 2022-09-20 PROCEDURE — 78226 HEPATOBILIARY SYSTEM IMAGING: CPT

## 2022-09-20 PROCEDURE — 6360000002 HC RX W HCPCS: Performed by: FAMILY MEDICINE

## 2022-09-20 PROCEDURE — 90945 DIALYSIS ONE EVALUATION: CPT

## 2022-09-20 PROCEDURE — 82962 GLUCOSE BLOOD TEST: CPT

## 2022-09-20 RX ORDER — INSULIN LISPRO 100 [IU]/ML
0-4 INJECTION, SOLUTION INTRAVENOUS; SUBCUTANEOUS NIGHTLY
Status: DISCONTINUED | OUTPATIENT
Start: 2022-09-20 | End: 2022-10-03 | Stop reason: HOSPADM

## 2022-09-20 RX ORDER — POTASSIUM CHLORIDE 20 MEQ/1
40 TABLET, EXTENDED RELEASE ORAL ONCE
Status: COMPLETED | OUTPATIENT
Start: 2022-09-20 | End: 2022-09-20

## 2022-09-20 RX ORDER — INSULIN LISPRO 100 [IU]/ML
0-8 INJECTION, SOLUTION INTRAVENOUS; SUBCUTANEOUS
Status: DISCONTINUED | OUTPATIENT
Start: 2022-09-20 | End: 2022-09-20

## 2022-09-20 RX ORDER — INSULIN LISPRO 100 [IU]/ML
0-4 INJECTION, SOLUTION INTRAVENOUS; SUBCUTANEOUS NIGHTLY
Status: DISCONTINUED | OUTPATIENT
Start: 2022-09-20 | End: 2022-09-20

## 2022-09-20 RX ORDER — INSULIN LISPRO 100 [IU]/ML
0-16 INJECTION, SOLUTION INTRAVENOUS; SUBCUTANEOUS
Status: DISCONTINUED | OUTPATIENT
Start: 2022-09-20 | End: 2022-10-03 | Stop reason: HOSPADM

## 2022-09-20 RX ADMIN — DOCUSATE SODIUM 100 MG: 100 CAPSULE, LIQUID FILLED ORAL at 21:44

## 2022-09-20 RX ADMIN — DULOXETINE HYDROCHLORIDE 30 MG: 30 CAPSULE, DELAYED RELEASE ORAL at 11:34

## 2022-09-20 RX ADMIN — SODIUM CHLORIDE, PRESERVATIVE FREE 40 MG: 5 INJECTION INTRAVENOUS at 11:33

## 2022-09-20 RX ADMIN — PIPERACILLIN AND TAZOBACTAM 4500 MG: 4; .5 INJECTION, POWDER, LYOPHILIZED, FOR SOLUTION INTRAVENOUS at 02:08

## 2022-09-20 RX ADMIN — INSULIN LISPRO 4 UNITS: 100 INJECTION, SOLUTION INTRAVENOUS; SUBCUTANEOUS at 21:55

## 2022-09-20 RX ADMIN — AMLODIPINE BESYLATE 5 MG: 5 TABLET ORAL at 11:34

## 2022-09-20 RX ADMIN — LEVETIRACETAM 250 MG: 250 TABLET, FILM COATED ORAL at 11:33

## 2022-09-20 RX ADMIN — LEVETIRACETAM 250 MG: 250 TABLET, FILM COATED ORAL at 21:44

## 2022-09-20 RX ADMIN — Medication 6 MILLICURIE: at 08:49

## 2022-09-20 RX ADMIN — INSULIN LISPRO 8 UNITS: 100 INJECTION, SOLUTION INTRAVENOUS; SUBCUTANEOUS at 12:54

## 2022-09-20 RX ADMIN — LEVOTHYROXINE SODIUM 50 MCG: 0.05 TABLET ORAL at 05:47

## 2022-09-20 RX ADMIN — ASPIRIN 81 MG 81 MG: 81 TABLET ORAL at 11:33

## 2022-09-20 RX ADMIN — ISOSORBIDE MONONITRATE 30 MG: 30 TABLET, EXTENDED RELEASE ORAL at 11:33

## 2022-09-20 RX ADMIN — POTASSIUM CHLORIDE 40 MEQ: 1500 TABLET, EXTENDED RELEASE ORAL at 11:46

## 2022-09-20 RX ADMIN — PIPERACILLIN AND TAZOBACTAM 4500 MG: 4; .5 INJECTION, POWDER, LYOPHILIZED, FOR SOLUTION INTRAVENOUS at 13:59

## 2022-09-20 RX ADMIN — INSULIN LISPRO 8 UNITS: 100 INJECTION, SOLUTION INTRAVENOUS; SUBCUTANEOUS at 06:21

## 2022-09-20 ASSESSMENT — PAIN SCALES - GENERAL
PAINLEVEL_OUTOF10: 0
PAINLEVEL_OUTOF10: 0

## 2022-09-20 NOTE — PROGRESS NOTES
Date: 2022       Patient Name: Madisyn Shay  : 1940      MRN: [id number]    PT order received and chart reviewed.  PT evaluation held for PD set up, per RN  Will follow up as appropriate    Carol Villalba PT, DPT  [id number]

## 2022-09-20 NOTE — CARE COORDINATION
9/20 Update CM note. Na today, 126 and K, 3.3. Albumin also low at 1.7. LFT's continue to be elevated but trending down. HIDA scan in process. Neuro cx for AMS pending. EEG was completed revealing a potential for generalized photosensitive epilepsy. PT/OT/SLP evals pending. Cardio consulted for clearance for possible milton tube vs lap milton with GSGY. Disposition is dependant upon PT/OT evals/recs. Pt's son Ana Ayala was provided with [facility] with highlighted CAPD facilities yesterday. Will f/u once PT/OT evals and GSGY makes further recommendations for surgery.     Barbara Oliver, SALLYN, RN  [facility] Case Management   Cell: [phone]

## 2022-09-20 NOTE — FLOWSHEET NOTE
Transport here to take pt to [facility] scan. Pt needs disconnected from PD. Keerthi Hill in dialysis made aware of situation. Keerthi Hill states the nurse \"will be around\". When asked if nurse can be called to make pt a priority she said \"the nurse will be around\".   Transport on delay

## 2022-09-20 NOTE — PROGRESS NOTES
Madisyn Shay is a 80 y.o. right handed female     Neurology is following for Altered mental status    [facility] of  anemia, hypertension, arthritis, diabetes, thyroid diease, peritoneal dialysis     Patient presented to the ER for altered mental status on 9/16. Son states that since Wednesday she has gradually gotten more confused. She seems to have trouble getting her words out. She is intermittently twitching. This was all new for her. She is on peritoneal dialysis, and she has been getting her treatments at home. She lives alone. She was having trouble ambulating , so they called EMS. Patient denies any pain. She just stated she does not feel right. She denies any fever, chills, nausea, vomiting, cough, sputum, neck pain or stiffness, paresthesias, lethargy, or any other symptoms or complaints. On Sunday 9/18 she was having difficulty staying awake and following commands during exam by Dr Terrance Moore. He repeated her CT Head which demonstrated no acute abnormalities. He noticed some myoclonic jerks. Yesterday she was lying in bed with her son by her bedside. She had difficulty following commands and he son said this is not her normal baseline. She is extremely hard of hearing. Today she is more awake and alert. She is able to follow all of my commands which is a great improvement since yesterday.      ROS negative unless stated below        Allergies as of 09/16/2022 - Fully Reviewed 09/16/2022   Allergen Reaction Noted    Sulfa antibiotics Swelling 08/07/2015       Objective:     BP (!) 133/90   Pulse 64   Temp 97.2 °F (36.2 °C)   Resp 16   Ht 5' (1.524 m)   Wt 143 lb 4.8 oz (65 kg)   SpO2 98%   BMI 27.99 kg/m²      General appearance: alert, appears stated age and cooperative  Head: Normocephalic, without obvious abnormality, atraumatic  Extremities: no cyanosis or edema  Pulses: 2+ and symmetric  Skin: no rashes or lesions    Mental Status: Alert, oriented, thought content appropriate    Speech: clear  Language: appropriate    Cranial Nerves:  I: smell    II: visual acuity     II: visual fields Full   II: pupils MEGHNA   III,VII: ptosis None   III,IV,VI: extraocular muscles  EOMI without nystagmus    V: mastication Normal   V: facial light touch sensation  Normal   V,VII: corneal reflex  Present   VII: facial muscle function - upper     VII: facial muscle function - lower Normal   VIII: hearing Normal   IX: soft palate elevation  Normal   IX,X: gag reflex    XI: trapezius strength  5/5   XI: sternocleidomastoid strength 5/5   XI: neck extension strength  5/5   XII: tongue strength  Normal     Motor:  Decreased ROM in her left arm due to injury  5-/5 bilateral arms  4+/5 bilateral legs    Normal bulk and tone    Sensory:  Normal to LT and PP  Vibration normal in the ankles     Coordination:   FN, FFM and GABRIELA symmetrical with her right arm, difficult to perform with her left arm due to her decreased ROM    Gait:  Deferred for patient safety    DTR:   2+ throughout    No Babinski    No Yuen's     Laboratory/Radiology:     CBC with Differential:    Lab Results   Component Value Date/Time    WBC 12.6 09/20/2022 05:06 AM    RBC 3.39 09/20/2022 05:06 AM    HGB 10.3 09/20/2022 05:06 AM    HCT 31.0 09/20/2022 05:06 AM     09/20/2022 05:06 AM    MCV 91.4 09/20/2022 05:06 AM    MCH 30.4 09/20/2022 05:06 AM    MCHC 33.2 09/20/2022 05:06 AM    RDW 14.3 09/20/2022 05:06 AM    BANDSPCT 2 12/19/2015 04:23 PM    METASPCT 1 12/19/2015 04:23 PM    LYMPHOPCT 9.4 09/16/2022 11:41 AM    MONOPCT 5.3 09/16/2022 11:41 AM    MYELOPCT 1 12/19/2015 04:23 PM    BASOPCT 0.3 09/16/2022 11:41 AM    MONOSABS 0.90 09/16/2022 11:41 AM    LYMPHSABS 1.60 09/16/2022 11:41 AM    EOSABS 0.07 09/16/2022 11:41 AM    BASOSABS 0.05 09/16/2022 11:41 AM     BMP:    Lab Results   Component Value Date/Time     09/20/2022 05:06 AM    K 3.3 09/20/2022 05:06 AM    K 4.4 07/20/2022 10:23 AM    CL 88 09/20/2022 05:06 AM CO2 23 09/20/2022 05:06 AM    BUN 43 09/20/2022 05:06 AM    LABALBU 1.7 09/20/2022 05:06 AM    CREATININE 5.8 09/20/2022 05:06 AM    CALCIUM 8.7 09/20/2022 05:06 AM    GFRAA 8 09/20/2022 05:06 AM    LABGLOM 7 09/20/2022 05:06 AM    GLUCOSE 383 09/20/2022 05:06 AM     HgBA1c:    Lab Results   Component Value Date/Time    LABA1C 5.4 09/16/2022 07:32 PM     FLP:    Lab Results   Component Value Date/Time    HDL 27 04/28/2022 06:50 AM    LDLCALC 119 04/28/2022 06:50 AM    LABVLDL 41 04/28/2022 06:50 AM     TSH:    Lab Results   Component Value Date/Time    TSH 3.910 09/17/2022 05:49 AM     EEG  A potential for generalized photosensitive epilepsy    I personally reviewed the patient's lab and imaging studies at this time. Assessment:     Metabolic encephalopathy  causing  acute alteration in mental status. Her BUN was 49, creatinine was 6.6, and sodium was 128. Seizures- An EEG was obtained and the results demonstrated a potential for generalized photosensitive epilepsy.  Keppra was started at 250 mg BID    Plan:     Continue Keppra 250 mg BID    Monitor electrolytes and treat deficiencies    Neurology to sign off, call if needed    No driving until cleared by neurology    Follow up with neurology upon discharge        KERRI France - CNP  2:22 PM  9/20/2022

## 2022-09-20 NOTE — FLOWSHEET NOTE
09/20/22 1700   Cycler   Verification of Prescription CCPD   Total Volume Programmed 40431 mL   Therapy Time (Hours:Minutes) 11   Fill Volume 2300 mL   Last Fill Volume 1800 mL   Dextrose Setting Same (Nonextraneal)   Number of Cycles 7   Bag Volume 5000 mL   Number of Bags Used 4   Dianeal Solution Other (Comment)  (Dextrose 2.5% in 5000ml)   CCPD programmed 11 hr tx with  exchanges of 2.5 % in 5L bags and final [id number], total volume 16L verified. Tx started via PD cath using aseptic technique and dressing changed. Draining and filling with out complications. Remains in care of staff.

## 2022-09-20 NOTE — CONSULTS
[facility] cardiology/the [address]    Name: Prema Walsh    Age: 80 y.o. Date of Admission: 9/16/2022 11:18 AM    Date of Service: 9/20/2022    Reason for Consultation: Preoperative cardiovascular assessment prior to possible laparoscopic cholecystectomy    Referring Physician:     History of Present Illness: The patient is a 80y.o. year old female with a known history of end-stage renal disease on peritoneal dialysis, CAD/CABG approximately 2000, DM, AF on Eliquis troponin 183 then 164 this admission, echocardiogram April 29, 2022 LVEF normal 55%, 1-2+ MR moderate aortic valve stenosis, 1+ TR, mild to moderate pulmonary hypertension. Admitted with some confusion and mental status change but no focal motor or sensory deficit. Today she appears to be comfortable taking her pills with water and interacting appropriately but not reliable historian. Much of the history was obtained from the current chart and the old chart. She currently denies any chest pain or shortness of breath or distress. She does report her abdomen was hurting her yesterday. Past Medical History: As above and permanent atrial fibrillation. Past surgical history: Back surgery, bilateral carpal tunnel surgery bilateral cataract, CABG in approximately 2000      Review of Systems:     Constitutional: No fever, chills, sweats  Cardiac: As per HPI  Pulmonary: No cough, wheeze, hemoptysis  HEENT: No visual disturbances or difficult swallowing  GI: No nausea, vomiting, diarrhea, abdominal pain, rectal bleeding  : No dysuria or hematuria  Endocrine: No excessive thirst, heat or cold intolerance. Musculoskeletal: No joint pain or muscle aches. No claudication  Skin: No skin breakdown or rashes  Neuro: No headache, confusion, or seizures  Psych: No depression, anxiety      Family History:  History reviewed. No pertinent family history.     Social History:  Social History Socioeconomic History    Marital status:      Spouse name: Not on file    Number of children: Not on file    Years of education: Not on file    Highest education level: Not on file   Occupational History    Not on file   Tobacco Use    Smoking status: Never    Smokeless tobacco: Never   Vaping Use    Vaping Use: Never used   Substance and Sexual Activity    Alcohol use: No    Drug use: No    Sexual activity: Not on file   Other Topics Concern    Not on file   Social History Narrative    Not on file     Social Determinants of Health     Financial Resource Strain: Not on file   Food Insecurity: Not on file   Transportation Needs: Not on file   Physical Activity: Not on file   Stress: Not on file   Social Connections: Not on file   Intimate Partner Violence: Not on file   Housing Stability: Not on file       Allergies:   Allergies   Allergen Reactions    Sulfa Antibiotics Swelling     Swelling after being out in sun       Current Medications:  Current Facility-Administered Medications   Medication Dose Route Frequency Provider Last Rate Last Admin    insulin lispro (HUMALOG) injection vial 0-16 Units  0-16 Units SubCUTAneous TID WC KERRI Harden - CNP   8 Units at 09/20/22 1254    insulin lispro (HUMALOG) injection vial 0-4 Units  0-4 Units SubCUTAneous Nightly KERRI Harden - CNP        piperacillin-tazobactam (ZOSYN) 4,500 mg in dextrose 5 % 100 mL IVPB ([id number])  4,500 mg IntraVENous Q12H Scottie Oliveira MD   Stopped at 09/20/22 0814    levETIRAcetam (KEPPRA) tablet 250 mg  250 mg Oral BID Jesus Repjameson, DO   250 mg at 09/20/22 1133    pantoprazole (PROTONIX) 40 mg in sodium chloride (PF) 10 mL injection  40 mg IntraVENous Daily Karla Ortiz MD   40 mg at 09/20/22 1133    acetaminophen (TYLENOL) suppository 650 mg  650 mg Rectal Q4H PRN ALEXIS Siddiqui   650 mg at 09/18/22 1927    amLODIPine (NORVASC) tablet 5 mg  5 mg Oral Daily Smooth Fuller DO   5 mg at 09/20/22 1134    [Held by provider] apixaban (ELIQUIS) tablet 2.5 mg  2.5 mg Oral BID Hazle Heys, DO   2.5 mg at 09/19/22 5415    aspirin chewable tablet 81 mg  81 mg Oral Daily Smooth Eliezer Gourd, DO   81 mg at 09/20/22 1133    [Held by provider] atorvastatin (LIPITOR) tablet 20 mg  20 mg Oral Nightly Smooth Eliezer Gourd, DO   20 mg at 09/17/22 2152    DULoxetine (CYMBALTA) extended release capsule 30 mg  30 mg Oral Daily Smooth Eliezer Gourd, DO   30 mg at 09/20/22 1134    isosorbide mononitrate (IMDUR) extended release tablet 30 mg  30 mg Oral Daily Smooth Eliezer Gourd, DO   30 mg at 09/20/22 1133    levothyroxine (SYNTHROID) tablet 50 mcg  50 mcg Oral Daily Smooth Eliezer Gourd, DO   50 mcg at 09/20/22 0547    docusate sodium (COLACE) capsule 100 mg  100 mg Oral Nightly Smooth Eliezer Gourd, DO   100 mg at 09/19/22 2117    [Held by provider] pantoprazole (PROTONIX) tablet 40 mg  40 mg Oral QAM AC Smooth Eliezer Gourd, DO        glucose chewable tablet 16 g  4 tablet Oral PRN Smooth Eliezer Gourd, DO        dextrose bolus 10% 125 mL  125 mL IntraVENous PRN Smooth Eliezer Gourd, DO        Or    dextrose bolus 10% 250 mL  250 mL IntraVENous PRN Smooth Eliezer Gourd, DO        glucagon (rDNA) injection 1 mg  1 mg SubCUTAneous PRN Smooth Eliezer Gourd, DO        dextrose 10 % infusion   IntraVENous Continuous PRN Smooth Eliezer Gourd, DO        acetaminophen (TYLENOL) tablet 650 mg  650 mg Oral Q4H PRN Smooth Eliezer Gourd, DO        trimethobenzamide Yelitza Balloon) injection 200 mg  200 mg IntraMUSCular Q6H PRN Hazle Heys, DO   200 mg at 09/19/22 0855      dextrose         Physical Exam:  BP (!) 133/90   Pulse 64   Temp 97.2 °F (36.2 °C)   Resp 16   Ht 5' (1.524 m)   Wt 143 lb 4.8 oz (65 kg)   SpO2 98%   BMI 27.99 kg/m²   Weight change:    Wt Readings from Last 3 Encounters:   09/19/22 143 lb 4.8 oz (65 kg)   07/27/22 143 lb 3.2 oz (65 kg)   04/29/22 158 lb 8.2 oz (71.9 kg)         General: Awake, alert, oriented x3, no acute distress  HEENT: Unremarkable  Neck: No JVD or bruits. Cardiac: Regular rate and rhythm, normal S1 and S2, no extra heart sounds, murmurs, heaves, thrills  Resp: Lungs clear without wheezing or crackles. No accessory muscle use or retraction  Abdomen: soft, nontender, nondistended, no gross organomegaly or mass  Skin: Warm and dry, no cyanosis. Musculoskeletal: normal tone and strength in the upper and lower extremities bilaterally  Neuro: Grossly unremarkable  Psych: Cooperative, and normal affect    Intake/Output:    Intake/Output Summary (Last 24 hours) at 9/20/2022 1321  Last data filed at 9/20/2022 0805  Gross per 24 hour   Intake --   Output 2641 ml   Net -2641 ml     I/O this shift:  In: -   Out: 841     Laboratory Tests:  Lab Results   Component Value Date    CREATININE 5.8 (H) 09/20/2022    BUN 43 (H) 09/20/2022     (L) 09/20/2022    K 3.3 (L) 09/20/2022    CL 88 (L) 09/20/2022    CO2 23 09/20/2022     No results for input(s): CKTOTAL, CKMB in the last 72 hours.     Invalid input(s): TROPONONI  No results found for: BNP  Lab Results   Component Value Date/Time    WBC 12.6 09/20/2022 05:06 AM    RBC 3.39 09/20/2022 05:06 AM    HGB 10.3 09/20/2022 05:06 AM    HCT 31.0 09/20/2022 05:06 AM    MCV 91.4 09/20/2022 05:06 AM    MCH 30.4 09/20/2022 05:06 AM    MCHC 33.2 09/20/2022 05:06 AM    RDW 14.3 09/20/2022 05:06 AM     09/20/2022 05:06 AM    MPV 11.0 09/20/2022 05:06 AM     Recent Labs     09/19/22  0437 09/20/22  0506   ALKPHOS 935* 760*   * 202*   * 170*   PROT 6.5 5.9*   BILITOT 2.0* 1.2   BILIDIR 1.1* 1.0*   LABALBU 2.5* 1.7*     Lab Results   Component Value Date/Time    MG 1.7 09/18/2022 04:40 AM     Lab Results   Component Value Date/Time    PROTIME 12.6 09/16/2022 11:41 AM    INR 1.2 09/16/2022 11:41 AM     Lab Results   Component Value Date/Time    TSH 3.910 09/17/2022 05:49 AM     No components found for: CHLPL  No results found for: TRIG  Lab Results   Component Value Date    HDL 27 04/28/2022     Lab Results   Component Value Date    LDLCALC 119 (H) 04/28/2022     Lab Results   Component Value Date    INR 1.2 09/16/2022       Admission ECG September 16, 2022 11:53 AM personally reviewed by me revealed atrial fibrillation heart rate in the 70s  ms underlying right bundle branch block. ASSESSMENT / PLAN:    1. Preoperative cholecystectomy prior to planned laparoscopic cholecystectomy in the near future. Currently no indication of reported heart failure symptoms, heart failure or unstable angina. Would hold the Eliquis 2 days before and we will stop it today. Would be cautious to avoid volume overloading her. Correct electrolytes keep potassium 4 or greater and magnesium 2 or greater. Would suggest she is low to intermediate risk for the planned laparoscopic cholecystectomy. #2 permanent atrial fibrillation with controlled ventricular rate. Heart rate currently in the 70s and 80s. Hold Eliquis. #3 chronic CAD prior CABG approximately 22 years ago and continue to modify cardiovascular risk factors optimizing blood pressure blood sugar and lipids long-term. Continues on aspirin 81 mg daily and consider adding metoprolol if heart rate tachycardic. #4 end-stage renal disease on peritoneal dialysis and I suspect she does not make much urine by history. Avoiding ACE or ARB to avoid hyperkalemia. #5 moderate aortic valve stenosis and normal LVEF by echocardiogram April 2022. Avoid any significant hypotension. #6 leukocytosis White count was 23.7 on admission improved to 12. 6. Continues on IV antibiotic.             Electronically signed by Jahaira Camilo MD on 9/20/2022 at 1:21 PM

## 2022-09-20 NOTE — FLOWSHEET NOTE
09/20/22 0805   Peritoneal Dialysis (CAPD manual)   Effluent Appearance Clear;Yellow   Cycler   Ultrafiltration (UF) (mL) 841 mL   De accessed from ccpd using aseptic technique, 841 clear yellow effluent removed, tolerated well, drsg CDI, stable at dc, call light wi reach

## 2022-09-20 NOTE — PROGRESS NOTES
GENERAL SURGERY  DAILY PROGRESS NOTE  9/20/2022    Subjective:  No events overnight. Resting comfortably. Patient states abdominal pain is largely unchanged from prior. Patient denies nausea/vomiting. Patient tolerated diet yesterday prior to being made n.p.o. Objective:  /75   Pulse 95   Temp 97.3 °F (36.3 °C) (Temporal)   Resp 16   Ht 5' (1.524 m)   Wt 143 lb 4.8 oz (65 kg)   SpO2 97%   BMI 27.99 kg/m²     General appearance: alert, cooperative and in no acute distress. Eyes: grossly normal  Lungs: nonlabored breathing on 3 L nasal cannula  Heart: regular rate  Abdomen: soft, nondistended. Tender to palpation over right upper quadrant without rebound/guarding/rigidity. Skin: No skin abnormalities  Neurologic: Alert and oriented x 3. Grossly normal  Musculoskeletal: No clubbing cyanosis or edema    Assessment/Plan:  80 y.o. female history of ESRD on peritoneal dialysis, CAD, DM, A. fib on Eliquis currently admitted with altered mental status. General surgery consulted for possible acute cholecystitis with possible choledocholithiasis.     -Continue to trend LFTs which are pending this AM.  -Cardiology consulted for preoperative risk stratification with recommendations pending  -Continue Zosyn for antibiotic coverage  -Perc cholecystostomy tube versus lap milton with IOC later this week  -Continue to hold Eliquis in preparation for procedure    Electronically signed by Nancy Benavides DO on 9/20/2022 at 6:23 AM      Seen/examined  Agree with above  Cardiology note reviewed  HIDA with patent common bile duct  Tentatively plan lap milton with IOC tomorrow  JSGadyMD

## 2022-09-20 NOTE — PROGRESS NOTES
Subjective:  Sitting comfortably, she still slightly confused however indicates she feels better than yesterday  No family at bedside on my evaluation  She does undergo peritoneal dialysis and makes minimal to no urine  Perc cholecystostomy tube versus lap milton with IOC later this week      Objective:    /75   Pulse 95   Temp 97.3 °F (36.3 °C) (Temporal)   Resp 16   Ht 5' (1.524 m)   Wt 143 lb 4.8 oz (65 kg)   SpO2 97%   BMI 27.99 kg/m²     In: -   Out: 8543   In: -   Out: 2641     General Appearance:  awake and alert, with confusion   Head/face:  NCAT  Eyes:  No gross abnormalities. Lungs:  Normal expansion. Clear to auscultation. No rales, rhonchi, or wheezing. Heart:  Heart sounds are normal.  Regular rate and rhythm without murmur, gallop or rub. Abdomen:  Soft, mildly tender, normal bowel sounds. No bruits, organomegaly or masses. PD cath,-abdomen is actually benign without pain to palpation  Extremities: Extremities warm to touch, pink, with no edema. Neurologic:  Lethargic, oriented x 2, no focal deficits, mild involuntary twitching of extremities - improved from admission     Recent Labs     09/18/22  0430 09/19/22  0437 09/20/22  0506   WBC 16.9* 23.7* 12.6*   HGB 10.6* 10.3* 10.3*   HCT 32.1* 31.5* 31.0*    297 264         Recent Labs     09/18/22  0430 09/19/22  0437 09/20/22  0506   * 128* 126*   K 3.6 3.5 3.3*   CL 87* 88* 88*   CO2 25 23 23   BUN 49* 47* 43*   CREATININE 6.6* 6.2* 5.8*   CALCIUM 8.7 8.8 8.7         Assessment:    Principal Problem:    Change in mental status  Resolved Problems:    * No resolved hospital problems. *        PLAN:     # Altered mental status  -Likely from sepsis-?   Ascending cholangitis-WBC 23,000 yesterday, down to 12,000 today with initiation of Zosyn on 9/19/2022  - CT head STAT ordered > negative   - consult to neuro placed, appreciate input  -EEG > A potential for generalized photosensitive epilepsy, will defer to neurology   - blood cultures x 2 are negative x 24 hours, follow  - peritoneal body fluid culture NGTD  - UA and urine culture need to be collected - patient does not produce urine-no need to check PVR    LFTs-improved today  -CT A/P with concern for possible cholecystitis but US gallbladder reveals no wall thickening or pericholecystic fluid. -HIDA scan pending  -Perc cholecystostomy tube versus lap milton with IOC later this week  -Cardiology consulted by [address] [place] [address] for cardiac clearance  -General surgery consulted  -Lipitor held     ESRD on PD  -Nephro following  -PD per nephro     Diabetes mellitus   -Continue to monitor blood glucose levels  -Currently on medium dose ISS, still in the upper 300's, will need to increase to high dose     Elevated troponin  -without EKG changes or chest pain - likely related to renal failure - noted elevations 04/22 chronically. Trending down 183 > 164  - follow labs     Medication for other comorbidities continue as appropriate dose adjustment as necessary.   Case discussed with nurse at bedside, son requesting PVRs however patient does not really make any urine-no need for such scans frequently     DVT prophylaxis heparin   PT OT  Discharge plan

## 2022-09-20 NOTE — PROGRESS NOTES
SPEECH/LANGUAGE PATHOLOGY  SPEECH/LANGUAGE/COGNITIVE EVALUATION   and PLAN OF CARE      PATIENT NAME:  Oniel Barnes  (female)     MRN:  [id number]    :  1940  (80 y.o.)  STATUS:  Inpatient: Room 8422/8422-A    TODAY'S DATE:  22 1400    SLP eval and treat  Start:  22 1400,   End:  22 1400,   ONE TIME,   Standing Count:  1 Occurrences,   R         Glenn Baeza MD   REASON FOR REFERRAL:  to further assess cognitive-linguistic abilities  EVALUATING THERAPIST: TANNER Mclaughlin    ADMITTING DIAGNOSIS: Change in mental status [R41.82]  Altered mental status, unspecified altered mental status type [R41.82]    VISIT DIAGNOSIS:        SPEECH THERAPY  PLAN OF CARE   The speech therapy  POC is established based on physician order, speech pathology diagnosis and results of clinical assessment     SPEECH PATHOLOGY DIAGNOSIS:    Mild-moderate cognitive-linguistic deficits    Speech Pathology intervention is recommended up to 6 times per week for LOS or when goals are met with emphasis on the following:      Conditions Requiring Skilled Therapeutic Intervention for speech, language and/or cognition    Cognitive linguistic impairment  Decreased short term memory  Decreased problem solving skills   Decreased thought organization    Specific Speech Therapy Interventions to Include: Therapeutic tasks for Cognition    Specific instructions for next treatment:      To initiate memory tasks  To initiate thought organization tasks    SHORT/LONG TERM GOALS  Pt will improve immediate, short term, recent memory during structured and unstructured tasks with 80% accuracy   Pt will improve problem solving/thought organization during structured and unstructured tasks with 80% accuracy     Patient goals: Patient/family involved in developing goals and treatment plan:   Treatment goals discussed with Patient and Family    The Patient and Family understand(s) the diagnosis, prognosis and plan of care   The patient/family Agreed with above,     This plan may be re-evaluated and revised as warranted. Rehabilitation Potential/Prognosis: good                CLINICAL ASSESSMENT:  MOTOR SPEECH       Oral Peripheral Examination   Adequate lingual/labial strength     Parameters of Speech Production  Respiration:  Adequate for speech production  Articulation:  Within functional limits  Resonance:  Within functional limits  Quality:   Within functional limits  Pitch: Within functional limits  Intensity: Within functional limits  Fluency:  Intact  Prosody Intact    RECEPTIVE LANGUAGE    Comprehension of Yes/No Questions: Within functional limits    Process  Simple Verbal Commands:   Within functional limits  Process Intermediate Verbal Commands:   Within functional limits  Process Complex Verbal Commands:     Within functional limits    Comprehension of Conversation:      Within functional limits      EXPRESSIVE LANGUAGE     Serials: Functional    Imitation:  Words   Functional   Sentences Functional    Naming:  (Modality used:  Verbal)  Confrontation Naming  Functional  Functional Description  Functional  Response Naming: Functional    Conversation:      Confusion was noted during conversation    COGNITION     Attention/Orientation  Attention: Sustained attention   Orientation:  Oriented to Person, Place, Date, Reason for hospitalization    Memory   Immediate Recall: Repeated 2/3    Delayed Recall:   Recalled 0/3    Long Term Recall:   Recalled Address, Birthdate, Age, and Family    Organization/Problem Solving/Reasoning   Verbal Sequencing:   Functional        Verbal Problem solving:   Impaired          CLINICAL OBSERVATIONS NOTED DURING THE EVALUATION   Patient alert and cooperative throughout evaluation. Patient demonstrated mild-moderate deficits w/ short-term memory/recall, problem solving, and reasoning tasks. Patient and family report this is a change from her baseline.  Patient would benefit from continued SLP services to increase cognitive-linguistic abilities and ability to return to OF. EDUCATION:   The Speech Language Pathologist (SLP) completed education regarding results of evaluation and that intervention is warranted at this time. Learner: Patient and Family  Education: Reviewed results and recommendations of this evaluation  Evaluation of Education:  Verbalizes understanding    Evaluation Time includes thorough review of current medical information, gathering information on past medical history/social history and prior level of function, completion of standardized testing/informal observation of tasks, assessment of data and education on plan of care and goals. CPT code:    74479  eval speech sound lang comprehension      The admitting diagnosis and active problem list, as listed below have been reviewed prior to initiation of this evaluation. ACTIVE PROBLEM LIST:   Patient Active Problem List   Diagnosis    Peritoneal dialysis catheter infection ([address]    Sepsis ([address]    SIRS (systemic inflammatory response syndrome) ([address]    Type 2 diabetes mellitus with diabetic chronic kidney disease ([address]    Osteoarthritis of right knee    Arthritis of knee, right    History of peritoneal dialysis    End stage renal disease (HCC)    Altered mental status    Acute delirium    Essential hypertension, benign    ESRD on peritoneal dialysis ([address]    Type 2 diabetes mellitus, with long-term current use of insulin (HCC)    Primary hypertension    Hypomagnesemia    Hypokalemia    Obesity (BMI 30-39. 9)    TIA (transient ischemic attack)    Hyperlipidemia    AMS (altered mental status)    Diabetes mellitus ([address]    Lactic acid acidosis    Leukocytosis    Change in mental status       Flaco Davila M.S., [address] Pathologist  [phone]

## 2022-09-20 NOTE — PROGRESS NOTES
OT SESSION ATTEMPT     Date:2022  Patient Name: Berny Donahue  MRN: [id number]  : 1940  Room: [address] [place] [address]     Occupational therapy orders received/chart review completed and OT session attempted this date. On hold per RN for PD being set up in patient room at this date. Will reattempt OT eval at a later time/date.     Otto Rodriguez, [address] OTR/L #[id number]

## 2022-09-20 NOTE — PROGRESS NOTES
[facility] MD King Aneta Maguire MD. Iris Mendez MD   Progress Note    2022    SUBJECTIVE:   More awake , alert oriented . Dw son at bedside     PROBLEM LIST:    Principal Problem:    Change in mental status  Resolved Problems:    * No resolved hospital problems. *       DIET:    ADULT DIET; Clear Liquid       Allergies : Sulfa antibiotics    Past Medical History:   Diagnosis Date    Anemia     Arthritis     CAD (coronary artery disease)     Diabetes mellitus ([facility]     Gout     History of bleeding peptic ulcer approx     Hypertension     Peritoneal dialysis catheter in place [facility])     Peritoneal dialysis status ([facility]     at night for 8 hours    Thyroid disease     Use of cane as ambulatory aid     Wears glasses        Past Surgical History:   Procedure Laterality Date    BACK SURGERY      CARDIOVASCULAR STRESS TEST      CARPAL TUNNEL RELEASE Bilateral     CATARACT REMOVAL WITH IMPLANT Bilateral      SECTION      CORONARY ARTERY BYPASS GRAFT  approx 2000    x 3; per Dr Syd Julian, COLON, [address] [facility] [address]      left knee, right shoulder    MD TOTAL KNEE ARTHROPLASTY Right 10/31/2018    RIGHT KNEE TOTAL ARTHROPLASTY +++BRIDGER++ PNB++ performed by Darrick Morillo MD at [address]         History reviewed. No pertinent family history. reports that she has never smoked. She has never used smokeless tobacco. She reports that she does not drink alcohol and does not use drugs. Review of Systems:   Constitutional: no fevers , no chills , feels ok   Eyes: no eye pain , no itching , no drainage  Ears, nose, mouth, throat, and face: no ear ,nose pain , hearing is ok ,no nasal drainage   Respiratory: no sob ,no cough ,no wheezing . Cardiovascular: no chest pain , no palpitation ,no sob . Gastrointestinal: no nausea, vomiting , constipation , no abdominal pain .    Genitourinary:no urinary retention , no burning , dysuria . No polyuria   Hematologic/lymphatic: no bleeding , no cougulation issues . Musculoskeletal:no joint pain , no swelling . Neurological: no headaches ,no weakness , no numbness . Endocrine: no thirst , no weight issues .      MEDS (scheduled):    insulin lispro  0-16 Units SubCUTAneous TID WC    insulin lispro  0-4 Units SubCUTAneous Nightly    piperacillin-tazobactam  4,500 mg IntraVENous Q12H    levETIRAcetam  250 mg Oral BID    pantoprazole (PROTONIX) 40 mg injection  40 mg IntraVENous Daily    amLODIPine  5 mg Oral Daily    [Held by provider] apixaban  2.5 mg Oral BID    aspirin  81 mg Oral Daily    [Held by provider] atorvastatin  20 mg Oral Nightly    DULoxetine  30 mg Oral Daily    isosorbide mononitrate  30 mg Oral Daily    levothyroxine  50 mcg Oral Daily    docusate sodium  100 mg Oral Nightly    [Held by provider] pantoprazole  40 mg Oral QAM AC       MEDS (infusions):   dextrose         MEDS (prn):  acetaminophen, glucose, dextrose bolus **OR** dextrose bolus, glucagon (rDNA), dextrose, acetaminophen, trimethobenzamide    PHYSICAL EXAM:     Patient Vitals for the past 24 hrs:   BP Temp Temp src Pulse Resp SpO2   09/20/22 1130 (!) 133/90 97.2 °F (36.2 °C) -- 64 16 98 %   09/20/22 0000 114/75 97.3 °F (36.3 °C) Temporal 95 16 97 %   09/19/22 1735 (!) 143/58 97.6 °F (36.4 °C) Axillary 75 18 --     @      Intake/Output Summary (Last 24 hours) at 9/20/2022 1644  Last data filed at 9/20/2022 0805  Gross per 24 hour   Intake --   Output 2641 ml   Net -2641 ml           Wt Readings from Last 3 Encounters:   09/19/22 143 lb 4.8 oz (65 kg)   07/27/22 143 lb 3.2 oz (65 kg)   04/29/22 158 lb 8.2 oz (71.9 kg)       Constitutional:  in no acute distress  Oral: mucus membranes moist  Neck: no JVD  Cardiovascular: S1, S2 regular rhythm, no murmur,or rub  Respiratory:  No crackles, no wheeze  Gastrointestinal:  Soft, nontender, nondistended, NABS  Ext: no edema, feet warm  Skin: dry, no rash  Neuro: awake, alert, interactive      DATA:    Recent Labs     09/18/22  0430 09/19/22  0437 09/20/22  0506   WBC 16.9* 23.7* 12.6*   HGB 10.6* 10.3* 10.3*   HCT 32.1* 31.5* 31.0*   MCV 90.9 90.3 91.4    297 264       Recent Labs     09/18/22  0430 09/18/22  0440 09/19/22  0437 09/20/22  0506   *  --  128* 126*   K 3.6  --  3.5 3.3*   CL 87*  --  88* 88*   CO2 25  --  23 23   MG  --  1.7  --   --    BUN 49*  --  47* 43*   CREATININE 6.6*  --  6.2* 5.8*   ALT  --   --  332* 202*   AST  --   --  492* 170*   BILIDIR  --   --  1.1* 1.0*   BILITOT  --   --  2.0* 1.2   ALKPHOS  --   --  935* 760*         No results found for: LABPROT    ASSESSMENT / RECOMMENDATIONS:      1.  End-stage renal disease, on peritoneal dialysis. 2.  Encephalopathy, etiology unclear, metabolic versus ongoing  infection. 3.  Leukocytosis ? Ascedning cholangitis   4. Anemia of chronic disease. 5.  Hypomagnesemia. 6.  Mineral bone disease. PLAN:  1. Continue peritoneal dialysis per script. 2.  PD fluid sample was reviewed and no signs of peritonitis. 3.  Followup cultures.   4   general surgery on board note reviewed       Electronically signed by Korin Pruett MD on 9/20/2022 at 4:44 PM

## 2022-09-20 NOTE — ANESTHESIA PRE PROCEDURE
Department of Anesthesiology  Preprocedure Note       Name:  Tracy Lea   Age:  80 y.o.  :  1940                                          MRN:  [id number]         Date:  2022      Surgeon: Neftaly Guadalupe):  Vilma Lopez MD    Procedure: Procedure(s):  CHOLECYSTECTOMY LAPAROSCOPIC    Medications prior to admission:   Prior to Admission medications    Medication Sig Start Date End Date Taking? Authorizing Provider   insulin glargine (LANTUS) 100 UNIT/ML injection vial Inject 10 Units into the skin in the morning and 10 Units before bedtime. 22   KERRI Falk CNP   guaiFENesin (ROBITUSSIN) 100 MG/5ML SOLN oral solution Take 10 mLs by mouth every 6 hours as needed for Cough 22   KERRI Falk CNP   zinc sulfate (ZINCATE) 220 (50 Zn) MG capsule Take 1 capsule by mouth in the morning for 2 doses. 22  KERRI Falk CNP   B Complex-C-Folic Acid (DIALYVITE 719 PO) Take by mouth    Historical Provider, MD   omeprazole (PRILOSEC) 20 MG delayed release capsule Take 20 mg by mouth in the morning. Historical Provider, MD   Cetirizine HCl (ZYRTEC PO) Take by mouth  Patient not taking: Reported on 2022    Historical Provider, MD   potassium chloride (KLOR-CON M) 20 MEQ extended release tablet Take 20 mEq by mouth in the morning. Historical Provider, MD   amLODIPine (NORVASC) 5 MG tablet Take 5 mg by mouth in the morning. Historical Provider, MD   aspirin 81 MG chewable tablet Take 1 tablet by mouth daily 22   Teena Alba MD   apixaban (ELIQUIS) 2.5 MG TABS tablet Take 1 tablet by mouth 2 times daily 22   Teena Alba MD   atorvastatin (LIPITOR) 20 MG tablet Take 1 tablet by mouth nightly 22   Teena Alba MD   DULoxetine (CYMBALTA) 30 MG extended release capsule Take 30 mg by mouth in the morning. Historical Provider, MD   isosorbide mononitrate (IMDUR) 30 MG CR tablet Take 30 mg by mouth in the morning.     Historical pantoprazole (PROTONIX) tablet 40 mg  40 mg Oral QAM AC Smooth Modesto Linger, DO        glucose chewable tablet 16 g  4 tablet Oral PRN Pio Gardner, DO        dextrose bolus 10% 125 mL  125 mL IntraVENous PRN Smooth Modesto Linger, DO        Or    dextrose bolus 10% 250 mL  250 mL IntraVENous PRN Smooth Modesto Linger, DO        glucagon (rDNA) injection 1 mg  1 mg SubCUTAneous PRN Smooth Modesto Linger, DO        dextrose 10 % infusion   IntraVENous Continuous PRN Smooth Modesto Valerieer, DO        acetaminophen (TYLENOL) tablet 650 mg  650 mg Oral Q4H PRN Smooth Modesto Valerieer, DO        trimethobenzamide Claudetta Cherri) injection 200 mg  200 mg IntraMUSCular Q6H PRN Pio Gardner, DO   200 mg at 09/19/22 0855       Allergies: Allergies   Allergen Reactions    Sulfa Antibiotics Swelling     Swelling after being out in sun       Problem List:    Patient Active Problem List   Diagnosis Code    Peritoneal dialysis catheter infection ([facility] T85.71XA    Sepsis ([facility] A41.9    SIRS (systemic inflammatory response syndrome) ([facility] R65.10    Type 2 diabetes mellitus with diabetic chronic kidney disease ([facility] E11.22    Osteoarthritis of right knee M17.11    Arthritis of knee, right M17.11    History of peritoneal dialysis Z98.890    End stage renal disease ([facility] N18.6    Altered mental status R41.82    Acute delirium R41.0    Essential hypertension, benign I10    ESRD on peritoneal dialysis ([facility] N18.6, Z99.2    Type 2 diabetes mellitus, with long-term current use of insulin ([facility]) E11.9, Z79.4    Primary hypertension I10    Hypomagnesemia E83.42    Hypokalemia E87.6    Obesity (BMI 30-39. 9) E66.9    TIA (transient ischemic attack) G45.9    Hyperlipidemia E78.5    AMS (altered mental status) R41.82    Diabetes mellitus ([facility] E11.9    Lactic acid acidosis E87.2    Leukocytosis D72.829    Change in mental status R41.82       Past Medical History:        Diagnosis Date    Anemia     Arthritis     CAD (coronary artery disease)     Diabetes mellitus ([facility]     Gout     History of bleeding peptic ulcer approx     Hypertension     Peritoneal dialysis catheter in place [facility])     Peritoneal dialysis status ([facility]     at night for 8 hours    Thyroid disease     Use of cane as ambulatory aid     Wears glasses        Past Surgical History:        Procedure Laterality Date    BACK SURGERY      CARDIOVASCULAR STRESS TEST      CARPAL TUNNEL RELEASE Bilateral     CATARACT REMOVAL WITH IMPLANT Bilateral      SECTION      CORONARY ARTERY BYPASS GRAFT  approx 2000    x 3; per Dr Kenji Penn, COLON, [address]      left knee, right shoulder    DE TOTAL KNEE ARTHROPLASTY Right 10/31/2018    RIGHT KNEE TOTAL ARTHROPLASTY +++BRIDGER++ PNB++ performed by Claudia Gomez MD at  [facility]         Social History:    Social History     Tobacco Use    Smoking status: Never    Smokeless tobacco: Never   Substance Use Topics    Alcohol use: No                                Counseling given: Not Answered      Vital Signs (Current):   Vitals:    22 0700 22 0811 22 1735 22 0000   BP: 114/60 128/64 (!) 143/58 114/75   Pulse: 85 86 75 95   Resp: 18  18 16   Temp:   36.4 °C (97.6 °F) 36.3 °C (97.3 °F)   TempSrc:   Axillary Temporal   SpO2:  99%  97%   Weight: 143 lb 4.8 oz (65 kg)      Height:                                                  BP Readings from Last 3 Encounters:   22 114/75   22 137/68   22 (!) 156/75       NPO Status:                                                                                 BMI:   Wt Readings from Last 3 Encounters:   22 143 lb 4.8 oz (65 kg)   22 143 lb 3.2 oz (65 kg)   22 158 lb 8.2 oz (71.9 kg)     Body mass index is 27.99 kg/m².     CBC:   Lab Results   Component Value Date/Time    WBC 12.6 2022 05:06 AM    RBC 3.39 2022 05:06 AM    HGB 10.3 09/20/2022 05:06 AM    HCT 31.0 09/20/2022 05:06 AM    MCV 91.4 09/20/2022 05:06 AM    RDW 14.3 09/20/2022 05:06 AM     09/20/2022 05:06 AM       CMP:   Lab Results   Component Value Date/Time     09/20/2022 05:06 AM    K 3.3 09/20/2022 05:06 AM    K 4.4 07/20/2022 10:23 AM    CL 88 09/20/2022 05:06 AM    CO2 23 09/20/2022 05:06 AM    BUN 43 09/20/2022 05:06 AM    CREATININE 5.8 09/20/2022 05:06 AM    GFRAA 8 09/20/2022 05:06 AM    LABGLOM 7 09/20/2022 05:06 AM    GLUCOSE 383 09/20/2022 05:06 AM    PROT 5.9 09/20/2022 05:06 AM    CALCIUM 8.7 09/20/2022 05:06 AM    BILITOT 1.2 09/20/2022 05:06 AM    ALKPHOS 760 09/20/2022 05:06 AM     09/20/2022 05:06 AM     09/20/2022 05:06 AM       POC Tests: No results for input(s): POCGLU, POCNA, POCK, POCCL, POCBUN, POCHEMO, POCHCT in the last 72 hours. Coags:   Lab Results   Component Value Date/Time    PROTIME 12.6 09/16/2022 11:41 AM    INR 1.2 09/16/2022 11:41 AM    APTT 29.1 09/16/2022 11:41 AM       HCG (If Applicable): No results found for: PREGTESTUR, PREGSERUM, HCG, HCGQUANT     ABGs: No results found for: PHART, PO2ART, FAU1BTV, YWH8DCU, BEART, K5GZYJWP     Type & Screen (If Applicable):  No results found for: LABABO, LABRH    Drug/Infectious Status (If Applicable):  No results found for: HIV, HEPCAB    COVID-19 Screening (If Applicable):   Lab Results   Component Value Date/Time    COVID19 Not Detected 09/18/2022 09:00 PM       EKG 9/16/22  Atrial fibrillation  Low voltage QRS  Right bundle branch block  Abnormal ECG  When compared with ECG of 20-JUL-2022 01:49,  Significant changes have occurred    ECHO 4/29/22   Summary   Mild to moderate mitral regurgitation is present. No mitral valve stenosis present. The aortic valve appears mildly sclerotic. Moderate aortic valve stenosis. Mild tricuspid regurgitation. Pulmonary hypertension is mild to moderate . The left atrium is mildly dilated.    LV Ejection fraction is visually estimated at 55%. Mild left ventricular concentric hypertrophy noted. Anesthesia Evaluation  Patient summary reviewed and Nursing notes reviewed no history of anesthetic complications:   Airway: Mallampati: III  TM distance: <3 FB   Neck ROM: full  Mouth opening: > = 3 FB   Dental:    (+) caps and poor dentition      Pulmonary:normal exam  breath sounds clear to auscultation                            ROS comment: On 3L O2   Cardiovascular:    (+) hypertension:, CAD:, hyperlipidemia      ECG reviewed  Rhythm: irregular  Rate: abnormal           Beta Blocker:  Not on Beta Blocker         Neuro/Psych:   (+) TIA, psychiatric history:             ROS comment: Altered mental status  Acute delirium  Gout   GI/Hepatic/Renal:   (+) renal disease (peritoneal 9/19/22 last treatment): ESRD and dialysis,           Endo/Other:    (+) DiabetesType II DM, using insulin, : arthritis: OA., electrolyte abnormalities (K 3.3, hypomagnesemia), . Abdominal:             Vascular: negative vascular ROS. Other Findings:           Anesthesia Plan      general     ASA 3       Induction: intravenous. BIS  MIPS: Postoperative opioids intended and Prophylactic antiemetics administered. Anesthetic plan and risks discussed with patient. Use of blood products discussed with patient whom consented to blood products. Plan discussed with CRNA and attending.                     Mony Landaverde RN   9/20/2022

## 2022-09-20 NOTE — PROGRESS NOTES
SPEECH LANGUAGE PATHOLOGY  DAILY PROGRESS NOTE        PATIENT NAME:  Keith Toussaint      :  1940          TODAY'S DATE:  2022 ROOM:  [address] [place], [place] [id number]    Patient seen for f/u for cognitive tx. Patient and son were educated on POC and goals targeting STM/recall, problem solving, and reasoning. Patient and son verbalized understanding and agreement. Patient was able to recall 0/3 elements after 5 minutes w/ mod verbal cues required. Patient able to complete verbal problem solving task w/ 50% acc w/ mod verbal cues required to increase attention to detail. Patient able to complete reasoning task w/ 60% acc w/ min-mod verbal cues required.        CPT code(s) [id number]  therapeutic interventions that focus on cognitive function , initial  15 min    Total minutes :  15 minutes    Nava Taylor M.S., CCC-SLP  Speech-Language Pathologist  [phone]

## 2022-09-20 NOTE — PROGRESS NOTES
OT SESSION ATTEMPT     Date:2022  Patient Name: Alejandra Gonzalez  MRN: [id number]  : 1940  Room: [address] [place] [address]     Occupational therapy orders received/chart review completed and OT session attempted this date:    [] unavailable due to other medical staff currently with pt   [] on hold, await MRI/ neurosurgical recommendations. [] on hold per nursing staff secondary to lab / radiology results    [] declined Occupational Therapy  this date due to ___. Benefits of participation in therapy reviewed with pt. [x] off unit with nuclear med at this time   [] Other:     Will reattempt OT evla at a later time/date.     Celio Lowe, [address] OTR/L #[id number]

## 2022-09-20 NOTE — PROGRESS NOTES
Patient has been seen by [address] [place] [address] Street in past will defer consult to them. Electronically signed by Andre Eldridge.  RACQUEL Wyman on 9/20/2022 at 6:47 AM

## 2022-09-21 ENCOUNTER — ANESTHESIA (OUTPATIENT)
Dept: OPERATING ROOM | Age: 82
DRG: 853 | End: 2022-09-21
Payer: MEDICARE

## 2022-09-21 ENCOUNTER — APPOINTMENT (OUTPATIENT)
Dept: GENERAL RADIOLOGY | Age: 82
DRG: 853 | End: 2022-09-21
Payer: MEDICARE

## 2022-09-21 LAB
ALBUMIN SERPL-MCNC: 1.8 G/DL (ref 3.5–5.2)
ALP BLD-CCNC: 724 U/L (ref 35–104)
ALT SERPL-CCNC: 151 U/L (ref 0–32)
ANION GAP SERPL CALCULATED.3IONS-SCNC: 13 MMOL/L (ref 7–16)
AST SERPL-CCNC: 143 U/L (ref 0–31)
BILIRUB SERPL-MCNC: 1.1 MG/DL (ref 0–1.2)
BILIRUBIN DIRECT: 0.9 MG/DL (ref 0–0.3)
BILIRUBIN, INDIRECT: 0.2 MG/DL (ref 0–1)
BLOOD CULTURE, ROUTINE: NORMAL
BUN BLDV-MCNC: 32 MG/DL (ref 6–23)
CALCIUM SERPL-MCNC: 8.7 MG/DL (ref 8.6–10.2)
CHLORIDE BLD-SCNC: 89 MMOL/L (ref 98–107)
CO2: 27 MMOL/L (ref 22–29)
CREAT SERPL-MCNC: 5.1 MG/DL (ref 0.5–1)
CULTURE, BLOOD 2: NORMAL
GFR AFRICAN AMERICAN: 10
GFR NON-AFRICAN AMERICAN: 8 ML/MIN/1.73
GLUCOSE BLD-MCNC: 217 MG/DL (ref 74–99)
METER GLUCOSE: 191 MG/DL (ref 74–99)
METER GLUCOSE: 243 MG/DL (ref 74–99)
METER GLUCOSE: 265 MG/DL (ref 74–99)
METER GLUCOSE: 306 MG/DL (ref 74–99)
METER GLUCOSE: 319 MG/DL (ref 74–99)
POTASSIUM SERPL-SCNC: 3.1 MMOL/L (ref 3.5–5)
SODIUM BLD-SCNC: 129 MMOL/L (ref 132–146)
TOTAL PROTEIN: 5.5 G/DL (ref 6.4–8.3)

## 2022-09-21 PROCEDURE — 6370000000 HC RX 637 (ALT 250 FOR IP): Performed by: INTERNAL MEDICINE

## 2022-09-21 PROCEDURE — BF121ZZ FLUOROSCOPY OF GALLBLADDER USING LOW OSMOLAR CONTRAST: ICD-10-PCS | Performed by: SURGERY

## 2022-09-21 PROCEDURE — 3600000014 HC SURGERY LEVEL 4 ADDTL 15MIN: Performed by: SURGERY

## 2022-09-21 PROCEDURE — 80076 HEPATIC FUNCTION PANEL: CPT

## 2022-09-21 PROCEDURE — 6370000000 HC RX 637 (ALT 250 FOR IP): Performed by: STUDENT IN AN ORGANIZED HEALTH CARE EDUCATION/TRAINING PROGRAM

## 2022-09-21 PROCEDURE — 2500000003 HC RX 250 WO HCPCS

## 2022-09-21 PROCEDURE — 2720000010 HC SURG SUPPLY STERILE: Performed by: SURGERY

## 2022-09-21 PROCEDURE — 97130 THER IVNTJ EA ADDL 15 MIN: CPT

## 2022-09-21 PROCEDURE — 2580000003 HC RX 258: Performed by: INTERNAL MEDICINE

## 2022-09-21 PROCEDURE — 6360000002 HC RX W HCPCS

## 2022-09-21 PROCEDURE — 6360000002 HC RX W HCPCS: Performed by: FAMILY MEDICINE

## 2022-09-21 PROCEDURE — 6360000002 HC RX W HCPCS: Performed by: INTERNAL MEDICINE

## 2022-09-21 PROCEDURE — 7100000000 HC PACU RECOVERY - FIRST 15 MIN: Performed by: SURGERY

## 2022-09-21 PROCEDURE — C9113 INJ PANTOPRAZOLE SODIUM, VIA: HCPCS | Performed by: FAMILY MEDICINE

## 2022-09-21 PROCEDURE — 82962 GLUCOSE BLOOD TEST: CPT

## 2022-09-21 PROCEDURE — 2580000003 HC RX 258

## 2022-09-21 PROCEDURE — 97129 THER IVNTJ 1ST 15 MIN: CPT

## 2022-09-21 PROCEDURE — 88304 TISSUE EXAM BY PATHOLOGIST: CPT

## 2022-09-21 PROCEDURE — 36415 COLL VENOUS BLD VENIPUNCTURE: CPT

## 2022-09-21 PROCEDURE — 3700000001 HC ADD 15 MINUTES (ANESTHESIA): Performed by: SURGERY

## 2022-09-21 PROCEDURE — 0FT44ZZ RESECTION OF GALLBLADDER, PERCUTANEOUS ENDOSCOPIC APPROACH: ICD-10-PCS | Performed by: SURGERY

## 2022-09-21 PROCEDURE — 2709999900 HC NON-CHARGEABLE SUPPLY: Performed by: SURGERY

## 2022-09-21 PROCEDURE — 2580000003 HC RX 258: Performed by: FAMILY MEDICINE

## 2022-09-21 PROCEDURE — 1200000000 HC SEMI PRIVATE

## 2022-09-21 PROCEDURE — 6360000004 HC RX CONTRAST MEDICATION: Performed by: SURGERY

## 2022-09-21 PROCEDURE — 6370000000 HC RX 637 (ALT 250 FOR IP): Performed by: PSYCHIATRY & NEUROLOGY

## 2022-09-21 PROCEDURE — 3600000004 HC SURGERY LEVEL 4 BASE: Performed by: SURGERY

## 2022-09-21 PROCEDURE — 3209999900 FLUORO FOR SURGICAL PROCEDURES

## 2022-09-21 PROCEDURE — 7100000001 HC PACU RECOVERY - ADDTL 15 MIN: Performed by: SURGERY

## 2022-09-21 PROCEDURE — 3700000000 HC ANESTHESIA ATTENDED CARE: Performed by: SURGERY

## 2022-09-21 PROCEDURE — 80048 BASIC METABOLIC PNL TOTAL CA: CPT

## 2022-09-21 PROCEDURE — 2500000003 HC RX 250 WO HCPCS: Performed by: SURGERY

## 2022-09-21 RX ORDER — SODIUM CHLORIDE 9 MG/ML
INJECTION, SOLUTION INTRAVENOUS PRN
Status: DISCONTINUED | OUTPATIENT
Start: 2022-09-21 | End: 2022-09-21 | Stop reason: HOSPADM

## 2022-09-21 RX ORDER — SODIUM CHLORIDE 0.9 % (FLUSH) 0.9 %
5-40 SYRINGE (ML) INJECTION PRN
Status: DISCONTINUED | OUTPATIENT
Start: 2022-09-21 | End: 2022-09-21 | Stop reason: HOSPADM

## 2022-09-21 RX ORDER — LIDOCAINE HYDROCHLORIDE 20 MG/ML
INJECTION, SOLUTION INTRAVENOUS PRN
Status: DISCONTINUED | OUTPATIENT
Start: 2022-09-21 | End: 2022-09-21 | Stop reason: SDUPTHER

## 2022-09-21 RX ORDER — FENTANYL CITRATE 50 UG/ML
25 INJECTION, SOLUTION INTRAMUSCULAR; INTRAVENOUS EVERY 5 MIN PRN
Status: DISCONTINUED | OUTPATIENT
Start: 2022-09-21 | End: 2022-09-21 | Stop reason: HOSPADM

## 2022-09-21 RX ORDER — OXYCODONE HYDROCHLORIDE 5 MG/1
5 TABLET ORAL EVERY 4 HOURS PRN
Status: DISCONTINUED | OUTPATIENT
Start: 2022-09-21 | End: 2022-10-03 | Stop reason: HOSPADM

## 2022-09-21 RX ORDER — BUPIVACAINE HYDROCHLORIDE 5 MG/ML
INJECTION, SOLUTION EPIDURAL; INTRACAUDAL PRN
Status: DISCONTINUED | OUTPATIENT
Start: 2022-09-21 | End: 2022-09-21 | Stop reason: ALTCHOICE

## 2022-09-21 RX ORDER — FENTANYL CITRATE 50 UG/ML
INJECTION, SOLUTION INTRAMUSCULAR; INTRAVENOUS PRN
Status: DISCONTINUED | OUTPATIENT
Start: 2022-09-21 | End: 2022-09-21 | Stop reason: SDUPTHER

## 2022-09-21 RX ORDER — PHENYLEPHRINE HCL IN 0.9% NACL 1 MG/10 ML
SYRINGE (ML) INTRAVENOUS PRN
Status: DISCONTINUED | OUTPATIENT
Start: 2022-09-21 | End: 2022-09-21 | Stop reason: SDUPTHER

## 2022-09-21 RX ORDER — SODIUM CHLORIDE 0.9 % (FLUSH) 0.9 %
5-40 SYRINGE (ML) INJECTION EVERY 12 HOURS SCHEDULED
Status: DISCONTINUED | OUTPATIENT
Start: 2022-09-21 | End: 2022-09-21 | Stop reason: HOSPADM

## 2022-09-21 RX ORDER — ONDANSETRON 2 MG/ML
INJECTION INTRAMUSCULAR; INTRAVENOUS PRN
Status: DISCONTINUED | OUTPATIENT
Start: 2022-09-21 | End: 2022-09-21 | Stop reason: SDUPTHER

## 2022-09-21 RX ORDER — POTASSIUM CHLORIDE 20 MEQ/1
40 TABLET, EXTENDED RELEASE ORAL ONCE
Status: COMPLETED | OUTPATIENT
Start: 2022-09-21 | End: 2022-09-21

## 2022-09-21 RX ORDER — ESMOLOL HYDROCHLORIDE 10 MG/ML
INJECTION INTRAVENOUS PRN
Status: DISCONTINUED | OUTPATIENT
Start: 2022-09-21 | End: 2022-09-21 | Stop reason: SDUPTHER

## 2022-09-21 RX ORDER — LIDOCAINE HYDROCHLORIDE 10 MG/ML
INJECTION, SOLUTION EPIDURAL; INFILTRATION; INTRACAUDAL; PERINEURAL PRN
Status: DISCONTINUED | OUTPATIENT
Start: 2022-09-21 | End: 2022-09-21 | Stop reason: ALTCHOICE

## 2022-09-21 RX ORDER — ROCURONIUM BROMIDE 10 MG/ML
INJECTION, SOLUTION INTRAVENOUS PRN
Status: DISCONTINUED | OUTPATIENT
Start: 2022-09-21 | End: 2022-09-21 | Stop reason: SDUPTHER

## 2022-09-21 RX ORDER — FENTANYL CITRATE 50 UG/ML
50 INJECTION, SOLUTION INTRAMUSCULAR; INTRAVENOUS EVERY 5 MIN PRN
Status: DISCONTINUED | OUTPATIENT
Start: 2022-09-21 | End: 2022-09-21 | Stop reason: HOSPADM

## 2022-09-21 RX ORDER — ONDANSETRON 2 MG/ML
4 INJECTION INTRAMUSCULAR; INTRAVENOUS
Status: DISCONTINUED | OUTPATIENT
Start: 2022-09-21 | End: 2022-09-21 | Stop reason: HOSPADM

## 2022-09-21 RX ORDER — DEXAMETHASONE SODIUM PHOSPHATE 10 MG/ML
INJECTION INTRAMUSCULAR; INTRAVENOUS PRN
Status: DISCONTINUED | OUTPATIENT
Start: 2022-09-21 | End: 2022-09-21 | Stop reason: SDUPTHER

## 2022-09-21 RX ORDER — SODIUM CHLORIDE 9 MG/ML
INJECTION, SOLUTION INTRAVENOUS CONTINUOUS PRN
Status: DISCONTINUED | OUTPATIENT
Start: 2022-09-21 | End: 2022-09-21 | Stop reason: SDUPTHER

## 2022-09-21 RX ORDER — ETOMIDATE 2 MG/ML
INJECTION INTRAVENOUS PRN
Status: DISCONTINUED | OUTPATIENT
Start: 2022-09-21 | End: 2022-09-21 | Stop reason: SDUPTHER

## 2022-09-21 RX ADMIN — SODIUM CHLORIDE, PRESERVATIVE FREE 40 MG: 5 INJECTION INTRAVENOUS at 09:07

## 2022-09-21 RX ADMIN — LEVETIRACETAM 250 MG: 250 TABLET, FILM COATED ORAL at 09:08

## 2022-09-21 RX ADMIN — PIPERACILLIN AND TAZOBACTAM 4500 MG: 4; .5 INJECTION, POWDER, LYOPHILIZED, FOR SOLUTION INTRAVENOUS at 02:25

## 2022-09-21 RX ADMIN — DEXAMETHASONE SODIUM PHOSPHATE 10 MG: 10 INJECTION INTRAMUSCULAR; INTRAVENOUS at 13:35

## 2022-09-21 RX ADMIN — AMLODIPINE BESYLATE 5 MG: 5 TABLET ORAL at 09:07

## 2022-09-21 RX ADMIN — POTASSIUM CHLORIDE 40 MEQ: 1500 TABLET, EXTENDED RELEASE ORAL at 12:07

## 2022-09-21 RX ADMIN — DOCUSATE SODIUM 100 MG: 100 CAPSULE, LIQUID FILLED ORAL at 20:57

## 2022-09-21 RX ADMIN — FENTANYL CITRATE 50 MCG: 50 INJECTION, SOLUTION INTRAMUSCULAR; INTRAVENOUS at 13:22

## 2022-09-21 RX ADMIN — SUGAMMADEX 260 MG: 100 INJECTION, SOLUTION INTRAVENOUS at 14:20

## 2022-09-21 RX ADMIN — Medication 50 MCG: at 14:07

## 2022-09-21 RX ADMIN — ETOMIDATE 20 MG: 2 INJECTION INTRAVENOUS at 13:22

## 2022-09-21 RX ADMIN — LIDOCAINE HYDROCHLORIDE 100 MG: 20 INJECTION, SOLUTION INTRAVENOUS at 13:22

## 2022-09-21 RX ADMIN — SODIUM CHLORIDE: 9 INJECTION, SOLUTION INTRAVENOUS at 13:22

## 2022-09-21 RX ADMIN — ROCURONIUM BROMIDE 50 MG: 10 INJECTION, SOLUTION INTRAVENOUS at 13:22

## 2022-09-21 RX ADMIN — ACETAMINOPHEN 650 MG: 325 TABLET, FILM COATED ORAL at 21:08

## 2022-09-21 RX ADMIN — INSULIN LISPRO 4 UNITS: 100 INJECTION, SOLUTION INTRAVENOUS; SUBCUTANEOUS at 20:58

## 2022-09-21 RX ADMIN — LEVOTHYROXINE SODIUM 50 MCG: 0.05 TABLET ORAL at 06:31

## 2022-09-21 RX ADMIN — FENTANYL CITRATE 25 MCG: 50 INJECTION, SOLUTION INTRAMUSCULAR; INTRAVENOUS at 13:59

## 2022-09-21 RX ADMIN — ISOSORBIDE MONONITRATE 30 MG: 30 TABLET, EXTENDED RELEASE ORAL at 09:07

## 2022-09-21 RX ADMIN — ESMOLOL HYDROCHLORIDE 20 MG: 10 INJECTION, SOLUTION INTRAVENOUS at 13:50

## 2022-09-21 RX ADMIN — PIPERACILLIN AND TAZOBACTAM 4500 MG: 4; .5 INJECTION, POWDER, LYOPHILIZED, FOR SOLUTION INTRAVENOUS at 15:27

## 2022-09-21 RX ADMIN — LEVETIRACETAM 250 MG: 250 TABLET, FILM COATED ORAL at 20:57

## 2022-09-21 RX ADMIN — ONDANSETRON 4 MG: 2 INJECTION INTRAMUSCULAR; INTRAVENOUS at 14:10

## 2022-09-21 RX ADMIN — FENTANYL CITRATE 50 MCG: 50 INJECTION, SOLUTION INTRAMUSCULAR; INTRAVENOUS at 13:35

## 2022-09-21 RX ADMIN — FENTANYL CITRATE 25 MCG: 50 INJECTION, SOLUTION INTRAMUSCULAR; INTRAVENOUS at 13:44

## 2022-09-21 ASSESSMENT — ENCOUNTER SYMPTOMS: SHORTNESS OF BREATH: 1

## 2022-09-21 NOTE — ANESTHESIA PRE PROCEDURE
Department of Anesthesiology  Preprocedure Note       Name:  Tracy Lea   Age:  80 y.o.  :  1940                                          MRN:  [id number]         Date:  2022      Surgeon: Neftaly Guadalupe):  Vilma Lopez MD    Procedure: Procedure(s):  CHOLECYSTECTOMY LAPAROSCOPIC with intraoperative cholangiogram    Medications prior to admission:   Prior to Admission medications    Medication Sig Start Date End Date Taking? Authorizing Provider   insulin glargine (LANTUS) 100 UNIT/ML injection vial Inject 10 Units into the skin in the morning and 10 Units before bedtime. 22   KERRI Falk CNP   guaiFENesin (ROBITUSSIN) 100 MG/5ML SOLN oral solution Take 10 mLs by mouth every 6 hours as needed for Cough 22   KERRI Falk CNP   zinc sulfate (ZINCATE) 220 (50 Zn) MG capsule Take 1 capsule by mouth in the morning for 2 doses. 22  KERRI Falk CNP   B Complex-C-Folic Acid (DIALYVITE 735 PO) Take by mouth    Historical Provider, MD   omeprazole (PRILOSEC) 20 MG delayed release capsule Take 20 mg by mouth in the morning. Historical Provider, MD   Cetirizine HCl (ZYRTEC PO) Take by mouth  Patient not taking: Reported on 2022    Historical Provider, MD   potassium chloride (KLOR-CON M) 20 MEQ extended release tablet Take 20 mEq by mouth in the morning. Historical Provider, MD   amLODIPine (NORVASC) 5 MG tablet Take 5 mg by mouth in the morning. Historical Provider, MD   aspirin 81 MG chewable tablet Take 1 tablet by mouth daily 22   Teena Alba MD   apixaban (ELIQUIS) 2.5 MG TABS tablet Take 1 tablet by mouth 2 times daily 22   Teena Alba MD   atorvastatin (LIPITOR) 20 MG tablet Take 1 tablet by mouth nightly 22   Teena Alba MD   DULoxetine (CYMBALTA) 30 MG extended release capsule Take 30 mg by mouth in the morning.     Historical Provider, MD   isosorbide mononitrate (IMDUR) 30 MG CR tablet Take 30 mg by mouth in the morning. Historical Provider, MD   levothyroxine (SYNTHROID) 50 MCG tablet Take 50 mcg by mouth Daily    Historical Provider, MD       Current medications:    No current facility-administered medications for this visit. No current outpatient medications on file.      Facility-Administered Medications Ordered in Other Visits   Medication Dose Route Frequency Provider Last Rate Last Admin    insulin lispro (HUMALOG) injection vial 0-16 Units  0-16 Units SubCUTAneous TID  Monica Alba, APRN - CNP   8 Units at 09/20/22 1254    insulin lispro (HUMALOG) injection vial 0-4 Units  0-4 Units SubCUTAneous Nightly Jaylyn Coleman, APRN - CNP   4 Units at 09/20/22 2155    piperacillin-tazobactam (ZOSYN) 4,500 mg in dextrose 5 % 100 mL IVPB ([id number])  4,500 mg IntraVENous Q12H Fredi Ortiz MD   Stopped at 09/21/22 5142    levETIRAcetam (KEPPRA) tablet 250 mg  250 mg Oral BID Bonduel Poole, DO   250 mg at 09/21/22 0908    pantoprazole (PROTONIX) 40 mg in sodium chloride (PF) 10 mL injection  40 mg IntraVENous Daily Niya Ferro MD   40 mg at 09/21/22 0907    acetaminophen (TYLENOL) suppository 650 mg  650 mg Rectal Q4H PRN ALEXIS Nickerson   650 mg at 09/18/22 1927    amLODIPine (NORVASC) tablet 5 mg  5 mg Oral Daily Smooth Twilla Maizes, DO   5 mg at 09/21/22 9773    [Held by provider] apixaban (ELIQUIS) tablet 2.5 mg  2.5 mg Oral BID [place] Rom, DO   2.5 mg at 09/19/22 3432    aspirin chewable tablet 81 mg  81 mg Oral Daily Smooth Twilla Maizes, DO   81 mg at 09/20/22 1133    [Held by provider] atorvastatin (LIPITOR) tablet 20 mg  20 mg Oral Nightly Smooth Twilla Maizes, DO   20 mg at 09/17/22 2152    DULoxetine (CYMBALTA) extended release capsule 30 mg  30 mg Oral Daily Smooth Twilla Maizes, DO   30 mg at 09/20/22 1134    isosorbide mononitrate (IMDUR) extended release tablet 30 mg  30 mg Oral Daily Smooth Twilla Maizes, DO   30 mg at 09/21/22 0907    levothyroxine (SYNTHROID) tablet 50 mcg  50 mcg Oral Daily Smooth Driscilla Nest, DO   50 mcg at 09/21/22 0631    docusate sodium (COLACE) capsule 100 mg  100 mg Oral Nightly Smooth Driscilla Nest, DO   100 mg at 09/20/22 2144    [Held by provider] pantoprazole (PROTONIX) tablet 40 mg  40 mg Oral QAM AC Smooth Driscilla Nest, DO        glucose chewable tablet 16 g  4 tablet Oral PRN Smooth Driscilla Nest, DO        dextrose bolus 10% 125 mL  125 mL IntraVENous PRN Smooth Driscilla Nest, DO        Or    dextrose bolus 10% 250 mL  250 mL IntraVENous PRN Smooth iscilla Nest, DO        glucagon (rDNA) injection 1 mg  1 mg SubCUTAneous PRN Smooth Driscilla Nest, DO        dextrose 10 % infusion   IntraVENous Continuous PRN Smooth Driscilla Nest, DO        acetaminophen (TYLENOL) tablet 650 mg  650 mg Oral Q4H PRN Smooth Driscilla Nest, DO        trimethobenzamide [place]) injection 200 mg  200 mg IntraMUSCular Q6H PRN Weskan Sauce, DO   200 mg at 09/19/22 0855       Allergies: Allergies   Allergen Reactions    Sulfa Antibiotics Swelling     Swelling after being out in sun       Problem List:    Patient Active Problem List   Diagnosis Code    Peritoneal dialysis catheter infection ([facility] T85.71XA    Sepsis ([facility] A41.9    SIRS [facility]systemic inflammatory response syndrome) [facility] R65.10    Type 2 diabetes mellitus with diabetic chronic kidney disease ([facility] E11.22    Osteoarthritis of right knee M17.11    Arthritis of knee, right M17.11    History of peritoneal dialysis Z98.890    End stage renal disease ([facility] N18.6    Altered mental status R41.82    Acute delirium R41.0    Essential hypertension, benign I10    ESRD on peritoneal dialysis ([facility] N18.6, Z99.2    Type 2 diabetes mellitus, with long-term current use of insulin (HCC) E11.9, Z79.4    Primary hypertension I10    Hypomagnesemia E83.42    Hypokalemia E87.6    Obesity (BMI 30-39. 9) E66.9    TIA (transient ischemic attack) G45.9    Hyperlipidemia E78.5    AMS (altered mental status) R41.82    Diabetes mellitus ([facility] E11.9    Lactic acid acidosis E87.2    Leukocytosis D72.829    Change in mental status R41.82       Past Medical History:        Diagnosis Date    Anemia     Arthritis     CAD (coronary artery disease)     Diabetes mellitus ([facility]     Gout     History of bleeding peptic ulcer approx     Hypertension     Peritoneal dialysis catheter in place [facility])     Peritoneal dialysis status ([facility]     at night for 8 hours    Thyroid disease     Use of cane as ambulatory aid     Wears glasses        Past Surgical History:        Procedure Laterality Date    BACK SURGERY      CARDIOVASCULAR STRESS TEST      CARPAL TUNNEL RELEASE Bilateral     CATARACT REMOVAL WITH IMPLANT Bilateral      SECTION      CORONARY ARTERY BYPASS GRAFT  approx 2000    x 3; per Dr Miranda Santana, [place], [address]      left knee, right shoulder    [place] TOTAL KNEE ARTHROPLASTY Right 10/31/2018    RIGHT KNEE TOTAL ARTHROPLASTY +++BRIDGER++ PNB++ performed by Harry Hogan MD at [facility]. 106         Social History:    Social History     Tobacco Use    Smoking status: Never    Smokeless tobacco: Never   Substance Use Topics    Alcohol use: No                                Counseling given: Not Answered      Vital Signs (Current): There were no vitals filed for this visit.                                            BP Readings from Last 3 Encounters:   22 133/62   22 137/68   22 (!) 156/75       NPO Status:  >8 hs                                                                               BMI:   Wt Readings from Last 3 Encounters:   22 143 lb 4.8 oz (65 kg)   22 143 lb 3.2 oz (65 kg)   22 158 lb 8.2 oz (71.9 kg)     There is no height or weight on file to calculate BMI.    CBC:   Lab Results   Component Value Date/Time    WBC 12.6 2022 05:06 AM    RBC 3.39 2022 05:06 AM    HGB 10.3 2022 05:06 AM HCT 31.0 09/20/2022 05:06 AM    MCV 91.4 09/20/2022 05:06 AM    RDW 14.3 09/20/2022 05:06 AM     09/20/2022 05:06 AM       CMP:   Lab Results   Component Value Date/Time     09/21/2022 09:57 AM    K 3.1 09/21/2022 09:57 AM    K 4.4 07/20/2022 10:23 AM    CL 89 09/21/2022 09:57 AM    CO2 27 09/21/2022 09:57 AM    BUN 32 09/21/2022 09:57 AM    CREATININE 5.1 09/21/2022 09:57 AM    GFRAA 10 09/21/2022 09:57 AM    LABGLOM 8 09/21/2022 09:57 AM    GLUCOSE 217 09/21/2022 09:57 AM    PROT 5.5 09/21/2022 05:10 AM    CALCIUM 8.7 09/21/2022 09:57 AM    BILITOT 1.1 09/21/2022 05:10 AM    ALKPHOS 893 09/21/2022 05:10 AM     09/21/2022 05:10 AM     09/21/2022 05:10 AM       POC Tests: No results for input(s): POCGLU, POCNA, POCK, POCCL, POCBUN, POCHEMO, POCHCT in the last 72 hours. Coags:   Lab Results   Component Value Date/Time    PROTIME 12.6 09/16/2022 11:41 AM    INR 1.2 09/16/2022 11:41 AM    APTT 29.1 09/16/2022 11:41 AM       HCG (If Applicable): No results found for: PREGTESTUR, PREGSERUM, HCG, HCGQUANT     ABGs: No results found for: PHART, PO2ART, HPH7GDX, QCM0QWC, BEART, A1ALBAVY     Type & Screen (If Applicable):  No results found for: LABABO, LABRH    Drug/Infectious Status (If Applicable):  No results found for: HIV, HEPCAB    COVID-19 Screening (If Applicable):   Lab Results   Component Value Date/Time    COVID19 Not Detected 09/18/2022 09:00 PM       EKG 9/16/22  Atrial fibrillation  Low voltage QRS  Right bundle branch block  Abnormal ECG  When compared with ECG of 20-JUL-2022 01:49,  Significant changes have occurred    ECHO 4/29/22   Summary   Mild to moderate mitral regurgitation is present. No mitral valve stenosis present. The aortic valve appears mildly sclerotic. Moderate aortic valve stenosis. Mild tricuspid regurgitation. Pulmonary hypertension is mild to moderate . The left atrium is mildly dilated. LV Ejection fraction is visually estimated at 55%.    Mild left ventricular concentric hypertrophy noted. Anesthesia Evaluation  Patient summary reviewed and Nursing notes reviewed no history of anesthetic complications:   Airway: Mallampati: III  TM distance: <3 FB   Neck ROM: full  Mouth opening: > = 3 FB   Dental:    (+) caps and poor dentition      Pulmonary:normal exam  breath sounds clear to auscultation  (+) shortness of breath:            Patient did not smoke on day of surgery. ROS comment: On 3L O2   Cardiovascular:    (+) hypertension:, valvular problems/murmurs ( moderate AS and moderate MS): AS, CAD:, dysrhythmias: atrial fibrillation, murmur, pulmonary hypertension: moderate and mild, hyperlipidemia      ECG reviewed  Rhythm: irregular  Rate: abnormal  Echocardiogram reviewed         Beta Blocker:  Not on Beta Blocker         Neuro/Psych:   (+) TIA, psychiatric history:             ROS comment: Altered mental status  Acute delirium  Gout   GI/Hepatic/Renal:   (+) liver disease (Acutely elevaed LFTs):, renal disease (PD pt): ESRD and dialysis,           Endo/Other:    (+) DiabetesType II DM, using insulin, : arthritis: OA., electrolyte abnormalities (K 3.3, hypomagnesemia,  up from 128), . Abdominal:             Vascular: negative vascular ROS. Other Findings:             Anesthesia Plan      general     ASA 4     (22 ga)  Induction: intravenous. BIS  MIPS: Postoperative opioids intended and Prophylactic antiemetics administered. Anesthetic plan and risks discussed with patient. Use of blood products discussed with patient whom consented to blood products. Plan discussed with CRNA and attending.               Pending cardiac clearance       Georgina Verde RN   9/21/2022

## 2022-09-21 NOTE — PROGRESS NOTES
[facility] MD Randy Voss MD. Ivet Chung MD   Progress Note    2022    SUBJECTIVE:   Not in her room   She is getting cholcystectomy    PROBLEM LIST:    Principal Problem:    Change in mental status  Resolved Problems:    * No resolved hospital problems. *       DIET:    Diet NPO Exceptions are: Sips of Water with Meds       Allergies : Sulfa antibiotics    Past Medical History:   Diagnosis Date    Anemia     Arthritis     CAD (coronary artery disease)     Diabetes mellitus ([address]     Gout     History of bleeding peptic ulcer approx     Hypertension     Peritoneal dialysis catheter in place [facility])     Peritoneal dialysis status ([facility]     at night for 8 hours    Thyroid disease     Use of cane as ambulatory aid     Wears glasses        Past Surgical History:   Procedure Laterality Date    BACK SURGERY      CARDIOVASCULAR STRESS TEST      CARPAL TUNNEL RELEASE Bilateral     CATARACT REMOVAL WITH IMPLANT Bilateral      SECTION      CORONARY ARTERY BYPASS GRAFT  approx 2000    x 3; per Dr [place], [place], [address] [facility] [address]      left knee, right shoulder    [place] TOTAL KNEE ARTHROPLASTY Right 10/31/2018    RIGHT KNEE TOTAL ARTHROPLASTY +++BRIDGER++ PNB++ performed by Anna Burden MD at [address] [place] [address]         History reviewed. No pertinent family history. reports that she has never smoked. She has never used smokeless tobacco. She reports that she does not drink alcohol and does not use drugs. Review of Systems:   Constitutional: no fevers , no chills , feels ok   Eyes: no eye pain , no itching , no drainage  Ears, nose, mouth, throat, and face: no ear ,nose pain , hearing is ok ,no nasal drainage   Respiratory: no sob ,no cough ,no wheezing . Cardiovascular: no chest pain , no palpitation ,no sob . Gastrointestinal: no nausea, vomiting , constipation , no abdominal pain .    Genitourinary:no urinary retention , no burning , dysuria . No polyuria   Hematologic/lymphatic: no bleeding , no cougulation issues . Musculoskeletal:no joint pain , no swelling . Neurological: no headaches ,no weakness , no numbness . Endocrine: no thirst , no weight issues .      MEDS (scheduled):    insulin lispro  0-16 Units SubCUTAneous TID WC    insulin lispro  0-4 Units SubCUTAneous Nightly    piperacillin-tazobactam  4,500 mg IntraVENous Q12H    levETIRAcetam  250 mg Oral BID    pantoprazole (PROTONIX) 40 mg injection  40 mg IntraVENous Daily    amLODIPine  5 mg Oral Daily    [Held by provider] apixaban  2.5 mg Oral BID    aspirin  81 mg Oral Daily    [Held by provider] atorvastatin  20 mg Oral Nightly    DULoxetine  30 mg Oral Daily    isosorbide mononitrate  30 mg Oral Daily    levothyroxine  50 mcg Oral Daily    docusate sodium  100 mg Oral Nightly    [Held by provider] pantoprazole  40 mg Oral QAM AC       MEDS (infusions):   dextrose         MEDS (prn):  acetaminophen, glucose, dextrose bolus **OR** dextrose bolus, glucagon (rDNA), dextrose, acetaminophen, trimethobenzamide    PHYSICAL EXAM:     Patient Vitals for the past 24 hrs:   BP Temp Temp src Pulse Resp SpO2   09/21/22 1615 (!) 137/51 98.1 °F (36.7 °C) Temporal 70 14 95 %   09/21/22 1545 (!) 125/54 -- -- 71 16 95 %   09/21/22 1530 (!) 144/58 -- -- 80 22 98 %   09/21/22 1515 (!) 144/63 -- -- 80 12 99 %   09/21/22 1500 (!) 137/56 -- -- 79 (!) 1 99 %   09/21/22 1445 (!) 120/52 -- -- 77 11 98 %   09/21/22 1442 (!) 126/51 97.6 °F (36.4 °C) Temporal 71 14 98 %   09/21/22 1435 (!) 126/51 96.8 °F (36 °C) Temporal 75 16 99 %   09/21/22 0820 133/62 -- -- 88 16 --   09/21/22 0814 (!) 147/61 97.6 °F (36.4 °C) Oral 85 16 97 %     @      Intake/Output Summary (Last 24 hours) at 9/21/2022 1927  Last data filed at 9/21/2022 1424  Gross per 24 hour   Intake 400 ml   Output 1107 ml   Net -707 ml           Wt Readings from Last 3 Encounters:   09/19/22 143 lb 4.8 oz (65 kg) 07/27/22 143 lb 3.2 oz (65 kg)   04/29/22 158 lb 8.2 oz (71.9 kg)       Constitutional:  in no acute distress  Oral: mucus membranes moist  Neck: no JVD  Cardiovascular: S1, S2 regular rhythm, no murmur,or rub  Respiratory:  No crackles, no wheeze  Gastrointestinal:  Soft, nontender, nondistended, NABS  Ext: no edema, feet warm  Skin: dry, no rash  Neuro: awake, alert, interactive      DATA:    Recent Labs     09/19/22  0437 09/20/22  0506   WBC 23.7* 12.6*   HGB 10.3* 10.3*   HCT 31.5* 31.0*   MCV 90.3 91.4    264       Recent Labs     09/19/22  0437 09/20/22  0506 09/21/22  0510 09/21/22  0957   * 126*  --  129*   K 3.5 3.3*  --  3.1*   CL 88* 88*  --  89*   CO2 23 23  --  27   BUN 47* 43*  --  32*   CREATININE 6.2* 5.8*  --  5.1*   * 202* 151*  --    * 170* 143*  --    BILIDIR 1.1* 1.0* 0.9*  --    BILITOT 2.0* 1.2 1.1  --    ALKPHOS 935* 760* 724*  --          No results found for: LABPROT    ASSESSMENT / RECOMMENDATIONS:      1.  End-stage renal disease, on peritoneal dialysis. 2.  Encephalopathy, etiology unclear, metabolic versus ongoing  infection. 3.  Leukocytosis ? Ascedning cholangitis   4. Anemia of chronic disease. 5.  Hypomagnesemia. 6.  Mineral bone disease.      PLAN:    For laprascopic cholcystectomy today    will d/w general surgery whether ok to use PD cath or need to switch to HD and allow healing process for few weeks   Hold PD dialysis today    Electronically signed by Wallace Alcala MD on 9/21/2022 at 7:27 PM

## 2022-09-21 NOTE — PROGRESS NOTES
GENERAL SURGERY  DAILY PROGRESS NOTE  9/21/2022    Subjective:  No events overnight. Resting comfortably. Complaining of minimal abdominal pain this AM. Denies any nausea/vomiting. Objective:  BP (!) 133/90   Pulse 64   Temp 97.2 °F (36.2 °C)   Resp 16   Ht 5' (1.524 m)   Wt 143 lb 4.8 oz (65 kg)   SpO2 98%   BMI 27.99 kg/m²     General appearance: alert, cooperative and in no acute distress. Eyes: grossly normal  Lungs: nonlabored breathing on 3 L nasal cannula  Heart: regular rate  Abdomen: soft, nondistended. Tender to palpation over right upper quadrant without rebound/guarding/rigidity. Skin: No skin abnormalities  Neurologic: Alert and oriented x 3. Grossly normal  Musculoskeletal: No clubbing cyanosis or edema    Assessment/Plan:  80 y.o. female history of ESRD on peritoneal dialysis, CAD, DM, A. fib on Eliquis currently admitted with altered mental status. General surgery consulted for possible acute cholecystitis with possible choledocholithiasis. -LFT's starting to downtrend yesterday with still elevated bili. This AM Labs are currently pending.   -Cardiology consulted for preoperative risk stratification and patient was deemed low to intermediate risk for planned lap milton. -Continue Zosyn for antibiotic coverage  -Plan for laparoscopic cholecystectomy with IOC today 09/21  -Continue to hold Eliquis in preparation for procedure  - Discussed risks/benefits with patient at bedside and answered any questions.      Electronically signed by Yordan Lemus DO on 9/21/2022 at 5:56 AM

## 2022-09-21 NOTE — ANESTHESIA PRE PROCEDURE
Department of Anesthesiology  Preprocedure Note       Name:  Vishnu Ellsworth   Age:  80 y.o.  :  1940                                          MRN:  [id number]         Date:  2022      Surgeon: Ange Prasad):  Rick Field MD    Procedure: Procedure(s):  CHOLECYSTECTOMY LAPAROSCOPIC with intraoperative cholangiogram    Medications prior to admission:   Prior to Admission medications    Medication Sig Start Date End Date Taking? Authorizing Provider   insulin glargine (LANTUS) 100 UNIT/ML injection vial Inject 10 Units into the skin in the morning and 10 Units before bedtime. 22   Kennisaray Comment Learn, APRN - CNP   guaiFENesin (ROBITUSSIN) 100 MG/5ML SOLN oral solution Take 10 mLs by mouth every 6 hours as needed for Cough 22   Marknisaray Comment Learn, APRN - CNP   zinc sulfate (ZINCATE) 220 (50 Zn) MG capsule Take 1 capsule by mouth in the morning for 2 doses. 22  Susie Camarena, APRN - CNP   B Complex-C-Folic Acid (DIALYVITE 503 PO) Take by mouth    Historical Provider, MD   omeprazole (PRILOSEC) 20 MG delayed release capsule Take 20 mg by mouth in the morning. Historical Provider, MD   Cetirizine HCl (ZYRTEC PO) Take by mouth  Patient not taking: Reported on 2022    Historical Provider, MD   potassium chloride (KLOR-CON M) 20 MEQ extended release tablet Take 20 mEq by mouth in the morning. Historical Provider, MD   amLODIPine (NORVASC) 5 MG tablet Take 5 mg by mouth in the morning. Historical Provider, MD   aspirin 81 MG chewable tablet Take 1 tablet by mouth daily 22   Baron Shabbir MD   apixaban (ELIQUIS) 2.5 MG TABS tablet Take 1 tablet by mouth 2 times daily 22   Baron Shabbir MD   atorvastatin (LIPITOR) 20 MG tablet Take 1 tablet by mouth nightly 22   Baron Shabbir MD   DULoxetine (CYMBALTA) 30 MG extended release capsule Take 30 mg by mouth in the morning.     Historical Provider, MD   isosorbide mononitrate (IMDUR) 30 MG CR tablet Take 30 mg by mouth in the morning. Historical Provider, MD   levothyroxine (SYNTHROID) 50 MCG tablet Take 50 mcg by mouth Daily    Historical Provider, MD       Current medications:    No current facility-administered medications for this visit. No current outpatient medications on file.      Facility-Administered Medications Ordered in Other Visits   Medication Dose Route Frequency Provider Last Rate Last Admin    insulin lispro (HUMALOG) injection vial 0-16 Units  0-16 Units SubCUTAneous TID WC Monica Alba APRN - CNP   8 Units at 09/20/22 1254    insulin lispro (HUMALOG) injection vial 0-4 Units  0-4 Units SubCUTAneous Nightly Nimonoreen Dickey APRN - CNP   4 Units at 09/20/22 2155    piperacillin-tazobactam (ZOSYN) 4,500 mg in dextrose 5 % 100 mL IVPB ([id number])  4,500 mg IntraVENous Q12H Jarred Irwin MD   Stopped at 09/21/22 1476    levETIRAcetam (KEPPRA) tablet 250 mg  250 mg Oral BID Diana Alvarado, DO   250 mg at 09/21/22 0908    pantoprazole (PROTONIX) 40 mg in sodium chloride (PF) 10 mL injection  40 mg IntraVENous Daily Abdi Salvador MD   40 mg at 09/21/22 0907    acetaminophen (TYLENOL) suppository 650 mg  650 mg Rectal Q4H PRN ALEXIS Glover   650 mg at 09/18/22 1927    amLODIPine (NORVASC) tablet 5 mg  5 mg Oral Daily Smooth Rise Drummer, DO   5 mg at 09/21/22 6425    [Held by provider] apixaban (ELIQUIS) tablet 2.5 mg  2.5 mg Oral BID Ling Leader, DO   2.5 mg at 09/19/22 6015    aspirin chewable tablet 81 mg  81 mg Oral Daily Smooth Rise Drummer, DO   81 mg at 09/20/22 1133    [Held by provider] atorvastatin (LIPITOR) tablet 20 mg  20 mg Oral Nightly Smooth Rise Drummer, DO   20 mg at 09/17/22 2152    DULoxetine (CYMBALTA) extended release capsule 30 mg  30 mg Oral Daily Smooth Rise Drummer, DO   30 mg at 09/20/22 1134    isosorbide mononitrate (IMDUR) extended release tablet 30 mg  30 mg Oral Daily Smooth Rise Drummer, DO   30 mg at 09/21/22 0907    levothyroxine (SYNTHROID) tablet 50 mcg  50 mcg Oral Daily Smooth Brett Specking, DO   50 mcg at 09/21/22 0631    docusate sodium (COLACE) capsule 100 mg  100 mg Oral Nightly Smooth Brett Specking, DO   100 mg at 09/20/22 2144    [Held by provider] pantoprazole (PROTONIX) tablet 40 mg  40 mg Oral QAM AC Smooth Brett Specking, DO        glucose chewable tablet 16 g  4 tablet Oral PRN Smooth Brett Specking, DO        dextrose bolus 10% 125 mL  125 mL IntraVENous PRN Smooth Brett Specking, DO        Or    dextrose bolus 10% 250 mL  250 mL IntraVENous PRN Smooth Brett Specking, DO        glucagon (rDNA) injection 1 mg  1 mg SubCUTAneous PRN Smooth Brett Specking, DO        dextrose 10 % infusion   IntraVENous Continuous PRN Smooth Brett Specking, DO        acetaminophen (TYLENOL) tablet 650 mg  650 mg Oral Q4H PRN Smooth Brett Specking, DO        trimethobenzamide Edmondarnie Naik) injection 200 mg  200 mg IntraMUSCular Q6H PRN Armin Nisha, DO   200 mg at 09/19/22 0855       Allergies: Allergies   Allergen Reactions    Sulfa Antibiotics Swelling     Swelling after being out in sun       Problem List:    Patient Active Problem List   Diagnosis Code    Peritoneal dialysis catheter infection ([facility] T85.71XA    Sepsis ([facility] [address] A41.9    SIRS (systemic inflammatory response syndrome) ([facility] [address] R65.10    Type 2 diabetes mellitus with diabetic chronic kidney disease ([facility] [address] E11.22    Osteoarthritis of right knee M17.11    Arthritis of knee, right M17.11    History of peritoneal dialysis Z98.890    End stage renal disease ([facility] [address] N18.6    Altered mental status R41.82    Acute delirium R41.0    Essential hypertension, benign I10    ESRD on peritoneal dialysis ([facility] [address] [facility] N18.6, Z99.2    Type 2 diabetes mellitus, with long-term current use of insulin ([facility]) E11.9, Z79.4    Primary hypertension I10    Hypomagnesemia E83.42    Hypokalemia E87.6    Obesity (BMI 30-39. 9) E66.9    TIA (transient ischemic attack) G45.9    Hyperlipidemia E78.5    AMS (altered mental status) R41.82    Diabetes mellitus ([facility] [address] [facility] E11.9    Lactic acid acidosis E87.2    Leukocytosis D72.829    Change in mental status R41.82       Past Medical History:        Diagnosis Date    Anemia     Arthritis     CAD (coronary artery disease)     Diabetes mellitus ([facility] [address]     Gout     History of bleeding peptic ulcer approx     Hypertension     Peritoneal dialysis catheter in place [facility])     Peritoneal dialysis status ([facility] [address]     at night for 8 hours    Thyroid disease     Use of cane as ambulatory aid     Wears glasses        Past Surgical History:        Procedure Laterality Date    BACK SURGERY      CARDIOVASCULAR STRESS TEST      CARPAL TUNNEL RELEASE Bilateral     CATARACT REMOVAL WITH IMPLANT Bilateral      SECTION      CORONARY ARTERY BYPASS GRAFT  approx 2000    x 3; per Dr Henrietta Mendoza, [facility], [address]      left knee, right shoulder    CA TOTAL KNEE ARTHROPLASTY Right 10/31/2018    RIGHT KNEE TOTAL ARTHROPLASTY +++BRIDGER++ PNB++ [facility]         Social History:    Social History     Tobacco Use    Smoking status: Never    Smokeless tobacco: Never   Substance Use Topics    Alcohol use: No                                Counseling given: Not Answered      Vital Signs (Current): There were no vitals filed for this visit.                                            BP Readings from Last 3 Encounters:   22 133/62   22 137/68   22 (!) 156/75       NPO Status:                                                                                 BMI:   Wt Readings from Last 3 Encounters:   22 143 lb 4.8 oz (65 kg)   22 143 lb 3.2 oz (65 kg)   22 158 lb 8.2 oz (71.9 kg)     There is no height or weight on file to calculate BMI.    CBC:   Lab Results   Component Value Date/Time    WBC 12.6 2022 05:06 AM    RBC 3.39 2022 05:06 AM    HGB 10.3 2022 05:06 AM    HCT 31.0 09/20/2022 05:06 AM    MCV 91.4 09/20/2022 05:06 AM    RDW 14.3 09/20/2022 05:06 AM     09/20/2022 05:06 AM       CMP:   Lab Results   Component Value Date/Time     09/21/2022 09:57 AM    K 3.1 09/21/2022 09:57 AM    K 4.4 07/20/2022 10:23 AM    CL 89 09/21/2022 09:57 AM    CO2 27 09/21/2022 09:57 AM    BUN 32 09/21/2022 09:57 AM    CREATININE 5.1 09/21/2022 09:57 AM    GFRAA 10 09/21/2022 09:57 AM    LABGLOM 8 09/21/2022 09:57 AM    GLUCOSE 217 09/21/2022 09:57 AM    PROT 5.5 09/21/2022 05:10 AM    CALCIUM 8.7 09/21/2022 09:57 AM    BILITOT 1.1 09/21/2022 05:10 AM    ALKPHOS 981 09/21/2022 05:10 AM     09/21/2022 05:10 AM     09/21/2022 05:10 AM       POC Tests: No results for input(s): POCGLU, POCNA, POCK, POCCL, POCBUN, POCHEMO, POCHCT in the last 72 hours. Coags:   Lab Results   Component Value Date/Time    PROTIME 12.6 09/16/2022 11:41 AM    INR 1.2 09/16/2022 11:41 AM    APTT 29.1 09/16/2022 11:41 AM       HCG (If Applicable): No results found for: PREGTESTUR, PREGSERUM, HCG, HCGQUANT     ABGs: No results found for: PHART, PO2ART, NSH7FCH, BUJ6RSU, BEART, I8CVQEGC     Type & Screen (If Applicable):  No results found for: LABABO, LABRH    Drug/Infectious Status (If Applicable):  No results found for: HIV, HEPCAB    COVID-19 Screening (If Applicable):   Lab Results   Component Value Date/Time    COVID19 Not Detected 09/18/2022 09:00 PM       EKG 9/16/22  Atrial fibrillation  Low voltage QRS  Right bundle branch block  Abnormal ECG  When compared with ECG of 20-JUL-2022 01:49,  Significant changes have occurred    ECHO 4/29/22   Summary   Mild to moderate mitral regurgitation is present. No mitral valve stenosis present. The aortic valve appears mildly sclerotic. Moderate aortic valve stenosis. Mild tricuspid regurgitation. Pulmonary hypertension is mild to moderate . The left atrium is mildly dilated. LV Ejection fraction is visually estimated at 55%.    Mild left ventricular concentric hypertrophy noted. Anesthesia Evaluation  Patient summary reviewed and Nursing notes reviewed no history of anesthetic complications:   Airway: Mallampati: III  TM distance: <3 FB   Neck ROM: full  Mouth opening: > = 3 FB   Dental:    (+) caps and poor dentition      Pulmonary:normal exam  breath sounds clear to auscultation                            ROS comment: On 3L O2   Cardiovascular:    (+) hypertension:, CAD:, hyperlipidemia      ECG reviewed  Rhythm: irregular  Rate: abnormal           Beta Blocker:  Not on Beta Blocker         Neuro/Psych:   (+) TIA, psychiatric history:             ROS comment: Altered mental status  Acute delirium  Gout   GI/Hepatic/Renal:   (+) renal disease (peritoneal 9/19/22 last treatment): ESRD and dialysis,           Endo/Other:    (+) DiabetesType II DM, using insulin, : arthritis: OA., electrolyte abnormalities (K 3.3, hypomagnesemia), . Abdominal:             Vascular: negative vascular ROS. Other Findings:             Anesthesia Plan      general     ASA 4       Induction: intravenous. BIS  MIPS: Postoperative opioids intended and Prophylactic antiemetics administered. Anesthetic plan and risks discussed with patient. Use of blood products discussed with patient whom consented to blood products. Plan discussed with CRNA and attending. BMP reviewed K 3.1 spoke with primary care team that will start K replacement. Patient has a peritoneal dialysis. Na: 129  Dr. Hilaria Rebolledo from cardiology cleared patient for surgery today.       Lana Woodard MD   9/21/2022

## 2022-09-21 NOTE — PROGRESS NOTES
Subjective:  Appears much more comfortable  Was discussed with son at bedside  She does undergo peritoneal dialysis and makes minimal to no urine  For surgical intervention today      Objective:    /62   Pulse 88   Temp 97.6 °F (36.4 °C) (Oral)   Resp 16   Ht 5' (1.524 m)   Wt 143 lb 4.8 oz (65 kg)   SpO2 97%   BMI 27.99 kg/m²     In: -   Out: 1948   In: -   Out: 1948     General Appearance:  awake and alert, with confusion   Head/face:  NCAT  Eyes:  No gross abnormalities. Lungs:  Normal expansion. Clear to auscultation. No rales, rhonchi, or wheezing. Heart:  Heart sounds are normal.  Regular rate and rhythm without murmur, gallop or rub. Abdomen:  Soft, mildly tender, normal bowel sounds. No bruits, organomegaly or masses. PD cath,-pain to palpation in epigastrium  Extremities: Extremities warm to touch, pink, with no edema. Neurologic:  Lethargic, oriented x 2, no focal deficits, mild involuntary twitching of extremities - improved from admission     Recent Labs     09/19/22  0437 09/20/22  0506   WBC 23.7* 12.6*   HGB 10.3* 10.3*   HCT 31.5* 31.0*    264       Recent Labs     09/19/22  0437 09/20/22  0506   * 126*   K 3.5 3.3*   CL 88* 88*   CO2 23 23   BUN 47* 43*   CREATININE 6.2* 5.8*   CALCIUM 8.8 8.7       Assessment:    Principal Problem:    Change in mental status  Resolved Problems:    * No resolved hospital problems. *        PLAN:     # Altered mental status  -Likely from sepsis-?   Ascending cholangitis-WBC 23,000 yesterday, down to 12,000 today with initiation of Zosyn on 9/19/2022  - CT head STAT ordered > negative   - consult to neuro placed, appreciate input  -EEG > A potential for generalized photosensitive epilepsy, will defer to neurology   - blood cultures x 2 are negative x 24 hours, follow  - peritoneal body fluid culture NGTD  - UA and urine culture need to be collected - patient does not produce urine-no need to check PVR    LFTs/acute cholecystitis-improved markedly today  -CT A/P with concern for possible cholecystitis but US gallbladder reveals no wall thickening or pericholecystic fluid.   -to OR today for lap milton with [facility] 9/21/2022  -Cardiology consulted by [address] [place] [address] for cardiac clearance  -General surgery following-input appreciated  -Lipitor held   -Blood thinners held last night preparation for surgery    ESRD on PD  -Nephro following  -PD per nephro     Severe protein calorie malnutrition  -Nutrition consult    Diabetes mellitus   -Continue to monitor blood glucose levels  -Currently on medium dose ISS, still in the upper 300's, will need to increase to high dose     Elevated troponin  -without EKG changes or chest pain - likely related to renal failure - noted elevations 04/22 chronically. Trending down 183 > 164  - follow labs     Medication for other comorbidities continue as appropriate dose adjustment as necessary.   Was discussed with son at bedside who indicates patient is markedly improved mentation wise  DVT prophylaxis heparin   PT OT  Discharge plan

## 2022-09-21 NOTE — OP NOTE
Operative Note      Patient: Nikky Aguillon  YOB: 1940  MRN: [id number]    Date of Procedure: 9/21/2022    Pre-Op Diagnosis: cholecystitis    Post-Op Diagnosis: Same with choledocolithiasis       Procedure(s):  CHOLECYSTECTOMY LAPAROSCOPIC with intraoperative cholangiogram    Surgeon(s):  Arslan Hugo MD    Assistant:   Resident: Jimena Cortes MD; Javier Alvarado MD    Anesthesia: General    Estimated Blood Loss (mL): 50 cc    Complications: None    Specimens:   ID Type Source Tests Collected by Time Destination   A : gallbladder Tissue Tissue SURGICAL PATHOLOGY Arslan Hugo MD 9/21/2022 1342        Implants:  * No implants in log *      Drains: * No LDAs found *    Findings: acute cholecystitis, abnormal cholangiogram- multiple CBD stones    Detailed Description of Procedure: Indications:  Nikky Aguillon is a 80year old female with choledocholithiasis. Risks, benefits, and complications of the procedure discussed with the patient who expresses understanding and agrees to proceed. Procedure: The patient was taken to the operating room and placed in the supine position. Patient was already on antibiotics. Sequential compression devices were applied. Anesthesia was administered per anesthesia record. The abdomen was prepped and draped in the usual sterile fashion. Immediately prior to the procedure a time out was called. The peritoneal dialysis catheter was used to insufflate the abdomen. An 11 blade was used to make a 5 mm supra-umbilical incision. Following, two 5 mm right sided ports and a sub-xyphoid 12 mm port were inserted under direct visualization. Next, adhesions between the omentum and gallbladder were then taken down bluntly. There is significant edema and inflammation of the gallbladder noted. Careful blunt dissection is then performed to dissect the cystic duct. A clip was applied distally.  A Hoang catheter was inserted and there were multiple filling defects in the common bile duct. The catheter was then removed. The cystic duct was clipped twice proximally. The cystic artery was clipped 3 times proximally and once distally. An accessory artery was clipped once proximally and distally. Scissors were used to transect the cystic duct, cystic duct, and accessory artery. An endo-loop was applied around the duct. The gallbladder was removed from the cystic plate using electrocautery. The gallbladder was placed in an Endobag and then removed from the 12 mm port site. Thorough irrigation was performed, and areas of oozing on the liver bed were controlled with electrocautery. 2 pieces of surgical snow were placed in the liver bed. The ports were removed and the abdomen was de-sufflated. The 12 mm defect was closed with 0 Vicryl in figure-of-eight fashion. The skin on all ports was closed with 4-0 Monocryl. The skin was cleansed with saline and dried. Dermabond was applied. Instrument and material count was correct x 2 at the end of the case. The patient tolerated the procedure well and was brought to PACU in stable condition. Dr. Boom Nicholson was present and scrubbed throughout the procedure.     Electronically signed by Masha Reed MD on 9/21/2022 at 2:48 PM

## 2022-09-21 NOTE — PROGRESS NOTES
[facility] CARDIOLOGY PROGRESS NOTE  [facility]        Subjective: Preoperative cardiovascular assessment prior to laparoscopic cholecystectomy completing this afternoon. Known history of CAD prior CABG approximately 2000, permanent atrial fibrillation on Eliquis, DM, elevated troponin on admission    Saw patient this morning with her son at bedside. He is very helpful. Patient more appropriate and alert today. Denies any chest pain or shortness of breath currently or overnight. Reviewed her echocardiogram with her that was done April 2022 LVEF 55% 1-2+ MR moderate aortic valve stenosis mild to moderate pulmonary hypertension. Objective: Medications lab chart and telemetry all reviewed. Patient Vitals for the past 24 hrs:   BP Temp Temp src Pulse Resp SpO2   09/21/22 0820 133/62 -- -- 88 16 --   09/21/22 0814 (!) 147/61 97.6 °F (36.4 °C) Oral 85 16 97 %         Intake/Output Summary (Last 24 hours) at 9/21/2022 1242  Last data filed at 9/21/2022 1115  Gross per 24 hour   Intake 0 ml   Output 1107 ml   Net -1107 ml       Wt Readings from Last 3 Encounters:   09/19/22 143 lb 4.8 oz (65 kg)   07/27/22 143 lb 3.2 oz (65 kg)   04/29/22 158 lb 8.2 oz (71.9 kg)       Telemetry: Personally reviewed and shows atrial fibrillation heart rate in the 60s and 70s.     Current meds: Scheduled Meds:   insulin lispro  0-16 Units SubCUTAneous TID WC    insulin lispro  0-4 Units SubCUTAneous Nightly    piperacillin-tazobactam  4,500 mg IntraVENous Q12H    levETIRAcetam  250 mg Oral BID    pantoprazole (PROTONIX) 40 mg injection  40 mg IntraVENous Daily    amLODIPine  5 mg Oral Daily    [Held by provider] apixaban  2.5 mg Oral BID    aspirin  81 mg Oral Daily    [Held by provider] atorvastatin  20 mg Oral Nightly    DULoxetine  30 mg Oral Daily    isosorbide mononitrate  30 mg Oral Daily    levothyroxine  50 mcg Oral Daily    docusate sodium  100 mg Oral Nightly    [Held by provider] pantoprazole  40 mg Oral QAM AC Continuous Infusions:   dextrose       PRN Meds:.acetaminophen, glucose, dextrose bolus **OR** dextrose bolus, glucagon (rDNA), dextrose, acetaminophen, trimethobenzamide    Allergies: Sulfa antibiotics      Labs:   Recent Labs     09/19/22  0437 09/20/22  0506   WBC 23.7* 12.6*   HGB 10.3* 10.3*   HCT 31.5* 31.0*   MCV 90.3 91.4    264       Labs:   Recent Labs     09/19/22  0437 09/20/22  0506 09/21/22  0957   * 126* 129*   K 3.5 3.3* 3.1*   CL 88* 88* 89*   CO2 23 23 27   BUN 47* 43* 32*   CREATININE 6.2* 5.8* 5.1*       Labs: No results for input(s): CKTOTAL, CKMB, CKMBINDEX, TROPONINI in the last 72 hours. Labs: No results for input(s): BNP in the last 72 hours. Labs: No results for input(s): CHOL, HDL, TRIG in the last 72 hours. Invalid input(s): Lizett Clovis    Labs:   Recent Labs     09/19/22  0437 09/20/22  0506 09/21/22  0510   PROT 6.5 5.9* 5.5*       Review of systems: No reported significant weight gain or weight loss. no dysuria or frequency, no dizziness, falls or trauma, no change in bowel or bladder habits, no hematochezia, hemoptysis or hematuria. No fevers, chills, nausea or vomiting reported. No significant wheezing or sputum production. No headache or visual changes. The remainder of the 10 review of systems otherwise negative. Exam      General: Patient comfortable in no distress and currently denies any chest pain. HEENT: Face symmetrical and no apparent cranial nerve deficit. Neck: No jugular venous distention, carotid bruit or thyromegaly. Lungs: Clear bilaterally without rales, wheezes or dullness. Cardiac: IRegular rate and rhythm, no S3, S4, no rub or gallop. Abdomen: No rebound or guarding, no hepatosplenomegaly. Extremities: Without significant clubbing , cyanosis, or edema. Neuro:  No focal motor or sensory deficit apparent. Skin: No petechiae, no significant bruising.       Assessment: See plan below        Plan: #1 perioperative cardiovascular management for planned laparoscopic cholecystectomy today. Has been off Eliquis for 2 days now. Avoid hypotension with moderate aortic valve stenosis preserved normal LVEF. #2 permanent atrial fibrillation and would have her go back to telemetry after surgery  like she was on telemetry today    #3 end-stage renal disease on peritoneal dialysis. #4 chronic CAD prior CABG and medical management. Continue supportive measures and advance activity as tolerated per surgery after OR this afternoon.         Electronically signed by Krystina Bowie MD on 9/21/2022 at 12:42 PM

## 2022-09-21 NOTE — CARE COORDINATION
9/21 Update CM note. Pt for lap milton today per [address]. Zosyn IV Q12H continues. LFT's continuing to trend down. Met with pt's son Aria Amaral at bedside who stated pt's mental status is improving. He informed  pt is currently active with [facility]. Message sent to [facility] to confirm, await response. Aria Amaral stated he will not send his mother to [facility] unless it is absolutely necessary. He stated if pt continues to improve he does not see why she cannot return home. PT/OT evals remain pending. 1114  Per [facility] at [facility], pt is active for SN/PT. Will need REBECCA orders on discharge. PAULA/destination complete.      SALLY YarbroughN, RN  [facility] SURGICAL [facility] Case Management   Cell: [phone]

## 2022-09-21 NOTE — ANESTHESIA POSTPROCEDURE EVALUATION
Department of Anesthesiology  Postprocedure Note    Patient: Charito Beyer  MRN: [id number]  YOB: 1940  Date of evaluation: 9/21/2022      Procedure Summary     Date: 09/21/22 [place] [facility] [place] [facility]    Anesthesia Start: 0836 Anesthesia Stop: 1443    Procedure: CHOLECYSTECTOMY LAPAROSCOPIC with intraoperative cholangiogram (Abdomen) Diagnosis:       Calculus of bile duct without cholecystitis with obstruction      (/)    Surgeons: Jn Gonzales MD Responsible Provider: Vasile Martin MD    Anesthesia Type: general ASA Status: 4          Anesthesia Type: No value filed.     Ammon Phase I: Ammon Score: 8    Ammon Phase II:        Anesthesia Post Evaluation    Patient location during evaluation: PACU  Patient participation: complete - patient participated  Level of consciousness: awake and alert  Airway patency: patent  Nausea & Vomiting: no nausea and no vomiting  Complications: no  Cardiovascular status: hemodynamically stable  Respiratory status: acceptable  Hydration status: euvolemic

## 2022-09-21 NOTE — ANESTHESIA POSTPROCEDURE EVALUATION
Department of Anesthesiology  Postprocedure Note    Patient: Monica Collins  MRN: [id number]  YOB: 1940  Date of evaluation: 9/21/2022      Procedure Summary     Date: 09/21/22 Room / Location: [facility] OR 07 / [facility]    Anesthesia Start: 1563 Anesthesia Stop: 1443    Procedure: CHOLECYSTECTOMY LAPAROSCOPIC with intraoperative cholangiogram (Abdomen) Diagnosis:       Calculus of bile duct without cholecystitis with obstruction      (/)    Surgeons: Ruthann Hawley MD Responsible Provider: Ash Fernando MD    Anesthesia Type: general ASA Status: 4          Anesthesia Type: No value filed.     Ammon Phase I: Ammon Score: 8    Ammon Phase II:        Anesthesia Post Evaluation    Patient location during evaluation: PACU  Patient participation: complete - patient participated  Level of consciousness: awake and alert  Pain score: 2  Airway patency: patent  Nausea & Vomiting: no nausea and no vomiting  Complications: no  Cardiovascular status: blood pressure returned to baseline  Respiratory status: acceptable  Hydration status: euvolemic

## 2022-09-21 NOTE — FLOWSHEET NOTE
09/21/22 0820   Vitals   /62   Heart Rate 88   Resp 16   Peritoneal Dialysis Catheter Right lower abdomen   No Placement Date or Time found.    Catheter Location: Right lower abdomen   Status Deaccessed   Site Condition Clean, dry, intact   Date of Last Dressing Change 09/20/22   Cycler   Ultrafiltration (UF) (mL) 1107 mL   Ccpd completed using 6.0% no complications stay safe cap applied

## 2022-09-21 NOTE — PROGRESS NOTES
Verbal results taken from lab for the BMP drawn at 0948    Sodium 129  Potassium 3.1  Chloride 89   CO2 27  Glucose 217  BUN 33  Creatinine 5.1  Calcium 8.7     Call placed to help desk due to not being able to see lab results.

## 2022-09-21 NOTE — PROGRESS NOTES
Perfect serve sent to surgery. Family has questions about procedure tomorrow. Provided son Sheba Borrero number [phone] to call.

## 2022-09-21 NOTE — CONSULTS
Vascular Surgery Consultation Note    Reason for Consult:  tunneled dialysis catheter placement     HPI:    Rudy Benton is a 80 y.o. female with history of ESRD on peritoneal dialysis, CAD, insulin-dependent diabetes, hypertension, A. fib on Eliquis who initially presented to the ED on 9/16 secondary to AMS. Patient was initially evaluated in the ED and noted to have a leukocytosis. Patient started on broad-spectrum antibiotics with Zosyn while working up reason for altered mental status and leukocytosis. Patient had blood cultures drawn which are showing a 24 hours no growth. Patient also had fluid culture from peritoneal dialysis catheter which is showing no growth. Patient underwent CT abdomen and pelvis without contrast in the ED which demonstrated a markedly distended gallbladder with cholelithiasis and thickened wall possible acute cholecystitis but largely unchanged from CT abdomen pelvis on 7/20. Secondary to this scan being without IV contrast secondary to renal disease patient underwent right upper quadrant ultrasound which again demonstrated a distended gallbladder with numerous cholelithiasis and sludge without wall thickening or pericholecystic fluid negative for acute cholecystitis at that time; common bile duct is also noted to measure 7 mm within normal limits. LFTs initially in the ED demonstrated AST/ALT normal at 12/8, alk phos mildly elevated 201, bilirubin normal at 0.5. Repeat LFTs demonstrated alk phos elevated at 935, AST/ALT elevated at 492/332, bilirubin elevated at 2.0 with direct elevated at 1.1. Patient was also noted to have an uptrending leukocytosis to 23.2 and febrile to 101.7 T-max initially. General surgery was consulted for concerns for acute cholecystitis and elevated LFTs. Patient's Eliquis was held x3 days, and patient went for laparoscopic cholecystectomy with [facility] 9/21 with demonstrated multiple filling defects in the common bile duct.   Vascular surgery was consulted by nephrology as she has a history of ESRD on peritoneal dialysis with plans to hold peritoneal dialysis with recent intra-abdominal surgery.         ROS: Negative if blank [], Positive if [x]  General Vascular   [] Fevers [] Claudication (Blocks)   [] Chills [] Rest Pain   [] Weight Loss [] Tissue Loss   [] Chest Pain [] Clotting Disorder   [] SOB at rest [] Leg Swelling   [] SOB with exertion [] DVT/PE      [] Nausea    [] Vomiting [] Stroke/TIA   [] Abdominal Pain [] Focal weakness   [] Melena [] Slurred Speech   [] Hematochezia [] Vision Changes   [] Hematuria    [] Dysuria [] Hx of Central Catheters   [] Wears Glasses/Contacts [x] Dialysis    [] Blindness    [x] Right Hand Dominant   [] Difficulty swallowing        Past Medical History:   Diagnosis Date    Anemia     Arthritis     CAD (coronary artery disease)     Diabetes mellitus ([facility]     Gout     History of bleeding peptic ulcer approx     Hypertension     Peritoneal dialysis catheter in place [facility])     Peritoneal dialysis status ([facility]     at night for 8 hours    Thyroid disease     Use of cane as ambulatory aid     Wears glasses         Past Surgical History:   Procedure Laterality Date    BACK SURGERY      CARDIOVASCULAR STRESS TEST      CARPAL TUNNEL RELEASE Bilateral     CATARACT REMOVAL WITH IMPLANT Bilateral      SECTION      CORONARY ARTERY BYPASS GRAFT  approx 2000    x 3; per Dr Mary Smith, [place], [address] [facility] [address]      left knee, right shoulder    [place] TOTAL KNEE ARTHROPLASTY Right 10/31/2018    RIGHT KNEE TOTAL ARTHROPLASTY +++BRIDGER++ PNB++ performed by La Paula MD at [address] ENDOSCOPY         Current Medications:    dextrose        acetaminophen, glucose, dextrose bolus **OR** dextrose bolus, glucagon (rDNA), dextrose, acetaminophen, trimethobenzamide    insulin lispro  0-16 Units SubCUTAneous TID WC    insulin lispro  0-4 Units SubCUTAneous Nightly piperacillin-tazobactam  4,500 mg IntraVENous Q12H    levETIRAcetam  250 mg Oral BID    pantoprazole (PROTONIX) 40 mg injection  40 mg IntraVENous Daily    amLODIPine  5 mg Oral Daily    [Held by provider] apixaban  2.5 mg Oral BID    aspirin  81 mg Oral Daily    [Held by provider] atorvastatin  20 mg Oral Nightly    DULoxetine  30 mg Oral Daily    isosorbide mononitrate  30 mg Oral Daily    levothyroxine  50 mcg Oral Daily    docusate sodium  100 mg Oral Nightly    [Held by provider] pantoprazole  40 mg Oral QAM AC        Allergies:  Sulfa antibiotics    Social History     Socioeconomic History    Marital status:      Spouse name: Not on file    Number of children: Not on file    Years of education: Not on file    Highest education level: Not on file   Occupational History    Not on file   Tobacco Use    Smoking status: Never    Smokeless tobacco: Never   Vaping Use    Vaping Use: Never used   Substance and Sexual Activity    Alcohol use: No    Drug use: No    Sexual activity: Not on file   Other Topics Concern    Not on file   Social History Narrative    Not on file     Social Determinants of Health     Financial Resource Strain: Not on file   Food Insecurity: Not on file   Transportation Needs: Not on file   Physical Activity: Not on file   Stress: Not on file   Social Connections: Not on file   Intimate Partner Violence: Not on file   Housing Stability: Not on file        History reviewed. No pertinent family history. PHYSICAL EXAM:    BP (!) 137/51   Pulse 70   Temp 98.1 °F (36.7 °C) (Temporal)   Resp 14   Ht 5' (1.524 m)   Wt 143 lb 4.8 oz (65 kg)   SpO2 95%   BMI 27.99 kg/m²   CONSTITUTIONAL:  awake, alert, cooperative, no apparent distress, and appears stated age  EYES:  lids and lashes normal, sclera clear and conjunctiva normal  ENT:  normocepalic, without obvious abnormality.    NECK:  supple, symmetrical, trachea midline, no jugular venous distension, no carotid bruits  LUNGS:  no increased work of breathing  CARDIOVASCULAR:  irregularly irregular rhythm  ABDOMEN:  soft, non-distended, Mild RUQ tenderness, Aorta is not palpable  SKIN:  normal skin color, texture, turgor    EXTREMITIES: 2+ bilateral radial pulses, 2+ bilateral femoral pulses. Minimal nonpitting edema noted bilateral lower extremities. LABS:    Lab Results   Component Value Date    WBC 12.6 (H) 09/20/2022    HGB 10.3 (L) 09/20/2022    HCT 31.0 (L) 09/20/2022     09/20/2022    PROTIME 12.6 (H) 09/16/2022    INR 1.2 09/16/2022    K 3.1 (L) 09/21/2022    BUN 32 (H) 09/21/2022    CREATININE 5.1 (H) 09/21/2022       RADIOLOGY:  CT ABDOMEN PELVIS WO CONTRAST Additional Contrast? None    Result Date: 9/16/2022  EXAMINATION: CT OF THE ABDOMEN AND PELVIS WITHOUT CONTRAST 9/16/2022 3:10 pm TECHNIQUE: CT of the abdomen and pelvis was performed without the administration of intravenous contrast. Multiplanar reformatted images are provided for review. Automated exposure control, iterative reconstruction, and/or weight based adjustment of the mA/kV was utilized to reduce the radiation dose to as low as reasonably achievable. COMPARISON: CT abdomen and pelvis without 07/20/2022 HISTORY: ORDERING SYSTEM PROVIDED HISTORY: ab pain, vomiting TECHNOLOGIST PROVIDED HISTORY: Reason for exam:->ab pain, vomiting Additional Contrast?->None Decision Support Exception - unselect if not a suspected or confirmed emergency medical condition->Emergency Medical Condition ([place]) What reading provider will be dictating this exam?->CRC FINDINGS: Lower Chest:  Visualized portion of the lower chest demonstrates no acute abnormality. Organs: There is increased noncontrast attenuation of the liver. No focal hepatic lesion is noted. The gallbladder is significantly distended with gallstones and mild edematous wall thickening, which appears similar to the prior exam.  No biliary ductal dilatation is seen.  GI/Bowel: Stomach and duodenal sweep demonstrate no acute abnormality. There is no evidence of bowel obstruction. No evidence of abnormal bowel wall thickening or distension. There is diverticulosis without evidence of diverticulitis. Pelvis:  Bladder is unremarkable in appearance. The uterus and adnexa are without acute abnormality. Peritoneal dialysis catheter is seen in the anterior pelvis, which is stable in position without evidence of kinking. Peritoneum/Retroperitoneum: A small-moderate volume of ascites is identified, which is less compared to the prior exam.  No free air is seen. No evidence of lymphadenopathy. Aorta is normal in caliber. Bones/Soft Tissues:  No acute abnormality of the visualized osseous structures. Markedly distended gallbladder with cholelithiasis and mild edematous wall thickening concerning for possible acute cholecystitis or gallbladder hydrops. The appearance is similar to the prior exam on 07/20/2022. Consider right upper quadrant ultrasound for further evaluation, if clinically indicated. Small-moderate volume of ascites with stable peritoneal dialysis catheter seen. No evidence of bowel obstruction. CT HEAD WO CONTRAST    Result Date: 9/16/2022  EXAMINATION: CT OF THE HEAD WITHOUT CONTRAST  9/16/2022 1:03 pm TECHNIQUE: CT of the head was performed without the administration of intravenous contrast. Automated exposure control, iterative reconstruction, and/or weight based adjustment of the mA/kV was utilized to reduce the radiation dose to as low as reasonably achievable. COMPARISON: None. HISTORY: ORDERING SYSTEM PROVIDED HISTORY: ams TECHNOLOGIST PROVIDED HISTORY: Has a \"code stroke\" or \"stroke alert\" been called? ->No Reason for exam:->ams Decision Support Exception - unselect if not a suspected or confirmed emergency medical condition->Emergency Medical Condition (MA) What reading provider will be dictating this exam?->CRC FINDINGS: BRAIN/VENTRICLES: There is no acute intracranial hemorrhage, mass effect or midline shift. No abnormal extra-axial fluid collection. The gray-white differentiation is maintained without evidence of an acute infarct. There is no evidence of hydrocephalus. The ventricles, cisterns and sulci are prominent consistent with atrophy. There is decreased attenuation within the periventricular white matter consistent with periventricular leukomalacia. ORBITS: The visualized portion of the orbits demonstrate no acute abnormality. SINUSES: The visualized paranasal sinuses and mastoid air cells demonstrate no acute abnormality. SOFT TISSUES/SKULL:  No acute abnormality of the visualized skull or soft tissues. 1.  There is no acute intracranial abnormality. Specifically, there is no intracranial hemorrhage. 2. Atrophy and periventricular leukomalacia, .     US GALLBLADDER RUQ    Result Date: 9/16/2022  EXAMINATION: RIGHT UPPER QUADRANT ULTRASOUND 9/16/2022 8:27 pm COMPARISON: None. HISTORY: ORDERING SYSTEM PROVIDED HISTORY: ab pain TECHNOLOGIST PROVIDED HISTORY: Reason for exam:->ab pain What reading provider will be dictating this exam?->CRC FINDINGS: LIVER:  The liver demonstrates normal echogenicity without evidence of intrahepatic biliary ductal dilatation. BILIARY SYSTEM:  Gallbladder distended with multiple echogenic foci demonstrating shadowing of cholelithiasis. No wall thickening or biliary dilatation. Common bile duct is within normal limits measuring 7 mm. RIGHT KIDNEY: The right kidney is grossly unremarkable without evidence of hydronephrosis. PANCREAS:  Visualized portions of the pancreas are unremarkable. OTHER: No evidence of right upper quadrant ascites.      Distended gallbladder with numerous echogenic areas demonstrating shadowing of cholelithiasis and intermixed sludge however no wall thickening or pericholecystic fluid     XR CHEST PORTABLE    Result Date: 9/16/2022  EXAMINATION: ONE XRAY VIEW OF THE CHEST 9/16/2022 4:22 pm COMPARISON: 07/19/2022 HISTORY: ORDERING SYSTEM PROVIDED HISTORY: weakness, Possible Stroke TECHNOLOGIST PROVIDED HISTORY: Reason for exam:->weakness, Possible Stroke What reading provider will be dictating this exam?->CRC FINDINGS: Sternal wires are seen. EKG leads are seen superimposed over the chest. Poor inspiratory effort is seen that limits evaluation. The cardiomediastinal silhouette is slightly prominent in size. Prominence of the bronchovascular and interstitial lung markings is seen that demonstrates prominence in comparison to the prior study, increased hazziness is seen overlying the left hemithorax. Peribronchial cuffing and bilateral hilar prominence is seen. The right costophrenic angle is unremarkable, mild haziness with blunting of the left costophrenic angle is seen The lungs are without acute focal process. There is no pneumothorax. The osseous structures are without acute process. Increased opacification and bronchovascular prominence in comparison to the prior study. Assesment/Plan  80 y.o. female with history of ESRD on peritoneal dialysis and A. fib on Eliquis currently admitted with acute cholecystitis with choledocholithiasis status post laparoscopic cholecystectomy with IOC.    -No plans for placement of tunneled catheter currently. Discussed with general surgery and they are currently okay with the use of peritoneal dialysis catheter postoperatively. -Further plans pending discussion with nephrology regarding dialysis access tomorrow 9/22.  -Discussed with Dr. Mary Jane Mcclendon DO  9/21/22  5:28 PM EDT    Patient was seen and examined. Agree with above. Status post lap milton. She has indwelling PD catheter. From our standpoint I will defer to nephrology. She can continue PD. I did discuss options of tunneled catheter with the patient and family and they are not interested.     Kylie Villeda

## 2022-09-21 NOTE — DISCHARGE INSTR - COC
Continuity of Care Form    Patient Name: Keira Steve   :  1940  MRN:  [id number]    Admit date:  2022  Discharge date:  10/3/22    Code Status Order: Full Code   Advance Directives:     Admitting Physician:  Oriana Shay MD  PCP: Macario English MD    Discharging Nurse: [facility] Unit/Room#: 3020/1067-I  Discharging Unit Phone Number: [phone]    Emergency Contact:   Extended Emergency Contact Information  Primary Emergency Contact: Avis Aquino Phone: [phone]  Work Phone: [phone]  Mobile Phone: [phone]  Relation: Child  Preferred language: English   needed? No  Secondary Emergency Contact: Olga Page  Home Phone: [phone]  Relation: Daughter-in-Law   needed?  No    Past Surgical History:  Past Surgical History:   Procedure Laterality Date    BACK SURGERY      CARDIOVASCULAR STRESS TEST      CARPAL TUNNEL RELEASE Bilateral     CATARACT REMOVAL WITH IMPLANT Bilateral      SECTION      CORONARY ARTERY BYPASS GRAFT  approx 2000    x 3; per Dr Fer Blanco, [place], [address] [facility] [address]      left knee, right shoulder    [place] TOTAL KNEE ARTHROPLASTY Right 10/31/2018    RIGHT KNEE TOTAL ARTHROPLASTY +++BRIDGER++ PNB++ performed by Jeff Renteria MD at [facility] ENDOSCOPY         Immunization History:   Immunization History   Administered Date(s) Administered    COVID-19, PFIZER PURPLE top, DILUTE for use, (age 15 y+), 30mcg/0.3mL 2021, 2021       Active Problems:  Patient Active Problem List   Diagnosis Code    Peritoneal dialysis catheter infection ([address] T85.71XA    Sepsis ([address] A41.9    SIRS (systemic inflammatory response syndrome) (HCC) R65.10    Type 2 diabetes mellitus with diabetic chronic kidney disease ([address] E11.22    Osteoarthritis of right knee M17.11    Arthritis of knee, right M17.11    History of peritoneal dialysis Z98.890    End stage renal disease ([address] N18.6 Altered mental status R41.82    Acute delirium R41.0    Essential hypertension, benign I10    ESRD on peritoneal dialysis ([facility]) N18.6, Z99.2    Type 2 diabetes mellitus, with long-term current use of insulin ([facility]) E11.9, Z79.4    Primary hypertension I10    Hypomagnesemia E83.42    Hypokalemia E87.6    Obesity (BMI 30-39. 9) E66.9    TIA (transient ischemic attack) G45.9    Hyperlipidemia E78.5    AMS (altered mental status) R41.82    Diabetes mellitus (HCC) E11.9    Lactic acid acidosis E87.2    Leukocytosis D72.829    Change in mental status R41.82       Isolation/Infection:   Isolation            No Isolation          Patient Infection Status       Infection Onset Added Last Indicated Last Indicated By Review Planned Expiration Resolved Resolved By    None active    Resolved    COVID-19 (Rule Out) 22 COVID-19 & Influenza Combo (Ordered)   22 Rule-Out Test Canceled    C-diff Rule Out 22 CLOSTRIDIUM DIFFICILE EIA (Ordered)   22 Rule-Out Test Resulted    COVID-19 22 COVID-19, Rapid   22     COVID-19 (Rule Out) 22 COVID-19, Rapid (Ordered)   22 Rule-Out Test Resulted            Nurse Assessment:  Last Vital Signs: /62   Pulse 88   Temp 97.6 °F (36.4 °C) (Oral)   Resp 16   Ht 5' (1.524 m)   Wt 143 lb 4.8 oz (65 kg)   SpO2 97%   BMI 27.99 kg/m²     Last documented pain score (0-10 scale): Pain Level: 0  Last Weight:   Wt Readings from Last 1 Encounters:   22 143 lb 4.8 oz (65 kg)     Mental Status:  alert confused at times     IV Access:  - None    Nursing Mobility/ADLs:  Walking   Assisted  Transfer  Assisted  Bathing  Assisted  Dressing  Assisted  Toileting  Assisted  Feeding  Assisted  Med Admin  Assisted  Med Delivery   whole    Wound Care Documentation and Therapy:        Elimination:  Continence:    Bowel: No  Bladder: No  Urinary Catheter: None Colostomy/Ileostomy/Ileal Conduit: No       Date of Last BM: 10/3/22    Intake/Output Summary (Last 24 hours) at 9/21/2022 1111  Last data filed at 9/21/2022 0820  Gross per 24 hour   Intake --   Output 1107 ml   Net -1107 ml     I/O last 3 completed shifts:  In: -   Out: 841     Safety Concerns: At Risk for Falls    Impairments/Disabilities:      None    Nutrition Therapy:  Current Nutrition Therapy:   - Oral Diet:  low potassium, low phos    Routes of Feeding: Oral  Liquids: No Restrictions  Daily Fluid Restriction: no  Last Modified Barium Swallow with Video (Video Swallowing Test): not done    Treatments at the Time of Hospital Discharge:   Respiratory Treatments: ***  Oxygen Therapy:  is on oxygen at 2 L/min per nasal cannula.   Ventilator:    - No ventilator support    Rehab Therapies: Physical Therapy and Occupational Therapy  Weight Bearing Status/Restrictions: No weight bearing restrictions  Other Medical Equipment (for information only, NOT a DME order):  ***  Other Treatments: ***    Patient's personal belongings (please select all that are sent with patient):  ***    RN SIGNATURE:  Electronically signed by Michelle Porter on 10/3/22 at 4:04 PM EDT    CASE MANAGEMENT/SOCIAL WORK SECTION    Inpatient Status Date: 09/17/2022    Readmission Risk Assessment Score:  Readmission Risk              Risk of Unplanned Readmission:  23           Discharging to Facility/ Agency   Name: [address] [place] [address]  Address: [address], [facility], [address] [place] [address]  Phone: [phone]  Fax:     Dialysis Facility (if applicable)   Name: [facility] [address] [facility]  Address: [address] [place] [address]  Dialysis Schedule: Peritoneal Dialysis  Phone:   Fax:    / signature: Electronically signed by Katy Shea RN on 9/26/22 at 2:38 PM EDT    PHYSICIAN SECTION    Prognosis: {Prognosis:[id number]}    Condition at Discharge: [address] Patient Condition:[id number]}    Rehab Potential (if transferring to Rehab): {Prognosis:1837995242}    Recommended Labs or Other Treatments After Discharge: ***    Physician Certification: I certify the above information and transfer of Courtney Rangel  is necessary for the continuing treatment of the diagnosis listed and that she requires [facility] for less 30 days.      Update Admission H&P: {CHP DME Changes in PSZNF:[id number]}    PHYSICIAN SIGNATURE:  {Esignature:[id number]}

## 2022-09-22 PROBLEM — E44.0 MODERATE PROTEIN-CALORIE MALNUTRITION (HCC): Chronic | Status: ACTIVE | Noted: 2022-09-22

## 2022-09-22 PROBLEM — N18.9 CHRONIC KIDNEY DISEASE: Status: ACTIVE | Noted: 2022-09-22

## 2022-09-22 LAB
ALBUMIN SERPL-MCNC: 1.7 G/DL (ref 3.5–5.2)
ALP BLD-CCNC: 807 U/L (ref 35–104)
ALT SERPL-CCNC: 179 U/L (ref 0–32)
ANION GAP SERPL CALCULATED.3IONS-SCNC: 20 MMOL/L (ref 7–16)
AST SERPL-CCNC: 215 U/L (ref 0–31)
BASOPHILS ABSOLUTE: 0.04 E9/L (ref 0–0.2)
BASOPHILS RELATIVE PERCENT: 0.2 % (ref 0–2)
BILIRUB SERPL-MCNC: 1.7 MG/DL (ref 0–1.2)
BILIRUBIN DIRECT: 1.5 MG/DL (ref 0–0.3)
BILIRUBIN, INDIRECT: 0.2 MG/DL (ref 0–1)
BUN BLDV-MCNC: 42 MG/DL (ref 6–23)
CALCIUM SERPL-MCNC: 9.4 MG/DL (ref 8.6–10.2)
CHLORIDE BLD-SCNC: 88 MMOL/L (ref 98–107)
CO2: 19 MMOL/L (ref 22–29)
CREAT SERPL-MCNC: 5.8 MG/DL (ref 0.5–1)
EOSINOPHILS ABSOLUTE: 0.05 E9/L (ref 0.05–0.5)
EOSINOPHILS RELATIVE PERCENT: 0.3 % (ref 0–6)
GFR AFRICAN AMERICAN: 8
GFR NON-AFRICAN AMERICAN: 7 ML/MIN/1.73
GLUCOSE BLD-MCNC: 270 MG/DL (ref 74–99)
HCT VFR BLD CALC: 28 % (ref 34–48)
HEMOGLOBIN: 9.1 G/DL (ref 11.5–15.5)
IMMATURE GRANULOCYTES #: 0.24 E9/L
IMMATURE GRANULOCYTES %: 1.4 % (ref 0–5)
LYMPHOCYTES ABSOLUTE: 0.73 E9/L (ref 1.5–4)
LYMPHOCYTES RELATIVE PERCENT: 4.3 % (ref 20–42)
MCH RBC QN AUTO: 29.7 PG (ref 26–35)
MCHC RBC AUTO-ENTMCNC: 32.5 % (ref 32–34.5)
MCV RBC AUTO: 91.5 FL (ref 80–99.9)
METER GLUCOSE: 184 MG/DL (ref 74–99)
METER GLUCOSE: 281 MG/DL (ref 74–99)
METER GLUCOSE: 299 MG/DL (ref 74–99)
METER GLUCOSE: 304 MG/DL (ref 74–99)
MONOCYTES ABSOLUTE: 0.79 E9/L (ref 0.1–0.95)
MONOCYTES RELATIVE PERCENT: 4.6 % (ref 2–12)
NEUTROPHILS ABSOLUTE: 15.29 E9/L (ref 1.8–7.3)
NEUTROPHILS RELATIVE PERCENT: 89.2 % (ref 43–80)
PDW BLD-RTO: 14.4 FL (ref 11.5–15)
PLATELET # BLD: 295 E9/L (ref 130–450)
PMV BLD AUTO: 11.5 FL (ref 7–12)
POTASSIUM SERPL-SCNC: 4.3 MMOL/L (ref 3.5–5)
RBC # BLD: 3.06 E12/L (ref 3.5–5.5)
SODIUM BLD-SCNC: 127 MMOL/L (ref 132–146)
TOTAL PROTEIN: 5.6 G/DL (ref 6.4–8.3)
WBC # BLD: 17.1 E9/L (ref 4.5–11.5)

## 2022-09-22 PROCEDURE — 97165 OT EVAL LOW COMPLEX 30 MIN: CPT

## 2022-09-22 PROCEDURE — 2580000003 HC RX 258: Performed by: STUDENT IN AN ORGANIZED HEALTH CARE EDUCATION/TRAINING PROGRAM

## 2022-09-22 PROCEDURE — 99222 1ST HOSP IP/OBS MODERATE 55: CPT | Performed by: SURGERY

## 2022-09-22 PROCEDURE — 97535 SELF CARE MNGMENT TRAINING: CPT

## 2022-09-22 PROCEDURE — 97161 PT EVAL LOW COMPLEX 20 MIN: CPT

## 2022-09-22 PROCEDURE — 90945 DIALYSIS ONE EVALUATION: CPT | Performed by: INTERNAL MEDICINE

## 2022-09-22 PROCEDURE — 97530 THERAPEUTIC ACTIVITIES: CPT

## 2022-09-22 PROCEDURE — 80048 BASIC METABOLIC PNL TOTAL CA: CPT

## 2022-09-22 PROCEDURE — 3E1M39Z IRRIGATION OF PERITONEAL CAVITY USING DIALYSATE, PERCUTANEOUS APPROACH: ICD-10-PCS | Performed by: INTERNAL MEDICINE

## 2022-09-22 PROCEDURE — 1200000000 HC SEMI PRIVATE

## 2022-09-22 PROCEDURE — 6370000000 HC RX 637 (ALT 250 FOR IP): Performed by: STUDENT IN AN ORGANIZED HEALTH CARE EDUCATION/TRAINING PROGRAM

## 2022-09-22 PROCEDURE — 97129 THER IVNTJ 1ST 15 MIN: CPT

## 2022-09-22 PROCEDURE — 6360000002 HC RX W HCPCS: Performed by: STUDENT IN AN ORGANIZED HEALTH CARE EDUCATION/TRAINING PROGRAM

## 2022-09-22 PROCEDURE — C9113 INJ PANTOPRAZOLE SODIUM, VIA: HCPCS | Performed by: STUDENT IN AN ORGANIZED HEALTH CARE EDUCATION/TRAINING PROGRAM

## 2022-09-22 PROCEDURE — 36415 COLL VENOUS BLD VENIPUNCTURE: CPT

## 2022-09-22 PROCEDURE — 97130 THER IVNTJ EA ADDL 15 MIN: CPT

## 2022-09-22 PROCEDURE — 80076 HEPATIC FUNCTION PANEL: CPT

## 2022-09-22 PROCEDURE — 85025 COMPLETE CBC W/AUTO DIFF WBC: CPT

## 2022-09-22 PROCEDURE — 82962 GLUCOSE BLOOD TEST: CPT

## 2022-09-22 RX ADMIN — LEVOTHYROXINE SODIUM 50 MCG: 0.05 TABLET ORAL at 06:13

## 2022-09-22 RX ADMIN — LEVETIRACETAM 250 MG: 250 TABLET, FILM COATED ORAL at 09:15

## 2022-09-22 RX ADMIN — INSULIN LISPRO 4 UNITS: 100 INJECTION, SOLUTION INTRAVENOUS; SUBCUTANEOUS at 09:16

## 2022-09-22 RX ADMIN — ISOSORBIDE MONONITRATE 30 MG: 30 TABLET, EXTENDED RELEASE ORAL at 09:15

## 2022-09-22 RX ADMIN — PIPERACILLIN AND TAZOBACTAM 4500 MG: 4; .5 INJECTION, POWDER, LYOPHILIZED, FOR SOLUTION INTRAVENOUS at 14:50

## 2022-09-22 RX ADMIN — INSULIN LISPRO 4 UNITS: 100 INJECTION, SOLUTION INTRAVENOUS; SUBCUTANEOUS at 21:35

## 2022-09-22 RX ADMIN — AMLODIPINE BESYLATE 5 MG: 5 TABLET ORAL at 09:15

## 2022-09-22 RX ADMIN — DOCUSATE SODIUM 100 MG: 100 CAPSULE, LIQUID FILLED ORAL at 21:34

## 2022-09-22 RX ADMIN — DULOXETINE HYDROCHLORIDE 30 MG: 30 CAPSULE, DELAYED RELEASE ORAL at 09:15

## 2022-09-22 RX ADMIN — LEVETIRACETAM 250 MG: 250 TABLET, FILM COATED ORAL at 21:34

## 2022-09-22 RX ADMIN — PIPERACILLIN AND TAZOBACTAM 4500 MG: 4; .5 INJECTION, POWDER, LYOPHILIZED, FOR SOLUTION INTRAVENOUS at 02:02

## 2022-09-22 RX ADMIN — INSULIN LISPRO 8 UNITS: 100 INJECTION, SOLUTION INTRAVENOUS; SUBCUTANEOUS at 18:46

## 2022-09-22 RX ADMIN — SODIUM CHLORIDE, PRESERVATIVE FREE 40 MG: 5 INJECTION INTRAVENOUS at 09:15

## 2022-09-22 RX ADMIN — ASPIRIN 81 MG 81 MG: 81 TABLET ORAL at 09:15

## 2022-09-22 RX ADMIN — OXYCODONE HYDROCHLORIDE 5 MG: 5 TABLET ORAL at 06:13

## 2022-09-22 NOTE — PROGRESS NOTES
Physical Therapy Initial Assessment     Name: Aron Shannon  : 1940  MRN: [id number]      Date of Service: 2022    Evaluating PT:  Joanna Adrian PT, DPT [id number]    Room #:  5162/4392-T  Diagnosis:  Change in mental status [R41.82]  Altered mental status, unspecified altered mental status type [R41.82]  PMHx/PSHx:    Past Medical History:   Diagnosis Date    Anemia     Arthritis     CAD (coronary artery disease)     Diabetes mellitus ([facility] [address]     Gout     History of bleeding peptic ulcer approx     Hypertension     Peritoneal dialysis catheter in place [facility])     Peritoneal dialysis status ([facility] [address]     at night for 8 hours    Thyroid disease     Use of cane as ambulatory aid     Wears glasses      Procedure/Surgery:  22 CHOLECYSTECTOMY LAPAROSCOPIC with intraoperative cholangiogram  Reason for admission: AMS, difficulty ambulating and articulating words  Precautions:  Fall Risk, Bed Alarm, IV, TAPS/Wedges, Cognition, O2  Equipment Needs:  TBD    SUBJECTIVE:  Pt lives alone in 1 story home with 3-steps to enter and 1HR with bed and bath on main floor. Pt has basement but does not need to access. Son lives 10 min away and assists pt with dialysis 2x.day. Pt was independent PTA. Pt oriented but had difficulty answering subjective questions during interview so info taken from son present. OBJECTIVE:   Initial Evaluation  Date:  Treatment Short Term/ Long Term   Goals   AM-PAC 6 Clicks      Was pt agreeable to Eval/treatment? Yes     Does pt have pain? Unable to state     Bed Mobility  Rolling: mod A  Supine to sit:   Max A x 2  Sit to supine:  Max A x 2  Scooting: NT  Rolling: ind  Supine to sit: mod I  Sit to supine: mod I  Scooting: ind   Transfers NT  Sit to stand: SBA  Stand to sit: SBA  Stand pivot: SBA   Ambulation   NT    100 feet with AAD min a   Stair negotiation: ascended and descended NT  4 steps with hand rail min a   ROM BUE:  Refer to OT eval   BLE:  WNL     Strength BUE: Refer to OT eval   BLE:  difficult to assess d/t difficulty following commands, global weakness  >4/5 globally   Balance Sitting EOB:  CGA<> MIN a  Dynamic Standing:  NT  Sitting EOB:  mod I   Dynamic Standing:  min A AAD     Pt is A & O x 4  Sensation:  Pt denies numbness and tingling to extremities  Edema:  none noted     Vitals:  Blood Pressure at rest -- Blood Pressure post session --   Heart Rate at rest -- Heart Rate post session --   SPO2 at rest -- SPO2 post session --     Therapeutic Exercises:  none performed this visit    Patient education  Pt educated on purpose of therapy, benefits of OOB activity    Patient response to education:   Pt verbalized understanding Pt demonstrated skill Pt requires further education in this area   x x x     ASSESSMENT:  Conditions Requiring Skilled Therapeutic Intervention:  [x]Decreased strength     []Decreased ROM  [x]Decreased functional mobility  [x]Decreased balance   [x]Decreased endurance   [x]Decreased posture  []Decreased sensation  [x]Decreased coordination   []Decreased vision  [x]Decreased safety awareness   []Increased pain       Comments:  Pt in bed on arrival, lethargic but agreeable to PT eval. Son present for eval and able to answer questions that pt was unable to for home set up. Pt required heavy assist to transfer to EOB, able to sit EOB x5 min for strength testing and for practice correcting midline d/t R lateral lean. Pt able to correct with verbal cues but unable to maintain. Pt returned to supine at end of session d/t fatigue. Pt with poor activity tolerance at this time, will benefit from increased OOB activity and cont skilled PT. Treatment:  Patient practiced and was instructed in the following treatment:    Bed mobility: performed with cues for safety awareness and proper hand placement to promote improved functional independence. Pt's/ family goals   1. Return home safely. Prognosis is fair for reaching above PT goals.     Patient and or family understand(s) diagnosis, prognosis, and plan of care. yes    PHYSICAL THERAPY PLAN OF CARE:    PT POC is established based on physician order and patient diagnosis     Referring provider/PT Order:    Ordered On Ordered By    9/16/2022  6:09 PM Reva Scott DO      Diagnosis:  Change in mental status [R41.82]  Altered mental status, unspecified altered mental status type [R41.82]  Specific instructions for next treatment:  trial standing, ambulation    Current Treatment Recommendations:   [x] Strengthening to improve independence with functional mobility   [] ROM to improve independence with functional mobility   [x] Balance Training to improve static/dynamic balance and to reduce fall risk  [x] Endurance Training to improve activity tolerance during functional mobility   [x] Transfer Training to improve safety and independence with all functional transfers   [x] Gait Training to improve gait mechanics, endurance and assess need for appropriate assistive device  [] Stair Training in preparation for safe discharge home and/or into the community   [x] Positioning to prevent skin breakdown and contractures  [x] Safety and Education Training   [] Patient/Caregiver Education   [] HEP  [] Other     PT long term treatment goals are located in above grid    Frequency of treatments: 2-5x/week x 1-2 weeks. Time in  1530  Time out  1555    Total Treatment Time  10 minutes     Evaluation Time includes thorough review of current medical information, gathering information on past medical history/social history and prior level of function, completion of standardized testing/informal observation of tasks, assessment of data and education on plan of care and goals.     CPT codes:  [x] Low Complexity PT evaluation 11139  [] Moderate Complexity PT evaluation 31934  [] High Complexity PT evaluation 74819  [] PT Re-evaluation 57546  [] Gait training 99596 -- minutes  [] Manual therapy 37289 -- minutes  [x] Therapeutic activities 14497 10 minutes  [] Therapeutic exercises 95824 -- minutes  [] Neuromuscular reeducation 94774 -- minutes     Eloina Ibarra, PT, DPT  [id number]

## 2022-09-22 NOTE — PROGRESS NOTES
GENERAL SURGERY  DAILY PROGRESS NOTE  9/22/2022    Subjective:  No events overnight. Resting comfortably. Patient complaining of minimal abdominal pain this AM. Denies any flatus/BM post op. She states that she did not have much appetite post op and did not attempt to eat anything. Objective:  BP (!) 144/51   Pulse 83   Temp 98.1 °F (36.7 °C) (Temporal)   Resp 16   Ht 5' (1.524 m)   Wt 143 lb 4.8 oz (65 kg)   SpO2 91%   BMI 27.99 kg/m²     General appearance: alert, cooperative and in no acute distress. Eyes: grossly normal  Lungs: nonlabored breathing on 3 L nasal cannula  Heart: regular rate  Abdomen: soft, nondistended. Appropriately tender post op. Incisions clean/dry/intact with skin glue in place. Skin: No skin abnormalities  Neurologic: Alert and oriented x 3. Grossly normal  Musculoskeletal: No clubbing cyanosis or edema    Assessment/Plan:  80 y.o. female history of ESRD on peritoneal dialysis, CAD, DM, A. fib on Eliquis currently admitted with altered mental status with acute cholecystitis and choledocholithiasis s/p Lap [place]ton with IOC 09/21     -Continue Zosyn for antibiotic coverage with multiple filling defects in the common bile duct.   -Patient was noted to have multiple filling defects in the common bile duct during intraoperative cholangiogram.  [facility] was consulted for ERCP with possible stenting.   -Okay for diet from surgical perspective  -Okay to use PD catheter for dialysis from surgery perspective    Electronically signed by Amanda Hallman DO on 9/22/2022 at 6:17 AM    Seen/examined  Agree with above  [email or web address]

## 2022-09-22 NOTE — PROGRESS NOTES
Per Dr. Nic Ramirez, Dr. Ara Daugherty will return from time off tomorrow 9/23. Dr. Nic Ramirez is also off today. [facility] notified consult will be done tomorrow.

## 2022-09-22 NOTE — CARE COORDINATION
9/22 Update CM note. POD #1 from gert[facility] rose. Plans for possible ERCP tomorrow as Dr. Clotilde Parks will be back in office. Dr. Alfonso Menon also off today. Zosyn IV Q12H continues. Na today, 127 and WBC is 17.1. Per [address] to use PD catheter. Vascular states no plans currently for tunneled dialysis catheter at this time. PT/OT evals pending. Pt is active with [facility] for SN/PT. If pt returns home, will need REBECCA orders. Pt's son does not want pt in [facility] unless absolutely necessary. Will f/u once PT/OT evaluates.     SALLY YarbroughN, RN  [facility] Case Management   Cell: [phone]

## 2022-09-22 NOTE — PLAN OF CARE
Problem: Skin/Tissue Integrity  Goal: Absence of new skin breakdown  Description: 1. Monitor for areas of redness and/or skin breakdown  2. Assess vascular access sites hourly  3. Every 4-6 hours minimum:  Change oxygen saturation probe site  4. Every 4-6 hours:  If on nasal continuous positive airway pressure, respiratory therapy assess nares and determine need for appliance change or resting period.   Outcome: Progressing     Problem: Safety - Adult  Goal: Free from fall injury  Outcome: Progressing     Problem: Chronic Conditions and Co-morbidities  Goal: Patient's chronic conditions and co-morbidity symptoms are monitored and maintained or improved  Outcome: Progressing     Problem: Discharge Planning  Goal: Discharge to home or other facility with appropriate resources  Outcome: Progressing

## 2022-09-22 NOTE — FLOWSHEET NOTE
[facility] tx initiated using strict aseptic technique, family at bedside indicating possible order change. Dr Roxie Gould confirmed. Upon initial drain,  bloody effluent drained to 150cc and stopped. Filled well , pt denies complaints, no drainage appreciated from surgical sites.

## 2022-09-22 NOTE — PROGRESS NOTES
[address] [id number] [facility]  [address] [place] [address]        Date:2022                                                  Patient Name: Courtney Rangel    MRN: [id number]    : 1940    Room: [address] [place], [place] [address]V      Evaluating OT: Tom Longo, [address] OTR/L; [id number]      Referring Provider: Betsey Richardson DO    Specific Provider Orders/Date: OT Eval and Treat 22      Diagnosis: Change in Mental Status     Surgery: 22 CHOLECYSTECTOMY LAPAROSCOPIC with intraoperative cholangiogram    Pertinent Medical History:  has a past medical history of Anemia, Arthritis, CAD (coronary artery disease), Diabetes mellitus ([facility] [address] Gout, History of bleeding peptic ulcer, Hypertension, Peritoneal dialysis catheter in place [facility]), Peritoneal dialysis status ([facility] [address] Thyroid disease, Use of cane as ambulatory aid, and Wears glasses.       Reason for Admission: AMS, difficulty ambulating and articulating words     Recommended Adaptive Equipment:  TBD pending progression/discharge     Precautions:  Fall Risk, Bed Alarm, IV, TAPS/Wedges, Cognition, O2      Assessment of current deficits    [x] Functional mobility            [x]ADLs           [x] Strength                   [x]Cognition    [x] Functional transfers          [x] IADLs          [x] Safety Awareness   [x]Endurance    [x] Fine Coordination              [x] Balance      [] Vision/perception    []Sensation      []Gross Motor Coordination  [x] ROM           [] Delirium                   [] Motor Control      OT PLAN OF CARE   OT POC based on physician orders, patient diagnosis and results of clinical assessment     Frequency/Duration 2-5 days/wk for 2 weeks PRN   Specific OT Treatment Interventions to include:   * Instruction/training on adapted ADL techniques and AE recommendations to increase functional independence within precautions       * Training on energy conservation strategies, correct breathing pattern and techniques to improve independence/tolerance for self-care routine  * Functional transfer/mobility training/DME recommendations for increased independence, safety, and fall prevention  * Patient/Family education to increase follow through with safety techniques and functional independence  * Recommendation of environmental modifications for increased safety with functional transfers/mobility and ADLs  * Cognitive retraining/development of therapeutic activities to improve problem solving, judgement, memory, and attention for increased safety/participation in ADL/IADL tasks  * Sensory re-education to improve body/limb awareness, maintain/improve skin integrity, and improve hand/UE motor function  * Visual-perceptual training to improve environmental scanning, visual attention/focus, and oculomotor skills for increased safety/independence with functional transfers/mobility and ADLs  * Splinting/positioning for increased function, prevention of contractures, and improve skin integrity  * Therapeutic exercise to improve motor endurance, ROM, and functional strength for ADLs/functional transfers  * Therapeutic activities to facilitate/challenge dynamic balance, stand tolerance for increased safety and independence with ADLs  * Therapeutic activities to facilitate gross/fine motor skills for increased independence with ADLs  * Neuro-muscular re-education: facilitation of righting/equilibrium reactions, midline orientation, scapular stability/mobility, normalization of muscle tone, and facilitation of volitional active controled movement  * Positioning to improve skin integrity, interaction with environment and functional independence  * Delirium prevention/treatment  * Manual techniques for edema management     Recommended Adaptive Equipment/DME: Shower chair, BSC     Home Living: Pt lives alone in 1 story home with 3-steps to enter and 1HR.  bed and bath on main floor  Basement does not need to access  Bathroom setup: Tub/shower, however pt son states she sponge bathes only using wipes  Equipment owned: Rollator     Prior Level of Function: Ind with ADLs , Min A with IADLs - son orders groceries to be delivered  Used rollator for functional mobility. Driving: No - son provides transportation as needed  Occupation: None stated    Available Support: Son lives 10 minutes away     Pain Level: none stated  Cognition: A&O: 4/4 - however increased time for processing and response time   Follows single step directions              Memory:  Fair              Sequencing: Fair              Problem solving:  Fair              Judgement/safety:  Fair                  Functional Assessment:  AM-PAC Daily Activity Raw Score: 13/24    Initial Eval Status  Date: 9/22/22 Treatment Status  Date: STGs = LTGs  Time frame: 10-14 days   Feeding Minimal Assist   Cup to mouth from tray  Independent    Grooming Minimal Assist   Limited d/t decreased UB strength and chronic poor LUE AROM   Independent    UB Dressing Moderate Assist  Fabián shirt over shoulders seated EOB    S      LB Dressing Dependent  Fabián socks lying supine    S      Bathing Maximal Assist  Decreased dynamic sitting balance poor functional reach for LB bathing tasks   Limited activity tolerance for bathing tasks   SBA  In seated position      Toileting Maximal Assist  limited ROM for posterior and anterior pericare       SBA      Bed Mobility  Supine to sit:  Max A x 2  Sit to supine: Max A x 2      Supine to sit: SBA  Sit to supine: SBA   Functional Transfers Sit <> Stand: NT  Stand Pivot: NT  Commode: NT   Sit t<> Stand: Min A with AD  Stand Pivot: Min A with AD  Commode: Min A with AD   Functional Mobility NT      Min A with AD   Balance Sitting:     Static: CGA <> Min A    Dynamic: Mod A  Standing: NT   Sitting:     Static: IND    Dynamic: IND  Standing: Min A with AD   Activity Tolerance Fair  Limited d/t overall fatigue    [facility] Visual/  Perceptual Glasses: Yes   Not present          Vitals SpO2 on RA: 91% upon entering room required 2L to return to 95%  SpO2 stead EOB on 2L NC: 97%  SpO2 end of session on 2L NC: 97%    HR: 100-100 bpm during session        Hand Dominance R    AROM (PROM) Strength Additional Info:    RUE  WFL Grossly 3+/5 Poor  and wfl FMC/dexterity noted during ADL tasks         LUE AROM  Shoulder Flexion ~30 degrees   Elbow/hand WFL Proximally 2+/5  Distally 3+/5   poor  and wfl FMC/dexterity noted during ADL tasks         Hearing: [place]   Sensation:  No c/o numbness or tingling   Tone: WFL   Edema: Unremarkable       Patient/Family Goal: discharge to [facility]   Based on patient's functional performance as stated below and level of assistance needed prior to admission, this therapist believes that the patient would benefit from further skilled OT following hospital stay in an effort to increase safety, functional independence, and quality of life. Comment: Cleared by RN to see pt. Upon arrival patient lying supine in bed with son present and agreeable to OT session. At end of session, patient seated lying supine in bed with son present with call light and phone within reach, all lines and tubes intact. Overall patient demonstrated  decreased independence and safety during completion of ADL/functional transfer/mobility tasks. Pt would benefit from continued skilled OT to increase safety and independence with completion of ADL/IADL tasks for functional independence and quality of life. Treatment:  Pt required vc's for proper technique/safety with hand placement/body mechanics/posture for bed mobility/ADLs/functional tranfers/mobility/ww management. Pt required vc's for sequencing/initiation of ADLs/functional transfers. Pt on RA upon entering however stating 91% requried 2L NC to return to 95%. Pt able to sit EOB ~5 mins to increase core strength/balance/activity tolerance for ease with ADLs.  Pt required verbal cues for BLE progression and BLE hand placement for supine to sit transfer. Pt required increased time to complete ADLs/functional transfers due to management of multiple lines/overall fatigue. PPt required skilled monitoring of SpO2 during session and education on energy conservation techniques. Pt required rest breaks during session and educated on pursed lip breathing. Pt appeared to have tolerated session well and appears motivated/cooperative/pleasant . Pt instructed on use of call light for assistance and fall prevention. Pt demo'ing fair understanding of education provided. Continue to educate. Rehab Potential: Good  for established goals       LTG: maximize independence with ADLs to return to PLOF    Patient and/or family were instructed on functional diagnosis, prognosis/goals and OT plan of care. Demonstrated FAIR understanding. [] Malnutrition indicators have been identified and nursing has been notified to ensure a dietitian consult is ordered. Eval Complexity: mod  History: Expanded chart review of medical records and additional review of physical, cognitive, or psychosocial history related to current functional performance  Exam: 3+ performance deficits  Assistance/Modification: MOD ssistance or modifications required to perform tasks. May have comorbidities that affect occupational performance. Evaluation time includes thorough review of current medical information, gathering information on past medical & social history & PLOF, completion of standardized testing, informal observation of tasks, consultation with other medical professions/disciplines, assessment of data & development of POC/goals.      Time In: 1530  Time Out: 1600  Total Treatment Time: 15    Min Units   OT Eval Low 29995  x     OT Eval Medium 14877      OT Eval High 73634      OT Re-Eval [id number]       Therapeutic Ex 35764       Therapeutic Activities 72625       ADL/Self Care 96252  15  1   Orthotic Management 66247 Manual 61362     Neuro Re-Ed 33117       Non-Billable Time          Evaluation Time additionally includes thorough review of current medical information, gathering information on past medical history/social history and prior level of function, interpretation of standardized testing/informal observation of tasks, assessment of data and development of plan of care and goals.             GRECIA Guillermo OTR/L; [id number]

## 2022-09-22 NOTE — PROGRESS NOTES
[facility] MD Jennifer Aguayo MD Isobel Rein, MD  Progress Note    2022    SUBJECTIVE:   : Not in her room   She is getting cholcystectomy    : Status post cholecystectomy yesterday without complication. Feeling better, ongoing fatigue and malaise, generalized weakness. Appetite is improved and she is looking forward to her dinner. Denies nausea or vomiting. Pain controlled. ROS otherwise unremarkable    PROBLEM LIST:    Principal Problem:    Change in mental status  Active Problems:    Chronic kidney disease    Moderate protein-calorie malnutrition ([address]  Resolved Problems:    * No resolved hospital problems. *       DIET:    ADULT DIET; Regular; 5 carb choices (75 gm/meal); Low Fat (less than or equal to 50 gm/day)  ADULT ORAL NUTRITION SUPPLEMENT; Breakfast, Lunch, Dinner; Diabetic Oral Supplement  ADULT ORAL NUTRITION SUPPLEMENT; Breakfast, Lunch;  Wound Healing Oral Supplement  Diet NPO Exceptions are: Sips of Water with Meds       Allergies : Sulfa antibiotics    Past Medical History:   Diagnosis Date    Anemia     Arthritis     CAD (coronary artery disease)     Diabetes mellitus ([address]     Gout     History of bleeding peptic ulcer approx     Hypertension     Peritoneal dialysis catheter in place [facility])     Peritoneal dialysis status ([facility]     at night for 8 hours    Thyroid disease     Use of cane as ambulatory aid     Wears glasses        Past Surgical History:   Procedure Laterality Date    BACK SURGERY      CARDIOVASCULAR STRESS TEST      CARPAL TUNNEL RELEASE Bilateral     CATARACT REMOVAL WITH IMPLANT Bilateral      SECTION      CHOLECYSTECTOMY, LAPAROSCOPIC N/A 2022    CHOLECYSTECTOMY LAPAROSCOPIC with intraoperative cholangiogram performed by Caty Smith MD at [address] [facility]  approx 2000    x 3; per Dr Lani Dangelo, [place], [address] [facility] [address]      left knee, right shoulder    [place] TOTAL KNEE ARTHROPLASTY Right 10/31/2018    RIGHT KNEE TOTAL ARTHROPLASTY +++BRIDGER++ PNB++ performed by Valdo Wilkes MD at [address] [place] [address]         History reviewed. No pertinent family history. reports that she has never smoked. She has never used smokeless tobacco. She reports that she does not drink alcohol and does not use drugs. Review of Systems:   Constitutional: no fevers , no chills , feels ok   Eyes: no eye pain , no itching , no drainage  Ears, nose, mouth, throat, and face: no ear ,nose pain , hearing is ok ,no nasal drainage   Respiratory: no sob ,no cough ,no wheezing . Cardiovascular: no chest pain , no palpitation ,no sob . Gastrointestinal: no nausea, vomiting , constipation , no abdominal pain . Genitourinary:no urinary retention , no burning , dysuria . No polyuria   Hematologic/lymphatic: no bleeding , no cougulation issues . Musculoskeletal:no joint pain , no swelling . Neurological: no headaches ,no weakness , no numbness . Endocrine: no thirst , no weight issues .      MEDS (scheduled):    insulin lispro  0-16 Units SubCUTAneous TID WC    insulin lispro  0-4 Units SubCUTAneous Nightly    piperacillin-tazobactam  4,500 mg IntraVENous Q12H    levETIRAcetam  250 mg Oral BID    pantoprazole (PROTONIX) 40 mg injection  40 mg IntraVENous Daily    amLODIPine  5 mg Oral Daily    [Held by provider] apixaban  2.5 mg Oral BID    aspirin  81 mg Oral Daily    [Held by provider] atorvastatin  20 mg Oral Nightly    DULoxetine  30 mg Oral Daily    isosorbide mononitrate  30 mg Oral Daily    levothyroxine  50 mcg Oral Daily    docusate sodium  100 mg Oral Nightly    [Held by provider] pantoprazole  40 mg Oral QAM AC       MEDS (infusions):   dextrose         MEDS (prn):  oxyCODONE, acetaminophen, glucose, dextrose bolus **OR** dextrose bolus, glucagon (rDNA), dextrose, acetaminophen, trimethobenzamide    PHYSICAL EXAM:     Patient Vitals for the past 24 hrs:   BP Temp Temp src Pulse Resp SpO2 Height   09/22/22 1732 138/63 97.7 °F (36.5 °C) Temporal 84 -- 100 % --   09/22/22 1611 -- -- -- -- -- -- 5' (1.524 m)   09/22/22 0815 (!) 131/46 98.2 °F (36.8 °C) Oral 92 18 92 % --   09/22/22 0430 -- -- -- -- -- 91 % --   09/21/22 2000 (!) 144/51 98.1 °F (36.7 °C) Temporal 83 16 97 % --   @      Intake/Output Summary (Last 24 hours) at 9/22/2022 1909  Last data filed at 9/22/2022 1026  Gross per 24 hour   Intake 120 ml   Output --   Net 120 ml         Wt Readings from Last 3 Encounters:   09/19/22 143 lb 4.8 oz (65 kg)   07/27/22 143 lb 3.2 oz (65 kg)   04/29/22 158 lb 8.2 oz (71.9 kg)       Constitutional:  in no acute distress  Oral: mucus membranes moist  Neck: no JVD  Cardiovascular: S1, S2 regular rhythm, no murmur,or rub  Respiratory:  No crackles, no wheeze  Gastrointestinal:  Soft, nontender, nondistended, NABS  Ext: no edema, feet warm  Skin: dry, no rash  Neuro: awake, alert, interactive      DATA:    Recent Labs     09/20/22  0506 09/22/22  0815   WBC 12.6* 17.1*   HGB 10.3* 9.1*   HCT 31.0* 28.0*   MCV 91.4 91.5    295     Recent Labs     09/20/22  0506 09/21/22  0510 09/21/22  0957 09/22/22  0428 09/22/22  0815   *  --  129*  --  127*   K 3.3*  --  3.1*  --  4.3   CL 88*  --  89*  --  88*   CO2 23  --  27  --  19*   BUN 43*  --  32*  --  42*   CREATININE 5.8*  --  5.1*  --  5.8*   * 151*  --  179*  --    * 143*  --  215*  --    BILIDIR 1.0* 0.9*  --  1.5*  --    BILITOT 1.2 1.1  --  1.7*  --    ALKPHOS 760* 724*  --  807*  --        No results found for: LABPROT    Assessment    1. End-stage renal disease, on peritoneal dialysis. 2.  Encephalopathy, etiology unclear, metabolic versus ongoing infection. .  Markedly improved  3. Leukocytosis ? Ascedning cholangitis. Improved now status post cholecystectomy  4. Anemia due to ESRD  5. Hypomagnesemia. 6.  Mineral bone disease.      Recommendations  \"Cleared\" per general surgery to resume PD  Resume CCPD this evening for solute and volume clearance. We will plan lower volume fills (1500 cc versus 2000).   All 2.5% dextrose solutions  He develops any complications, will need to switch to hemodialysis temporarily  Otherwise continue current management  Follow labs     Electronically signed by Madhu Clark MD on 9/22/2022

## 2022-09-22 NOTE — PROGRESS NOTES
[facility] CARDIOLOGY PROGRESS NOTE  [facility]        Subjective: Perioperative cardiovascular assessment and follow-up with known permanent atrial fibrillation Eliquis on hold and underwent laparoscopic cholecystectomy yesterday, DM, CABG in approximately 2000. Son at bedside throughout the interview and he is very helpful. Patient is sleepy and groggy but wakes up to her name. No focal motor or sensory deficit. Discussed with son plan for possible ERCP tomorrow. Will defer to gastroenterology. Objective: Labs chart medications and telemetry are reviewed. Patient Vitals for the past 24 hrs:   BP Temp Temp src Pulse Resp SpO2   09/22/22 0815 (!) 131/46 98.2 °F (36.8 °C) Oral 92 18 92 %   09/22/22 0430 -- -- -- -- -- 91 %   09/21/22 2000 (!) 144/51 98.1 °F (36.7 °C) Temporal 83 16 97 %   09/21/22 1615 (!) 137/51 98.1 °F (36.7 °C) Temporal 70 14 95 %   09/21/22 1545 (!) 125/54 -- -- 71 16 95 %   09/21/22 1530 (!) 144/58 -- -- 80 22 98 %   09/21/22 1515 (!) 144/63 -- -- 80 12 99 %   09/21/22 1500 (!) 137/56 -- -- 79 (!) 1 99 %   09/21/22 1445 (!) 120/52 -- -- 77 11 98 %   09/21/22 1442 (!) 126/51 97.6 °F (36.4 °C) Temporal 71 14 98 %   09/21/22 1435 (!) 126/51 96.8 °F (36 °C) Temporal 75 16 99 %         Intake/Output Summary (Last 24 hours) at 9/22/2022 1359  Last data filed at 9/22/2022 1026  Gross per 24 hour   Intake 520 ml   Output --   Net 520 ml       Wt Readings from Last 3 Encounters:   09/19/22 143 lb 4.8 oz (65 kg)   07/27/22 143 lb 3.2 oz (65 kg)   04/29/22 158 lb 8.2 oz (71.9 kg)       Telemetry: Personally reviewed and shows atrial fibrillation heart rate in the 80s. Controlled ventricular rate.     Current meds: Scheduled Meds:   insulin lispro  0-16 Units SubCUTAneous TID WC    insulin lispro  0-4 Units SubCUTAneous Nightly    piperacillin-tazobactam  4,500 mg IntraVENous Q12H    levETIRAcetam  250 mg Oral BID    pantoprazole (PROTONIX) 40 mg injection  40 mg IntraVENous Daily amLODIPine  5 mg Oral Daily    [Held by provider] apixaban  2.5 mg Oral BID    aspirin  81 mg Oral Daily    [Held by provider] atorvastatin  20 mg Oral Nightly    DULoxetine  30 mg Oral Daily    isosorbide mononitrate  30 mg Oral Daily    levothyroxine  50 mcg Oral Daily    docusate sodium  100 mg Oral Nightly    [Held by provider] pantoprazole  40 mg Oral QAM AC     Continuous Infusions:   dextrose       PRN Meds:.oxyCODONE, acetaminophen, glucose, dextrose bolus **OR** dextrose bolus, glucagon (rDNA), dextrose, acetaminophen, trimethobenzamide    Allergies: Sulfa antibiotics      Labs:   Recent Labs     09/20/22  0506 09/22/22  0815   WBC 12.6* 17.1*   HGB 10.3* 9.1*   HCT 31.0* 28.0*   MCV 91.4 91.5    295       Labs:   Recent Labs     09/20/22  0506 09/21/22  0957   * 129*   K 3.3* 3.1*   CL 88* 89*   CO2 23 27   BUN 43* 32*   CREATININE 5.8* 5.1*       Labs: No results for input(s): CKTOTAL, CKMB, CKMBINDEX, TROPONINI in the last 72 hours. Labs: No results for input(s): BNP in the last 72 hours. Labs: No results for input(s): CHOL, HDL, TRIG in the last 72 hours. Invalid input(s): Shantal [place]    Labs:   Recent Labs     09/20/22  0506 09/21/22  0510 09/22/22  0428   PROT 5.9* 5.5* 5.6*       Review of systems: No reported significant weight gain or weight loss. no dysuria or frequency, no dizziness, falls or trauma, no change in bowel or bladder habits, no hematochezia, hemoptysis or hematuria. No fevers, chills, nausea or vomiting reported. No significant wheezing or sputum production. No headache or visual changes. The remainder of the 10 review of systems otherwise negative. Exam      General: Patient comfortable in no distress and currently denies any chest pain. HEENT: Face symmetrical and no apparent cranial nerve deficit. Neck: No jugular venous distention, carotid bruit or thyromegaly.      Lungs: Clear bilaterally without rales, wheezes or dullness. Cardiac: IRegular rate and rhythm, no S3, S4, no rub or gallop. Abdomen: No rebound or guarding, no hepatosplenomegaly. Extremities: Without significant clubbing , cyanosis, or edema. Neuro:  No focal motor or sensory deficit apparent. Skin: No petechiae, no significant bruising. Assessment: See plan below        Plan: #1 permanent AF we will continue to hold Eliquis as potentially undergoing ERCP tomorrow. #2 laparoscopic cholecystectomy completed yesterday. Abdomen currently comfortable and benign. #3 end-stage renal disease on peritoneal dialysis. #4 chronic CAD/CABG and medical management. Long-term continue aspirin, statin, isosorbide mononitrate. #5 diabetes and follow-up blood sugar.         Electronically signed by Roscoe Yuen MD on 9/22/2022 at 1:59 PM

## 2022-09-22 NOTE — PROGRESS NOTES
Subjective:    S/p Lap milton with IOC yesterday   Multiple filling defects seen  No acute complaints  More alert and oriented  Case discussed with son at bedside    Objective:    BP (!) 131/46   Pulse 92   Temp 98.2 °F (36.8 °C) (Oral)   Resp 18   Ht 5' (1.524 m)   Wt 143 lb 4.8 oz (65 kg)   SpO2 92%   BMI 27.99 kg/m²     In: 520 [P.O.:120; I.V.:400]  Out: 1107   In: 520   Out: 1107     General Appearance:  awake and alert, with confusion   Head/face:  NCAT  Eyes:  No gross abnormalities. Lungs:  Normal expansion. Clear to auscultation. No rales, rhonchi, or wheezing. Heart:  Heart sounds are normal.  Regular rate and rhythm without murmur, gallop or rub. Abdomen:  Soft, mildly tender, normal bowel sounds. No bruits, organomegaly or masses. PD cath,-pain to palpation in epigastrium  Extremities: Extremities warm to touch, pink, with no edema. Neurologic:  Lethargic, oriented x 2, no focal deficits, mild involuntary twitching of extremities - improved from admission     Recent Labs     09/20/22  0506 09/22/22  0815   WBC 12.6* 17.1*   HGB 10.3* 9.1*   HCT 31.0* 28.0*    295         Recent Labs     09/20/22  0506 09/21/22  0957   * 129*   K 3.3* 3.1*   CL 88* 89*   CO2 23 27   BUN 43* 32*   CREATININE 5.8* 5.1*   CALCIUM 8.7 8.7         Assessment:    Principal Problem:    Change in mental status  Resolved Problems:    * No resolved hospital problems.  *        PLAN:     # Altered mental status  -Likely from sepsis,   -WBC 23 > 12,000> 17.1 today  -Started on IV Zosyn 9/19/2022  -EEG > A potential for generalized photosensitive epilepsy, will defer to neurology > started on Keppra 250 mg twice daily, neurology signing off  - blood cultures x 2 are negative x 24 hours, follow  - peritoneal body fluid culture NGTD  - UA and urine culture need to be collected - patient does not produce urine-no need to check PVR    LFTs/acute cholecystitis-fluctuating  -s/p lap milton with IOC 9/21/2022  -Multiple filling defects in the CBD noted during IOC, advanced GI consulted for ERCP with possible stenting  -Lipitor held     ESRD on PD  -Nephro following  -PD per nephro   -Per general surgery, okay to continue peritoneal dialysis-no need for temporary dialysis line      Severe protein calorie malnutrition  -Nutrition consult    Diabetes mellitus   -Continue to monitor blood glucose levels  -Better controlled with ISS increase to high dose     Elevated troponin  -without EKG changes or chest pain - likely related to renal failure - noted elevations 04/22 chronically. Trending down 183 > 164  - follow labs     Medication for other comorbidities continue as appropriate dose adjustment as necessary.       DVT prophylaxis heparin   PT OT  Discharge plan    Supriya Torrez MD

## 2022-09-23 ENCOUNTER — ANESTHESIA (OUTPATIENT)
Dept: ENDOSCOPY | Age: 82
DRG: 853 | End: 2022-09-23
Payer: MEDICARE

## 2022-09-23 ENCOUNTER — APPOINTMENT (OUTPATIENT)
Dept: GENERAL RADIOLOGY | Age: 82
DRG: 853 | End: 2022-09-23
Payer: MEDICARE

## 2022-09-23 ENCOUNTER — ANESTHESIA EVENT (OUTPATIENT)
Dept: ENDOSCOPY | Age: 82
DRG: 853 | End: 2022-09-23
Payer: MEDICARE

## 2022-09-23 LAB
ALBUMIN SERPL-MCNC: 2 G/DL (ref 3.5–5.2)
ALP BLD-CCNC: 584 U/L (ref 35–104)
ALT SERPL-CCNC: 121 U/L (ref 0–32)
ANION GAP SERPL CALCULATED.3IONS-SCNC: 17 MMOL/L (ref 7–16)
ANISOCYTOSIS: ABNORMAL
AST SERPL-CCNC: 117 U/L (ref 0–31)
BASOPHILS ABSOLUTE: 0.14 E9/L (ref 0–0.2)
BASOPHILS RELATIVE PERCENT: 0.9 % (ref 0–2)
BILIRUB SERPL-MCNC: 1.6 MG/DL (ref 0–1.2)
BILIRUBIN DIRECT: 1.3 MG/DL (ref 0–0.3)
BILIRUBIN, INDIRECT: 0.3 MG/DL (ref 0–1)
BUN BLDV-MCNC: 43 MG/DL (ref 6–23)
CALCIUM SERPL-MCNC: 8.9 MG/DL (ref 8.6–10.2)
CHLORIDE BLD-SCNC: 87 MMOL/L (ref 98–107)
CO2: 23 MMOL/L (ref 22–29)
CREAT SERPL-MCNC: 5.4 MG/DL (ref 0.5–1)
EOSINOPHILS ABSOLUTE: 0.14 E9/L (ref 0.05–0.5)
EOSINOPHILS RELATIVE PERCENT: 0.9 % (ref 0–6)
GFR AFRICAN AMERICAN: 9
GFR NON-AFRICAN AMERICAN: 8 ML/MIN/1.73
GLUCOSE BLD-MCNC: 236 MG/DL (ref 74–99)
HCT VFR BLD CALC: 23.7 % (ref 34–48)
HEMOGLOBIN: 7.7 G/DL (ref 11.5–15.5)
LYMPHOCYTES ABSOLUTE: 1.59 E9/L (ref 1.5–4)
LYMPHOCYTES RELATIVE PERCENT: 10.3 % (ref 20–42)
MCH RBC QN AUTO: 29.6 PG (ref 26–35)
MCHC RBC AUTO-ENTMCNC: 32.5 % (ref 32–34.5)
MCV RBC AUTO: 91.2 FL (ref 80–99.9)
METAMYELOCYTES RELATIVE PERCENT: 0.9 % (ref 0–1)
METER GLUCOSE: 132 MG/DL (ref 74–99)
METER GLUCOSE: 218 MG/DL (ref 74–99)
METER GLUCOSE: 219 MG/DL (ref 74–99)
MONOCYTES ABSOLUTE: 0.95 E9/L (ref 0.1–0.95)
MONOCYTES RELATIVE PERCENT: 6 % (ref 2–12)
NEUTROPHILS ABSOLUTE: 13.04 E9/L (ref 1.8–7.3)
NEUTROPHILS RELATIVE PERCENT: 81 % (ref 43–80)
PDW BLD-RTO: 14.4 FL (ref 11.5–15)
PLATELET # BLD: 297 E9/L (ref 130–450)
PMV BLD AUTO: 10.9 FL (ref 7–12)
POIKILOCYTES: ABNORMAL
POLYCHROMASIA: ABNORMAL
POTASSIUM SERPL-SCNC: 3.3 MMOL/L (ref 3.5–5)
RBC # BLD: 2.6 E12/L (ref 3.5–5.5)
SODIUM BLD-SCNC: 127 MMOL/L (ref 132–146)
TARGET CELLS: ABNORMAL
TOTAL PROTEIN: 5.7 G/DL (ref 6.4–8.3)
WBC # BLD: 15.9 E9/L (ref 4.5–11.5)

## 2022-09-23 PROCEDURE — 6360000002 HC RX W HCPCS: Performed by: STUDENT IN AN ORGANIZED HEALTH CARE EDUCATION/TRAINING PROGRAM

## 2022-09-23 PROCEDURE — 2709999900 HC NON-CHARGEABLE SUPPLY: Performed by: STUDENT IN AN ORGANIZED HEALTH CARE EDUCATION/TRAINING PROGRAM

## 2022-09-23 PROCEDURE — 2580000003 HC RX 258: Performed by: ANESTHESIOLOGIST ASSISTANT

## 2022-09-23 PROCEDURE — 3700000000 HC ANESTHESIA ATTENDED CARE: Performed by: STUDENT IN AN ORGANIZED HEALTH CARE EDUCATION/TRAINING PROGRAM

## 2022-09-23 PROCEDURE — 2580000003 HC RX 258: Performed by: STUDENT IN AN ORGANIZED HEALTH CARE EDUCATION/TRAINING PROGRAM

## 2022-09-23 PROCEDURE — 3700000001 HC ADD 15 MINUTES (ANESTHESIA): Performed by: STUDENT IN AN ORGANIZED HEALTH CARE EDUCATION/TRAINING PROGRAM

## 2022-09-23 PROCEDURE — 1200000000 HC SEMI PRIVATE

## 2022-09-23 PROCEDURE — 97129 THER IVNTJ 1ST 15 MIN: CPT

## 2022-09-23 PROCEDURE — 99221 1ST HOSP IP/OBS SF/LOW 40: CPT | Performed by: STUDENT IN AN ORGANIZED HEALTH CARE EDUCATION/TRAINING PROGRAM

## 2022-09-23 PROCEDURE — 90945 DIALYSIS ONE EVALUATION: CPT

## 2022-09-23 PROCEDURE — 6370000000 HC RX 637 (ALT 250 FOR IP): Performed by: INTERNAL MEDICINE

## 2022-09-23 PROCEDURE — C1726 CATH, BAL DIL, NON-VASCULAR: HCPCS | Performed by: STUDENT IN AN ORGANIZED HEALTH CARE EDUCATION/TRAINING PROGRAM

## 2022-09-23 PROCEDURE — 80048 BASIC METABOLIC PNL TOTAL CA: CPT

## 2022-09-23 PROCEDURE — 6370000000 HC RX 637 (ALT 250 FOR IP): Performed by: STUDENT IN AN ORGANIZED HEALTH CARE EDUCATION/TRAINING PROGRAM

## 2022-09-23 PROCEDURE — 36415 COLL VENOUS BLD VENIPUNCTURE: CPT

## 2022-09-23 PROCEDURE — 3609014300 HC ERCP BALLOON DILATE BILIARY/PANC DUCT/AMPULLA EA: Performed by: STUDENT IN AN ORGANIZED HEALTH CARE EDUCATION/TRAINING PROGRAM

## 2022-09-23 PROCEDURE — 3609018800 HC ERCP DX COLLECTION SPECIMEN BRUSHING/WASHING: Performed by: STUDENT IN AN ORGANIZED HEALTH CARE EDUCATION/TRAINING PROGRAM

## 2022-09-23 PROCEDURE — 2580000003 HC RX 258: Performed by: INTERNAL MEDICINE

## 2022-09-23 PROCEDURE — 2720000010 HC SURG SUPPLY STERILE: Performed by: STUDENT IN AN ORGANIZED HEALTH CARE EDUCATION/TRAINING PROGRAM

## 2022-09-23 PROCEDURE — 2500000003 HC RX 250 WO HCPCS: Performed by: ANESTHESIOLOGIST ASSISTANT

## 2022-09-23 PROCEDURE — 80076 HEPATIC FUNCTION PANEL: CPT

## 2022-09-23 PROCEDURE — 6360000004 HC RX CONTRAST MEDICATION: Performed by: STUDENT IN AN ORGANIZED HEALTH CARE EDUCATION/TRAINING PROGRAM

## 2022-09-23 PROCEDURE — 82962 GLUCOSE BLOOD TEST: CPT

## 2022-09-23 PROCEDURE — 3609015200 HC ERCP REMOVE CALCULI/DEBRIS BILIARY/PANCREAS DUCT: Performed by: STUDENT IN AN ORGANIZED HEALTH CARE EDUCATION/TRAINING PROGRAM

## 2022-09-23 PROCEDURE — 85025 COMPLETE CBC W/AUTO DIFF WBC: CPT

## 2022-09-23 PROCEDURE — 3609014900 HC ERCP W/SPHINCTEROTOMY &/OR PAPILLOTOMY: Performed by: STUDENT IN AN ORGANIZED HEALTH CARE EDUCATION/TRAINING PROGRAM

## 2022-09-23 PROCEDURE — 6360000002 HC RX W HCPCS: Performed by: ANESTHESIOLOGIST ASSISTANT

## 2022-09-23 PROCEDURE — 7100000001 HC PACU RECOVERY - ADDTL 15 MIN: Performed by: STUDENT IN AN ORGANIZED HEALTH CARE EDUCATION/TRAINING PROGRAM

## 2022-09-23 PROCEDURE — C1769 GUIDE WIRE: HCPCS | Performed by: STUDENT IN AN ORGANIZED HEALTH CARE EDUCATION/TRAINING PROGRAM

## 2022-09-23 PROCEDURE — C9113 INJ PANTOPRAZOLE SODIUM, VIA: HCPCS | Performed by: STUDENT IN AN ORGANIZED HEALTH CARE EDUCATION/TRAINING PROGRAM

## 2022-09-23 PROCEDURE — 74330 X-RAY BILE/PANC ENDOSCOPY: CPT

## 2022-09-23 PROCEDURE — 0FC98ZZ EXTIRPATION OF MATTER FROM COMMON BILE DUCT, VIA NATURAL OR ARTIFICIAL OPENING ENDOSCOPIC: ICD-10-PCS | Performed by: STUDENT IN AN ORGANIZED HEALTH CARE EDUCATION/TRAINING PROGRAM

## 2022-09-23 PROCEDURE — 97130 THER IVNTJ EA ADDL 15 MIN: CPT

## 2022-09-23 PROCEDURE — 7100000000 HC PACU RECOVERY - FIRST 15 MIN: Performed by: STUDENT IN AN ORGANIZED HEALTH CARE EDUCATION/TRAINING PROGRAM

## 2022-09-23 PROCEDURE — 6360000002 HC RX W HCPCS: Performed by: INTERNAL MEDICINE

## 2022-09-23 RX ORDER — LIDOCAINE HYDROCHLORIDE 20 MG/ML
INJECTION, SOLUTION INTRAVENOUS PRN
Status: DISCONTINUED | OUTPATIENT
Start: 2022-09-23 | End: 2022-09-23 | Stop reason: SDUPTHER

## 2022-09-23 RX ORDER — PHENYLEPHRINE HCL IN 0.9% NACL 1 MG/10 ML
SYRINGE (ML) INTRAVENOUS PRN
Status: DISCONTINUED | OUTPATIENT
Start: 2022-09-23 | End: 2022-09-23 | Stop reason: SDUPTHER

## 2022-09-23 RX ORDER — SODIUM CHLORIDE 9 MG/ML
INJECTION, SOLUTION INTRAVENOUS CONTINUOUS PRN
Status: DISCONTINUED | OUTPATIENT
Start: 2022-09-23 | End: 2022-09-23 | Stop reason: SDUPTHER

## 2022-09-23 RX ORDER — PROPOFOL 10 MG/ML
INJECTION, EMULSION INTRAVENOUS PRN
Status: DISCONTINUED | OUTPATIENT
Start: 2022-09-23 | End: 2022-09-23 | Stop reason: SDUPTHER

## 2022-09-23 RX ORDER — SODIUM CHLORIDE 9 MG/ML
25 INJECTION, SOLUTION INTRAVENOUS PRN
Status: DISCONTINUED | OUTPATIENT
Start: 2022-09-23 | End: 2022-10-03 | Stop reason: HOSPADM

## 2022-09-23 RX ORDER — SODIUM CHLORIDE 0.9 % (FLUSH) 0.9 %
5-40 SYRINGE (ML) INJECTION EVERY 12 HOURS SCHEDULED
Status: DISCONTINUED | OUTPATIENT
Start: 2022-09-23 | End: 2022-10-03 | Stop reason: HOSPADM

## 2022-09-23 RX ORDER — SODIUM CHLORIDE 0.9 % (FLUSH) 0.9 %
5-40 SYRINGE (ML) INJECTION PRN
Status: DISCONTINUED | OUTPATIENT
Start: 2022-09-23 | End: 2022-10-03 | Stop reason: HOSPADM

## 2022-09-23 RX ORDER — POTASSIUM CHLORIDE 20 MEQ/1
40 TABLET, EXTENDED RELEASE ORAL ONCE
Status: DISCONTINUED | OUTPATIENT
Start: 2022-09-23 | End: 2022-09-26 | Stop reason: SDUPTHER

## 2022-09-23 RX ORDER — POTASSIUM CHLORIDE 20 MEQ/1
40 TABLET, EXTENDED RELEASE ORAL ONCE
Status: COMPLETED | OUTPATIENT
Start: 2022-09-23 | End: 2022-09-23

## 2022-09-23 RX ADMIN — AMLODIPINE BESYLATE 5 MG: 5 TABLET ORAL at 09:43

## 2022-09-23 RX ADMIN — HEPARIN SODIUM: 1000 INJECTION INTRAVENOUS; SUBCUTANEOUS at 17:41

## 2022-09-23 RX ADMIN — ASPIRIN 81 MG 81 MG: 81 TABLET ORAL at 09:44

## 2022-09-23 RX ADMIN — POTASSIUM CHLORIDE 40 MEQ: 20 TABLET, EXTENDED RELEASE ORAL at 14:00

## 2022-09-23 RX ADMIN — Medication 100 MCG: at 18:00

## 2022-09-23 RX ADMIN — SODIUM CHLORIDE, PRESERVATIVE FREE 40 MG: 5 INJECTION INTRAVENOUS at 09:43

## 2022-09-23 RX ADMIN — OXYCODONE HYDROCHLORIDE 5 MG: 5 TABLET ORAL at 09:43

## 2022-09-23 RX ADMIN — SODIUM CHLORIDE: 9 INJECTION, SOLUTION INTRAVENOUS at 17:39

## 2022-09-23 RX ADMIN — PIPERACILLIN AND TAZOBACTAM 4500 MG: 4; .5 INJECTION, POWDER, LYOPHILIZED, FOR SOLUTION INTRAVENOUS at 02:25

## 2022-09-23 RX ADMIN — PROPOFOL 20 MG: 10 INJECTION, EMULSION INTRAVENOUS at 17:51

## 2022-09-23 RX ADMIN — LIDOCAINE HYDROCHLORIDE 30 MG: 20 INJECTION, SOLUTION INTRAVENOUS at 17:51

## 2022-09-23 RX ADMIN — PROPOFOL 10 MG: 10 INJECTION, EMULSION INTRAVENOUS at 17:53

## 2022-09-23 RX ADMIN — PROPOFOL 25 MCG/KG/MIN: 10 INJECTION, EMULSION INTRAVENOUS at 17:54

## 2022-09-23 RX ADMIN — PROPOFOL 20 MG: 10 INJECTION, EMULSION INTRAVENOUS at 18:02

## 2022-09-23 RX ADMIN — HEPARIN SODIUM: 1000 INJECTION INTRAVENOUS; SUBCUTANEOUS at 17:42

## 2022-09-23 RX ADMIN — LEVETIRACETAM 250 MG: 250 TABLET, FILM COATED ORAL at 09:44

## 2022-09-23 RX ADMIN — PROPOFOL 10 MG: 10 INJECTION, EMULSION INTRAVENOUS at 17:59

## 2022-09-23 RX ADMIN — DULOXETINE HYDROCHLORIDE 30 MG: 30 CAPSULE, DELAYED RELEASE ORAL at 09:44

## 2022-09-23 RX ADMIN — INSULIN LISPRO 4 UNITS: 100 INJECTION, SOLUTION INTRAVENOUS; SUBCUTANEOUS at 09:44

## 2022-09-23 RX ADMIN — LEVOTHYROXINE SODIUM 50 MCG: 0.05 TABLET ORAL at 05:19

## 2022-09-23 RX ADMIN — ISOSORBIDE MONONITRATE 30 MG: 30 TABLET, EXTENDED RELEASE ORAL at 09:44

## 2022-09-23 RX ADMIN — PIPERACILLIN AND TAZOBACTAM 4500 MG: 4; .5 INJECTION, POWDER, LYOPHILIZED, FOR SOLUTION INTRAVENOUS at 14:47

## 2022-09-23 ASSESSMENT — PAIN SCALES - GENERAL
PAINLEVEL_OUTOF10: 0
PAINLEVEL_OUTOF10: 5
PAINLEVEL_OUTOF10: 0

## 2022-09-23 ASSESSMENT — ENCOUNTER SYMPTOMS: SHORTNESS OF BREATH: 1

## 2022-09-23 NOTE — PROGRESS NOTES
GENERAL SURGERY  DAILY PROGRESS NOTE  9/23/2022    Subjective:  No events overnight. Patient resting comfortably this AM. Only minimal abdominal pain overnight. Tolerating diet yesterday per nursing. Objective:  /60   Pulse 71   Temp 98.5 °F (36.9 °C) (Oral)   Resp 18   Ht 5' (1.524 m)   Wt 143 lb 4.8 oz (65 kg)   SpO2 98%   BMI 27.99 kg/m²     General appearance: alert, cooperative and in no acute distress. Eyes: grossly normal  Lungs: nonlabored breathing on 3 L nasal cannula  Heart: regular rate  Abdomen: soft, nondistended. Appropriately tender post op. Incisions clean/dry/intact with skin glue in place. Skin: No skin abnormalities  Neurologic: Alert and oriented x 3. Grossly normal  Musculoskeletal: No clubbing cyanosis or edema    Assessment/Plan:  80 y.o. female history of ESRD on peritoneal dialysis, CAD, DM, A. fib on Eliquis currently admitted with altered mental status with acute cholecystitis and choledocholithiasis s/p Lap milton with IOC 09/21     -Continue Zosyn for antibiotic coverage with multiple filling defects in the common bile duct.   -Patient was noted to have multiple filling defects in the common bile duct during intraoperative cholangiogram.  [facility] GI was consulted for ERCP with possible stenting with recommendations still pending this AM.  -NPO today pending GI recommendations regarding ERCP.   -Okay to use PD catheter for dialysis from surgery perspective    Electronically signed by Tj Daigle DO on 9/23/2022 at 6:06 AM

## 2022-09-23 NOTE — PROGRESS NOTES
Subjective:    S/p Lap milton with IOC 09/21  Advanced GI consulted for ERCP with poss stenting       Objective:    BP (!) 103/36   Pulse 76   Temp 97.8 °F (36.6 °C)   Resp 16   Ht 5' (1.524 m)   Wt 143 lb 4.8 oz (65 kg)   SpO2 98%   BMI 27.99 kg/m²     In: 120 [P.O.:120]  Out: 687   In: 120   Out: 687     General Appearance:  awake and alert, with confusion   Head/face:  NCAT  Eyes:  No gross abnormalities. Lungs:  Normal expansion. Clear to auscultation. No rales, rhonchi, or wheezing. Heart:  Heart sounds are normal.  Regular rate and rhythm without murmur, gallop or rub. Abdomen:  Soft, mildly tender, normal bowel sounds. No bruits, organomegaly or masses. PD cath,-pain to palpation in epigastrium  Extremities: Extremities warm to touch, pink, with no edema. Neurologic:  Lethargic, oriented x 2, no focal deficits, mild involuntary twitching of extremities - improved from admission     Recent Labs     09/22/22  0815   WBC 17.1*   HGB 9.1*   HCT 28.0*            Recent Labs     09/21/22  0957 09/22/22  0815   * 127*   K 3.1* 4.3   CL 89* 88*   CO2 27 19*   BUN 32* 42*   CREATININE 5.1* 5.8*   CALCIUM 8.7 9.4         Assessment:    Principal Problem:    Change in mental status  Active Problems:    Chronic kidney disease    Moderate protein-calorie malnutrition (HCC)  Resolved Problems:    * No resolved hospital problems.  *        PLAN:     # Altered mental status  -Likely from sepsis,   -WBC 23 > 12,000 > 17.1 > 15.9-fluctuating, will see what it is after ERCP  -Started on IV Zosyn 9/19/2022, continue until 9/25/2022 for 7 days total   -EEG > A potential for generalized photosensitive epilepsy, will defer to neurology > started on Keppra 250 mg twice daily, neurology signing off  - blood cultures x 2 are negative   - peritoneal body fluid culture NGTD    LFTs/acute cholecystitis-fluctuating  -s/p lap milton with IOC 9/21/2022  -Multiple filling defects in the CBD noted during IOC, advanced GI consulted for ERCP with possible stenting-planned for today 9/23/2022  -Lipitor held   - > 117  - > 121  -Total Bilirubin 1.7 > 1.6  -Continue to monitor labs, hepatic function test daily     ESRD on PD  -Nephro following  -PD per nephro   -Per general surgery, okay to continue peritoneal dialysis-no need for temporary dialysis line    Severe protein calorie malnutrition  -Nutrition consult  -Start Vanilla Glucerna TID and jacinda BID   -Monitor oral intake during meals  -NPO today regarding GI recommendation for possible ERCP     Diabetes mellitus   -Continue to monitor blood glucose levels  -Better controlled with ISS increase to high dose     Elevated troponin  -without EKG changes or chest pain - likely related to renal failure - noted elevations 04/22 chronically. Trending down 183 > 164  -follow labs  -Eliquis on hold, Hx AFIB     Medication for other comorbidities continue as appropriate dose adjustment as necessary.       DVT prophylaxis heparin   PT OT  Discharge plan  Case discussed with son at bedside daily    Myah Sims MD

## 2022-09-23 NOTE — PLAN OF CARE
Problem: Skin/Tissue Integrity  Goal: Absence of new skin breakdown  Description: 1. Monitor for areas of redness and/or skin breakdown  2. Assess vascular access sites hourly  3. Every 4-6 hours minimum:  Change oxygen saturation probe site  4. Every 4-6 hours:  If on nasal continuous positive airway pressure, respiratory therapy assess nares and determine need for appliance change or resting period.   Outcome: Progressing     Problem: Safety - Adult  Goal: Free from fall injury  Outcome: Progressing     Problem: Chronic Conditions and Co-morbidities  Goal: Patient's chronic conditions and co-morbidity symptoms are monitored and maintained or improved  Outcome: Progressing     Problem: Discharge Planning  Goal: Discharge to home or other facility with appropriate resources  Outcome: Progressing     Problem: Nutrition Deficit:  Goal: Optimize nutritional status  Outcome: Progressing

## 2022-09-23 NOTE — PROGRESS NOTES
[facility] CARDIOLOGY PROGRESS NOTE  [facility]        Subjective: Permanent AF on Eliquis had laparoscopic cholecystectomy done a few days ago for ERCP today, chronic renal failure on peritoneal dialysis, prior CABG in approximately 2000, DM    Patient in no distress today. Denies shortness of breath or chest heaviness currently or overnight. Objective: Labs chart medications and telemetry all reviewed. Patient Vitals for the past 24 hrs:   BP Temp Temp src Pulse Resp SpO2 Height Weight   09/23/22 0943 -- -- -- -- -- 94 % -- --   09/23/22 0730 (!) 103/36 97.8 °F (36.6 °C) -- 76 16 98 % -- 143 lb 4.8 oz (65 kg)   09/22/22 2300 131/60 98.5 °F (36.9 °C) Oral 71 18 98 % -- --   09/22/22 2023 (!) 116/54 97.2 °F (36.2 °C) Temporal 78 19 100 % -- --   09/22/22 1732 138/63 97.7 °F (36.5 °C) Temporal 84 -- 100 % -- --   09/22/22 1611 -- -- -- -- -- -- 5' (1.524 m) --         Intake/Output Summary (Last 24 hours) at 9/23/2022 1409  Last data filed at 9/23/2022 0730  Gross per 24 hour   Intake --   Output 687 ml   Net -687 ml       Wt Readings from Last 3 Encounters:   09/23/22 143 lb 4.8 oz (65 kg)   07/27/22 143 lb 3.2 oz (65 kg)   04/29/22 158 lb 8.2 oz (71.9 kg)       Telemetry: Personally reviewed and shows atrial fibrillation heart rate in the 70s.     Current meds: Scheduled Meds:   sodium chloride flush  5-40 mL IntraVENous 2 times per day    insulin lispro  0-16 Units SubCUTAneous TID WC    insulin lispro  0-4 Units SubCUTAneous Nightly    piperacillin-tazobactam  4,500 mg IntraVENous Q12H    levETIRAcetam  250 mg Oral BID    pantoprazole (PROTONIX) 40 mg injection  40 mg IntraVENous Daily    amLODIPine  5 mg Oral Daily    [Held by provider] apixaban  2.5 mg Oral BID    aspirin  81 mg Oral Daily    [Held by provider] atorvastatin  20 mg Oral Nightly    DULoxetine  30 mg Oral Daily    isosorbide mononitrate  30 mg Oral Daily    levothyroxine  50 mcg Oral Daily    docusate sodium  100 mg Oral Nightly [Held by provider] pantoprazole  40 mg Oral QAM AC     Continuous Infusions:   sodium chloride      dextrose       PRN Meds:.sodium chloride flush, sodium chloride, oxyCODONE, acetaminophen, glucose, dextrose bolus **OR** dextrose bolus, glucagon (rDNA), dextrose, acetaminophen, trimethobenzamide    Allergies: Sulfa antibiotics      Labs:   Recent Labs     09/22/22  0815 09/23/22  1000   WBC 17.1* 15.9*   HGB 9.1* 7.7*   HCT 28.0* 23.7*   MCV 91.5 91.2    297       Labs:   Recent Labs     09/21/22  0957 09/22/22  0815 09/23/22  0503   * 127* 127*   K 3.1* 4.3 3.3*   CL 89* 88* 87*   CO2 27 19* 23   BUN 32* 42* 43*   CREATININE 5.1* 5.8* 5.4*       Labs: No results for input(s): CKTOTAL, CKMB, CKMBINDEX, TROPONINI in the last 72 hours. Labs: No results for input(s): BNP in the last 72 hours. Labs: No results for input(s): CHOL, HDL, TRIG in the last 72 hours. Invalid input(s): Elvi Gola    Labs:   Recent Labs     09/21/22  0510 09/22/22  0428 09/23/22  0503   PROT 5.5* 5.6* 5.7*       Review of systems: No reported significant weight gain or weight loss. no dysuria or frequency, no dizziness, falls or trauma, no change in bowel or bladder habits, no hematochezia, hemoptysis or hematuria. No fevers, chills, nausea or vomiting reported. No significant wheezing or sputum production. No headache or visual changes. The remainder of the 10 review of systems otherwise negative. Exam      General: Patient comfortable in no distress and currently denies any chest pain. HEENT: Face symmetrical and no apparent cranial nerve deficit. Neck: No jugular venous distention, carotid bruit or thyromegaly. Lungs: Clear bilaterally without rales, wheezes or dullness. Cardiac: IRegular rate and rhythm, no S3, S4, no rub or gallop. Abdomen: No rebound or guarding, no hepatosplenomegaly. Extremities: Without significant clubbing , cyanosis, or edema. Neuro:   No focal motor or sensory deficit apparent. Skin: No petechiae, no significant bruising. Assessment: See plan below        Plan: #1 permanent AF, I would restart Eliquis tomorrow if okay with surgery and gastroenterology. Eliquis 2.5 mg twice a day lower dose based on her age, weight and chronic renal failure. #2 laparoscopic cholecystectomy and ERCP. Replace potassium as needed would like to see potassium 4 or greater and today 3.3 yesterday 4.3. Reduce arrhythmia risk. Would give just 40 mill equivalents of potassium today as she does have chronic renal failure on peritoneal dialysis. #3 end-stage renal disease on peritoneal dialysis. Continue to use peritoneal dialysis catheter even with recent laparoscopic cholecystectomy    #4 chronic CAD prior CABG and continue medical management long-term modifying blood pressure, blood sugar and lipids. Long-term aspirin 81 mg daily, atorvastatin 20 mg daily,    #5 hypertension blood pressure reasonable. Advance activity as tolerated.           Electronically signed by Rosalio Wilkins MD on 9/23/2022 at 2:09 PM

## 2022-09-23 NOTE — BRIEF OP NOTE
Brief Postoperative Note      Patient: Gabriel Rios  YOB: 1940  MRN: [id number]    Date of Procedure: 9/23/2022    Pre-Op Diagnosis: Choledocholithiasis [K80.50]    Post-Op Diagnosis: Same       Procedure(s):  ERCP ENDOSCOPIC RETROGRADE CHOLANGIOPANCREATOGRAPHY  ERCP SPHINCTER/PAPILLOTOMY  ERCP STONE REMOVAL  ERCP DILATION BALLOON    Surgeon(s):  Theresa Ames MD    Assistant:  * No surgical staff found *    Anesthesia: Monitor Anesthesia Care    Estimated Blood Loss (mL): Minimal    Complications: None    Specimens:   * No specimens in log *    Implants:  * No implants in log *      Drains: * No LDAs found *    Findings:     ERCP:  Sphincterotomy performed with self-limited bleeding. Balloon sweeps with removal of several small to medium stones. Occlusion cholangiogram with no further filling defects. Balloon dilation extension sphincteroplasty performed with CRE balloon to 8mm. Recommendations:  -Patient will be recovered and then returned to the floor.   -Continue trending LFTs.  -Okay to advance diet as tolerated.  -Hold anticoagulation for 72 hours before re-starting given high risk of post-sphincterotomy bleeding.      Electronically signed by Theresa Ames MD on 9/23/2022 at 6:33 PM

## 2022-09-23 NOTE — CONSULTS
[facility] ([facility])   Gastroenterology, Hepatology, &  Advanced Endoscopy    Consult       ASSESSMENT AND PLAN:    82y/F presents w/ AMS and infection with likely cholecystitis as source. CCY performed w/ IOC showing choledocholithiasis. PLAN:  -Keep NPO and hold anticoagulation. -ERCP today. HISTORY OF PRESENT ILLNESS:      Ms. Nicholas Cuba is an 82y/F w/ history of ESRD on peritoneal dialysis, CAD, insulin-dependent diabetes, hypertension, and A. fib on Eliquis who initially presented to the ED on 9/16 secondary to AMS. Patient was initially evaluated in the ED and noted to have a leukocytosis. Patient started on broad-spectrum antibiotics with Zosyn while working up reason for altered mental status and leukocytosis. Patient had blood cultures drawn which are showing a 24 hours no growth. Patient also had fluid culture from peritoneal dialysis catheter which is showing no growth thus far with final cultures pending. Patient underwent CT abdomen and pelvis without contrast in the ED which demonstrated a markedly distended gallbladder with cholelithiasis and thickened wall possible acute cholecystitis but largely unchanged from CT abdomen pelvis on 7/20. Secondary to this scan being without IV contrast secondary to renal disease patient underwent right upper quadrant ultrasound which again demonstrated a distended gallbladder with numerous cholelithiasis and sludge without wall thickening or pericholecystic fluid negative for acute cholecystitis at that time; common bile duct is also noted to measure 7 mm within normal limits. LFTs were noted to uptrend on admission and patient was also noted to be febrile. Without any clear sign of biliary obstruction, the patient was taken to the OR for cholecystectomy on 9/21 where she was noted to have choledocholithiasis on IOC.     Past Medical History:        Diagnosis Date    Anemia     Arthritis     CAD (coronary artery disease)     Diabetes mellitus ([facility] [address] Gout     History of bleeding peptic ulcer approx     Hypertension     Peritoneal dialysis catheter in place [facility])     Peritoneal dialysis status ([address]     at night for 8 hours    Thyroid disease     Use of cane as ambulatory aid     Wears glasses      Past Surgical History:        Procedure Laterality Date    BACK SURGERY      CARDIOVASCULAR STRESS TEST      CARPAL TUNNEL RELEASE Bilateral     CATARACT REMOVAL WITH IMPLANT Bilateral      SECTION      CHOLECYSTECTOMY, LAPAROSCOPIC N/A 2022    CHOLECYSTECTOMY LAPAROSCOPIC with intraoperative cholangiogram performed by Akiko Mauro MD at [facility]  approx 2000    x 3; per Dr Fer Blanco, [place], [address] [facility] [address]      left knee, right shoulder    DC TOTAL KNEE ARTHROPLASTY Right 10/31/2018    RIGHT KNEE TOTAL ARTHROPLASTY +++BRIDGER++ PNB++ performed by Jeff Renteria MD at [address]       Social History:    TOBACCO:   reports that she has never smoked. She has never used smokeless tobacco.  ETOH:   reports no history of alcohol use. DRUGS:   reports no history of drug use. Family History:   History reviewed. No pertinent family history.       Current Facility-Administered Medications:     sodium chloride flush 0.9 % injection 5-40 mL, 5-40 mL, IntraVENous, 2 times per day, Raquel Garcia MD    sodium chloride flush 0.9 % injection 5-40 mL, 5-40 mL, IntraVENous, PRN, Raquel Garcia MD    0.9 % sodium chloride infusion, 25 mL, IntraVENous, PRN, Raquel Garcia MD    potassium chloride (KLOR-CON M) extended release tablet 40 mEq, 40 mEq, Oral, Once, Luz Maria Sanches MD    oxyCODONE (ROXICODONE) immediate release tablet 5 mg, 5 mg, Oral, Q4H PRN, Kyra Vieira MD, 5 mg at 22 0943    insulin lispro (HUMALOG) injection vial 0-16 Units, 0-16 Units, SubCUTAneous, TID WC, Kyra Vieira MD, 4 Units at 22 0944    insulin lispro (HUMALOG) injection vial 0-4 Units, 0-4 Units, SubCUTAneous, Nightly, Danni Mcmahon MD, 4 Units at 09/22/22 2135    piperacillin-tazobactam (ZOSYN) 4,500 mg in dextrose 5 % 100 mL IVPB ([id number]), 4,500 mg, IntraVENous, Q12H, Danni Mcmahon MD, Last Rate: 25 mL/hr at 09/23/22 1447, 4,500 mg at 09/23/22 1447    levETIRAcetam (KEPPRA) tablet 250 mg, 250 mg, Oral, BID, Danni Mcmahon MD, 250 mg at 09/23/22 0944    pantoprazole (PROTONIX) 40 mg in sodium chloride (PF) 10 mL injection, 40 mg, IntraVENous, Daily, Danni Mcmahon MD, 40 mg at 09/23/22 0943    acetaminophen (TYLENOL) suppository 650 mg, 650 mg, Rectal, Q4H PRN, Danni Mcmhaon MD, 650 mg at 09/18/22 1927    amLODIPine (NORVASC) tablet 5 mg, 5 mg, Oral, Daily, Danni Mcmahon MD, 5 mg at 09/23/22 0943    [Held by provider] apixaban (ELIQUIS) tablet 2.5 mg, 2.5 mg, Oral, BID, Danni Mcmahon MD, 2.5 mg at 09/19/22 0902    aspirin chewable tablet 81 mg, 81 mg, Oral, Daily, Danni Mcmahon MD, 81 mg at 09/23/22 0944    [Held by provider] atorvastatin (LIPITOR) tablet 20 mg, 20 mg, Oral, Nightly, Danni Mcmahon MD, 20 mg at 09/17/22 2152    DULoxetine (CYMBALTA) extended release capsule 30 mg, 30 mg, Oral, Daily, Danni Mcmahon MD, 30 mg at 09/23/22 0944    isosorbide mononitrate (IMDUR) extended release tablet 30 mg, 30 mg, Oral, Daily, Danni Mcmahon MD, 30 mg at 09/23/22 0944    levothyroxine (SYNTHROID) tablet 50 mcg, 50 mcg, Oral, Daily, Danni Mcmahon MD, 50 mcg at 09/23/22 0519    docusate sodium (COLACE) capsule 100 mg, 100 mg, Oral, Nightly, Danni Mcmahon MD, 100 mg at 09/22/22 2134    [Held by provider] pantoprazole (PROTONIX) tablet 40 mg, 40 mg, Oral, QA Mavis JAMA MD    glucose chewable tablet 16 g, 4 tablet, Oral, PRN, Danni Mcmahon MD    dextrose bolus 10% 125 mL, 125 mL, IntraVENous, PRN **OR** dextrose bolus 10% 250 mL, 250 mL, IntraVENous, PRN, Danni Mcmahon MD    glucagon (rDNA) injection 1 mg, 1 mg, SubCUTAneous, PRN, Hari aMyo MD    dextrose 10 % infusion, , IntraVENous, Continuous PRN, Hari Mayo MD    acetaminophen (TYLENOL) tablet 650 mg, 650 mg, Oral, Q4H PRN, Hari Mayo MD, 650 mg at 09/21/22 2108    trimethobenzamide (TIGAN) injection 200 mg, 200 mg, IntraMUSCular, Q6H PRN, Hari Mayo MD, 200 mg at 09/19/22 0855    Facility-Administered Medications Ordered in Other Encounters:     0.9 % sodium chloride infusion, , IntraVENous, Continuous PRN, EMMA Nelson, Stopped at 09/23/22 1826    lidocaine (cardiac) (XYLOCAINE) injection, , IntraVENous, PRN, EMMA Nelson, 30 mg at 09/23/22 1751    propofol injection, , IntraVENous, PRN, EMMA Nelson, Stopped at 09/23/22 1820    phenylephrine (ALAYNA-SYNEPHRINE) 1 MG/10ML prefilled syringe (Push Dose), , IntraVENous, PRN, EMMA Nelson, 100 mcg at 09/23/22 1800     Allergies:  Sulfa antibiotics    ROS:  General: Patient denies n/v/f/c or weight loss. HEENT: Patient denies persistent postnasal drip, scleral icterus, drooling, persistent bleeding from nose/mouth. Resp: Patient denies SOB, wheezing, productive cough. Cards: Patient denies CP, palpitations, significant edema  GI: As above. Derm: Patient denies jaundice/rashes. Musc: Patient denies diffuse/irregular joint swelling or myalgias. PHYSICAL EXAM:  BP (!) 103/36   Pulse 76   Temp 97.8 °F (36.6 °C)   Resp 16   Ht 5' (1.524 m)   Wt 143 lb 4.8 oz (65 kg)   SpO2 94%   BMI 27.99 kg/m²   Physical Exam:  General: Chronically ill-appearing, NAD  HEENT: PERRLA, EOMI, MMM, no rhinorrhea, mild icterus  Cards: RRR, no LE edema  Resp: Breathing comfortably on room air, good air movement, no use of accessory muscles, no audible wheezing  Abdomen: soft, NT, ND. Extremities: Moves all extremities, no effusions or bruising.   Skin: No rashes or jaundice  Neuro: Able to answer questions and identify self, lethargic               Electronically signed by Baldemar Rush, MD on 9/23/2022 at 6:32 PM

## 2022-09-23 NOTE — CARE COORDINATION
Social Work/Case Management Transition of Care Planning (Steffen Suarez [place] [phone]):   Per report, general surgery consulted GI for ERCP. Plan is for ERCP today. Patient remains on IV Zosyn. Phone call to patient's son, Lavelle Colby, to discuss possible GAIL placement. Discussed PT/OT scores - 8 & 13. Son is in agreement GAIL will be needed. List of SARs that can accommodate peritoneal dialysis was left in patient's room for son to review and choice. Referral will be made when son choices. JAN Gonzales  9/23/2022    The Plan for Transition of Care is related to the following treatment goals: GAIL    The Patient and/or patient representative  was provided with a choice of provider and agrees   with the discharge plan. [x] Yes [] No    Freedom of choice list was provided with basic dialogue that supports the patient's individualized plan of care/goals, treatment preferences and shares the quality data associated with the providers.  [x] Yes [] No

## 2022-09-23 NOTE — PROGRESS NOTES
[facility] MD Vincenzo Valdovinos MD Domnick Porch, MD  Progress Note    2022    SUBJECTIVE:   : Not in her room   She is getting cholcystectomy    : Status post cholecystectomy yesterday without complication. Feeling better, ongoing fatigue and malaise, generalized weakness. Appetite is improved and she is looking forward to her dinner. Denies nausea or vomiting. Pain controlled. ROS otherwise unremarkable    : Off the floor, and endoscopy for MRCP. Discussed care with nurse. Vital signs stable. CCPD without complication, though drainage this morning was blood-tinged. No clots and no fibrin. BP somewhat lower than yesterday. PROBLEM LIST:    Principal Problem:    Change in mental status  Active Problems:    Chronic kidney disease    Moderate protein-calorie malnutrition ([address]  Resolved Problems:    * No resolved hospital problems.  *       DIET:    Diet NPO Exceptions are: Sips of Water with Meds       Allergies : Sulfa antibiotics    Past Medical History:   Diagnosis Date    Anemia     Arthritis     CAD (coronary artery disease)     Diabetes mellitus ([address]     Gout     History of bleeding peptic ulcer approx     Hypertension     Peritoneal dialysis catheter in place [facility])     Peritoneal dialysis status ([address]     at night for 8 hours    Thyroid disease     Use of cane as ambulatory aid     Wears glasses        Past Surgical History:   Procedure Laterality Date    BACK SURGERY      CARDIOVASCULAR STRESS TEST      CARPAL TUNNEL RELEASE Bilateral     CATARACT REMOVAL WITH IMPLANT Bilateral      SECTION      CHOLECYSTECTOMY, LAPAROSCOPIC N/A 2022    CHOLECYSTECTOMY LAPAROSCOPIC with intraoperative cholangiogram performed by Kevin Thompson MD at [address] [facility] [address]  approx 2000    x 3; per Dr Marianela Vasques, [place], [address] [facility] [address]      left knee, right shoulder    MS TOTAL KNEE ARTHROPLASTY Right 10/31/2018    RIGHT KNEE TOTAL ARTHROPLASTY +++BRIDGER++ PNB++ performed by Claritza Cardoso MD at [address] [place] [address]         History reviewed. No pertinent family history. reports that she has never smoked. She has never used smokeless tobacco. She reports that she does not drink alcohol and does not use drugs. Review of Systems:   Constitutional: no fevers , no chills , feels ok   Eyes: no eye pain , no itching , no drainage  Ears, nose, mouth, throat, and face: no ear ,nose pain , hearing is ok ,no nasal drainage   Respiratory: no sob ,no cough ,no wheezing . Cardiovascular: no chest pain , no palpitation ,no sob . Gastrointestinal: no nausea, vomiting , constipation , no abdominal pain . Genitourinary:no urinary retention , no burning , dysuria . No polyuria   Hematologic/lymphatic: no bleeding , no cougulation issues . Musculoskeletal:no joint pain , no swelling . Neurological: no headaches ,no weakness , no numbness . Endocrine: no thirst , no weight issues .      MEDS (scheduled):    sodium chloride flush  5-40 mL IntraVENous 2 times per day    potassium chloride  40 mEq Oral Once    custom dialysis builder   IntraPERitoneal Once    custom dialysis builder   IntraPERitoneal Once    custom dialysis builder   IntraPERitoneal Once    insulin lispro  0-16 Units SubCUTAneous TID WC    insulin lispro  0-4 Units SubCUTAneous Nightly    piperacillin-tazobactam  4,500 mg IntraVENous Q12H    levETIRAcetam  250 mg Oral BID    pantoprazole (PROTONIX) 40 mg injection  40 mg IntraVENous Daily    amLODIPine  5 mg Oral Daily    [Held by provider] apixaban  2.5 mg Oral BID    aspirin  81 mg Oral Daily    [Held by provider] atorvastatin  20 mg Oral Nightly    DULoxetine  30 mg Oral Daily    isosorbide mononitrate  30 mg Oral Daily    levothyroxine  50 mcg Oral Daily    docusate sodium  100 mg Oral Nightly    [Held by provider] pantoprazole  40 mg Oral QAM AC       MEDS (infusions): sodium chloride      dextrose         MEDS (prn):  sodium chloride flush, sodium chloride, oxyCODONE, acetaminophen, glucose, dextrose bolus **OR** dextrose bolus, glucagon (rDNA), dextrose, acetaminophen, trimethobenzamide    PHYSICAL EXAM:     Patient Vitals for the past 24 hrs:   BP Temp Temp src Pulse Resp SpO2 Weight   09/23/22 0943 -- -- -- -- -- 94 % --   09/23/22 0730 (!) 103/36 97.8 °F (36.6 °C) -- 76 16 98 % 143 lb 4.8 oz (65 kg)   09/22/22 2300 131/60 98.5 °F (36.9 °C) Oral 71 18 98 % --   09/22/22 2023 (!) 116/54 97.2 °F (36.2 °C) Temporal 78 19 100 % --   09/22/22 1732 138/63 97.7 °F (36.5 °C) Temporal 84 -- 100 % --   @      Intake/Output Summary (Last 24 hours) at 9/23/2022 1724  Last data filed at 9/23/2022 0730  Gross per 24 hour   Intake --   Output 687 ml   Net -687 ml         Wt Readings from Last 3 Encounters:   09/23/22 143 lb 4.8 oz (65 kg)   07/27/22 143 lb 3.2 oz (65 kg)   04/29/22 158 lb 8.2 oz (71.9 kg)       Constitutional:  in no acute distress  Oral: mucus membranes moist  Neck: no JVD  Cardiovascular: S1, S2 regular rhythm, no murmur,or rub  Respiratory:  No crackles, no wheeze  Gastrointestinal:  Soft, nontender, nondistended, NABS  Ext: no edema, feet warm  Skin: dry, no rash  Neuro: awake, alert, interactive      DATA:    Recent Labs     09/22/22  0815 09/23/22  1000   WBC 17.1* 15.9*   HGB 9.1* 7.7*   HCT 28.0* 23.7*   MCV 91.5 91.2    297     Recent Labs     09/21/22  0510 09/21/22  0957 09/22/22  0428 09/22/22  0815 09/23/22  0503   NA  --  129*  --  127* 127*   K  --  3.1*  --  4.3 3.3*   CL  --  89*  --  88* 87*   CO2  --  27  --  19* 23   BUN  --  32*  --  42* 43*   CREATININE  --  5.1*  --  5.8* 5.4*   *  --  179*  --  121*   *  --  215*  --  117*   BILIDIR 0.9*  --  1.5*  --  1.3*   BILITOT 1.1  --  1.7*  --  1.6*   ALKPHOS 724*  --  807*  --  584*       No results found for: LABPROT    Assessment    1.   End-stage renal disease, on peritoneal dialysis. 2.  Encephalopathy, etiology unclear, metabolic versus ongoing infection. .  Markedly improved  3. Leukocytosis ? Ascedning cholangitis. Improved now status post cholecystectomy  4. Anemia due to ESRD  5. Hypomagnesemia. 6.  Mineral bone disease. Recommendations  \"Cleared\" per general surgery to resume PD  Resume CCPD this evening for solute and volume clearance. We will plan lower volume fills (1500 cc versus 2000).   All 1.5% dextrose solutions given the relative hypotension  1000 units heparin added per bag to prevent clotting, adhesions given blood in her peritoneum  If she develops any complications, will need to switch to hemodialysis temporarily  Otherwise continue current management  Follow labs     Electronically signed by Nathan Penny MD on 9/23/2022

## 2022-09-23 NOTE — ANESTHESIA PRE PROCEDURE
Department of Anesthesiology  Preprocedure Note       Name:  Agustin Nava   Age:  80 y.o.  :  1940                                          MRN:  [id number]         Date:  2022      Surgeon: Damari Everett):  Joyce Gavin MD    Procedure: Procedure(s):  ERCP ENDOSCOPIC RETROGRADE CHOLANGIOPANCREATOGRAPHY    Medications prior to admission:   Prior to Admission medications    Medication Sig Start Date End Date Taking? Authorizing Provider   insulin glargine (LANTUS) 100 UNIT/ML injection vial Inject 10 Units into the skin in the morning and 10 Units before bedtime. 22   KERRI Pro CNP   guaiFENesin (ROBITUSSIN) 100 MG/5ML SOLN oral solution Take 10 mLs by mouth every 6 hours as needed for Cough 22   KERRI Pro CNP   zinc sulfate (ZINCATE) 220 (50 Zn) MG capsule Take 1 capsule by mouth in the morning for 2 doses. 22  KERRI Pro CNP   B Complex-C-Folic Acid (DIALYVITE 513 PO) Take by mouth    Historical Provider, MD   omeprazole (PRILOSEC) 20 MG delayed release capsule Take 20 mg by mouth in the morning. Historical Provider, MD   Cetirizine HCl (ZYRTEC PO) Take by mouth  Patient not taking: Reported on 2022    Historical Provider, MD   potassium chloride (KLOR-CON M) 20 MEQ extended release tablet Take 20 mEq by mouth in the morning. Historical Provider, MD   amLODIPine (NORVASC) 5 MG tablet Take 5 mg by mouth in the morning. Historical Provider, MD   aspirin 81 MG chewable tablet Take 1 tablet by mouth daily 22   Adam Lee MD   apixaban (ELIQUIS) 2.5 MG TABS tablet Take 1 tablet by mouth 2 times daily 22   Adam Lee MD   atorvastatin (LIPITOR) 20 MG tablet Take 1 tablet by mouth nightly 22   Adam Lee MD   DULoxetine (CYMBALTA) 30 MG extended release capsule Take 30 mg by mouth in the morning.     Historical Provider, MD   isosorbide mononitrate (IMDUR) 30 MG CR tablet Take 30 mg by mouth in the morning. Historical Provider, MD   levothyroxine (SYNTHROID) 50 MCG tablet Take 50 mcg by mouth Daily    Historical Provider, MD       Current medications:    No current facility-administered medications for this visit. No current outpatient medications on file.      Facility-Administered Medications Ordered in Other Visits   Medication Dose Route Frequency Provider Last Rate Last Admin    sodium chloride flush 0.9 % injection 5-40 mL  5-40 mL IntraVENous 2 times per day Nick Oviedo MD        sodium chloride flush 0.9 % injection 5-40 mL  5-40 mL IntraVENous PRN Nick Oviedo MD        0.9 % sodium chloride infusion  25 mL IntraVENous PRN Nick Oviedo MD        oxyCODONE (ROXICODONE) immediate release tablet 5 mg  5 mg Oral Q4H PRN Nora Ly MD   5 mg at 09/23/22 0943    insulin lispro (HUMALOG) injection vial 0-16 Units  0-16 Units SubCUTAneous TID WC Nora Ly MD   4 Units at 09/23/22 0944    insulin lispro (HUMALOG) injection vial 0-4 Units  0-4 Units SubCUTAneous Nightly Nora Ly MD   4 Units at 09/22/22 2135    piperacillin-tazobactam (ZOSYN) 4,500 mg in dextrose 5 % 100 mL IVPB ([id number])  4,500 mg IntraVENous Q12H Nora Ly MD   Stopped at 09/23/22 0807    levETIRAcetam (KEPPRA) tablet 250 mg  250 mg Oral BID Nora Ly MD   250 mg at 09/23/22 0944    pantoprazole (PROTONIX) 40 mg in sodium chloride (PF) 10 mL injection  40 mg IntraVENous Daily Nora Ly MD   40 mg at 09/23/22 0943    acetaminophen (TYLENOL) suppository 650 mg  650 mg Rectal Q4H PRN Nora Ly MD   650 mg at 09/18/22 1927    amLODIPine (NORVASC) tablet 5 mg  5 mg Oral Daily Nora Ly MD   5 mg at 09/23/22 0943    [Held by provider] apixaban (ELIQUIS) tablet 2.5 mg  2.5 mg Oral BID Nora Ly MD   2.5 mg at 09/19/22 0902    aspirin chewable tablet 81 mg  81 mg Oral Daily Nora Ly MD   81 mg at 09/23/22 4568    [Held by provider] atorvastatin (LIPITOR) tablet 20 mg  20 mg Oral Nightly Harini Brooks MD   20 mg at 09/17/22 2152    DULoxetine (CYMBALTA) extended release capsule 30 mg  30 mg Oral Daily Harini Brooks MD   30 mg at 09/23/22 0944    isosorbide mononitrate (IMDUR) extended release tablet 30 mg  30 mg Oral Daily Harini Brooks MD   30 mg at 09/23/22 0944    levothyroxine (SYNTHROID) tablet 50 mcg  50 mcg Oral Daily Harini Brooks MD   50 mcg at 09/23/22 0519    docusate sodium (COLACE) capsule 100 mg  100 mg Oral Nightly Harini Brooks MD   100 mg at 09/22/22 2134    [Held by provider] pantoprazole (PROTONIX) tablet 40 mg  40 mg Oral QAM AC Harini Brooks MD        glucose chewable tablet 16 g  4 tablet Oral PRN Harini Brooks MD        dextrose bolus 10% 125 mL  125 mL IntraVENous PRN Harini Brooks MD        Or    dextrose bolus 10% 250 mL  250 mL IntraVENous PRN Harini Brooks MD        glucagon (rDNA) injection 1 mg  1 mg SubCUTAneous PRN Harini Brooks MD        dextrose 10 % infusion   IntraVENous Continuous PRN Harini Brooks MD        acetaminophen (TYLENOL) tablet 650 mg  650 mg Oral Q4H PRN Harini Brooks MD   650 mg at 09/21/22 2108    trimethobenzamide (TIGAN) injection 200 mg  200 mg IntraMUSCular Q6H PRN Harini Brooks MD   200 mg at 09/19/22 0855       Allergies:     Allergies   Allergen Reactions    Sulfa Antibiotics Swelling     Swelling after being out in sun       Problem List:    Patient Active Problem List   Diagnosis Code    Peritoneal dialysis catheter infection ([facility] T85.71XA    Sepsis ([facility] A41.9    SIRS (systemic inflammatory response syndrome) ([facility] R65.10    Type 2 diabetes mellitus with diabetic chronic kidney disease ([facility] E11.22    Osteoarthritis of right knee M17.11    Arthritis of knee, right M17.11    History of peritoneal dialysis Z98.890    End stage renal disease ([facility] N18.6    Altered mental status R41.82    Acute delirium R41.0    Essential hypertension, benign I10    ESRD on peritoneal dialysis ([facility] N18.6, Z99.2    Type 2 diabetes mellitus, with long-term current use of insulin (HCC) E11.9, Z79.4    Primary hypertension I10    Hypomagnesemia E83.42    Hypokalemia E87.6    Obesity (BMI 30-39. 9) E66.9    TIA (transient ischemic attack) G45.9    Hyperlipidemia E78.5    AMS (altered mental status) R41.82    Diabetes mellitus ([facility] E11.9    Lactic acid acidosis E87.2    Leukocytosis D72.829    Change in mental status R41.82    Chronic kidney disease N18.9    Moderate protein-calorie malnutrition (HCC) E44.0       Past Medical History:        Diagnosis Date    Anemia     Arthritis     CAD (coronary artery disease)     Diabetes mellitus ([facility]     Gout     History of bleeding peptic ulcer approx     Hypertension     Peritoneal dialysis [facility] in [facility])     Peritoneal dialysis status ([facility]     at night for 8 hours    Thyroid disease     Use of cane as ambulatory aid     Wears glasses        Past Surgical History:        Procedure Laterality Date    BACK SURGERY      CARDIOVASCULAR STRESS TEST      CARPAL TUNNEL RELEASE Bilateral     CATARACT REMOVAL WITH IMPLANT Bilateral      SECTION      CHOLECYSTECTOMY, LAPAROSCOPIC N/A 2022    CHOLECYSTECTOMY LAPAROSCOPIC with intraoperative cholangiogram performed by Sunitha Chahal MD at [address] [place] [address]  approx 2000    x 3; per Dr Ronen Bolton, COLON, DIAGNOSTIC      JOINT REPLACEMENT      left knee, right shoulder    [place] TOTAL KNEE ARTHROPLASTY Right 10/31/2018    RIGHT KNEE TOTAL ARTHROPLASTY +++BRIDGER++ PNB++ performed by Guido Armijo MD at [place] [address] ENDOSCOPY         Social History:    Social History     Tobacco Use    Smoking status: Never    Smokeless tobacco: Never   Substance Use Topics    Alcohol use:  No                                Counseling given: Not Answered      Vital Signs (Current): There were no vitals filed for this visit. BP Readings from Last 3 Encounters:   09/23/22 (!) 103/36   07/27/22 137/68   04/29/22 (!) 156/75       NPO Status:  >8 hs                                                                               BMI:   Wt Readings from Last 3 Encounters:   09/23/22 143 lb 4.8 oz (65 kg)   07/27/22 143 lb 3.2 oz (65 kg)   04/29/22 158 lb 8.2 oz (71.9 kg)     There is no height or weight on file to calculate BMI.    CBC:   Lab Results   Component Value Date/Time    WBC 15.9 09/23/2022 10:00 AM    RBC 2.60 09/23/2022 10:00 AM    HGB 7.7 09/23/2022 10:00 AM    HCT 23.7 09/23/2022 10:00 AM    MCV 91.2 09/23/2022 10:00 AM    RDW 14.4 09/23/2022 10:00 AM     09/23/2022 10:00 AM       CMP:   Lab Results   Component Value Date/Time     09/23/2022 05:03 AM    K 3.3 09/23/2022 05:03 AM    K 4.4 07/20/2022 10:23 AM    CL 87 09/23/2022 05:03 AM    CO2 23 09/23/2022 05:03 AM    BUN 43 09/23/2022 05:03 AM    CREATININE 5.4 09/23/2022 05:03 AM    GFRAA 9 09/23/2022 05:03 AM    LABGLOM 8 09/23/2022 05:03 AM    GLUCOSE 236 09/23/2022 05:03 AM    PROT 5.7 09/23/2022 05:03 AM    CALCIUM 8.9 09/23/2022 05:03 AM    BILITOT 1.6 09/23/2022 05:03 AM    ALKPHOS 584 09/23/2022 05:03 AM     09/23/2022 05:03 AM     09/23/2022 05:03 AM       POC Tests: No results for input(s): POCGLU, POCNA, POCK, POCCL, POCBUN, POCHEMO, POCHCT in the last 72 hours.     Coags:   Lab Results   Component Value Date/Time    PROTIME 12.6 09/16/2022 11:41 AM    INR 1.2 09/16/2022 11:41 AM    APTT 29.1 09/16/2022 11:41 AM       HCG (If Applicable): No results found for: PREGTESTUR, PREGSERUM, HCG, HCGQUANT     ABGs: No results found for: PHART, PO2ART, WWQ9HNP, ELX2QWK, BEART, S1UBIZKY     Type & Screen (If Applicable):  No results found for: LABABO, LABRH    Drug/Infectious Status (If Applicable):  No results found for: HIV, HEPCAB    COVID-19 Screening (If Applicable):   Lab Results   Component Value Date/Time    COVID19 Not Detected 09/18/2022 09:00 PM       EKG 9/16/22  Atrial fibrillation  Low voltage QRS  Right bundle branch block  Abnormal ECG  When compared with ECG of 20-JUL-2022 01:49,  Significant changes have occurred    ECHO 4/29/22   Summary   Mild to moderate mitral regurgitation is present. No mitral valve stenosis present. The aortic valve appears mildly sclerotic. Moderate aortic valve stenosis. Mild tricuspid regurgitation. Pulmonary hypertension is mild to moderate . The left atrium is mildly dilated. LV Ejection fraction is visually estimated at 55%. Mild left ventricular concentric hypertrophy noted. Anesthesia Evaluation  Patient summary reviewed and Nursing notes reviewed no history of anesthetic complications:   Airway: Mallampati: III  TM distance: <3 FB   Neck ROM: full  Mouth opening: > = 3 FB   Dental:    (+) caps and poor dentition      Pulmonary:normal exam  breath sounds clear to auscultation  (+) shortness of breath:            Patient did not smoke on day of surgery. ROS comment:   CXR 09/16/2022    FINDINGS:  Sternal wires are seen.     EKG leads are seen superimposed over the chest.     Poor inspiratory effort is seen that limits evaluation.     The cardiomediastinal silhouette is slightly prominent in size.     Prominence of the bronchovascular and interstitial lung markings is seen that  demonstrates prominence in comparison to the prior study, increased hazziness  is seen overlying the left hemithorax.  Peribronchial cuffing and bilateral  hilar prominence is seen.  The right costophrenic angle is unremarkable, mild  haziness with blunting of the left costophrenic angle is seen     The lungs are without acute focal process.  There is no pneumothorax.     The osseous structures are without acute process.        Cardiovascular:  Exercise tolerance: poor (<4 METS), (+) hypertension:, valvular problems/murmurs ( moderate AS and moderate MS): AS, CAD:, dysrhythmias: atrial fibrillation, murmur, pulmonary hypertension: moderate and mild, hyperlipidemia      ECG reviewed  Rhythm: irregular  Rate: abnormal  Echocardiogram reviewed         Beta Blocker:  Not on Beta Blocker      ROS comment: 2D Echocardiogram 04/27/2022   Summary   Mild to moderate mitral regurgitation is present. No mitral valve stenosis present. The aortic valve appears mildly sclerotic. Moderate aortic valve stenosis. Mild tricuspid regurgitation. Pulmonary hypertension is mild to moderate . The left atrium is mildly dilated. LV Ejection fraction is visually estimated at 55%. Mild left ventricular concentric hypertrophy noted. Signature      ----------------------------------------------------------------   Electronically signed by Destinee Pozo MD(Interpreting   physician) on 05/01/2022 09:56 AM    EKG 09/16/2022  Atrial fibrillation  Low voltage QRS  Right bundle branch block  Abnormal ECG  When compared with ECG of 20-JUL-2022 01:49,  Significant changes have occurred  Confirmed by Reynaldo Enriuqe ([id number]) on 9/16/2022 1:49:57 PM     Neuro/Psych:   (+) TIA, psychiatric history:             ROS comment: Altered mental status  Acute delirium  Gout    History primarily obtained from pt's chart and Carlos Ledezma, pt's son at bedside due to pt's confusion. GI/Hepatic/Renal:   (+) liver disease (Acutely elevaed LFTs):, renal disease (PD, last done NOC of 09/22/2022): ESRD and dialysis,           Endo/Other:    (+) DiabetesType II DM, using insulin, blood dyscrasia: anemia, arthritis: OA., electrolyte abnormalities (K 3.3, hypomagnesemia, ), . Abdominal:             Vascular: negative vascular ROS. Other Findings: Pt drowsy, unable to answer questions            Anesthesia Plan      MAC     ASA 4     (22 ga)  Induction: intravenous.     MIPS: Postoperative opioids intended and Prophylactic antiemetics administered. Anesthetic plan and risks discussed with patient. Use of blood products discussed with patient whom consented to blood products. Plan discussed with CRNA and attending.               Pending cardiac clearance       Oanh Strauss RN [facility]  9/23/2022

## 2022-09-23 NOTE — OP NOTE
Operative Note      Patient: Keira Steve  YOB: 1940  MRN: [id number]    Date of Procedure: 9/23/2022    Pre-Op Diagnosis: Choledocholithiasis [K80.50]    Post-Op Diagnosis: Same       Procedure(s):  ERCP ENDOSCOPIC RETROGRADE CHOLANGIOPANCREATOGRAPHY  ERCP SPHINCTER/PAPILLOTOMY  ERCP STONE REMOVAL  ERCP DILATION BALLOON    Surgeon(s):  Alonso Webster MD    Assistant:   * No surgical staff found *    Anesthesia: Monitor Anesthesia Care    Estimated Blood Loss (mL): Minimal    Complications: None    Specimens:   * No specimens in log *    Implants:  * No implants in log *      Drains: * No LDAs found *    Indications:  82y/F presents w/ AMS secondary to infection with concern for cholecystitis s/p CCY w/ IOC showing choledocholithiasis. Findings:   Sphincterotomy performed with self-limited bleeding. Balloon sweeps with removal of several small to medium-sized stones. Occlusion cholangiogram with no further filling defects. Balloon dilation extension sphincteroplasty performed with CRE balloon to 8mm. Detailed Description of Procedure:   Pre-Anesthesia Assessment:  Prior to the procedure, a history and physical was performed and patient medications and allergies were reviewed. The risks, benefits, complications, treatment options and expected outcomes were discussed with the patient. The possibilities of reaction to medication, pulmonary aspiration, perforation of the gastrointestinal tract, bleeding requiring transfusion or operation, respiratory failure requiring placement on a ventilator, failure to diagnose a condition or identify polyps/lesions, and acute pancreatitis with potential need for prolonged hospitalization were discussed with the patient. All questions were answered and informed consent was obtained. Patient identification and proposed procedure were verified by the physician, the nurse, the anesthesiologist, the anesthetist, and the technician in the preprocedure area. Please see accompanying documentation. All staff in attendance for the performed procedure act in the role of the Chaperone. After I obtained informed consent, the scope was passed under direct vision. Throughout the procedure, the patient's blood pressure, pulse, and oxygen saturations were monitored continuously. The duodenoscope was introduced through the mouth and advanced to the ampulla. The procedure was performed without difficulty. The patient tolerated the procedure well. ERCP:  A  film of the abdomen was obtained. Sternotomy wires were appreciated. Surgical clips consistent with the previous cholecystectomy were seen in the area of the right upper quadrant of the abdomen. The esophagus was successfully intubated under direct vision. The scope was advanced to a the major papilla in the descending duodenum without detailed examination of the pharynx, larynx and associated structures, and upper GI tract. The upper GI tract was grossly normal.  The bile duct was deeply cannulated with the short nosed traction sphincterotome after passage of a guidewire. Contrast was injected to confirm placement within the bile duct. Biliary sphincterotomy was made with the traction (standard) sphincterotome using electrocautery. There was mild, self-limited post- sphincterotomy bleeding. A 12mm balloon was advanced over the wire. Several sweeps were made starting at the bifurcation and several stones and debris was was removed. After no further removal of debris, an occlusion cholangiogram was performed which showed no further filling defects within the bile ducts. A balloon dilation extension sphincteroplasty was then performed using a CRE balloon to 8mm. Adequate drainage of contrast was appreciated endoscopically as well as on fluoroscopy. The scope was then withdrawn while suctioning any remaining fluid from the duodenum and stomach.  The total fluoroscopy exposure time was saved in the equipment used according to protocol. Recommendations:  -Patient will be recovered and then returned to the floor.   -Continue trending LFTs.  -Okay to advance diet as tolerated.  -Hold anticoagulation for 72 hours before re-starting given high risk of post-sphincterotomy bleeding.      Electronically signed by Celio Rayo MD on 9/23/2022 at 6:33 PM

## 2022-09-24 LAB
ALBUMIN SERPL-MCNC: 1.7 G/DL (ref 3.5–5.2)
ALP BLD-CCNC: 537 U/L (ref 35–104)
ALT SERPL-CCNC: 88 U/L (ref 0–32)
ANION GAP SERPL CALCULATED.3IONS-SCNC: 18 MMOL/L (ref 7–16)
ANISOCYTOSIS: ABNORMAL
AST SERPL-CCNC: 70 U/L (ref 0–31)
BASOPHILIC STIPPLING: ABNORMAL
BASOPHILS ABSOLUTE: 0 E9/L (ref 0–0.2)
BASOPHILS RELATIVE PERCENT: 0.4 % (ref 0–2)
BILIRUB SERPL-MCNC: 1.8 MG/DL (ref 0–1.2)
BILIRUBIN DIRECT: 1.3 MG/DL (ref 0–0.3)
BILIRUBIN, INDIRECT: 0.5 MG/DL (ref 0–1)
BUN BLDV-MCNC: 43 MG/DL (ref 6–23)
CALCIUM SERPL-MCNC: 8.6 MG/DL (ref 8.6–10.2)
CHLORIDE BLD-SCNC: 86 MMOL/L (ref 98–107)
CO2: 22 MMOL/L (ref 22–29)
CREAT SERPL-MCNC: 5.1 MG/DL (ref 0.5–1)
EOSINOPHILS ABSOLUTE: 0 E9/L (ref 0.05–0.5)
EOSINOPHILS RELATIVE PERCENT: 0.3 % (ref 0–6)
GFR AFRICAN AMERICAN: 10
GFR NON-AFRICAN AMERICAN: 8 ML/MIN/1.73
GLUCOSE BLD-MCNC: 324 MG/DL (ref 74–99)
HCT VFR BLD CALC: 24.1 % (ref 34–48)
HEMOGLOBIN: 8.1 G/DL (ref 11.5–15.5)
LYMPHOCYTES ABSOLUTE: 2.3 E9/L (ref 1.5–4)
LYMPHOCYTES RELATIVE PERCENT: 11.3 % (ref 20–42)
MAGNESIUM: 1.6 MG/DL (ref 1.6–2.6)
MCH RBC QN AUTO: 30.6 PG (ref 26–35)
MCHC RBC AUTO-ENTMCNC: 33.6 % (ref 32–34.5)
MCV RBC AUTO: 90.9 FL (ref 80–99.9)
METER GLUCOSE: 134 MG/DL (ref 74–99)
METER GLUCOSE: 223 MG/DL (ref 74–99)
METER GLUCOSE: 281 MG/DL (ref 74–99)
METER GLUCOSE: 335 MG/DL (ref 74–99)
MONOCYTES ABSOLUTE: 1.67 E9/L (ref 0.1–0.95)
MONOCYTES RELATIVE PERCENT: 7.8 % (ref 2–12)
MYELOCYTE PERCENT: 1.8 % (ref 0–0)
NEUTROPHILS ABSOLUTE: 16.93 E9/L (ref 1.8–7.3)
NEUTROPHILS RELATIVE PERCENT: 79.1 % (ref 43–80)
OVALOCYTES: ABNORMAL
PDW BLD-RTO: 14 FL (ref 11.5–15)
PHOSPHORUS: 3.4 MG/DL (ref 2.5–4.5)
PLATELET # BLD: 334 E9/L (ref 130–450)
PMV BLD AUTO: 11.1 FL (ref 7–12)
POIKILOCYTES: ABNORMAL
POLYCHROMASIA: ABNORMAL
POTASSIUM SERPL-SCNC: 3.6 MMOL/L (ref 3.5–5)
RBC # BLD: 2.65 E12/L (ref 3.5–5.5)
SCHISTOCYTES: ABNORMAL
SODIUM BLD-SCNC: 126 MMOL/L (ref 132–146)
TARGET CELLS: ABNORMAL
TOTAL PROTEIN: 5.9 G/DL (ref 6.4–8.3)
WBC # BLD: 20.9 E9/L (ref 4.5–11.5)

## 2022-09-24 PROCEDURE — 80076 HEPATIC FUNCTION PANEL: CPT

## 2022-09-24 PROCEDURE — 6370000000 HC RX 637 (ALT 250 FOR IP): Performed by: STUDENT IN AN ORGANIZED HEALTH CARE EDUCATION/TRAINING PROGRAM

## 2022-09-24 PROCEDURE — 6370000000 HC RX 637 (ALT 250 FOR IP): Performed by: INTERNAL MEDICINE

## 2022-09-24 PROCEDURE — 6360000002 HC RX W HCPCS: Performed by: STUDENT IN AN ORGANIZED HEALTH CARE EDUCATION/TRAINING PROGRAM

## 2022-09-24 PROCEDURE — 90945 DIALYSIS ONE EVALUATION: CPT

## 2022-09-24 PROCEDURE — A4216 STERILE WATER/SALINE, 10 ML: HCPCS | Performed by: STUDENT IN AN ORGANIZED HEALTH CARE EDUCATION/TRAINING PROGRAM

## 2022-09-24 PROCEDURE — C9113 INJ PANTOPRAZOLE SODIUM, VIA: HCPCS | Performed by: STUDENT IN AN ORGANIZED HEALTH CARE EDUCATION/TRAINING PROGRAM

## 2022-09-24 PROCEDURE — 84100 ASSAY OF PHOSPHORUS: CPT

## 2022-09-24 PROCEDURE — 2580000003 HC RX 258: Performed by: STUDENT IN AN ORGANIZED HEALTH CARE EDUCATION/TRAINING PROGRAM

## 2022-09-24 PROCEDURE — 1200000000 HC SEMI PRIVATE

## 2022-09-24 PROCEDURE — 85025 COMPLETE CBC W/AUTO DIFF WBC: CPT

## 2022-09-24 PROCEDURE — 80048 BASIC METABOLIC PNL TOTAL CA: CPT

## 2022-09-24 PROCEDURE — 83735 ASSAY OF MAGNESIUM: CPT

## 2022-09-24 PROCEDURE — 99233 SBSQ HOSP IP/OBS HIGH 50: CPT | Performed by: STUDENT IN AN ORGANIZED HEALTH CARE EDUCATION/TRAINING PROGRAM

## 2022-09-24 PROCEDURE — 82962 GLUCOSE BLOOD TEST: CPT

## 2022-09-24 PROCEDURE — 36415 COLL VENOUS BLD VENIPUNCTURE: CPT

## 2022-09-24 RX ORDER — LANOLIN ALCOHOL/MO/W.PET/CERES
400 CREAM (GRAM) TOPICAL EVERY 6 HOURS
Status: COMPLETED | OUTPATIENT
Start: 2022-09-24 | End: 2022-09-24

## 2022-09-24 RX ORDER — AMLODIPINE BESYLATE 2.5 MG/1
2.5 TABLET ORAL DAILY
Status: DISCONTINUED | OUTPATIENT
Start: 2022-09-24 | End: 2022-10-03 | Stop reason: HOSPADM

## 2022-09-24 RX ADMIN — LEVETIRACETAM 250 MG: 250 TABLET, FILM COATED ORAL at 08:26

## 2022-09-24 RX ADMIN — DOCUSATE SODIUM 100 MG: 100 CAPSULE, LIQUID FILLED ORAL at 20:59

## 2022-09-24 RX ADMIN — ISOSORBIDE MONONITRATE 30 MG: 30 TABLET, EXTENDED RELEASE ORAL at 08:26

## 2022-09-24 RX ADMIN — PIPERACILLIN AND TAZOBACTAM 4500 MG: 4; .5 INJECTION, POWDER, LYOPHILIZED, FOR SOLUTION INTRAVENOUS at 03:35

## 2022-09-24 RX ADMIN — INSULIN LISPRO 4 UNITS: 100 INJECTION, SOLUTION INTRAVENOUS; SUBCUTANEOUS at 17:49

## 2022-09-24 RX ADMIN — OXYCODONE HYDROCHLORIDE 5 MG: 5 TABLET ORAL at 12:04

## 2022-09-24 RX ADMIN — METOPROLOL TARTRATE 12.5 MG: 25 TABLET, FILM COATED ORAL at 08:26

## 2022-09-24 RX ADMIN — PIPERACILLIN AND TAZOBACTAM 4500 MG: 4; .5 INJECTION, POWDER, LYOPHILIZED, FOR SOLUTION INTRAVENOUS at 13:23

## 2022-09-24 RX ADMIN — INSULIN LISPRO 12 UNITS: 100 INJECTION, SOLUTION INTRAVENOUS; SUBCUTANEOUS at 06:49

## 2022-09-24 RX ADMIN — METOPROLOL TARTRATE 12.5 MG: 25 TABLET, FILM COATED ORAL at 20:59

## 2022-09-24 RX ADMIN — MAGNESIUM OXIDE 400 MG (241.3 MG MAGNESIUM) TABLET 400 MG: TABLET at 17:31

## 2022-09-24 RX ADMIN — LEVETIRACETAM 250 MG: 250 TABLET, FILM COATED ORAL at 20:59

## 2022-09-24 RX ADMIN — SODIUM CHLORIDE, PRESERVATIVE FREE 10 ML: 5 INJECTION INTRAVENOUS at 08:32

## 2022-09-24 RX ADMIN — MAGNESIUM OXIDE 400 MG (241.3 MG MAGNESIUM) TABLET 400 MG: TABLET at 20:59

## 2022-09-24 RX ADMIN — DULOXETINE HYDROCHLORIDE 30 MG: 30 CAPSULE, DELAYED RELEASE ORAL at 08:26

## 2022-09-24 RX ADMIN — AMLODIPINE BESYLATE 2.5 MG: 5 TABLET ORAL at 08:26

## 2022-09-24 RX ADMIN — SODIUM CHLORIDE, PRESERVATIVE FREE 10 ML: 5 INJECTION INTRAVENOUS at 20:59

## 2022-09-24 RX ADMIN — ASPIRIN 81 MG 81 MG: 81 TABLET ORAL at 08:26

## 2022-09-24 RX ADMIN — SODIUM CHLORIDE, PRESERVATIVE FREE 40 MG: 5 INJECTION INTRAVENOUS at 08:31

## 2022-09-24 ASSESSMENT — PAIN SCALES - GENERAL
PAINLEVEL_OUTOF10: 0
PAINLEVEL_OUTOF10: 0

## 2022-09-24 NOTE — FLOWSHEET NOTE
09/24/22 1046   Peritoneal Dialysis Catheter Right lower abdomen   No Placement Date or Time found.    Catheter Location: Right lower abdomen   Status Deaccessed   Site Condition Clean, dry, intact   Peritoneal Dialysis (CAPD manual)   Effluent Appearance Clear;Light;Red   Cycler   Ultrafiltration (UF) (mL) 225 mL

## 2022-09-24 NOTE — ANESTHESIA POSTPROCEDURE EVALUATION
Department of Anesthesiology  Postprocedure Note    Patient: He Vicente  MRN: [id number]  YOB: 1940  Date of evaluation: 9/24/2022      Procedure Summary     Date: 09/23/22 Room / [facility] / [facility]    Anesthesia Start: 9023 Anesthesia Stop: 2438    Procedures:       ERCP ENDOSCOPIC RETROGRADE CHOLANGIOPANCREATOGRAPHY      ERCP SPHINCTER/PAPILLOTOMY      ERCP STONE REMOVAL      ERCP DILATION BALLOON Diagnosis:       Choledocholithiasis      (Choledocholithiasis [K80.50])    Surgeons: Mark Valenzuela MD Responsible Provider: Wilton Thao MD    Anesthesia Type: MAC ASA Status: 4          Anesthesia Type: MAC    Ammon Phase I: Ammon Score: 10    Ammon Phase II:        Anesthesia Post Evaluation    Patient location during evaluation: PACU  Patient participation: complete - patient participated  Level of consciousness: awake and alert  Airway patency: patent  Nausea & Vomiting: no nausea and no vomiting  Complications: no  Cardiovascular status: hemodynamically stable  Respiratory status: acceptable  Hydration status: euvolemic  There was medical reason for not using a multimodal analgesia pain management approach.

## 2022-09-24 NOTE — FLOWSHEET NOTE
09/24/22 1615   Peritoneal Dialysis Catheter Right lower abdomen   No Placement Date or Time found. Catheter Location: Right lower abdomen   Status Accessed   Site Condition Clean, dry, intact   Dressing Status New dressing applied   Dressing Gauze   Cycler   Verification of Prescription CCPD   Total Volume Programmed 58359 mL   Therapy Time (Hours:Minutes) 9   Fill Volume 2000 mL   Last Fill Volume 1500 mL   Dextrose Setting Same (Nonextraneal)   Number of Cycles 5   Bag Volume 5000 mL   Number of Bags Used 3   CCPD programmed 9hr tx with 5 exchanges of2L 2.5 % in 5L bags and final fill 1.5L, total volume 11.5L verified. Tx started via PD cath using aseptic technique and dressing changed. Draining and filling with out complications. Remains in care of staff.

## 2022-09-24 NOTE — PROGRESS NOTES
[facility] ([facility])  Gastroenterology, Hepatology &  Advanced Endoscopy     Progress Note      SUBJECTIVE:      Patient more awake and engaging than yesterday with less lethargy. She reports no abdominal pain. OBJECTIVE      Physical    VITALS:  BP 96/70   Pulse 80   Temp 97.2 °F (36.2 °C) (Oral)   Resp 18   Ht 5' (1.524 m)   Wt 143 lb 4.8 oz (65 kg)   SpO2 94%   BMI 27.99 kg/m²   Physical Exam:  General: Chronically ill-appearing, NAD  HEENT: PERRLA, EOMI, Anicteric sclera, MMM, no rhinorrhea  Cards: RRR, no LE edema  Resp: Breathing comfortably on room air, good air movement, no use of accessory muscles, no audible wheezing  Abdomen: soft, NT, ND. Extremities: Moves all extremities, no effusions or bruising. Skin: No rashes, jaundice improving  Neuro: A&O x 3, CN grossly intact, non-focal exam     ASSESSMENT AND PLAN      82y/F who presents w/ AMS and is found to have biliary obstruction and RUQ pain s/p CCY w/ IOC showing choledocholithiasis now s/p ERCP w/ stone removal.     LFTs downtrending with uptrend in leukocytosis. She remains afebrile with improvement in mental status. PLAN:  -Continue trending LFTs  -Hold Eliquis for total 72hrs from ERCP   -Continue Zosyn for now. I will follow. Thank you for including us in the care of this patient. Please do not hesitate to contact us with any additional questions or concerns.     Park Carrasquillo MD  Gastroenterology/Hepatology  Advanced Endoscopy

## 2022-09-24 NOTE — FLOWSHEET NOTE
09/23/22 1930   Vitals   BP (!) 104/47   Temp 98.2 °F (36.8 °C)   Temp Source Axillary   Heart Rate 73   Resp 22   SpO2 95 %   Peritoneal Dialysis Catheter Right lower abdomen   No Placement Date or Time found. Catheter Location: Right lower abdomen   Status Accessed   Site Condition Clean, dry, intact   Dressing Status Clean, dry & intact   Dressing Gauze   Date of Last Dressing Change 09/23/22   Dialysis Type Continuous cycling   Exit Site Condition Clean, Dry   Catheter Care Given Yes   Peritoneal Dialysis (CAPD manual)   Effluent Appearance Clear;Light;Red   Cycler   Verification of Prescription CCPD   Total Volume Programmed 80080 mL   Therapy Time (Hours:Minutes) 11   Fill Volume 1500 mL   Last Fill Volume 1500 mL   Dextrose Setting Same (Nonextraneal)   Number of Cycles 7   Bag Volume 5000 mL   Number of Bags Used 3   Dianeal Solution   (1.5% in 5000mL)   I Drain (mL) 100 mL   First drain unable to bypass because of touch screen error.  Error fixed, drain bypassed and first fill complete

## 2022-09-24 NOTE — PROGRESS NOTES
Subjective:    Patient is awake and alert to self on examination  Denies any chest pain, shortness of breath  According to her neighbor in the room, patient has been uncomfortable calling out for help frequently    Objective:    BP (!) 150/48   Pulse 84   Temp 98.1 °F (36.7 °C) (Oral)   Resp 20   Ht 5' (1.524 m)   Wt 143 lb 4.8 oz (65 kg)   SpO2 97%   BMI 27.99 kg/m²     In: 50 [I.V.:50]  Out: 1012   In: 50   Out: 1012     General Appearance:  awake and alert, with slight confusion   Head/face:  NCAT  Eyes:  No gross abnormalities. Lungs:  Normal expansion. Clear to auscultation. No rales, rhonchi, or wheezing. Heart:  Heart sounds are normal.  Regular rate and rhythm without murmur, gallop or rub. Abdomen:  Soft, tender, normal bowel sounds. No bruits, PD cath  Extremities: Extremities warm to touch, pink, with no edema. Neurologic:  Awake, oriented x 2, no focal deficits, mild involuntary twitching of extremities - improved from admission     Recent Labs     09/22/22  0815 09/23/22  1000 09/24/22  0504   WBC 17.1* 15.9* 20.9*   HGB 9.1* 7.7* 8.1*   HCT 28.0* 23.7* 24.1*    297 334       Recent Labs     09/22/22  0815 09/23/22  0503 09/24/22  0504   * 127* 126*   K 4.3 3.3* 3.6   CL 88* 87* 86*   CO2 19* 23 22   BUN 42* 43* 43*   CREATININE 5.8* 5.4* 5.1*   CALCIUM 9.4 8.9 8.6       Assessment:    Principal Problem:    Change in mental status  Active Problems:    Chronic kidney disease    Moderate protein-calorie malnutrition (HCC)  Resolved Problems:    * No resolved hospital problems.  *        PLAN:     # Altered mental status  -Likely from sepsis,   -WBC 23 > 20.9 post ERCP  -Started on IV Zosyn 9/19/2022, continue until 9/25/2022 for 7 days total   -EEG > A potential for generalized photosensitive epilepsy, will defer to neurology > started on Keppra 250 mg twice daily, neurology signed off  - blood cultures x 2 are negative   - peritoneal body fluid culture NGTD    LFTs/acute cholecystitis-fluctuating  -s/p lap milton with IOC 9/21/2022  -Multiple filling defects in the CBD noted during IOC, advanced GI consulted for ERCP/stone removal sphincterotomy and sphincteroplasty-procedure completed on 9/23-which she tolerated well, pain ongoing  -Lipitor held   - > 117>70  - > 121>88  -Total Bilirubin 1.7 > 1.6>1.8  -Continue to monitor labs, hepatic function test daily -improving    ESRD on PD  -Nephro following  -PD per nephro   -Per general surgery, okay to continue peritoneal dialysis-no need for temporary dialysis line    Severe protein calorie malnutrition  -Nutrition consult  -Start Vanilla Glucerna TID and jacinda BID   -Monitor oral intake during meals  -Advance diet as tolerated    Diabetes mellitus   -Continue to monitor blood glucose levels  -Better controlled with ISS increase to high dose     Elevated troponin  -without EKG changes or chest pain - likely related to renal failure - noted elevations 04/22 chronically. Trending down 183 > 164  -follow labs  -Eliquis on hold, Hx AFIB     Medication for other comorbidities continue as appropriate dose adjustment as necessary. DVT prophylaxis heparin   PT OT  Discharge plan    Frantz Camarena, APRN-CNP  1:42  9/24/2022    Above note edited to reflect my thoughts     I personally saw, examined and provided care for the patient. Radiographs, labs and medication list were reviewed by me independently. The case was discussed in detail and plans for care were established. Review of Alexandra Camarena APRN- CNP, documentation was conducted and revisions were made as appropriate directly by me. I agree with the above documented exam, problem list, and plan of care.      Andree Dumont MD  2:01 PM  9/24/2022

## 2022-09-24 NOTE — PROGRESS NOTES
[facility] MD Varghese Bruce MD Sue Hoover, MD  Progress Note    2022    SUBJECTIVE:   : Not in her room   She is getting cholcystectomy    : Status post cholecystectomy yesterday without complication. Feeling better, ongoing fatigue and malaise, generalized weakness. Appetite is improved and she is looking forward to her dinner. Denies nausea or vomiting. Pain controlled. ROS otherwise unremarkable    : Off the floor, and endoscopy for MRCP. Discussed care with nurse. Vital signs stable. CCPD without complication, though drainage this morning was blood-tinged. No clots and no fibrin. BP somewhat lower than yesterday. : Constipated. No dyspnea. Ongoing fatigue, malaise, generalized weakness. Ongoing anorexia. CCPD last evening without complication, effluent less blood-tinged. PROBLEM LIST:    Principal Problem:    Change in mental status  Active Problems:    Chronic kidney disease    Moderate protein-calorie malnutrition ([address]  Resolved Problems:    * No resolved hospital problems. *       DIET:    ADULT DIET; Regular; Low Potassium (Less than 3000 mg/day); Low Phosphorus (Less than 1000 mg)  ADULT ORAL NUTRITION SUPPLEMENT; AM Snack, PM Snack;  Renal Oral Supplement       Allergies : Sulfa antibiotics    Past Medical History:   Diagnosis Date    Anemia     Arthritis     CAD (coronary artery disease)     Diabetes mellitus ([address]     Gout     History of bleeding peptic ulcer approx     Hypertension     Peritoneal dialysis catheter in place [facility])     Peritoneal dialysis status ([address]     at night for 8 hours    Thyroid disease     Use of cane as ambulatory aid     Wears glasses        Past Surgical History:   Procedure Laterality Date    BACK SURGERY      CARDIOVASCULAR STRESS TEST      CARPAL TUNNEL RELEASE Bilateral     CATARACT REMOVAL WITH IMPLANT Bilateral      SECTION      CHOLECYSTECTOMY, LAPAROSCOPIC N/A 2022 2.5 mg Oral BID    aspirin  81 mg Oral Daily    [Held by provider] atorvastatin  20 mg Oral Nightly    DULoxetine  30 mg Oral Daily    isosorbide mononitrate  30 mg Oral Daily    levothyroxine  50 mcg Oral Daily    docusate sodium  100 mg Oral Nightly    [Held by provider] pantoprazole  40 mg Oral QAM AC       MEDS (infusions):   sodium chloride      dextrose         MEDS (prn):  sodium chloride flush, sodium chloride, oxyCODONE, acetaminophen, glucose, dextrose bolus **OR** dextrose bolus, glucagon (rDNA), dextrose, acetaminophen, trimethobenzamide    PHYSICAL EXAM:     Patient Vitals for the past 24 hrs:   BP Temp Temp src Pulse Resp SpO2   09/24/22 1230 (!) 150/48 98.1 °F (36.7 °C) Oral 84 20 97 %   09/24/22 0753 (!) 119/53 98.7 °F (37.1 °C) Oral 77 16 98 %   09/23/22 2120 124/79 -- -- 77 -- --   09/23/22 1930 (!) 104/47 98.2 °F (36.8 °C) Axillary 73 22 95 %   09/23/22 1900 (!) 114/46 97.1 °F (36.2 °C) -- 80 25 95 %   09/23/22 1845 (!) 115/47 -- -- 89 22 95 %   09/23/22 1830 (!) 114/51 97.4 °F (36.3 °C) -- 91 23 94 %   @      Intake/Output Summary (Last 24 hours) at 9/24/2022 1440  Last data filed at 9/24/2022 1046  Gross per 24 hour   Intake 50 ml   Output 325 ml   Net -275 ml         Wt Readings from Last 3 Encounters:   09/23/22 143 lb 4.8 oz (65 kg)   07/27/22 143 lb 3.2 oz (65 kg)   04/29/22 158 lb 8.2 oz (71.9 kg)       Constitutional:  in no acute distress  Oral: mucus membranes moist  Neck: no JVD  Cardiovascular: S1, S2 regular rhythm, no murmur,or rub  Respiratory:  No crackles, no wheeze  Gastrointestinal:  Soft, nontender, nondistended, NABS  Ext: no edema, feet warm  Skin: dry, no rash  Neuro: awake, alert, interactive      DATA:    Recent Labs     09/22/22  0815 09/23/22  1000 09/24/22  0504   WBC 17.1* 15.9* 20.9*   HGB 9.1* 7.7* 8.1*   HCT 28.0* 23.7* 24.1*   MCV 91.5 91.2 90.9    297 334     Recent Labs     09/22/22  0428 09/22/22  0815 09/23/22  0503 09/24/22  0504   NA  --  127* 127* 126*   K  --  4.3 3.3* 3.6   CL  --  88* 87* 86*   CO2  --  19* 23 22   MG  --   --   --  1.6   PHOS  --   --   --  3.4   BUN  --  42* 43* 43*   CREATININE  --  5.8* 5.4* 5.1*   *  --  121* 88*   *  --  117* 70*   BILIDIR 1.5*  --  1.3* 1.3*   BILITOT 1.7*  --  1.6* 1.8*   ALKPHOS 807*  --  584* 537*       No results found for: LABPROT    Assessment    1. End-stage renal disease, on peritoneal dialysis. 2.  Encephalopathy, etiology unclear, metabolic versus ongoing infection. .  Markedly improved  3. Leukocytosis ? Ascedning cholangitis. Improved now status post cholecystectomy  4. Anemia due to ESRD  5. Hypomagnesemia. 6.  Mineral bone disease. 7.  Hyponatremia likely due to water overload, mild though     Recommendations  Continue CCPD this evening for solute and volume clearance.   We will increase the volume to 2000 cc per exchange   Use all 2.5% dextrose solutions to increase water clearance  1000 units heparin added per bag to prevent clotting, adhesions given blood in her peritoneum  If she develops any complications, will need to switch to hemodialysis temporarily  Otherwise continue current management  Follow labs     Electronically signed by Andre Maldonado MD on 9/24/2022

## 2022-09-24 NOTE — PROGRESS NOTES
[facility] Cardiology    INPATIENT CARDIOLOGY FOLLOW-UP    Name: Debbie Hammond    Age: 80 y.o. Date of Admission: 9/16/2022 11:18 AM    Date of Service: 9/24/2022    Chief Complaint: Follow-up for coronary artery disease, paroxysmal atrial fibrillation, aortic valve stenosis with normal EF, preoperative assessment prior to cholecystectomy followed by ERCP    Interim History:  No new overnight cardiac complaints. Currently with no complaints of CP, SOB, palpitations, dizziness, or lightheadedness. Underwent ERCP 9/23, lap cholecystectomy on 9/21  Hemoglobin 8.1      Telemetry  reviewed and showed atrial fibrillation with CVR        Review of Systems:   Cardiac: As per HPI  General: No fever, chills  Pulmonary: No cough, wheeze, or shortness of breath  GI: No nausea, vomiting,or abdominal pain  Neuro: No headache or confusion      Allergies:   Allergies   Allergen Reactions    Sulfa Antibiotics Swelling     Swelling after being out in sun       Current Medications:  Current Facility-Administered Medications   Medication Dose Route Frequency Provider Last Rate Last Admin    sodium chloride flush 0.9 % injection 5-40 mL  5-40 mL IntraVENous 2 times per day Kaden Cabrera MD        sodium chloride flush 0.9 % injection 5-40 mL  5-40 mL IntraVENous PRN Kaden Cabrera MD        0.9 % sodium chloride infusion  25 mL IntraVENous PRN Kaden Cabrera MD        potassium chloride (KLOR-CON M) extended release tablet 40 mEq  40 mEq Oral Once Madhu Clark MD        oxyCODONE (ROXICODONE) immediate release tablet 5 mg  5 mg Oral Q4H PRN Aaron June MD   5 mg at 09/23/22 0943    insulin lispro (HUMALOG) injection vial 0-16 Units  0-16 Units SubCUTAneous TID  Aaron June MD   12 Units at 09/24/22 0649    insulin lispro (HUMALOG) injection vial 0-4 Units  0-4 Units SubCUTAneous Nightly Aaron June MD   4 Units at 09/22/22 5576    piperacillin-tazobactam (ZOSYN) 4,500 mg in dextrose 5 % 100 mL IVPB ([id number])  4,500 mg IntraVENous Q12H Hubert Loving MD 25 mL/hr at 09/24/22 0335 4,500 mg at 09/24/22 0335    levETIRAcetam (KEPPRA) tablet 250 mg  250 mg Oral BID Hubert Loving MD   250 mg at 09/23/22 0944    pantoprazole (PROTONIX) 40 mg in sodium chloride (PF) 10 mL injection  40 mg IntraVENous Daily Hubert Loving MD   40 mg at 09/23/22 0943    acetaminophen (TYLENOL) suppository 650 mg  650 mg Rectal Q4H PRN Hubert Loving MD   650 mg at 09/18/22 1927    amLODIPine (NORVASC) tablet 5 mg  5 mg Oral Daily Hubert Loving MD   5 mg at 09/23/22 0943    [Held by provider] apixaban (ELIQUIS) tablet 2.5 mg  2.5 mg Oral BID Hubert Loving MD   2.5 mg at 09/19/22 0902    aspirin chewable tablet 81 mg  81 mg Oral Daily Hubert Loving MD   81 mg at 09/23/22 0944    [Held by provider] atorvastatin (LIPITOR) tablet 20 mg  20 mg Oral Nightly Hubert Loving MD   20 mg at 09/17/22 2152    DULoxetine (CYMBALTA) extended release capsule 30 mg  30 mg Oral Daily Hubert Loving MD   30 mg at 09/23/22 0944    isosorbide mononitrate (IMDUR) extended release tablet 30 mg  30 mg Oral Daily Hubert Loving MD   30 mg at 09/23/22 0944    levothyroxine (SYNTHROID) tablet 50 mcg  50 mcg Oral Daily Hubert Loving MD   50 mcg at 09/23/22 0519    docusate sodium (COLACE) capsule 100 mg  100 mg Oral Nightly Hubert Loving MD   100 mg at 09/22/22 2134    [Held by provider] pantoprazole (PROTONIX) tablet 40 mg  40 mg Oral QAM Magda Atkins MD        glucose chewable tablet 16 g  4 tablet Oral PRN Hubert Loving MD        dextrose bolus 10% 125 mL  125 mL IntraVENous PRN Hubert Loving MD        Or    dextrose bolus 10% 250 mL  250 mL IntraVENous PRN Hubert Loving MD        glucagon (rDNA) injection 1 mg  1 mg SubCUTAneous PRN Hubert Loving MD        dextrose 10 % infusion   IntraVENous Continuous PRN Hubert Loving MD        acetaminophen (TYLENOL) tablet 650 mg  650 mg Oral Q4H PRN Libby Ferraro MD   650 mg at 09/21/22 2108    trimethobenzamide (TIGAN) injection 200 mg  200 mg IntraMUSCular Q6H PRN Libby Ferraro MD   200 mg at 09/19/22 0855      sodium chloride      dextrose         Physical Exam:  /79   Pulse 77   Temp 98.2 °F (36.8 °C) (Axillary)   Resp 22   Ht 5' (1.524 m)   Wt 143 lb 4.8 oz (65 kg)   SpO2 95%   BMI 27.99 kg/m²   Weight change: Wt Readings from Last 3 Encounters:   09/23/22 143 lb 4.8 oz (65 kg)   07/27/22 143 lb 3.2 oz (65 kg)   04/29/22 158 lb 8.2 oz (71.9 kg)         Intake/Output:    Intake/Output Summary (Last 24 hours) at 9/24/2022 0724  Last data filed at 9/24/2022 7849  Gross per 24 hour   Intake 50 ml   Output 787 ml   Net -737 ml     No intake/output data recorded. General: Awake, alert, oriented x3, no acute distress    Neck: No JVD or carotid bruits,     Cardiac: Irregularly irregular rhythm with normal rate, diminished S2, grade 2 midsystolic murmur at the base, no diastolic murmurs normal carotid upstroke and no bruits. Resp: Unlabored respirations at rest.  No wheezes, rales or rhonchi. Abdomen: soft, nontender, nondistended, BS+; active bowel sounds    Extremities: no cyanosis, clubbing, or edema. Normal pulses in the upper/lower extremities bilaterally. Skin: Warm and dry, no bruises, petechiae or rashes. Neuro: moves all extremities appropriately to command, and normal sensation to light touch in the upper and lower extremities bilaterally. Laboratory Tests:  Lab Results   Component Value Date    CREATININE 5.1 (H) 09/24/2022    BUN 43 (H) 09/24/2022     (L) 09/24/2022    K 3.6 09/24/2022    CL 86 (L) 09/24/2022    CO2 22 09/24/2022     No results for input(s): CKTOTAL, CKMB, TROPONINI in the last 72 hours.   No results found for: PROBNP  Lab Results   Component Value Date/Time    WBC 20.9 09/24/2022 05:04 AM    RBC 2.65 09/24/2022 05:04 AM    HGB 8.1 09/24/2022 05:04 AM    HCT 24.1 09/24/2022 05:04 AM    MCV 90.9 09/24/2022 05:04 AM    MCH 30.6 09/24/2022 05:04 AM    MCHC 33.6 09/24/2022 05:04 AM    RDW 14.0 09/24/2022 05:04 AM     09/24/2022 05:04 AM    MPV 11.1 09/24/2022 05:04 AM     Recent Labs     09/22/22  0428 09/23/22  0503 09/24/22  0504   ALKPHOS 807* 584* 537*   * 121* 88*   * 117* 70*   PROT 5.6* 5.7* 5.9*   BILITOT 1.7* 1.6* 1.8*   BILIDIR 1.5* 1.3* 1.3*   LABALBU 1.7* 2.0* 1.7*     Lab Results   Component Value Date/Time    MG 1.6 09/24/2022 05:04 AM     Lab Results   Component Value Date/Time    PROTIME 12.6 09/16/2022 11:41 AM    INR 1.2 09/16/2022 11:41 AM     Lab Results   Component Value Date/Time    TSH 3.910 09/17/2022 05:49 AM     No components found for: CHLPL  No results found for: TRIG  Lab Results   Component Value Date    HDL 27 04/28/2022     Lab Results   Component Value Date    LDLCALC 119 (H) 04/28/2022       Radiology:  FL ERCP BILIARY AND PANCREATIC S&I   Final Result   Fluoroscopic support for ERCP. Please refer to the procedure report for   further details. FLUORO FOR SURGICAL PROCEDURES   Final Result   Intraprocedural fluoroscopic spot images as above. See separate procedure   report for more information. NM HEPATOBILIARY   Final Result   1. Delayed excretion of radiotracer from the liver suggest underlying   hepatic dysfunction. This limits the overall accuracy of the examination. 2.  The common bile duct is patent. 3.  The gallbladder is not visualized by 4 hours. While this could represent   cholecystitis, given the delayed excretion from the liver, the finding is   less specific. Careful clinical correlation is advised. CT HEAD WO CONTRAST   Final Result   No acute intracranial abnormality.          US GALLBLADDER RUQ   Final Result   Distended gallbladder with numerous echogenic areas demonstrating shadowing   of cholelithiasis and intermixed sludge however no wall thickening or pericholecystic fluid         CT ABDOMEN PELVIS WO CONTRAST Additional Contrast? None   Final Result   Markedly distended gallbladder with cholelithiasis and mild edematous wall   thickening concerning for possible acute cholecystitis or gallbladder   hydrops. The appearance is similar to the prior exam on 07/20/2022. Consider right upper quadrant ultrasound for further evaluation, if   clinically indicated. Small-moderate volume of ascites with stable peritoneal dialysis catheter   seen. No evidence of bowel obstruction. XR CHEST PORTABLE   Final Result   Increased opacification and bronchovascular prominence in comparison to the   prior study. CT HEAD WO CONTRAST   Final Result   1. There is no acute intracranial abnormality. Specifically, there is no   intracranial hemorrhage. 2. Atrophy and periventricular leukomalacia,   . Problem List:  Active Hospital Problems    Diagnosis     Chronic kidney disease [N18.9]      Priority: Medium    Moderate protein-calorie malnutrition ([facility] [E44.0]      Priority: Medium    Change in mental status [R41.82]      Priority: Medium           ASSESSMENT/PLAN:  CAD and remained stable. Continue aspirin, amlodipine and isosorbide mononitrate. Office charts indicate that she was taking amlodipine 2.5 mg daily, metoprolol 25 mg twice daily and isosorbide 30 mg daily. Change amlodipine to 2.5 daily, begin metoprolol 12.5 twice daily. Hypertension controlled    Chronic atrial fibrillation with CVR. Beta-blocker restarted as above. Will restart Eliquis 72 hours following ERCP as per GI recommendations.     Stable aortic valve stenosis    Status post lap milton and ERCP      Electronically signed by Марина Wiggins MD on 9/24/2022 at 7:24 AM

## 2022-09-25 LAB
ANION GAP SERPL CALCULATED.3IONS-SCNC: 17 MMOL/L (ref 7–16)
ANISOCYTOSIS: ABNORMAL
BASOPHILIC STIPPLING: ABNORMAL
BASOPHILS ABSOLUTE: 0 E9/L (ref 0–0.2)
BASOPHILS RELATIVE PERCENT: 0.7 % (ref 0–2)
BUN BLDV-MCNC: 38 MG/DL (ref 6–23)
CALCIUM SERPL-MCNC: 8.7 MG/DL (ref 8.6–10.2)
CHLORIDE BLD-SCNC: 88 MMOL/L (ref 98–107)
CO2: 22 MMOL/L (ref 22–29)
CREAT SERPL-MCNC: 4.6 MG/DL (ref 0.5–1)
EOSINOPHILS ABSOLUTE: 0.47 E9/L (ref 0.05–0.5)
EOSINOPHILS RELATIVE PERCENT: 2.6 % (ref 0–6)
GFR AFRICAN AMERICAN: 11
GFR NON-AFRICAN AMERICAN: 9 ML/MIN/1.73
GLUCOSE BLD-MCNC: 335 MG/DL (ref 74–99)
HCT VFR BLD CALC: 25.5 % (ref 34–48)
HEMOGLOBIN: 8.4 G/DL (ref 11.5–15.5)
LYMPHOCYTES ABSOLUTE: 1.79 E9/L (ref 1.5–4)
LYMPHOCYTES RELATIVE PERCENT: 10.4 % (ref 20–42)
MAGNESIUM: 1.8 MG/DL (ref 1.6–2.6)
MCH RBC QN AUTO: 29.8 PG (ref 26–35)
MCHC RBC AUTO-ENTMCNC: 32.9 % (ref 32–34.5)
MCV RBC AUTO: 90.4 FL (ref 80–99.9)
METAMYELOCYTES RELATIVE PERCENT: 1.7 % (ref 0–1)
METER GLUCOSE: 147 MG/DL (ref 74–99)
METER GLUCOSE: 167 MG/DL (ref 74–99)
METER GLUCOSE: 262 MG/DL (ref 74–99)
METER GLUCOSE: 321 MG/DL (ref 74–99)
MONOCYTES ABSOLUTE: 0.72 E9/L (ref 0.1–0.95)
MONOCYTES RELATIVE PERCENT: 3.5 % (ref 2–12)
NEUTROPHILS ABSOLUTE: 15.04 E9/L (ref 1.8–7.3)
NEUTROPHILS RELATIVE PERCENT: 81.8 % (ref 43–80)
OVALOCYTES: ABNORMAL
PDW BLD-RTO: 13.9 FL (ref 11.5–15)
PHOSPHORUS: 2.9 MG/DL (ref 2.5–4.5)
PLATELET # BLD: 339 E9/L (ref 130–450)
PMV BLD AUTO: 10.8 FL (ref 7–12)
POIKILOCYTES: ABNORMAL
POLYCHROMASIA: ABNORMAL
POTASSIUM SERPL-SCNC: 3.4 MMOL/L (ref 3.5–5)
RBC # BLD: 2.82 E12/L (ref 3.5–5.5)
SODIUM BLD-SCNC: 127 MMOL/L (ref 132–146)
WBC # BLD: 17.9 E9/L (ref 4.5–11.5)

## 2022-09-25 PROCEDURE — A4216 STERILE WATER/SALINE, 10 ML: HCPCS | Performed by: STUDENT IN AN ORGANIZED HEALTH CARE EDUCATION/TRAINING PROGRAM

## 2022-09-25 PROCEDURE — 6360000002 HC RX W HCPCS: Performed by: STUDENT IN AN ORGANIZED HEALTH CARE EDUCATION/TRAINING PROGRAM

## 2022-09-25 PROCEDURE — C9113 INJ PANTOPRAZOLE SODIUM, VIA: HCPCS | Performed by: STUDENT IN AN ORGANIZED HEALTH CARE EDUCATION/TRAINING PROGRAM

## 2022-09-25 PROCEDURE — 83735 ASSAY OF MAGNESIUM: CPT

## 2022-09-25 PROCEDURE — 1200000000 HC SEMI PRIVATE

## 2022-09-25 PROCEDURE — 2580000003 HC RX 258: Performed by: STUDENT IN AN ORGANIZED HEALTH CARE EDUCATION/TRAINING PROGRAM

## 2022-09-25 PROCEDURE — 90945 DIALYSIS ONE EVALUATION: CPT

## 2022-09-25 PROCEDURE — 6370000000 HC RX 637 (ALT 250 FOR IP): Performed by: STUDENT IN AN ORGANIZED HEALTH CARE EDUCATION/TRAINING PROGRAM

## 2022-09-25 PROCEDURE — 97530 THERAPEUTIC ACTIVITIES: CPT

## 2022-09-25 PROCEDURE — 84100 ASSAY OF PHOSPHORUS: CPT

## 2022-09-25 PROCEDURE — 97535 SELF CARE MNGMENT TRAINING: CPT

## 2022-09-25 PROCEDURE — 85025 COMPLETE CBC W/AUTO DIFF WBC: CPT

## 2022-09-25 PROCEDURE — 80048 BASIC METABOLIC PNL TOTAL CA: CPT

## 2022-09-25 PROCEDURE — 6370000000 HC RX 637 (ALT 250 FOR IP): Performed by: INTERNAL MEDICINE

## 2022-09-25 PROCEDURE — 82962 GLUCOSE BLOOD TEST: CPT

## 2022-09-25 PROCEDURE — 36415 COLL VENOUS BLD VENIPUNCTURE: CPT

## 2022-09-25 RX ORDER — POLYETHYLENE GLYCOL 3350 17 G/17G
17 POWDER, FOR SOLUTION ORAL DAILY PRN
Status: DISCONTINUED | OUTPATIENT
Start: 2022-09-25 | End: 2022-10-01

## 2022-09-25 RX ORDER — POTASSIUM CHLORIDE 20 MEQ/1
40 TABLET, EXTENDED RELEASE ORAL ONCE
Status: DISCONTINUED | OUTPATIENT
Start: 2022-09-25 | End: 2022-09-26 | Stop reason: SDUPTHER

## 2022-09-25 RX ORDER — DOCUSATE SODIUM 100 MG/1
100 CAPSULE, LIQUID FILLED ORAL DAILY
Status: DISCONTINUED | OUTPATIENT
Start: 2022-09-25 | End: 2022-09-25 | Stop reason: SDUPTHER

## 2022-09-25 RX ADMIN — PIPERACILLIN AND TAZOBACTAM 4500 MG: 4; .5 INJECTION, POWDER, LYOPHILIZED, FOR SOLUTION INTRAVENOUS at 17:08

## 2022-09-25 RX ADMIN — AMLODIPINE BESYLATE 2.5 MG: 5 TABLET ORAL at 09:15

## 2022-09-25 RX ADMIN — ISOSORBIDE MONONITRATE 30 MG: 30 TABLET, EXTENDED RELEASE ORAL at 09:15

## 2022-09-25 RX ADMIN — PIPERACILLIN AND TAZOBACTAM 4500 MG: 4; .5 INJECTION, POWDER, LYOPHILIZED, FOR SOLUTION INTRAVENOUS at 02:18

## 2022-09-25 RX ADMIN — ATORVASTATIN CALCIUM 20 MG: 20 TABLET, FILM COATED ORAL at 19:42

## 2022-09-25 RX ADMIN — APIXABAN 2.5 MG: 2.5 TABLET, FILM COATED ORAL at 19:42

## 2022-09-25 RX ADMIN — INSULIN LISPRO 12 UNITS: 100 INJECTION, SOLUTION INTRAVENOUS; SUBCUTANEOUS at 09:23

## 2022-09-25 RX ADMIN — ASPIRIN 81 MG 81 MG: 81 TABLET ORAL at 09:15

## 2022-09-25 RX ADMIN — SODIUM CHLORIDE, PRESERVATIVE FREE 10 ML: 5 INJECTION INTRAVENOUS at 09:17

## 2022-09-25 RX ADMIN — DULOXETINE HYDROCHLORIDE 30 MG: 30 CAPSULE, DELAYED RELEASE ORAL at 09:15

## 2022-09-25 RX ADMIN — LEVETIRACETAM 250 MG: 250 TABLET, FILM COATED ORAL at 19:42

## 2022-09-25 RX ADMIN — SODIUM CHLORIDE, PRESERVATIVE FREE 40 MG: 5 INJECTION INTRAVENOUS at 09:14

## 2022-09-25 RX ADMIN — METOPROLOL TARTRATE 12.5 MG: 25 TABLET, FILM COATED ORAL at 09:15

## 2022-09-25 RX ADMIN — LEVETIRACETAM 250 MG: 250 TABLET, FILM COATED ORAL at 09:15

## 2022-09-25 RX ADMIN — ACETAMINOPHEN 650 MG: 325 TABLET, FILM COATED ORAL at 09:13

## 2022-09-25 RX ADMIN — LEVOTHYROXINE SODIUM 50 MCG: 0.05 TABLET ORAL at 06:09

## 2022-09-25 RX ADMIN — TRIMETHOBENZAMIDE HYDROCHLORIDE 200 MG: 100 INJECTION INTRAMUSCULAR at 09:39

## 2022-09-25 ASSESSMENT — PAIN SCALES - GENERAL
PAINLEVEL_OUTOF10: 0
PAINLEVEL_OUTOF10: 0
PAINLEVEL_OUTOF10: 3
PAINLEVEL_OUTOF10: 0
PAINLEVEL_OUTOF10: 0

## 2022-09-25 ASSESSMENT — PAIN DESCRIPTION - ONSET: ONSET: GRADUAL

## 2022-09-25 ASSESSMENT — PAIN DESCRIPTION - FREQUENCY: FREQUENCY: INTERMITTENT

## 2022-09-25 ASSESSMENT — PAIN - FUNCTIONAL ASSESSMENT: PAIN_FUNCTIONAL_ASSESSMENT: ACTIVITIES ARE NOT PREVENTED

## 2022-09-25 ASSESSMENT — PAIN DESCRIPTION - PAIN TYPE: TYPE: ACUTE PAIN

## 2022-09-25 ASSESSMENT — PAIN DESCRIPTION - DESCRIPTORS: DESCRIPTORS: ACHING

## 2022-09-25 ASSESSMENT — PAIN DESCRIPTION - ORIENTATION: ORIENTATION: ANTERIOR

## 2022-09-25 ASSESSMENT — PAIN DESCRIPTION - LOCATION: LOCATION: ABDOMEN

## 2022-09-25 NOTE — FLOWSHEET NOTE
09/25/22 0840   Vitals   /74   Temp 98 °F (36.7 °C)   Temp Source Oral   Heart Rate 86   Resp 16   SpO2 96 %   Weight 139 lb 1.8 oz (63.1 kg)   Peritoneal Dialysis Catheter Right lower abdomen   No Placement Date or Time found. Catheter Location: Right lower abdomen   Status Deaccessed   Site Condition Clean, dry, intact   Dressing Status Clean, dry & intact   Dressing Gauze   Date of Last Dressing Change 09/24/22   Dialysis Type Continuous cycling   Exit Site Condition not seen   Catheter Care Given Yes   Cycler   Verification of Prescription CCPD   Total Volume Programmed 12 mL   Therapy Time (Hours:Minutes) 9:00   Fill Volume 2000 mL   Last Fill Volume 1500 mL   Dextrose Setting Same (Nonextraneal)   Number of Cycles 5   Bag Volume 5000 mL   Number of Bags Used 3   Dianeal Solution Other (Comment)  (2.5% in 5000mL)   I Drain (mL) 1617 mL   Ultrafiltration (UF) (mL) 378 mL   CCPD complete. Disconnected line aseptically and iodine capped PD catheter without break in protocol. Effluent was orange in color with 3 small blood clots noted in drain bags. This is an expected outcome after gallbladder removal and bags were heparinized overnight to facilitate clearing of blood from peritoneal membrane. No alarms, pain or issues overnight. Pt is eager to discharge.

## 2022-09-25 NOTE — PROGRESS NOTES
[facility] MD Victoriano Oconnell MD Eddie Belling, MD  Progress Note    9/25/2022    SUBJECTIVE:   9/21: Not in her room   She is getting cholcystectomy    9/22: Status post cholecystectomy yesterday without complication. Feeling better, ongoing fatigue and malaise, generalized weakness. Appetite is improved and she is looking forward to her dinner. Denies nausea or vomiting. Pain controlled. ROS otherwise unremarkable    9/23: Off the floor, and endoscopy for MRCP. Discussed care with nurse. Vital signs stable. CCPD without complication, though drainage this morning was blood-tinged. No clots and no fibrin. BP somewhat lower than yesterday. 9/24: Constipated. No dyspnea. Ongoing fatigue, malaise, generalized weakness. Ongoing anorexia. CCPD last evening without complication, effluent less blood-tinged. 9/25: Ongoing constipation. She believes her last bowel movement was at least 5 days ago. Quite uncomfortable, though no pain per se. Ongoing fatigue and generalized weakness. No complications CCPD last evening. Effluent clear. PROBLEM LIST:    Principal Problem:    Change in mental status  Active Problems:    Chronic kidney disease    Moderate protein-calorie malnutrition ([address]  Resolved Problems:    * No resolved hospital problems. *       DIET:    ADULT DIET; Regular; Low Potassium (Less than 3000 mg/day); Low Phosphorus (Less than 1000 mg)  ADULT ORAL NUTRITION SUPPLEMENT; AM Snack, PM Snack;  Renal Oral Supplement       Allergies : Sulfa antibiotics    Past Medical History:   Diagnosis Date    Anemia     Arthritis     CAD (coronary artery disease)     Diabetes mellitus ([address]     Gout     History of bleeding peptic ulcer approx 2015    Hypertension     Peritoneal dialysis catheter in place [facility])     Peritoneal dialysis status ([facility] [address]     at night for 8 hours    Thyroid disease     Use of cane as ambulatory aid     Wears glasses        Past Surgical History:   Procedure Laterality Date    BACK SURGERY      CARDIOVASCULAR STRESS TEST      CARPAL TUNNEL RELEASE Bilateral     CATARACT REMOVAL WITH IMPLANT Bilateral      SECTION      CHOLECYSTECTOMY, LAPAROSCOPIC N/A 2022    CHOLECYSTECTOMY LAPAROSCOPIC with intraoperative cholangiogram performed by Jose L Gray MD at [address] [facility]  approx 2000    x 3; per Dr Trina Waldron, [place], [address] [facility] [address]      left knee, right shoulder    MI TOTAL KNEE ARTHROPLASTY Right 10/31/2018    RIGHT KNEE TOTAL ARTHROPLASTY +++BRIDGER++ PNB++ performed by Gianna Navarro MD at [address] [facility]         History reviewed. No pertinent family history. reports that she has never smoked. She has never used smokeless tobacco. She reports that she does not drink alcohol and does not use drugs. Review of Systems:   Constitutional: no fevers , no chills , feels ok   Eyes: no eye pain , no itching , no drainage  Ears, nose, mouth, throat, and face: no ear ,nose pain , hearing is ok ,no nasal drainage   Respiratory: no sob ,no cough ,no wheezing . Cardiovascular: no chest pain , no palpitation ,no sob . Gastrointestinal: no nausea, vomiting , constipation , no abdominal pain . Genitourinary:no urinary retention , no burning , dysuria . No polyuria   Hematologic/lymphatic: no bleeding , no cougulation issues . Musculoskeletal:no joint pain , no swelling . Neurological: no headaches ,no weakness , no numbness . Endocrine: no thirst , no weight issues .      MEDS (scheduled):    potassium chloride  40 mEq Oral Once    amLODIPine  2.5 mg Oral Daily    metoprolol tartrate  12.5 mg Oral BID    custom dialysis builder   IntraPERitoneal Once    custom dialysis builder   IntraPERitoneal Once    custom dialysis builder   IntraPERitoneal Once    sodium chloride flush  5-40 mL IntraVENous 2 times per day    potassium chloride  40 mEq Oral Once    insulin 09/24/22  0504 09/25/22  0438   * 126* 127*   K 3.3* 3.6 3.4*   CL 87* 86* 88*   CO2 23 22 22   MG  --  1.6 1.8   PHOS  --  3.4 2.9   BUN 43* 43* 38*   CREATININE 5.4* 5.1* 4.6*   * 88*  --    * 70*  --    BILIDIR 1.3* 1.3*  --    BILITOT 1.6* 1.8*  --    ALKPHOS 584* 537*  --        No results found for: LABPROT    Assessment    1. End-stage renal disease, on peritoneal dialysis. 2.  Encephalopathy, etiology unclear, metabolic versus ongoing infection. .  Markedly improved  3. Leukocytosis ? Ascedning cholangitis. Improved now status post cholecystectomy  4. Anemia due to ESRD  5. Hypomagnesemia. 6.  Mineral bone disease. 7.  Hyponatremia likely due to water overload, mild though, as well as hyperglycemia     Recommendations  Continue CCPD this evening for solute and volume clearance.   increased the volume to 2000 cc per exchange   Continue to use all 2.5% dextrose solutions to increase water clearance  1000 units heparin added per bag to prevent clotting, adhesions given blood in her peritoneum  Dulcolax as needed  MiraLAX as needed  Colace  Follow labs     Electronically signed by Jazmin Barraza MD on 9/25/2022

## 2022-09-25 NOTE — PROGRESS NOTES
[facility] Cardiology    INPATIENT CARDIOLOGY FOLLOW-UP    Name: Nuris Mcgovern    Age: 80 y.o. Date of Admission: 9/16/2022 11:18 AM    Date of Service: 9/25/2022    Chief Complaint: Follow-up for coronary artery disease, paroxysmal atrial fibrillation, aortic valve stenosis with normal EF, preoperative assessment prior to cholecystectomy followed by ERCP    9/24 interim History:  No new overnight cardiac complaints. Currently with no complaints of CP, SOB, palpitations, dizziness, or lightheadedness. Underwent ERCP 9/23, lap cholecystectomy on 9/21  Hemoglobin 8.1      Telemetry  reviewed and showed atrial fibrillation with CVR    9/25 interim history  Generalized weakness otherwise no shortness of breath, chest pain. Denies palpitations and no PND/orthopnea. Telemetry reviewed: Atrial fibrillation with CVR    Review of Systems:   Cardiac: As per HPI  General: No fever, chills  Pulmonary: No cough, wheeze, or shortness of breath  GI: No nausea, vomiting,or abdominal pain  Neuro: No headache or confusion      Allergies:   Allergies   Allergen Reactions    Sulfa Antibiotics Swelling     Swelling after being out in sun       Current Medications:  Current Facility-Administered Medications   Medication Dose Route Frequency Provider Last Rate Last Admin    amLODIPine (NORVASC) tablet 2.5 mg  2.5 mg Oral Daily Delmar Burns MD   2.5 mg at 09/25/22 0915    metoprolol tartrate (LOPRESSOR) tablet 12.5 mg  12.5 mg Oral BID Delmar Burns MD   12.5 mg at 09/25/22 0915    delflex lo-yared 2.5% 5,000 mL with heparin (porcine) 1,000 Units solution   IntraPERitoneal Once Pallavi Nelson MD        delflex SM 2.5% 5,000 mL with heparin (porcine) 1,000 Units solution   IntraPERitoneal Once MD yadira Dhillonflex lo-yared 2.5% 5,000 mL with heparin (porcine) 1,000 Units solution   IntraPERitoneal Once Pallavi Nelson MD        sodium chloride flush 0.9 % injection 5-40 mL  5-40 mL IntraVENous 2 times per day Arlin Garcia MD   10 mL at 09/25/22 6344    sodium chloride flush 0.9 % injection 5-40 mL  5-40 mL IntraVENous PRN Arlin Garcia MD        0.9 % sodium chloride infusion  25 mL IntraVENous PRN Arlin Garcia MD        potassium chloride (KLOR-CON M) extended release tablet 40 mEq  40 mEq Oral Once Trupti Frias MD        oxyCODONE (ROXICODONE) immediate release tablet 5 mg  5 mg Oral Q4H PRN Negin Segovia MD   5 mg at 09/24/22 1204    insulin lispro (HUMALOG) injection vial 0-16 Units  0-16 Units SubCUTAneous TID WC Negin Segovia MD   12 Units at 09/25/22 0923    insulin lispro (HUMALOG) injection vial 0-4 Units  0-4 Units SubCUTAneous Nightly Negin Segovia MD   4 Units at 09/22/22 2135    piperacillin-tazobactam (ZOSYN) 4,500 mg in dextrose 5 % 100 mL IVPB (Exof2Rxl)  4,500 mg IntraVENous Q12H Negin Segovia MD   Stopped at 09/25/22 6344    levETIRAcetam (KEPPRA) tablet 250 mg  250 mg Oral BID Negin Segovia MD   250 mg at 09/25/22 0915    pantoprazole (PROTONIX) 40 mg in sodium chloride (PF) 10 mL injection  40 mg IntraVENous Daily Negin Segovia MD   40 mg at 09/25/22 0914    acetaminophen (TYLENOL) suppository 650 mg  650 mg Rectal Q4H PRN Negin Segovia MD   650 mg at 09/18/22 1927    apixaban (ELIQUIS) tablet 2.5 mg  2.5 mg Oral BID Negin Segovia MD   2.5 mg at 09/19/22 0902    aspirin chewable tablet 81 mg  81 mg Oral Daily Negin Segovia MD   81 mg at 09/25/22 0915    [Held by provider] atorvastatin (LIPITOR) tablet 20 mg  20 mg Oral Nightly Negin Segovia MD   20 mg at 09/17/22 2152    DULoxetine (CYMBALTA) extended release capsule 30 mg  30 mg Oral Daily Negin Segovia MD   30 mg at 09/25/22 0915    isosorbide mononitrate (IMDUR) extended release tablet 30 mg  30 mg Oral Daily Negin Segovia MD   30 mg at 09/25/22 0915    levothyroxine (SYNTHROID) tablet 50 mcg  50 mcg Oral Daily Negin Segovia MD   50 mcg at 09/25/22 0609    docusate sodium (COLACE) capsule 100 mg  100 mg Oral Nightly Dez Bowman MD   100 mg at 09/24/22 2059    [Held by provider] pantoprazole (PROTONIX) tablet 40 mg  40 mg Oral QAM AC Dez Bowman MD        glucose chewable tablet 16 g  4 tablet Oral PRN Dez Bowman MD        dextrose bolus 10% 125 mL  125 mL IntraVENous PRN Dez Bowman MD        Or    dextrose bolus 10% 250 mL  250 mL IntraVENous PRN Dez Bowman MD        glucagon (rDNA) injection 1 mg  1 mg SubCUTAneous PRN Dez Bowman MD        dextrose 10 % infusion   IntraVENous Continuous PRN Dez Bowman MD        acetaminophen (TYLENOL) tablet 650 mg  650 mg Oral Q4H PRN Dez Bowman MD   650 mg at 09/25/22 0913    trimethobenzamide (TIGAN) injection 200 mg  200 mg IntraMUSCular Q6H PRN Dez Bowman MD   200 mg at 09/25/22 0939      sodium chloride      dextrose         Physical Exam:  /72   Pulse 87   Temp 98 °F (36.7 °C) (Temporal)   Resp 18   Ht 5' (1.524 m)   Wt 139 lb 1.8 oz (63.1 kg)   SpO2 98%   BMI 27.17 kg/m²   Weight change: Wt Readings from Last 3 Encounters:   09/25/22 139 lb 1.8 oz (63.1 kg)   07/27/22 143 lb 3.2 oz (65 kg)   04/29/22 158 lb 8.2 oz (71.9 kg)         Intake/Output:    Intake/Output Summary (Last 24 hours) at 9/25/2022 1050  Last data filed at 9/25/2022 0840  Gross per 24 hour   Intake --   Output 1995 ml   Net -1995 ml     I/O this shift:  In: -   Out: 1995       General: Awake, alert, oriented x3, no acute distress    Neck: No JVD or carotid bruits,     Cardiac: Irregularly irregular rhythm with normal rate, diminished S2, grade 2 midsystolic murmur at the base, no diastolic murmurs normal carotid upstroke and no bruits. Resp: Unlabored respirations at rest.  No wheezes, rales or rhonchi. Abdomen: soft, nontender, nondistended, BS+; active bowel sounds    Extremities: no cyanosis, clubbing, or edema. Normal pulses in the upper/lower extremities bilaterally.     Skin: Warm and dry, no bruises, petechiae or rashes. Neuro: moves all extremities appropriately to command, and normal sensation to light touch in the upper and lower extremities bilaterally. Laboratory Tests:  Lab Results   Component Value Date    CREATININE 4.6 (H) 09/25/2022    BUN 38 (H) 09/25/2022     (L) 09/25/2022    K 3.4 (L) 09/25/2022    CL 88 (L) 09/25/2022    CO2 22 09/25/2022     No results for input(s): CKTOTAL, CKMB, TROPONINI in the last 72 hours. No results found for: PROBNP  Lab Results   Component Value Date/Time    WBC 17.9 09/25/2022 04:38 AM    RBC 2.82 09/25/2022 04:38 AM    HGB 8.4 09/25/2022 04:38 AM    HCT 25.5 09/25/2022 04:38 AM    MCV 90.4 09/25/2022 04:38 AM    MCH 29.8 09/25/2022 04:38 AM    MCHC 32.9 09/25/2022 04:38 AM    RDW 13.9 09/25/2022 04:38 AM     09/25/2022 04:38 AM    MPV 10.8 09/25/2022 04:38 AM     Recent Labs     09/23/22  0503 09/24/22  0504   ALKPHOS 584* 537*   * 88*   * 70*   PROT 5.7* 5.9*   BILITOT 1.6* 1.8*   BILIDIR 1.3* 1.3*   LABALBU 2.0* 1.7*     Lab Results   Component Value Date/Time    MG 1.8 09/25/2022 04:38 AM     Lab Results   Component Value Date/Time    PROTIME 12.6 09/16/2022 11:41 AM    INR 1.2 09/16/2022 11:41 AM     Lab Results   Component Value Date/Time    TSH 3.910 09/17/2022 05:49 AM     No components found for: CHLPL  No results found for: TRIG  Lab Results   Component Value Date    HDL 27 04/28/2022     Lab Results   Component Value Date    LDLCALC 119 (H) 04/28/2022       Radiology:  FL ERCP BILIARY AND PANCREATIC S&I   Final Result   Fluoroscopic support for ERCP. Please refer to the procedure report for   further details. FLUORO FOR SURGICAL PROCEDURES   Final Result   Intraprocedural fluoroscopic spot images as above. See separate procedure   report for more information. NM HEPATOBILIARY   Final Result   1.   Delayed excretion of radiotracer from the liver suggest underlying   hepatic dysfunction. This limits the overall accuracy of the examination. 2.  The common bile duct is patent. 3.  The gallbladder is not visualized by 4 hours. While this could represent   cholecystitis, given the delayed excretion from the liver, the finding is   less specific. Careful clinical correlation is advised. CT HEAD WO CONTRAST   Final Result   No acute intracranial abnormality. US GALLBLADDER RUQ   Final Result   Distended gallbladder with numerous echogenic areas demonstrating shadowing   of cholelithiasis and intermixed sludge however no wall thickening or   pericholecystic fluid         CT ABDOMEN PELVIS WO CONTRAST Additional Contrast? None   Final Result   Markedly distended gallbladder with cholelithiasis and mild edematous wall   thickening concerning for possible acute cholecystitis or gallbladder   hydrops. The appearance is similar to the prior exam on 07/20/2022. Consider right upper quadrant ultrasound for further evaluation, if   clinically indicated. Small-moderate volume of ascites with stable peritoneal dialysis catheter   seen. No evidence of bowel obstruction. XR CHEST PORTABLE   Final Result   Increased opacification and bronchovascular prominence in comparison to the   prior study. CT HEAD WO CONTRAST   Final Result   1. There is no acute intracranial abnormality. Specifically, there is no   intracranial hemorrhage. 2. Atrophy and periventricular leukomalacia,   . Problem List:  Active Hospital Problems    Diagnosis     Chronic kidney disease [N18.9]      Priority: Medium    Moderate protein-calorie malnutrition ([facility] [address] [E44.0]      Priority: Medium    Change in mental status [R41.82]      Priority: Medium           ASSESSMENT/PLAN:  CAD and remains stable. Continue aspirin, amlodipine and isosorbide mononitrate.   Office charts indicate that she was taking amlodipine 2.5 mg daily, metoprolol 25 mg twice daily and isosorbide 30 mg daily and meds have been adjusted accordingly    Hypertension controlled    Chronic atrial fibrillation with CVR. Beta-blocker restarted as above. Will restart Eliquis 72 hours following ERCP as per GI recommendations.   Likely restart tomorrow    Stable aortic valve stenosis    Status post lap milton and ERCP      Electronically signed by Kim Arellano MD on 9/25/2022 at 10:50 AM

## 2022-09-25 NOTE — PROGRESS NOTES
GENERAL SURGERY  DAILY PROGRESS NOTE  9/25/2022    Subjective:  S/p ERCP 9/23 with stone removal and sphincterotomy and sphincteroplasty. States feeling ok. Diaysalate indwelling. Denies abdominal pain. Mild nausea. Afebrile. Objective:  BP 96/70   Pulse 80   Temp 96.8 °F (36 °C) (Oral)   Resp 17   Ht 5' (1.524 m)   Wt 143 lb 4.8 oz (65 kg)   SpO2 93%   BMI 27.99 kg/m²     General appearance: alert, cooperative and in no acute distress. Eyes: grossly normal  Lungs: nonlabored breathing on 2 L nasal cannula  Heart: regular rate  Abdomen: soft, nondistended. Appropriately tender post op. Incisions clean/dry/intact with skin glue in place. Skin: No skin abnormalities  Neurologic: Alert and oriented x 3. Grossly normal  Musculoskeletal: No clubbing cyanosis or edema    Assessment/Plan:  80 y.o. female history of ESRD on peritoneal dialysis, CAD, DM, A. fib on Eliquis currently admitted with altered mental status with acute cholecystitis and choledocholithiasis s/p Lap milton with IOC 09/21 s/p ERCP with sphincterotomy and stone removal 9/23    - Continue Zosyn, leukocytosis trending down.   - continue to trend LFTs  - ok to start renal diet   - ok to restart Eliquis  - Okay to use PD catheter for dialysis from surgery perspective  - discharge planning per Primary    Plan discussed with Dr. Laura Casas     Electronically signed by Landy Yin DO on 9/25/2022 at 9:14 AM

## 2022-09-25 NOTE — PROGRESS NOTES
Occupational Therapy  OT BEDSIDE TREATMENT NOTE   [facility] [address]  123 [facility], [address] [place] [address]  Patient Name: Wu Lim  MRN: [id number]  : 1940  Room: [address] [place] [address]     Per OT Eval:    Evaluating OT: Lucius Whaley, [address] OTR/L; [id number]       Referring Provider: Lexi Avina DO    Specific Provider Orders/Date: OT Eval and Treat 22       Diagnosis: Change in Mental Status     Surgery: 22 CHOLECYSTECTOMY LAPAROSCOPIC with intraoperative cholangiogram    Pertinent Medical History:  has a past medical history of Anemia, Arthritis, CAD (coronary artery disease), Diabetes mellitus ([facility] Gout, History of bleeding peptic ulcer, Hypertension, Peritoneal dialysis catheter in place [facility]), [facility] ([facility] Thyroid disease, Use of cane as ambulatory aid, and Wears glasses.        Reason for Admission: AMS, difficulty ambulating and articulating words     Recommended Adaptive Equipment:  TBD pending progression/discharge      Precautions:  Fall Risk, Bed Alarm, IV, TAPS/Wedges, Cognition, O2      Assessment of current deficits    [x] Functional mobility            [x]ADLs           [x] Strength                   [x]Cognition    [x] Functional transfers          [x] IADLs          [x] Safety Awareness   [x]Endurance    [x] Fine Coordination              [x] Balance      [] Vision/perception    []Sensation      []Gross Motor Coordination  [x] ROM           [] Delirium                   [] Motor Control      OT PLAN OF CARE   OT POC based on physician orders, patient diagnosis and results of clinical assessment     Frequency/Duration 2-5 days/wk for 2 weeks PRN   Specific OT Treatment Interventions to include:   * Instruction/training on adapted ADL techniques and AE recommendations to increase functional independence within precautions       * Training on energy conservation strategies, correct breathing pattern and techniques to improve independence/tolerance for self-care routine  * Functional transfer/mobility training/DME recommendations for increased independence, safety, and fall prevention  * Patient/Family education to increase follow through with safety techniques and functional independence  * Recommendation of environmental modifications for increased safety with functional transfers/mobility and ADLs  * Cognitive retraining/development of therapeutic activities to improve problem solving, judgement, memory, and attention for increased safety/participation in ADL/IADL tasks  * Sensory re-education to improve body/limb awareness, maintain/improve skin integrity, and improve hand/UE motor function  * Visual-perceptual training to improve environmental scanning, visual attention/focus, and oculomotor skills for increased safety/independence with functional transfers/mobility and ADLs  * Splinting/positioning for increased function, prevention of contractures, and improve skin integrity  * Therapeutic exercise to improve motor endurance, ROM, and functional strength for ADLs/functional transfers  * Therapeutic activities to facilitate/challenge dynamic balance, stand tolerance for increased safety and independence with ADLs  * Therapeutic activities to facilitate gross/fine motor skills for increased independence with ADLs  * Neuro-muscular re-education: facilitation of righting/equilibrium reactions, midline orientation, scapular stability/mobility, normalization of muscle tone, and facilitation of volitional active controled movement  * Positioning to improve skin integrity, interaction with environment and functional independence  * Delirium prevention/treatment  * Manual techniques for edema management     Recommended Adaptive Equipment/DME: Shower chair, BSC     Home Living: Pt lives alone in 1 story home with 3-steps to enter and 1HR.  bed and bath on main floor  Basement does not need to access  Bathroom setup: Tub/shower, however pt son states she sponge bathes only using wipes  Equipment owned: Rollator     Prior Level of Function: Ind with ADLs , Min A with IADLs - son orders groceries to be delivered  Used rollator for functional mobility. Driving: No - son provides transportation as needed  Occupation: None stated     Available Support: Son lives 10 minutes away     Pain Level: none but reports nausea & not feeling good, nsg present & provided medication     Cognition: A&O: 4/4 - appears fatigued & uncomfortable overall   Follows single step directions              Memory:  Fair              Sequencing: Fair              Problem solving:  Fair              Judgement/safety:  Fair                   Functional Assessment:  AM-PAC Daily Activity Raw Score: 13/24    Initial Eval Status  Date: 9/22/22 Treatment Status  Date:  9/25/22 STGs = LTGs  Time frame: 10-14 days   Feeding Minimal Assist   Cup to mouth from tray  SBA  After set up with toast, encouraged to eat something little due to medication  Independent    Grooming Minimal Assist   Limited d/t decreased UB strength and chronic poor LUE AROM  Min A  Washing off face, brushing hair seated at EOB, limited ROM L UE & overeall weakness  Independent    UB Dressing Moderate Assist  Fabián shirt over shoulders seated EOB   Mod A  Doff/fabián gown seated at EOB S      LB Dressing Dependent  Fabián socks lying supine   Dep  Donning socks bed level  S      Bathing Maximal Assist  Decreased dynamic sitting balance poor functional reach for LB bathing tasks   Limited activity tolerance for bathing tasks Max A  Simulated sponge bathing   SBA  In seated position      Toileting Maximal Assist  limited ROM for posterior and anterior pericare      Max A  simulated SBA      Bed Mobility  Supine to sit:  Max A x 2  Sit to supine: Max A x 2     Mod A sit to supine  Max A supine to sit   Cues for technique to ease task  Supine to sit: SBA  Sit to supine: SBA Functional Transfers Sit <> Stand: NT  Stand Pivot: NT  Commode: NT  Max A x 2  Partial stand    Sit t<> Stand: Min A with AD  Stand Pivot: Min A with AD  Commode: Min A with AD   Functional Mobility NT      Min A with AD   Balance Sitting:     Static: CGA <> Min A    Dynamic: Mod A  Standing: NT  Sitting: SBA  Standing: Max A x 2    Sitting:     Static: IND    Dynamic: IND  Standing: Min A with AD   Activity Tolerance Fair  Limited d/t overall fatigue   Fair-  Nausea & dizziness with standing  Seated at EOB 10 minutes  Limited standing tolerance, 15 seconds  WFL   Visual/  Perceptual Glasses: Yes   Not present          Vitals SpO2 on RA: 91% upon entering room required 2L to return to 95%  SpO2 stead EOB on 2L NC: 97%  SpO2 end of session on 2L NC: 97%     HR: 100-100 bpm during session  vitals WNL nsg present & assessed prior to session & monitored through out session             Education:  Pt was educated through out treatment regarding proper technique & safety with bed mobility, educated on importance of OOB activity, functional transfers & completion of simple ADL tasks, will educate on AE once pt is able to tolerate longer session, to ease tasks, improve safety & prevent falls to return home safely. Comments: Upon arrival pt was in bed & agreeable to therapy with nsg approval. At end of session pt was returned to bed, HOB upright, meal tray set up, pt only agreeable to eat toast this date, all lines and tubes intact, call light within reach. Son present at end of session. Bed alarm set. Pt has made slow progress towards set goals.    Continue with current plan of care      Treatment Time In:9:26            Treatment Time Out: 9:50           Treatment Charges: Mins Units   Ther Ex  97742     Manual Therapy 97806     Thera Activities 79789 11 1   ADL/Home Mgt 10365 15 1   Neuro Re-ed 16554     Group Therapy      Orthotic manage/training  [id number]     Non-Billable Time     Total Timed Treatment 24 2 Jocelyn Aviles.  [address] [place] [address] Drive, [facility]

## 2022-09-25 NOTE — PROGRESS NOTES
Physical Therapy Treatment    Name: Gregorio Soria  : 1940  MRN: [id number]      Date of Service: 2022    Evaluating PT:  Denver Juares, PT, DPT [id number]    Room #:  7829/4036-X  Diagnosis:  Change in mental status [R41.82]  Altered mental status, unspecified altered mental status type [R41.82]  PMHx/PSHx:    Past Medical History:   Diagnosis Date    Anemia     Arthritis     CAD (coronary artery disease)     Diabetes mellitus ([facility]     Gout     History of bleeding peptic ulcer approx     Hypertension     Peritoneal dialysis catheter in place [facility])     Peritoneal dialysis status ([facility]     at night for 8 hours    Thyroid disease     Use of cane as ambulatory aid     Wears glasses      Procedure/Surgery:  22 CHOLECYSTECTOMY LAPAROSCOPIC with intraoperative cholangiogram, ERCP ENDOSCOPIC RETROGRADE CHOLANGIOPANCREATOGRAPHY  ERCP SPHINCTER/PAPILLOTOMY  ERCP STONE REMOVAL  ERCP DILATION BALLOON   Reason for admission: AMS, difficulty ambulating and articulating words  Precautions:  Fall Risk, Bed Alarm, IV, TAPS/Wedges, Cognition, O2  Equipment Needs:  TBD    SUBJECTIVE:  Pt lives alone in 1 story home with 3-steps to enter and 1HR with bed and bath on main floor. Pt has basement but does not need to access. Son lives 10 min away and assists pt with dialysis 2x.day. Pt was independent PTA. Pt oriented but had difficulty answering subjective questions during interview so info taken from son present. OBJECTIVE:   Initial Evaluation  Date:  Treatment  22 Short Term/ Long Term   Goals   AM-PAC 6 Clicks     Was pt agreeable to Eval/treatment? Yes yes    Does pt have pain? Unable to state No c/o pain    Bed Mobility  Rolling: mod A  Supine to sit:   Max A x 2  Sit to supine:  Max A x 2  Scooting: NT Rolling: ModA  Supine to sit:  ModA  Sit to supine:  MaxA x2  Scooting: ModA Rolling: ind  Supine to sit: mod I  Sit to supine: mod I  Scooting: ind   Transfers NT Sit to stand: MaxA x2 partial  Stand to sit: MaxA x2  Stand pivot: NT Sit to stand: SBA  Stand to sit: SBA  Stand pivot: SBA   Ambulation   NT    feet with AAD min a   Stair negotiation: ascended and descended NT NT 4 steps with hand rail min a   ROM BUE:  Refer to OT eval   BLE:  WNL     Strength BUE:  Refer to OT eval   BLE:  difficult to assess d/t difficulty following commands, global weakness  >4/5 globally   Balance Sitting EOB:  CGA<> MIN a  Dynamic Standing:  NT Sitting EOB:  SBA  Dynamic Standing:  NT Sitting EOB:  mod I   Dynamic Standing:  min A AAD     Pt is A & O x 1-2  Sensation:  NT  Edema:  none noted      Patient education  Pt educated on role of PT, safety during mobility    Patient response to education:   Pt verbalized understanding Pt demonstrated skill Pt requires further education in this area   yes yes yes     ASSESSMENT:    Comments:  patient semi-supine in bed upon entry and agreeable to PT treatment. Pt required significant assist for supine>sit. At EOB pt complained of dizziness and nausea -- RN in to give nausea medicine during session. Pt sat EOB for approximately 10 minutes during session. Pt able to complete partial STS from EOB with heavy lift assist of two. Increased dizziness reported with stand. Assisted pt back to bed and positioned in seated with lunch tray. All needs met and call light in reach. Treatment:  Patient practiced and was instructed in the following treatment:    Bed Mobility: VCs provided for sequencing and safety during mobility. Manual assist provided for completion of task. Therapeutic Activities: pt sat EOB for approximately 10 minutes during session with no assist for static and dynamic sitting balance. Transfer Training: Verbal and tactile cueing provided for sequencing and safety during mobility. Manual assist provided for completion of task     PLAN:    Patient is making good progress towards established goals. Will continue with current POC.       Time in 3263  Time out  0950    Total Treatment Time  24 minutes     CPT codes:  [] Gait training 21462 - minutes  [] Manual therapy 14338 - minutes  [x] Therapeutic activities 70438 24 minutes  [] Therapeutic exercises 04566 - minutes  [] Neuromuscular reeducation 07365 - minutes    Denia Mcarthur PT, DPT  [id number]

## 2022-09-25 NOTE — PROGRESS NOTES
Subjective:    Patient is awake and alert to self on examination  Denies any chest pain, shortness of breath  Patient admits she has not had a bowel movement in approximately 2 weeks medication ordered    Objective:    /72   Pulse 87   Temp 98 °F (36.7 °C) (Temporal)   Resp 18   Ht 5' (1.524 m)   Wt 139 lb 1.8 oz (63.1 kg)   SpO2 98%   BMI 27.17 kg/m²     In: -   Out: 2220   In: -   Out: 2220     General Appearance:  awake and alert, with slight confusion   Head/face:  NCAT  Eyes:  No gross abnormalities. Lungs:  Normal expansion. Clear to auscultation. No rales, rhonchi, or wheezing. Heart:  Heart sounds are normal.  Regular rate and rhythm without murmur, gallop or rub. Abdomen:  Soft, tender, normal bowel sounds. No bruits, PD cath  Extremities: Extremities warm to touch, pink, with no edema. Neurologic:  Awake, oriented x 2, no focal deficits, mild involuntary twitching of extremities - improved from admission     Recent Labs     09/23/22  1000 09/24/22  0504 09/25/22  0438   WBC 15.9* 20.9* 17.9*   HGB 7.7* 8.1* 8.4*   HCT 23.7* 24.1* 25.5*    334 339       Recent Labs     09/23/22  0503 09/24/22  0504 09/25/22  0438   * 126* 127*   K 3.3* 3.6 3.4*   CL 87* 86* 88*   CO2 23 22 22   BUN 43* 43* 38*   CREATININE 5.4* 5.1* 4.6*   CALCIUM 8.9 8.6 8.7       Assessment:    Principal Problem:    Change in mental status  Active Problems:    Chronic kidney disease    Moderate protein-calorie malnutrition (HCC)  Resolved Problems:    * No resolved hospital problems.  *        PLAN:     # Altered mental status  -Likely from sepsis,   -WBC 23 > 17.9 post ERCP  -Started on IV Zosyn 9/19/2022, continue until 9/25/2022 for 7 days total   -EEG > A potential for generalized photosensitive epilepsy, will defer to neurology > started on Keppra 250 mg twice daily, neurology signed off  - blood cultures x 2 are negative   - peritoneal body fluid culture NGTD    LFTs/acute cholecystitis-fluctuating  -s/p lap milton with IOC 9/21/2022  -Multiple filling defects in the CBD noted during IOC, advanced GI consulted for ERCP/stone removal sphincterotomy and sphincteroplasty-procedure completed on 9/23-which she tolerated well, pain ongoing  -Lipitor held   - > 117>70 - improving  - > 121>88 - improving  -Total Bilirubin 1.7 > 1.6>1.8 - improving  -Continue to monitor labs, hepatic function test daily -improving    ESRD on PD  -Nephro following  -PD per nephro   -Per general surgery, okay to continue peritoneal dialysis-no need for temporary dialysis line    Severe protein calorie malnutrition  -Nutrition consult  -Start Vanilla Glucerna TID and jacinda BID   -Monitor oral intake during meals  -Advance diet as tolerated    Diabetes mellitus   -Continue to monitor blood glucose levels  -Better controlled with ISS increase to high dose   May have to initiate p.o. hypoglycemic agents due to elevated glucose levels as they continue to be elevated at 335    Elevated troponin  -without EKG changes or chest pain - likely related to renal failure - noted elevations 04/22 chronically. Trending down 183 > 164  -follow labs  -Eliquis on hold, Hx AFIB    Milk of mag for constipation     Medication for other comorbidities continue as appropriate dose adjustment as necessary. DVT prophylaxis heparin   PT OT  Discharge plan    KELLEE Lance  4:25 pm  9/25/2022    Above note edited to reflect my thoughts   Case discussed with multiple family members at bedside all questions answered  White count remains elevated however patient has remained on Zosyn over the past several days    I personally saw, examined and provided care for the patient. Radiographs, labs and medication list were reviewed by me independently. The case was discussed in detail and plans for care were established.  Review of Alexandra MANNING- CNP, documentation was conducted and revisions were made as appropriate directly by me. I agree with the above documented exam, problem list, and plan of care.      Suhas Evans MD  5:02 PM  9/25/2022

## 2022-09-26 LAB
ALBUMIN SERPL-MCNC: 1.6 G/DL (ref 3.5–5.2)
ALP BLD-CCNC: 520 U/L (ref 35–104)
ALT SERPL-CCNC: 50 U/L (ref 0–32)
ANION GAP SERPL CALCULATED.3IONS-SCNC: 15 MMOL/L (ref 7–16)
ANION GAP SERPL CALCULATED.3IONS-SCNC: 17 MMOL/L (ref 7–16)
AST SERPL-CCNC: 39 U/L (ref 0–31)
BILIRUB SERPL-MCNC: 0.8 MG/DL (ref 0–1.2)
BUN BLDV-MCNC: 37 MG/DL (ref 6–23)
BUN BLDV-MCNC: 38 MG/DL (ref 6–23)
CALCIUM SERPL-MCNC: 8.9 MG/DL (ref 8.6–10.2)
CALCIUM SERPL-MCNC: 9.2 MG/DL (ref 8.6–10.2)
CHLORIDE BLD-SCNC: 90 MMOL/L (ref 98–107)
CHLORIDE BLD-SCNC: 92 MMOL/L (ref 98–107)
CO2: 21 MMOL/L (ref 22–29)
CO2: 22 MMOL/L (ref 22–29)
CREAT SERPL-MCNC: 4.3 MG/DL (ref 0.5–1)
CREAT SERPL-MCNC: 4.5 MG/DL (ref 0.5–1)
GFR AFRICAN AMERICAN: 11
GFR AFRICAN AMERICAN: 12
GFR NON-AFRICAN AMERICAN: 10 ML/MIN/1.73
GFR NON-AFRICAN AMERICAN: 9 ML/MIN/1.73
GLUCOSE BLD-MCNC: 351 MG/DL (ref 74–99)
GLUCOSE BLD-MCNC: 98 MG/DL (ref 74–99)
HCT VFR BLD CALC: 27.1 % (ref 34–48)
HEMOGLOBIN: 8.7 G/DL (ref 11.5–15.5)
MAGNESIUM: 1.8 MG/DL (ref 1.6–2.6)
MCH RBC QN AUTO: 29 PG (ref 26–35)
MCHC RBC AUTO-ENTMCNC: 32.1 % (ref 32–34.5)
MCV RBC AUTO: 90.3 FL (ref 80–99.9)
METER GLUCOSE: 201 MG/DL (ref 74–99)
METER GLUCOSE: 210 MG/DL (ref 74–99)
METER GLUCOSE: 339 MG/DL (ref 74–99)
METER GLUCOSE: 95 MG/DL (ref 74–99)
PDW BLD-RTO: 14 FL (ref 11.5–15)
PHOSPHORUS: 2.8 MG/DL (ref 2.5–4.5)
PLATELET # BLD: 410 E9/L (ref 130–450)
PMV BLD AUTO: 10.5 FL (ref 7–12)
POTASSIUM SERPL-SCNC: 2.7 MMOL/L (ref 3.5–5)
POTASSIUM SERPL-SCNC: 3.8 MMOL/L (ref 3.5–5)
POTASSIUM SERPL-SCNC: 4.7 MMOL/L (ref 3.5–5)
RBC # BLD: 3 E12/L (ref 3.5–5.5)
SODIUM BLD-SCNC: 128 MMOL/L (ref 132–146)
SODIUM BLD-SCNC: 129 MMOL/L (ref 132–146)
TOTAL PROTEIN: 5.8 G/DL (ref 6.4–8.3)
WBC # BLD: 24.2 E9/L (ref 4.5–11.5)

## 2022-09-26 PROCEDURE — 6370000000 HC RX 637 (ALT 250 FOR IP): Performed by: STUDENT IN AN ORGANIZED HEALTH CARE EDUCATION/TRAINING PROGRAM

## 2022-09-26 PROCEDURE — 90945 DIALYSIS ONE EVALUATION: CPT

## 2022-09-26 PROCEDURE — 6370000000 HC RX 637 (ALT 250 FOR IP): Performed by: INTERNAL MEDICINE

## 2022-09-26 PROCEDURE — 6370000000 HC RX 637 (ALT 250 FOR IP): Performed by: NURSE PRACTITIONER

## 2022-09-26 PROCEDURE — 2580000003 HC RX 258: Performed by: STUDENT IN AN ORGANIZED HEALTH CARE EDUCATION/TRAINING PROGRAM

## 2022-09-26 PROCEDURE — 99232 SBSQ HOSP IP/OBS MODERATE 35: CPT | Performed by: STUDENT IN AN ORGANIZED HEALTH CARE EDUCATION/TRAINING PROGRAM

## 2022-09-26 PROCEDURE — 83735 ASSAY OF MAGNESIUM: CPT

## 2022-09-26 PROCEDURE — 85027 COMPLETE CBC AUTOMATED: CPT

## 2022-09-26 PROCEDURE — 6360000002 HC RX W HCPCS: Performed by: INTERNAL MEDICINE

## 2022-09-26 PROCEDURE — 97129 THER IVNTJ 1ST 15 MIN: CPT

## 2022-09-26 PROCEDURE — 80053 COMPREHEN METABOLIC PANEL: CPT

## 2022-09-26 PROCEDURE — 1200000000 HC SEMI PRIVATE

## 2022-09-26 PROCEDURE — 97130 THER IVNTJ EA ADDL 15 MIN: CPT

## 2022-09-26 PROCEDURE — 84132 ASSAY OF SERUM POTASSIUM: CPT

## 2022-09-26 PROCEDURE — 36415 COLL VENOUS BLD VENIPUNCTURE: CPT

## 2022-09-26 PROCEDURE — 84100 ASSAY OF PHOSPHORUS: CPT

## 2022-09-26 PROCEDURE — 82962 GLUCOSE BLOOD TEST: CPT

## 2022-09-26 PROCEDURE — 80048 BASIC METABOLIC PNL TOTAL CA: CPT

## 2022-09-26 PROCEDURE — 6360000002 HC RX W HCPCS: Performed by: STUDENT IN AN ORGANIZED HEALTH CARE EDUCATION/TRAINING PROGRAM

## 2022-09-26 RX ORDER — POTASSIUM CHLORIDE 20 MEQ/1
40 TABLET, EXTENDED RELEASE ORAL ONCE
Status: COMPLETED | OUTPATIENT
Start: 2022-09-26 | End: 2022-09-26

## 2022-09-26 RX ORDER — POTASSIUM CHLORIDE 7.45 MG/ML
10 INJECTION INTRAVENOUS
Status: COMPLETED | OUTPATIENT
Start: 2022-09-26 | End: 2022-09-26

## 2022-09-26 RX ORDER — OXYCODONE HYDROCHLORIDE AND ACETAMINOPHEN 5; 325 MG/1; MG/1
1 TABLET ORAL EVERY 6 HOURS PRN
Qty: 28 TABLET | Refills: 0 | Status: SHIPPED | OUTPATIENT
Start: 2022-09-26 | End: 2022-10-03

## 2022-09-26 RX ADMIN — INSULIN LISPRO 12 UNITS: 100 INJECTION, SOLUTION INTRAVENOUS; SUBCUTANEOUS at 08:20

## 2022-09-26 RX ADMIN — INSULIN LISPRO 4 UNITS: 100 INJECTION, SOLUTION INTRAVENOUS; SUBCUTANEOUS at 12:44

## 2022-09-26 RX ADMIN — LEVOTHYROXINE SODIUM 50 MCG: 0.05 TABLET ORAL at 06:35

## 2022-09-26 RX ADMIN — SODIUM CHLORIDE, PRESERVATIVE FREE 10 ML: 5 INJECTION INTRAVENOUS at 08:20

## 2022-09-26 RX ADMIN — LEVETIRACETAM 250 MG: 250 TABLET, FILM COATED ORAL at 08:19

## 2022-09-26 RX ADMIN — POTASSIUM CHLORIDE 10 MEQ: 7.46 INJECTION, SOLUTION INTRAVENOUS at 11:36

## 2022-09-26 RX ADMIN — POTASSIUM CHLORIDE 10 MEQ: 7.46 INJECTION, SOLUTION INTRAVENOUS at 09:21

## 2022-09-26 RX ADMIN — POTASSIUM CHLORIDE 10 MEQ: 7.46 INJECTION, SOLUTION INTRAVENOUS at 10:24

## 2022-09-26 RX ADMIN — POTASSIUM CHLORIDE 10 MEQ: 7.46 INJECTION, SOLUTION INTRAVENOUS at 08:32

## 2022-09-26 RX ADMIN — ASPIRIN 81 MG 81 MG: 81 TABLET ORAL at 08:19

## 2022-09-26 RX ADMIN — PANTOPRAZOLE SODIUM 40 MG: 40 TABLET, DELAYED RELEASE ORAL at 06:35

## 2022-09-26 RX ADMIN — APIXABAN 2.5 MG: 2.5 TABLET, FILM COATED ORAL at 20:47

## 2022-09-26 RX ADMIN — ATORVASTATIN CALCIUM 20 MG: 20 TABLET, FILM COATED ORAL at 20:47

## 2022-09-26 RX ADMIN — ISOSORBIDE MONONITRATE 30 MG: 30 TABLET, EXTENDED RELEASE ORAL at 08:19

## 2022-09-26 RX ADMIN — POTASSIUM CHLORIDE 40 MEQ: 20 TABLET, EXTENDED RELEASE ORAL at 18:48

## 2022-09-26 RX ADMIN — METOPROLOL TARTRATE 12.5 MG: 25 TABLET, FILM COATED ORAL at 20:47

## 2022-09-26 RX ADMIN — AMLODIPINE BESYLATE 2.5 MG: 5 TABLET ORAL at 08:19

## 2022-09-26 RX ADMIN — LEVETIRACETAM 250 MG: 250 TABLET, FILM COATED ORAL at 20:46

## 2022-09-26 RX ADMIN — APIXABAN 2.5 MG: 2.5 TABLET, FILM COATED ORAL at 08:19

## 2022-09-26 RX ADMIN — DOCUSATE SODIUM 100 MG: 100 CAPSULE, LIQUID FILLED ORAL at 20:49

## 2022-09-26 RX ADMIN — SODIUM CHLORIDE, PRESERVATIVE FREE 10 ML: 5 INJECTION INTRAVENOUS at 20:47

## 2022-09-26 RX ADMIN — PIPERACILLIN AND TAZOBACTAM 4500 MG: 4; .5 INJECTION, POWDER, LYOPHILIZED, FOR SOLUTION INTRAVENOUS at 12:45

## 2022-09-26 RX ADMIN — NYSTATIN 500000 UNITS: 100000 SUSPENSION ORAL at 20:47

## 2022-09-26 RX ADMIN — DULOXETINE HYDROCHLORIDE 30 MG: 30 CAPSULE, DELAYED RELEASE ORAL at 08:19

## 2022-09-26 RX ADMIN — NYSTATIN 500000 UNITS: 100000 SUSPENSION ORAL at 16:47

## 2022-09-26 RX ADMIN — POTASSIUM CHLORIDE 40 MEQ: 1500 TABLET, EXTENDED RELEASE ORAL at 08:19

## 2022-09-26 ASSESSMENT — PAIN SCALES - GENERAL
PAINLEVEL_OUTOF10: 0
PAINLEVEL_OUTOF10: 0

## 2022-09-26 ASSESSMENT — PAIN SCALES - WONG BAKER: WONGBAKER_NUMERICALRESPONSE: 0

## 2022-09-26 NOTE — PROGRESS NOTES
[facility] MD Nola Garay MD Enrique Fogo, MD  Progress Note    9/26/2022    SUBJECTIVE:   9/21: Not in her room   She is getting cholcystectomy    9/22: Status post cholecystectomy yesterday without complication. Feeling better, ongoing fatigue and malaise, generalized weakness. Appetite is improved and she is looking forward to her dinner. Denies nausea or vomiting. Pain controlled. ROS otherwise unremarkable    9/23: Off the floor, and endoscopy for MRCP. Discussed care with nurse. Vital signs stable. CCPD without complication, though drainage this morning was blood-tinged. No clots and no fibrin. BP somewhat lower than yesterday. 9/24: Constipated. No dyspnea. Ongoing fatigue, malaise, generalized weakness. Ongoing anorexia. CCPD last evening without complication, effluent less blood-tinged. 9/25: Ongoing constipation. She believes her last bowel movement was at least 5 days ago. Quite uncomfortable, though no pain per se. Ongoing fatigue and generalized weakness. No complications CCPD last evening. Effluent clear. 9/26: Somnolent though arousable. Did not sleep well last night. No complications with CCPD. PD effluent has cleared. No nausea or vomiting. Ongoing abdominal discomfort though no pain. No headache. No dyspnea at rest on room air. PROBLEM LIST:    Principal Problem:    Change in mental status  Active Problems:    Chronic kidney disease    Moderate protein-calorie malnutrition ([address]  Resolved Problems:    * No resolved hospital problems. *       DIET:    ADULT DIET; Regular; Low Potassium (Less than 3000 mg/day);  Low Phosphorus (Less than 1000 mg)  ADULT ORAL NUTRITION SUPPLEMENT; Breakfast, Lunch, Dinner; Diabetic Oral Supplement       Allergies : Sulfa antibiotics    Past Medical History:   Diagnosis Date    Anemia     Arthritis     CAD (coronary artery disease)     Diabetes mellitus (HCC)     Gout     History of bleeding peptic ulcer approx     Hypertension     Peritoneal dialysis catheter in place [facility])     Peritoneal dialysis status ([address]     at night for 8 hours    Thyroid disease     Use of cane as ambulatory aid     Wears glasses        Past Surgical History:   Procedure Laterality Date    BACK SURGERY      CARDIOVASCULAR STRESS TEST      CARPAL TUNNEL RELEASE Bilateral     CATARACT REMOVAL WITH IMPLANT Bilateral      SECTION      CHOLECYSTECTOMY, LAPAROSCOPIC N/A 2022    CHOLECYSTECTOMY LAPAROSCOPIC with intraoperative cholangiogram performed by Mony Lara MD at [facility]  approx 2000    x 3; per Dr Laura Montoya, COLON, DIAGNOSTIC      ERCP N/A 2022    ERCP ENDOSCOPIC RETROGRADE CHOLANGIOPANCREATOGRAPHY performed by Lois Alexis MD at [facility]    ERCP N/A 2022    ERCP SPHINCTER/PAPILLOTOMY performed by Lois Alexis MD at [facility]    ERCP N/A 2022    ERCP STONE REMOVAL performed by Lois Alexis MD at [facility]    ERCP N/A 2022    ERCP DILATION BALLOON performed by Lois Alexis MD at [address]      left knee, right shoulder    AR TOTAL KNEE ARTHROPLASTY Right 10/31/2018    RIGHT KNEE TOTAL ARTHROPLASTY +++BRIDGER++ PNB++ performed by Senait Mullins MD at [facility]         History reviewed. No pertinent family history. reports that she has never smoked. She has never used smokeless tobacco. She reports that she does not drink alcohol and does not use drugs. Review of Systems:   Constitutional: no fevers , no chills , feels ok   Eyes: no eye pain , no itching , no drainage  Ears, nose, mouth, throat, and face: no ear ,nose pain , hearing is ok ,no nasal drainage   Respiratory: no sob ,no cough ,no wheezing . Cardiovascular: no chest pain , no palpitation ,no sob . Gastrointestinal: no nausea, vomiting , constipation , no abdominal pain .    Genitourinary:no urinary retention , no burning , dysuria . No polyuria   Hematologic/lymphatic: no bleeding , no cougulation issues . Musculoskeletal:no joint pain , no swelling . Neurological: no headaches ,no weakness , no numbness . Endocrine: no thirst , no weight issues .      MEDS (scheduled):    nystatin  5 mL Oral 4x Daily    amLODIPine  2.5 mg Oral Daily    metoprolol tartrate  12.5 mg Oral BID    custom dialysis builder   IntraPERitoneal Once    sodium chloride flush  5-40 mL IntraVENous 2 times per day    insulin lispro  0-16 Units SubCUTAneous TID WC    insulin lispro  0-4 Units SubCUTAneous Nightly    piperacillin-tazobactam  4,500 mg IntraVENous Q12H    levETIRAcetam  250 mg Oral BID    apixaban  2.5 mg Oral BID    aspirin  81 mg Oral Daily    atorvastatin  20 mg Oral Nightly    DULoxetine  30 mg Oral Daily    isosorbide mononitrate  30 mg Oral Daily    levothyroxine  50 mcg Oral Daily    docusate sodium  100 mg Oral Nightly    pantoprazole  40 mg Oral QAM AC       MEDS (infusions):   sodium chloride      dextrose         MEDS (prn):  polyethylene glycol, bisacodyl, sodium chloride flush, sodium chloride, oxyCODONE, acetaminophen, glucose, dextrose bolus **OR** dextrose bolus, glucagon (rDNA), dextrose, acetaminophen, trimethobenzamide    PHYSICAL EXAM:     Patient Vitals for the past 24 hrs:   BP Temp Temp src Pulse Resp SpO2 Height   09/26/22 1719 (!) 103/55 98 °F (36.7 °C) -- 77 18 -- --   09/26/22 1515 (!) 100/57 98 °F (36.7 °C) Temporal 74 18 100 % --   09/26/22 1419 -- -- -- -- -- -- 5' (1.524 m)   09/26/22 0759 127/60 97.9 °F (36.6 °C) Temporal 54 18 100 % --   09/25/22 2015 (!) 103/36 97.1 °F (36.2 °C) Temporal 58 16 98 % --   @      Intake/Output Summary (Last 24 hours) at 9/26/2022 1733  Last data filed at 9/26/2022 1527  Gross per 24 hour   Intake 357 ml   Output 1017 ml   Net -660 ml         Wt Readings from Last 3 Encounters:   09/25/22 139 lb 1.8 oz (63.1 kg)   07/27/22 143 lb 3.2 oz (65 kg) 04/29/22 158 lb 8.2 oz (71.9 kg)       Constitutional:  in no acute distress  Oral: mucus membranes moist  Neck: no JVD  Cardiovascular: S1, S2 regular rhythm, no murmur,or rub  Respiratory:  No crackles, no wheeze  Gastrointestinal:  Soft, nontender, nondistended, NABS  Ext: no edema, feet warm  Skin: dry, no rash  Neuro: awake, alert, interactive      DATA:    Recent Labs     09/24/22  0504 09/25/22  0438 09/26/22  0611   WBC 20.9* 17.9* 24.2*   HGB 8.1* 8.4* 8.7*   HCT 24.1* 25.5* 27.1*   MCV 90.9 90.4 90.3    339 410     Recent Labs     09/24/22  0504 09/25/22  0438 09/26/22  0611 09/26/22  1313 09/26/22  1624   * 127* 129*  --  128*   K 3.6 3.4* 2.7* 4.7 3.8   CL 86* 88* 90*  --  92*   CO2 22 22 22  --  21*   MG 1.6 1.8 1.8  --   --    PHOS 3.4 2.9 2.8  --   --    BUN 43* 38* 37*  --  38*   CREATININE 5.1* 4.6* 4.3*  --  4.5*   ALT 88*  --  50*  --   --    AST 70*  --  39*  --   --    BILIDIR 1.3*  --   --   --   --    BILITOT 1.8*  --  0.8  --   --    ALKPHOS 537*  --  520*  --   --        No results found for: LABPROT    Assessment    1. End-stage renal disease, on peritoneal dialysis. 2.  Encephalopathy, etiology unclear, metabolic versus ongoing infection. .  Markedly improved  3. Leukocytosis ? Ascedning cholangitis. Improved now status post cholecystectomy  4. Anemia due to ESRD  5. Hypomagnesemia. 6.  Mineral bone disease. 7.  Hyponatremia likely due to water overload, mild though, as well as hyperglycemia  8. Hypokalemia, due to poor intake, losses with PD. Supplemented, the repeat at 1 PM was hemolyzed. Next repeat 3.8 mmol/L     Recommendations  Continue CCPD this evening for solute and volume clearance.   increased the volume to 2000 cc per exchange   Continue to use all 2.5% dextrose solutions to increase water clearance  1000 units heparin added per bag to prevent clotting, adhesions given blood in her peritoneum  Dulcolax as needed  MiraLAX as needed  Colace  Supplement potassium again, p.o.   Follow labs     Electronically signed by Randy Chacon MD on 9/26/2022

## 2022-09-26 NOTE — PROGRESS NOTES
Subjective:    Patient is awake and oriented. Conversant  Denies any chest pain, shortness of breath  Patient states she \"feels fatigued but better than yesterday\"    Objective:    /60   Pulse 54   Temp 97.9 °F (36.6 °C) (Temporal)   Resp 18   Ht 5' (1.524 m)   Wt 139 lb 1.8 oz (63.1 kg)   SpO2 100%   BMI 27.17 kg/m²     In: 690 [P.O.:690]  Out: 3012   In: 690   Out: 3012     General Appearance:  awake and alert, conversant, slightly delayed responses  Head/face: NCAT  Eyes:  No gross abnormalities. Lungs:  Normal expansion. Clear to auscultation. No rales, rhonchi, or wheezing. Heart:  Heart sounds are normal.  Regular rate and rhythm without murmur, gallop or rub. Abdomen:  Soft, tender, normal bowel sounds. No bruits, PD cath  Extremities: Extremities warm to touch, pink, with no edema. Neurologic:  Awake, oriented x 2, no focal deficits, mild involuntary twitching of extremities - improved from admission     Recent Labs     09/24/22  0504 09/25/22  0438 09/26/22  0611   WBC 20.9* 17.9* 24.2*   HGB 8.1* 8.4* 8.7*   HCT 24.1* 25.5* 27.1*    339 410         Recent Labs     09/24/22  0504 09/25/22  0438 09/26/22  0611   * 127* 129*   K 3.6 3.4* 2.7*   CL 86* 88* 90*   CO2 22 22 22   BUN 43* 38* 37*   CREATININE 5.1* 4.6* 4.3*   CALCIUM 8.6 8.7 9.2         Assessment:    Principal Problem:    Change in mental status  Active Problems:    Chronic kidney disease    Moderate protein-calorie malnutrition (HCC)  Resolved Problems:    * No resolved hospital problems.  *        PLAN:     # Altered mental status  -Likely from sepsis,   -WBC 23 > 17.9 post ERCP > 24.2-currently off antibiotic therapy, watch for fevers, will need infectious disease evaluation if no improvement in white count tomorrow  -IV Zosyn to be completed 9/26/2022, but worsening leukocytosis  -EEG > A potential for generalized photosensitive epilepsy, will defer to neurology > started on Keppra 250 mg twice daily, neurology signed off  -blood cultures x 2 are negative   -peritoneal body fluid culture NGTD    LFTs/acute cholecystitis-fluctuating  -s/p lap milton with IOC 9/21/2022  -Multiple filling defects in the CBD noted during IOC, advanced GI consulted for ERCP/stone removal sphincterotomy and sphincteroplasty-procedure completed on 9/23-which she tolerated well  -Lipitor restarted   - > 117>70 > 39- improving  - > 121>88 > 50- improving  -Total Bilirubin 1.7 > 1.6>1.8 > 0.8, improved  -Continue to monitor labs, hepatic function test daily -improving    ESRD on PD  -Nephro following  -PD per nephro   -Per general surgery, okay to continue peritoneal dialysis-no need for temporary dialysis line  -Monitor labs  -K 3.4 > 2.7 > 40 meq IV and 40 meq PO x1 per nephro, continue to monitor and supplement as needed    Severe protein calorie malnutrition  -Nutrition consult  -Start Vanilla Glucerna TID and jacinda BID   -Monitor oral intake during meals  -Advance diet as tolerated    Diabetes mellitus   -Continue to monitor blood glucose levels  -Better controlled with ISS increase to high dose   May have to initiate p.o. hypoglycemic agents due to elevated glucose levels as they continue to be elevated at 335    Elevated troponin  -Without EKG changes or chest pain - likely related to renal failure - noted elevations 04/22 chronically. Trending down 183 > 164  -Follow labs  -Ok to restart Eliquis, Hx AFIB  -Cardio signed off    Constipation  -Dulcolax as needed  -Colace    Dry mouth, Concern for thrush  -Poss. due to long term antibiotic use  -Start nystatin   -Oral Hygeine     Medication for other comorbidities continue as appropriate dose adjustment as necessary. DVT prophylaxis Eliquis  PT OT  Discharge plan GAIL Carter, APRN - CNP  9:58 AM  9/26/2022    Above note edited to reflect my thoughts     I personally saw, examined and provided care for the patient.  Radiographs, labs and medication list were reviewed by me independently. The case was discussed in detail and plans for care were established. Review of KERRI Arnold-CNP   , documentation was conducted and revisions were made as appropriate directly by me. I agree with the above documented exam, problem list, and plan of care.      Kenzie Walker MD  6:16 PM  9/26/2022

## 2022-09-26 NOTE — PROGRESS NOTES
[facility] CARDIOLOGY PROGRESS NOTE  [facility]        Subjective: Seeing patient for follow-up of CAD prior CABG in 2000, AF on chronic Eliquis 2.5 mg twice a day adjusted for chronic renal failure on CAPD    Admitted with mental status change and underwent laparoscopic cholecystectomy and then ERC P.  Eliquis was held. Today potassium 2.7 being replaced. Denies any shortness of breath currently or overnight. Objective: Medications lab chart and telemetry all reviewed.     Patient Vitals for the past 24 hrs:   BP Temp Temp src Pulse Resp SpO2   09/26/22 0759 127/60 97.9 °F (36.6 °C) Temporal 54 18 100 %   09/25/22 2015 (!) 103/36 97.1 °F (36.2 °C) Temporal 58 16 98 %         Intake/Output Summary (Last 24 hours) at 9/26/2022 1254  Last data filed at 9/26/2022 1059  Gross per 24 hour   Intake 460 ml   Output 1017 ml   Net -557 ml       Wt Readings from Last 3 Encounters:   09/25/22 139 lb 1.8 oz (63.1 kg)   07/27/22 143 lb 3.2 oz (65 kg)   04/29/22 158 lb 8.2 oz (71.9 kg)       Telemetry: Personally reviewed and shows atrial fibrillation heart rate 70s    Current meds: Scheduled Meds:   amLODIPine  2.5 mg Oral Daily    metoprolol tartrate  12.5 mg Oral BID    custom dialysis builder   IntraPERitoneal Once    custom dialysis builder   IntraPERitoneal Once    custom dialysis builder   IntraPERitoneal Once    sodium chloride flush  5-40 mL IntraVENous 2 times per day    insulin lispro  0-16 Units SubCUTAneous TID WC    insulin lispro  0-4 Units SubCUTAneous Nightly    piperacillin-tazobactam  4,500 mg IntraVENous Q12H    levETIRAcetam  250 mg Oral BID    apixaban  2.5 mg Oral BID    aspirin  81 mg Oral Daily    atorvastatin  20 mg Oral Nightly    DULoxetine  30 mg Oral Daily    isosorbide mononitrate  30 mg Oral Daily    levothyroxine  50 mcg Oral Daily    docusate sodium  100 mg Oral Nightly    pantoprazole  40 mg Oral QAM AC     Continuous Infusions:   sodium chloride      dextrose       PRN Meds:.polyethylene glycol, bisacodyl, sodium chloride flush, sodium chloride, oxyCODONE, acetaminophen, glucose, dextrose bolus **OR** dextrose bolus, glucagon (rDNA), dextrose, acetaminophen, trimethobenzamide    Allergies: Sulfa antibiotics      Labs:   Recent Labs     09/24/22  0504 09/25/22  0438 09/26/22  0611   WBC 20.9* 17.9* 24.2*   HGB 8.1* 8.4* 8.7*   HCT 24.1* 25.5* 27.1*   MCV 90.9 90.4 90.3    339 410       Labs:   Recent Labs     09/24/22  0504 09/25/22  0438 09/26/22  0611   * 127* 129*   K 3.6 3.4* 2.7*   CL 86* 88* 90*   CO2 22 22 22   PHOS 3.4 2.9 2.8   BUN 43* 38* 37*   CREATININE 5.1* 4.6* 4.3*       Labs: No results for input(s): CKTOTAL, CKMB, CKMBINDEX, TROPONINI in the last 72 hours. Labs: No results for input(s): BNP in the last 72 hours. Labs: No results for input(s): CHOL, HDL, TRIG in the last 72 hours. Invalid input(s): Antelmo Matute    Labs:   Recent Labs     09/24/22  0504 09/26/22  0611   PROT 5.9* 5.8*       Review of systems: No reported significant weight gain or weight loss. no dysuria or frequency, no dizziness, falls or trauma, no change in bowel or bladder habits, no hematochezia, hemoptysis or hematuria. No fevers, chills, nausea or vomiting reported. No significant wheezing or sputum production. No headache or visual changes. The remainder of the 10 review of systems otherwise negative. Exam      General: Patient comfortable in no distress and currently denies any chest pain. HEENT: Face symmetrical and no apparent cranial nerve deficit. Neck: No jugular venous distention, carotid bruit or thyromegaly. Lungs: Clear bilaterally without rales, wheezes or dullness. Cardiac: IRegular rate and rhythm, no S3, S4, no rub or gallop. Abdomen: No rebound or guarding, no hepatosplenomegaly. Extremities: Without significant clubbing , cyanosis, or edema. Neuro:  No focal motor or sensory deficit apparent.     Skin: No petechiae, no significant bruising. Assessment: See plan below        Plan: #1 perioperative cardiac evaluation and management with known chronic CAD, atrial fibrillation on Eliquis. Pleasant and appropriate today no acute distress. Ate breakfast.    #2 chronic atrial fibrillation and controlled ventricular rate, continue current medical regimen. Restart Eliquis 2.5 mg twice a day this evening. #3 chronic Eliquis anticoagulation renal adjusted 2.5 mg twice a day with known chronic renal failure on CAPD. #4 CAPD and tolerates well. Blood pressure reasonable on current medications. #5 hypokalemia to be replaced with p.o. and IV. Can be discharged from cardiology viewpoint and please call if any further questions or concerns.             Electronically signed by Tyrone Brown MD on 9/26/2022 at 12:55 PM

## 2022-09-26 NOTE — DISCHARGE INSTRUCTIONS
Discharge Instructions for Cholecystectomy     A cholecystectomy is the surgical removal of the gallbladder. Home Care    Keep the incision area clean and dry, okay to shower and wash incisions after 6:00pm tonight with soap and water, and pat dry. Diet    You will be started on a clear-liquid diet after surgery and advanced to a regular diet, depending on your progress and tolerance. Your liver will take over the functions of the gallbladder, although some people notice that they have a little more trouble digesting fatty foods, particularly for the first month after surgery. You may notice increased gas or changes in your bowel habits during the first month after surgery. Keep the bowels soft with Metamucil and bran cereal.  Physical Activity    Walk frequently and it is okay to do stairs but do not do anything that causes pain in the incisions. Medications    Take Ibuprofen 800mg every 8 hours and Tylenol 500mg every 6 hours for moderate pain. You will start them both at the same time and then they will overlap by 2 hours. Use the Oxycodone for more severe pain. Take the stool softeners if you are taking the oxycodone  Follow-up   Follow up upon discharge to schedule an appointment. Call Your Doctor If Any of the Following Occurs     Signs of infection, including fever and chills   Redness, swelling, increasing pain, excessive bleeding, or discharge at the incision site   Cough, shortness of breath, chest pain   Increased abdominal pain   Nausea and/or vomiting that does not resolve off narcotics. Pain, burning, urgency or frequency of urination, or blood in the urine   Pain and/or swelling in your feet, calves, or legs   Dark urine, light stools, or evidence of jaundice (yellowing of the skin or eyes). In case of an emergency,    CALL 911  immediately.

## 2022-09-26 NOTE — PROGRESS NOTES
[address]   Gastroenterology, Hepatology &  Advanced Endoscopy     Progress Note    SUBJECTIVE:      Patient tearful about possible placement into nursing facility. Otherwise denies abdominal pain and is tolerating PO. OBJECTIVE      Physical    VITALS:  BP (!) 103/55   Pulse 77   Temp 98 °F (36.7 °C)   Resp 18   Ht 5' (1.524 m)   Wt 139 lb 1.8 oz (63.1 kg)   SpO2 100%   BMI 27.17 kg/m²   Physical Exam:  General: Chronically ill-appearing, NAD  HEENT: PERRLA, EOMI, Anicteric sclera, MMM, no rhinorrhea  Cards: RRR, no LE edema  Resp: Breathing comfortably on room air, good air movement, no use of accessory muscles, no audible wheezing  Abdomen: soft, NT. Mild distention at baseline. Extremities: Moves all extremities, no effusions or bruising. Skin: No rashes or jaundice  Neuro: A&O x 3, CN grossly intact, non-focal exam     ASSESSMENT AND PLAN      82y/F who presents w/ AMS and is found to have biliary obstruction and RUQ pain s/p CCY w/ IOC showing choledocholithiasis now s/p ERCP w/ stone removal.      LFTs downtrending. PLAN:  -Continue trending LFTs. -Hold Eliquis for total 72hrs from ERCP and then okay to resume.   -Continue antibiotics for treatment course of cholecystitis with adequate source control. GI will sign off. Thank you for including us in the care of this patient. Please do not hesitate to contact us with any additional questions or concerns.      Martha Marie MD  Gastroenterology/Hepatology  Advanced Endoscopy

## 2022-09-26 NOTE — PLAN OF CARE
Problem: Skin/Tissue Integrity  Goal: Absence of new skin breakdown  Description: 1. Monitor for areas of redness and/or skin breakdown  2. Assess vascular access sites hourly  3. Every 4-6 hours minimum:  Change oxygen saturation probe site  4. Every 4-6 hours:  If on nasal continuous positive airway pressure, respiratory therapy assess nares and determine need for appliance change or resting period.   9/26/2022 4487 by Blas Samuel RN  Outcome: Progressing  9/25/2022 2029 by Osmin Angeles RN  Outcome: Progressing     Problem: Safety - Adult  Goal: Free from fall injury  9/26/2022 0639 by Blas Samuel RN  Outcome: Progressing  9/25/2022 2029 by Osmin Angeles RN  Outcome: Progressing     Problem: Chronic Conditions and Co-morbidities  Goal: Patient's chronic conditions and co-morbidity symptoms are monitored and maintained or improved  9/26/2022 0639 by Blas Samuel RN  Outcome: Progressing  9/25/2022 2029 by Osmin Angeles RN  Outcome: Progressing  Flowsheets (Taken 9/25/2022 0840)  Care Plan - Patient's Chronic Conditions and Co-Morbidity Symptoms are Monitored and Maintained or Improved: Monitor and assess patient's chronic conditions and comorbid symptoms for stability, deterioration, or improvement     Problem: Discharge Planning  Goal: Discharge to home or other facility with appropriate resources  9/26/2022 0639 by Blas Samuel RN  Outcome: Progressing  9/25/2022 2029 by Osmin Angeles RN  Outcome: Progressing     Problem: Nutrition Deficit:  Goal: Optimize nutritional status  9/26/2022 0639 by Blas Samuel RN  Outcome: Progressing  9/25/2022 2029 by Osmin Angeles RN  Outcome: Progressing     Problem: Pain  Goal: Verbalizes/displays adequate comfort level or baseline comfort level  9/26/2022 0639 by Blas Samuel RN  Outcome: Progressing  9/25/2022 2029 by Osmin Angeles RN  Outcome: Progressing  Flowsheets (Taken 9/25/2022 0930)  Verbalizes/displays adequate comfort level or baseline comfort level: Encourage patient to monitor pain and request assistance

## 2022-09-26 NOTE — FLOWSHEET NOTE
09/26/22 0700   Peritoneal Dialysis Catheter Right lower abdomen   No Placement Date or Time found.    Catheter Location: Right lower abdomen   Status Deaccessed   Site Condition Clean, dry, intact   Peritoneal Dialysis (CAPD manual)   Effluent Appearance Latanya;Clear   Cycler   Verification of Prescription CCPD   Total Volume Programmed 38423 mL   Therapy Time (Hours:Minutes) 9   Fill Volume 2000 mL   Last Fill Volume 1500 mL   Dextrose Setting Same (Nonextraneal)   Number of Cycles 6   Bag Volume 5000 mL   Number of Bags Used 3   Dianeal Solution   (2.5% 5000ml)   Ultrafiltration (UF) (mL) 1017 mL

## 2022-09-26 NOTE — PLAN OF CARE
Problem: Skin/Tissue Integrity  Goal: Absence of new skin breakdown  Description: 1. Monitor for areas of redness and/or skin breakdown  2. Assess vascular access sites hourly  3. Every 4-6 hours minimum:  Change oxygen saturation probe site  4. Every 4-6 hours:  If on nasal continuous positive airway pressure, respiratory therapy assess nares and determine need for appliance change or resting period.   Outcome: Progressing     Problem: Safety - Adult  Goal: Free from fall injury  Outcome: Progressing     Problem: Chronic Conditions and Co-morbidities  Goal: Patient's chronic conditions and co-morbidity symptoms are monitored and maintained or improved  Outcome: Progressing     Problem: Discharge Planning  Goal: Discharge to home or other facility with appropriate resources  Outcome: Progressing     Problem: Nutrition Deficit:  Goal: Optimize nutritional status  Outcome: Progressing     Problem: Pain  Goal: Verbalizes/displays adequate comfort level or baseline comfort level  Outcome: Progressing  Flowsheets (Taken 9/25/2022 5533)  Verbalizes/displays adequate comfort level or baseline comfort level: Encourage patient to monitor pain and request assistance

## 2022-09-26 NOTE — PROGRESS NOTES
Comprehensive Nutrition Assessment    Type and Reason for Visit:  Reassess    Nutrition Recommendations/Plan:   Recommend lifting renal diet restrictions as current phos WNL & hypokalemia noted  Start Carb Controlled 5 diet  Modify ONS to Glucerna TID     Malnutrition Assessment:  Malnutrition Status: Moderate malnutrition (09/22/22 1610)    Context:  Chronic Illness (poor intake PTA)     Findings of the 6 clinical characteristics of malnutrition:  Energy Intake:  75% or less estimated energy requirements for 1 month or longer  Weight Loss:  Unable to assess (wt flux d/t fluid shifts d/t ESRD/PD)     Body Fat Loss:  Mild body fat loss Orbital, Triceps   Muscle Mass Loss:  Mild muscle mass loss Temples (temporalis), Clavicles (pectoralis & deltoids), Hand (interosseous)  Fluid Accumulation:  No significant fluid accumulation     Strength:  Not Performed    Nutrition Assessment:    Pt remains nutritionally at risk w/ ongoing poor PO intake & moderate malnutrition. Admit w/ AMS, cholecystitis s/p lap milton w/ IOC. Noted choledocholithiasis s/p ERCP w/ stone removal. Pt w/ ESRD on PD, hx DM2, TIA. Will provide updated diet/ONS recs and monitor. Nutrition Related Findings:    pt lethargic/disoriented at times, active BS, abd tenderness, trace edema, -I/Os 8.7L, hypokalemia, elevated LFTs, phos/bili WNL Wound Type: Multiple, Surgical Incision (x4 lap sites)       Current Nutrition Intake & Therapies:    Average Meal Intake: 1-25%  Average Supplements Intake: 1-25%  ADULT DIET; Regular; Low Potassium (Less than 3000 mg/day); Low Phosphorus (Less than 1000 mg)  ADULT ORAL NUTRITION SUPPLEMENT; AM Snack, PM Snack; Renal Oral Supplement    Anthropometric Measures:  Height: 5' (152.4 cm)  Ideal Body Weight (IBW): 100 lbs (45 kg)    Admission Body Weight: 143 lb (64.9 kg) (9/19 actual)  Current Body Weight: 139 lb (63 kg) (9/25 actual), 139 % IBW.     Current BMI (kg/m2): 27.1  Usual Body Weight:  (LUIZA as pt w/ wt flux likely d/t fluid shifts r/t ESRD/PD)     Weight Adjustment For: No Adjustment                 BMI Categories: Overweight (BMI 25.0-29. 9)    Estimated Daily Nutrient Needs:  Energy Requirements Based On: Formula  Weight Used for Energy Requirements: Current  Energy (kcal/day): MSJ x 1.2 SF = 9419-2799  Weight Used for Protein Requirements: Ideal  Protein (g/day): 70-80 (1.5-1.8 gm/kg IBW)  Method Used for Fluid Requirements: Standard Renal  Fluid (ml/day): per renal management    Nutrition Diagnosis:   Moderate malnutrition, In context of chronic illness related to catabolic illness (ESRD) as evidenced by Criteria as identified in malnutrition assessment    Nutrition Interventions:   Food and/or Nutrient Delivery: Modify Current Diet, Modify Oral Nutrition Supplement (Recommend lifting renal diet restrictions as current phos WNL & hypokalemia noted. Start Carb Controlled 5 diet. Modify ONS to Glucerna TID)  Nutrition Education/Counseling: Education not appropriate  Coordination of Nutrition Care: Continue to monitor while inpatient  Plan of Care discussed with: pt's son. Attempted to contact pt's nurse x3 but was unsuccessful- would like to know if pt can get assistance at meals times as this might promote oral intake. Goals:  Previous Goal Met: Progressing toward Goal(s)  Goals: PO intake 50% or greater, by next RD assessment  Specify Other Goals: Consumes >20% meals/ONS. Nutrition Monitoring and Evaluation:   Behavioral-Environmental Outcomes: None Identified  Food/Nutrient Intake Outcomes: Food and Nutrient Intake, Supplement Intake  Physical Signs/Symptoms Outcomes: Biochemical Data, GI Status, Fluid Status or Edema, Nutrition Focused Physical Findings, Skin, Weight    Discharge Planning:     Too soon to determine     Ana Rivera MS, RD, LD  Contact: [phone]

## 2022-09-26 NOTE — PROGRESS NOTES
GENERAL SURGERY  DAILY PROGRESS NOTE  9/26/2022  CC: abdominal pain     Subjective:  Feeling and looking better. Tolerating diet    Objective:  BP (!) 100/57   Pulse 74   Temp 98 °F (36.7 °C) (Temporal)   Resp 18   Ht 5' (1.524 m)   Wt 139 lb 1.8 oz (63.1 kg)   SpO2 100%   BMI 27.17 kg/m²     General appearance: alert, cooperative and in no acute distress. Eyes: grossly normal  Lungs: No respiratory distress  Heart: regular rate  Abdomen: soft, nondistended. Appropriately tender post op. Incisions clean/dry/intact with skin glue in place. PD catheter  Skin: No skin abnormalities  Neurologic: Alert and oriented x 3.  Grossly normal  Musculoskeletal: No clubbing cyanosis or edema    Assessment/Plan:  80 y.o. female history of ESRD on peritoneal dialysis, CAD, DM, A. fib on Eliquis currently admitted with altered mental status with acute cholecystitis and choledocholithiasis s/p Lap milton with IOC 09/21 s/p ERCP with sphincterotomy and stone removal 9/23    Okay for diet  Pain control  Okay for therapeutic anticoagulation  Patient can be discharged from surgical perspective    Electronically signed by Stan Padilla MD on 9/26/2022 at 3:35 PM

## 2022-09-26 NOTE — FLOWSHEET NOTE
09/25/22 1730   Peritoneal Dialysis Catheter Right lower abdomen   No Placement Date or Time found. Catheter Location: Right lower abdomen   Status Accessed   Site Condition Clean, dry, intact   Dressing Status Clean, dry & intact; New dressing applied   Dressing Gauze   Date of Last Dressing Change 09/25/22   Dialysis Type Continuous cycling   Exit Site Condition cdi   Catheter Care Given Yes   Cycler   Verification of Prescription CCPD   Total Volume Programmed 40943 mL   Therapy Time (Hours:Minutes) 9   Fill Volume 2000 mL   Last Fill Volume 1500 mL   Number of Cycles 6   Bag Volume 5000 mL   Number of Bags Used 3   Dianeal Solution   (Dextrose 2.5% in 5000 mL)   CCPD initiated, catheter accessed, drsg changed, aseptic technique, draining cl yellow effluent, tolerating well.

## 2022-09-27 LAB
ALBUMIN SERPL-MCNC: 1.9 G/DL (ref 3.5–5.2)
ALP BLD-CCNC: 548 U/L (ref 35–104)
ALT SERPL-CCNC: 48 U/L (ref 0–32)
ANION GAP SERPL CALCULATED.3IONS-SCNC: 15 MMOL/L (ref 7–16)
ANISOCYTOSIS: ABNORMAL
AST SERPL-CCNC: 46 U/L (ref 0–31)
BASOPHILS ABSOLUTE: 0 E9/L (ref 0–0.2)
BASOPHILS RELATIVE PERCENT: 0.5 % (ref 0–2)
BILIRUB SERPL-MCNC: 0.8 MG/DL (ref 0–1.2)
BILIRUBIN DIRECT: 0.5 MG/DL (ref 0–0.3)
BILIRUBIN, INDIRECT: 0.3 MG/DL (ref 0–1)
BUN BLDV-MCNC: 31 MG/DL (ref 6–23)
CALCIUM SERPL-MCNC: 9.5 MG/DL (ref 8.6–10.2)
CHLORIDE BLD-SCNC: 89 MMOL/L (ref 98–107)
CO2: 23 MMOL/L (ref 22–29)
CREAT SERPL-MCNC: 3.9 MG/DL (ref 0.5–1)
EOSINOPHILS ABSOLUTE: 0.26 E9/L (ref 0.05–0.5)
EOSINOPHILS RELATIVE PERCENT: 0.9 % (ref 0–6)
GFR AFRICAN AMERICAN: 13
GFR NON-AFRICAN AMERICAN: 11 ML/MIN/1.73
GLUCOSE BLD-MCNC: 337 MG/DL (ref 74–99)
HCT VFR BLD CALC: 24.3 % (ref 34–48)
HEMOGLOBIN: 7.8 G/DL (ref 11.5–15.5)
LYMPHOCYTES ABSOLUTE: 2.32 E9/L (ref 1.5–4)
LYMPHOCYTES RELATIVE PERCENT: 7.8 % (ref 20–42)
MAGNESIUM: 2 MG/DL (ref 1.6–2.6)
MCH RBC QN AUTO: 29.1 PG (ref 26–35)
MCHC RBC AUTO-ENTMCNC: 32.1 % (ref 32–34.5)
MCV RBC AUTO: 90.7 FL (ref 80–99.9)
METAMYELOCYTES RELATIVE PERCENT: 0.9 % (ref 0–1)
METER GLUCOSE: 106 MG/DL (ref 74–99)
METER GLUCOSE: 107 MG/DL (ref 74–99)
METER GLUCOSE: 186 MG/DL (ref 74–99)
METER GLUCOSE: 297 MG/DL (ref 74–99)
MONOCYTES ABSOLUTE: 2.9 E9/L (ref 0.1–0.95)
MONOCYTES RELATIVE PERCENT: 10.4 % (ref 2–12)
MYELOCYTE PERCENT: 2.6 % (ref 0–0)
NEUTROPHILS ABSOLUTE: 22.91 E9/L (ref 1.8–7.3)
NEUTROPHILS RELATIVE PERCENT: 75.7 % (ref 43–80)
NUCLEATED RED BLOOD CELLS: 1.7 /100 WBC
PDW BLD-RTO: 14.1 FL (ref 11.5–15)
PHOSPHORUS: 2.6 MG/DL (ref 2.5–4.5)
PLATELET # BLD: 456 E9/L (ref 130–450)
PMV BLD AUTO: 10.6 FL (ref 7–12)
POLYCHROMASIA: ABNORMAL
POTASSIUM REFLEX MAGNESIUM: 3.1 MMOL/L (ref 3.5–5)
POTASSIUM SERPL-SCNC: 3.1 MMOL/L (ref 3.5–5)
PROMYELOCYTES PERCENT: 1.7 % (ref 0–0)
RBC # BLD: 2.68 E12/L (ref 3.5–5.5)
SODIUM BLD-SCNC: 127 MMOL/L (ref 132–146)
TOTAL PROTEIN: 6.1 G/DL (ref 6.4–8.3)
WBC # BLD: 29 E9/L (ref 4.5–11.5)

## 2022-09-27 PROCEDURE — 84100 ASSAY OF PHOSPHORUS: CPT

## 2022-09-27 PROCEDURE — 36415 COLL VENOUS BLD VENIPUNCTURE: CPT

## 2022-09-27 PROCEDURE — 90945 DIALYSIS ONE EVALUATION: CPT

## 2022-09-27 PROCEDURE — 2580000003 HC RX 258: Performed by: STUDENT IN AN ORGANIZED HEALTH CARE EDUCATION/TRAINING PROGRAM

## 2022-09-27 PROCEDURE — 80048 BASIC METABOLIC PNL TOTAL CA: CPT

## 2022-09-27 PROCEDURE — 6370000000 HC RX 637 (ALT 250 FOR IP): Performed by: INTERNAL MEDICINE

## 2022-09-27 PROCEDURE — 6360000002 HC RX W HCPCS: Performed by: INTERNAL MEDICINE

## 2022-09-27 PROCEDURE — 6370000000 HC RX 637 (ALT 250 FOR IP): Performed by: STUDENT IN AN ORGANIZED HEALTH CARE EDUCATION/TRAINING PROGRAM

## 2022-09-27 PROCEDURE — 1200000000 HC SEMI PRIVATE

## 2022-09-27 PROCEDURE — 82962 GLUCOSE BLOOD TEST: CPT

## 2022-09-27 PROCEDURE — 80053 COMPREHEN METABOLIC PANEL: CPT

## 2022-09-27 PROCEDURE — 87449 NOS EACH ORGANISM AG IA: CPT

## 2022-09-27 PROCEDURE — 97530 THERAPEUTIC ACTIVITIES: CPT

## 2022-09-27 PROCEDURE — 85025 COMPLETE CBC W/AUTO DIFF WBC: CPT

## 2022-09-27 PROCEDURE — 83735 ASSAY OF MAGNESIUM: CPT

## 2022-09-27 PROCEDURE — 97535 SELF CARE MNGMENT TRAINING: CPT

## 2022-09-27 PROCEDURE — 80076 HEPATIC FUNCTION PANEL: CPT

## 2022-09-27 PROCEDURE — 2580000003 HC RX 258: Performed by: INTERNAL MEDICINE

## 2022-09-27 PROCEDURE — 6370000000 HC RX 637 (ALT 250 FOR IP): Performed by: NURSE PRACTITIONER

## 2022-09-27 PROCEDURE — 97130 THER IVNTJ EA ADDL 15 MIN: CPT

## 2022-09-27 PROCEDURE — 87324 CLOSTRIDIUM AG IA: CPT

## 2022-09-27 PROCEDURE — 6360000002 HC RX W HCPCS: Performed by: STUDENT IN AN ORGANIZED HEALTH CARE EDUCATION/TRAINING PROGRAM

## 2022-09-27 PROCEDURE — 97129 THER IVNTJ 1ST 15 MIN: CPT

## 2022-09-27 RX ORDER — POTASSIUM CHLORIDE 20 MEQ/1
40 TABLET, EXTENDED RELEASE ORAL ONCE
Status: COMPLETED | OUTPATIENT
Start: 2022-09-27 | End: 2022-09-27

## 2022-09-27 RX ADMIN — POTASSIUM CHLORIDE 40 MEQ: 1500 TABLET, EXTENDED RELEASE ORAL at 08:28

## 2022-09-27 RX ADMIN — APIXABAN 2.5 MG: 2.5 TABLET, FILM COATED ORAL at 20:46

## 2022-09-27 RX ADMIN — PIPERACILLIN AND TAZOBACTAM 4500 MG: 4; .5 INJECTION, POWDER, LYOPHILIZED, FOR SOLUTION INTRAVENOUS at 12:58

## 2022-09-27 RX ADMIN — DULOXETINE HYDROCHLORIDE 30 MG: 30 CAPSULE, DELAYED RELEASE ORAL at 08:28

## 2022-09-27 RX ADMIN — LEVOTHYROXINE SODIUM 50 MCG: 0.05 TABLET ORAL at 06:15

## 2022-09-27 RX ADMIN — ATORVASTATIN CALCIUM 20 MG: 20 TABLET, FILM COATED ORAL at 20:46

## 2022-09-27 RX ADMIN — PANTOPRAZOLE SODIUM 40 MG: 40 TABLET, DELAYED RELEASE ORAL at 06:15

## 2022-09-27 RX ADMIN — LEVETIRACETAM 250 MG: 250 TABLET, FILM COATED ORAL at 20:47

## 2022-09-27 RX ADMIN — METOPROLOL TARTRATE 12.5 MG: 25 TABLET, FILM COATED ORAL at 20:46

## 2022-09-27 RX ADMIN — NYSTATIN 500000 UNITS: 100000 SUSPENSION ORAL at 20:46

## 2022-09-27 RX ADMIN — ASPIRIN 81 MG 81 MG: 81 TABLET ORAL at 08:29

## 2022-09-27 RX ADMIN — ISOSORBIDE MONONITRATE 30 MG: 30 TABLET, EXTENDED RELEASE ORAL at 08:30

## 2022-09-27 RX ADMIN — APIXABAN 2.5 MG: 2.5 TABLET, FILM COATED ORAL at 08:29

## 2022-09-27 RX ADMIN — POTASSIUM CHLORIDE 40 MEQ: 1500 TABLET, EXTENDED RELEASE ORAL at 15:02

## 2022-09-27 RX ADMIN — LEVETIRACETAM 250 MG: 250 TABLET, FILM COATED ORAL at 08:30

## 2022-09-27 RX ADMIN — SODIUM CHLORIDE, PRESERVATIVE FREE 10 ML: 5 INJECTION INTRAVENOUS at 20:46

## 2022-09-27 RX ADMIN — AMLODIPINE BESYLATE 2.5 MG: 5 TABLET ORAL at 08:30

## 2022-09-27 RX ADMIN — SODIUM CHLORIDE, PRESERVATIVE FREE 10 ML: 5 INJECTION INTRAVENOUS at 08:35

## 2022-09-27 RX ADMIN — PIPERACILLIN AND TAZOBACTAM 4500 MG: 4; .5 INJECTION, POWDER, LYOPHILIZED, FOR SOLUTION INTRAVENOUS at 01:19

## 2022-09-27 RX ADMIN — NYSTATIN 500000 UNITS: 100000 SUSPENSION ORAL at 12:56

## 2022-09-27 RX ADMIN — NYSTATIN 500000 UNITS: 100000 SUSPENSION ORAL at 08:30

## 2022-09-27 RX ADMIN — INSULIN LISPRO 4 UNITS: 100 INJECTION, SOLUTION INTRAVENOUS; SUBCUTANEOUS at 08:35

## 2022-09-27 RX ADMIN — MEROPENEM 500 MG: 1 INJECTION, POWDER, FOR SOLUTION INTRAVENOUS at 15:10

## 2022-09-27 ASSESSMENT — PAIN SCALES - GENERAL: PAINLEVEL_OUTOF10: 0

## 2022-09-27 NOTE — FLOWSHEET NOTE
CCPD tx initiated following aseptic technique. Effluent drain blood tinged. , 947 drained. Dressing clean, dry and intact. 09/27/22 1915   Vitals   BP (!) 94/51   Temp 97.5 °F (36.4 °C)   Temp Source Temporal   Heart Rate 68   Resp 18   SpO2 98 %   Peritoneal Dialysis Catheter Right lower abdomen   No Placement Date or Time found.    Catheter Location: Right lower abdomen   Site Condition Clean, dry, intact   Dressing Status New dressing applied   Dressing Gauze   Date of Last Dressing Change 09/27/22   Dialysis Type Continuous cycling   Exit Site Condition Clean, dry and intact   Catheter Care Given Yes   Cycler   Verification of Prescription CCPD   Total Volume Programmed 63168 mL   Therapy Time (Hours:Minutes) 9   Fill Volume 2000 mL   Last Fill Volume 1500 mL   Dextrose Setting Same (Nonextraneal)   Number of Cycles 6   Bag Volume 500 mL   Number of Bags Used 3

## 2022-09-27 NOTE — PROGRESS NOTES
LM with Pravin Guerrero in rehab regarding need for PT/OT updates.     Alyssa Arshad, BSN, RN  [facility] Case Management   Cell: [phone]

## 2022-09-27 NOTE — CONSULTS
Department of Internal Medicine  Infectious Diseases   Consult Note      Reason for Consult: Leukocytosis       Requesting Physician: Dr Torito Casas:      Pt is known to ID . This is an 80 yrs of female with hx of ESRD on PD, CAD,  DM, Gout, COVID 19 , HTN presented to the ER  on  7 with with change in mental status. She was found to have cholecystitis, choledocholithiasis - she underwent lap cholecystectomy on 22 and ERCP with papillotomy on   . She was given IV zosyn,  Pt was continued on PD . She developed leukocytosis of 29 K , ID consult was obtained for leukocytosis .   Pt denied fever, chills, or abdomen pain       Past Medical History:    Past Medical History:   Diagnosis Date    Anemia     Arthritis     CAD (coronary artery disease)     Diabetes mellitus ([address]     Gout     History of bleeding peptic ulcer approx     Hypertension     Peritoneal dialysis catheter in place [facility])     Peritoneal dialysis status ([address]     at night for 8 hours    Thyroid disease     Use of cane as ambulatory aid     Wears glasses          Past Surgical History:      Past Surgical History:   Procedure Laterality Date    BACK SURGERY      CARDIOVASCULAR STRESS TEST      CARPAL TUNNEL RELEASE Bilateral     CATARACT REMOVAL WITH IMPLANT Bilateral      SECTION      CHOLECYSTECTOMY, LAPAROSCOPIC N/A 2022    CHOLECYSTECTOMY LAPAROSCOPIC with intraoperative [facility]   approx 2000    x 3; per Dr Anabel Boyd, COLON, DIAGNOSTIC      ERCP N/A 2022    ERCP ENDOSCOPIC RETROGRADE CHOLANGIOPANCREATOGRAPHY performed by Katherine Almanzar MD at [address] [place] [address]    ERCP N/A 2022    ERCP SPHINCTER/PAPILLOTOMY performed by Katherine Almanzar MD at [address] [place] [address]    ERCP N/A 2022    ERCP STONE REMOVAL performed by Katherine Almanzar MD at [address] [place] [address]    ERCP N/A 2022    ERCP DILATION BALLOON performed by Sunitha Bruner MD at [address] [facility] [address]      left knee, right shoulder    [place] TOTAL KNEE ARTHROPLASTY Right 10/31/2018    RIGHT KNEE TOTAL ARTHROPLASTY +++BRIDGER++ PNB++ performed by Evelyn Newton MD at [address] [facility]           Current Medications:      Current Facility-Administered Medications   Medication Dose Route Frequency Provider Last Rate Last Admin    potassium chloride (KLOR-CON M) extended release tablet 40 mEq  40 mEq Oral Once Severiano Human, MD        nystatin (MYCOSTATIN) 534377 UNIT/ML suspension 500,000 Units  5 mL Oral 4x Daily KERRI Hinkle - CNP   500,000 Units at 09/27/22 0830    polyethylene glycol (GLYCOLAX) packet 17 g  17 g Oral Daily PRN Severiano Human, MD        bisacodyl (DULCOLAX) EC tablet 5 mg  5 mg Oral Daily PRN Severiano Human, MD        amLODIPine (NORVASC) tablet 2.5 mg  2.5 mg Oral Daily Max Barney MD   2.5 mg at 09/27/22 0830    metoprolol tartrate (LOPRESSOR) tablet 12.5 mg  12.5 mg Oral BID Max Barney MD   12.5 mg at 09/26/22 2047    delflex lo-yared 2.5% 5,000 mL with heparin (porcine) 1,000 Units solution   IntraPERitoneal Once Severiano Human, MD        sodium chloride flush 0.9 % injection 5-40 mL  5-40 mL IntraVENous 2 times per day Sunitha Bruner MD   10 mL at 09/27/22 0835    sodium chloride flush 0.9 % injection 5-40 mL  5-40 mL IntraVENous PRN Sunitha Bruner MD        0.9 % sodium chloride infusion  25 mL IntraVENous PRN Sunitha Bruner MD        oxyCODONE (ROXICODONE) immediate release tablet 5 mg  5 mg Oral Q4H PRN Issa Knox MD   5 mg at 09/24/22 1204    insulin lispro (HUMALOG) injection vial 0-16 Units  0-16 Units SubCUTAneous TID WC Issa Knox MD   4 Units at 09/27/22 0835    insulin lispro (HUMALOG) injection vial 0-4 Units  0-4 Units SubCUTAneous Nightly Issa Knox MD   4 Units at 09/22/22 2135    piperacillin-tazobactam (ZOSYN) 4,500 mg in dextrose 5 % 100 mL IVPB ([id number])  4,500 mg IntraVENous Q12H Estella Brantley MD   Stopped at 09/27/22 0517    levETIRAcetam (KEPPRA) tablet 250 mg  250 mg Oral BID Estella Brantley MD   250 mg at 09/27/22 0830    acetaminophen (TYLENOL) suppository 650 mg  650 mg Rectal Q4H PRN Estella Brantley MD   650 mg at 09/18/22 1927    apixaban (ELIQUIS) tablet 2.5 mg  2.5 mg Oral BID Estella Brantley MD   2.5 mg at 09/27/22 4557    aspirin chewable tablet 81 mg  81 mg Oral Daily Estella Brantley MD   81 mg at 09/27/22 0829    atorvastatin (LIPITOR) tablet 20 mg  20 mg Oral Nightly Estella Brantley MD   20 mg at 09/26/22 2047    DULoxetine (CYMBALTA) extended release capsule 30 mg  30 mg Oral Daily Estella Brantley MD   30 mg at 09/27/22 0828    isosorbide mononitrate (IMDUR) extended release tablet 30 mg  30 mg Oral Daily Estella Brantley MD   30 mg at 09/27/22 0830    levothyroxine (SYNTHROID) tablet 50 mcg  50 mcg Oral Daily Estella Brantley MD   50 mcg at 09/27/22 0615    docusate sodium (COLACE) capsule 100 mg  100 mg Oral Nightly Estella Brantley MD   100 mg at 09/26/22 2049    pantoprazole (PROTONIX) tablet 40 mg  40 mg Oral QAM AC Estella Brantley MD   40 mg at 09/27/22 0615    glucose chewable tablet 16 g  4 tablet Oral PRN Estella Brantley MD        dextrose bolus 10% 125 mL  125 mL IntraVENous VIVEK Brantley MD        Or    dextrose bolus 10% 250 mL  250 mL IntraVENous VIVEK Brantley MD        glucagon (rDNA) injection 1 mg  1 mg SubCUTAneous PRVICK Brantley MD        dextrose 10 % infusion   IntraVENous Continuous VIVEK Brantley MD        acetaminophen (TYLENOL) tablet 650 mg  650 mg Oral Q4H VIVEK Brantley MD   650 mg at 09/25/22 0913    trimethobenzamide (TIGAN) injection 200 mg  200 mg IntraMUSCular Q6H PRVICK Brantley MD   200 mg at 09/25/22 0605       Allergies:  Sulfa antibiotics    Social History:      Social History     Socioeconomic History    Marital status:      Spouse name: Not on file    Number of children: Not on file    Years of education: Not on file    Highest education level: Not on file   Occupational History    Not on file   Tobacco Use    Smoking status: Never    Smokeless tobacco: Never   Vaping Use    Vaping Use: Never used   Substance and Sexual Activity    Alcohol use: No    Drug use: No    Sexual activity: Not on file   Other Topics Concern    Not on file   Social History Narrative    Not on file     Social Determinants of Health     Financial Resource Strain: Not on file   Food Insecurity: Not on file   Transportation Needs: Not on file   Physical Activity: Not on file   Stress: Not on file   Social Connections: Not on file   Intimate Partner Violence: Not on file   Housing Stability: Not on file       Family History:     Not pertinent to present illness     REVIEW OF SYSTEMS:    CONSTITUTIONAL:Denies fever   HEENT: denies blurring of vision or double vision, denies hearing problem  RESPIRATORY: denies cough, shortness of breath, sputum expectoration,  CARDIOVASCULAR:  Denies palpitation  GASTROINTESTINAL:  Denies abdomen pain, has loose stool . GENITOURINARY:  ESRD on PD   INTEGUMENT: denies wound , rash  HEMATOLOGIC/LYMPHATIC:  Denies lymph node swelling, gum bleeding or easy bruising. MUSCULOSKELETAL:  Denies leg pain , joint pain , joint swelling  NEUROLOGICAL:  weakness       PHYSICAL EXAM:      Vitals:     /65   Pulse 57   Temp 98.1 °F (36.7 °C) (Oral)   Resp 17   Ht 5' (1.524 m)   Wt 139 lb 1.8 oz (63.1 kg)   SpO2 99%   BMI 27.17 kg/m²       General Appearance:    Awake, alert , looks chronically ill    Head:    Normocephalic, atraumatic   Eyes:    No pallor, no icterus,   Ears:    No obvious deformity or drainage.    Nose:   No nasal drainage   Throat:   Mucosa moist, no oral thrush   Neck:   Supple, no lymphadenopathy   Lungs:     Clear to auscultation bilaterally   Heart:     Irregular, systolic murmur    Abdomen: Soft, non-tender, bowel sounds present    Extremities:   No edema, no cyanosis    Pulses:   Dorsalis pedis palpable    Skin:   no rashes      CBC with Differential:      Lab Results   Component Value Date/Time    WBC 29.0 09/27/2022 04:23 AM    RBC 2.68 09/27/2022 04:23 AM    HGB 7.8 09/27/2022 04:23 AM    HCT 24.3 09/27/2022 04:23 AM     09/27/2022 04:23 AM    MCV 90.7 09/27/2022 04:23 AM    MCH 29.1 09/27/2022 04:23 AM    MCHC 32.1 09/27/2022 04:23 AM    RDW 14.1 09/27/2022 04:23 AM    NRBC 1.7 09/27/2022 04:23 AM    BANDSPCT 2 12/19/2015 04:23 PM    METASPCT 0.9 09/27/2022 04:23 AM    LYMPHOPCT 7.8 09/27/2022 04:23 AM    PROMYELOPCT 1.7 09/27/2022 04:23 AM    MONOPCT 10.4 09/27/2022 04:23 AM    MYELOPCT 2.6 09/27/2022 04:23 AM    BASOPCT 0.5 09/27/2022 04:23 AM    MONOSABS 2.90 09/27/2022 04:23 AM    LYMPHSABS 2.32 09/27/2022 04:23 AM    EOSABS 0.26 09/27/2022 04:23 AM    BASOSABS 0.00 09/27/2022 04:23 AM       CMP     Lab Results   Component Value Date/Time     09/27/2022 04:23 AM    K 3.1 09/27/2022 04:23 AM    K 3.1 09/27/2022 04:23 AM    CL 89 09/27/2022 04:23 AM    CO2 23 09/27/2022 04:23 AM    BUN 31 09/27/2022 04:23 AM    CREATININE 3.9 09/27/2022 04:23 AM    GFRAA 13 09/27/2022 04:23 AM    LABGLOM 11 09/27/2022 04:23 AM    GLUCOSE 337 09/27/2022 04:23 AM    PROT 6.1 09/27/2022 04:23 AM    LABALBU 1.9 09/27/2022 04:23 AM    CALCIUM 9.5 09/27/2022 04:23 AM    BILITOT 0.8 09/27/2022 04:23 AM    ALKPHOS 548 09/27/2022 04:23 AM    AST 46 09/27/2022 04:23 AM    ALT 48 09/27/2022 04:23 AM           IMPRESSION:     S/P lap cholecystectomy  (for acute cholecystitis ) and ERCP   Leukocytosis         RECOMMENDATIONS:       Stool for C diff toxin assay   2. CT scan of abdomen and pelvis   3. Zosyn to meropenem 500 mg IV q 24 hrs  4. Blood cx   5.  CBC with diff         Thank you Dr Almas Miles for the consult

## 2022-09-27 NOTE — FLOWSHEET NOTE
09/27/22 0725   Peritoneal Dialysis (CAPD manual)   Effluent Appearance Clear; Jodee   Cycler   Ultrafiltration (UF) (mL) 969 mL   9 hr tx with 6 exchanges, 11.5L of 2.5% in 5L bags and final fill of 1500ml, total volume verified. CCPD completed with out complications. PD cath de-accessed using aseptic technique. Net fluid removal  969 ml, clear jodee effluent noted. Remains in care of staff.

## 2022-09-27 NOTE — PROGRESS NOTES
Occupational Therapy  OT BEDSIDE TREATMENT NOTE   [facility] [address]  [address] [place] [address]       Date:2022  Patient Name: Nikky Aguillon  MRN: [id number]  : 1940  Room: [facility]19-     Per OT Eval:    Evaluating OT: Keon Choi, [address] OTR/L; [id number]       Referring Provider: Parisa Ayala DO    Specific Provider Orders/Date: OT Eval and Treat 22       Diagnosis: Change in Mental Status     Surgery: 22 CHOLECYSTECTOMY LAPAROSCOPIC with intraoperative cholangiogram    Pertinent Medical History:  has a past medical history of Anemia, Arthritis, CAD (coronary artery disease), Diabetes mellitus ([facility] [address] Gout, History of bleeding peptic ulcer, Hypertension, Peritoneal dialysis catheter in place [facility]), Peritoneal dialysis status ([facility] [address] Thyroid disease, Use of cane as ambulatory aid, and Wears glasses.        Reason for Admission: AMS, difficulty ambulating and articulating words     Recommended Adaptive Equipment:  TBD pending progression/discharge      Precautions:  Fall Risk, Bed Alarm, IV, TAPS/Wedges, Cognition, O2      Assessment of current deficits    [x] Functional mobility            [x]ADLs           [x] Strength                   [x]Cognition    [x] Functional transfers          [x] IADLs          [x] Safety Awareness   [x]Endurance    [x] Fine Coordination              [x] Balance      [] Vision/perception    []Sensation      []Gross Motor Coordination  [x] ROM           [] Delirium                   [] Motor Control      OT PLAN OF CARE   OT POC based on physician orders, patient diagnosis and results of clinical assessment     Frequency/Duration 2-5 days/wk for 2 weeks PRN   Specific OT Treatment Interventions to include:   * Instruction/training on adapted ADL techniques and AE recommendations to increase functional independence within precautions       * Training on energy conservation strategies, correct breathing pattern and techniques to improve independence/tolerance for self-care routine  * Functional transfer/mobility training/DME recommendations for increased independence, safety, and fall prevention  * Patient/Family education to increase follow through with safety techniques and functional independence  * Recommendation of environmental modifications for increased safety with functional transfers/mobility and ADLs  * Cognitive retraining/development of therapeutic activities to improve problem solving, judgement, memory, and attention for increased safety/participation in ADL/IADL tasks  * Sensory re-education to improve body/limb awareness, maintain/improve skin integrity, and improve hand/UE motor function  * Visual-perceptual training to improve environmental scanning, visual attention/focus, and oculomotor skills for increased safety/independence with functional transfers/mobility and ADLs  * Splinting/positioning for increased function, prevention of contractures, and improve skin integrity  * Therapeutic exercise to improve motor endurance, ROM, and functional strength for ADLs/functional transfers  * Therapeutic activities to facilitate/challenge dynamic balance, stand tolerance for increased safety and independence with ADLs  * Therapeutic activities to facilitate gross/fine motor skills for increased independence with ADLs  * Neuro-muscular re-education: facilitation of righting/equilibrium reactions, midline orientation, scapular stability/mobility, normalization of muscle tone, and facilitation of volitional active controled movement  * Positioning to improve skin integrity, interaction with environment and functional independence  * Delirium prevention/treatment  * Manual techniques for edema management     Recommended Adaptive Equipment/DME: Shower chair, BSC     Home Living: Pt lives alone in 1 story home with 3-steps to enter and 1HR.  bed and bath on main floor  Basement does not need to access  Bathroom setup: Tub/shower, however pt son states she sponge bathes only using wipes  Equipment owned: Rollator     Prior Level of Function: Ind with ADLs , Min A with IADLs - son orders groceries to be delivered  Used rollator for functional mobility. Driving: No - son provides transportation as needed  Occupation: None stated     Available Support: Son lives 10 minutes away     Pain Level: none but reports nausea & not feeling good, nsg present & provided medication     Cognition: A&O: 4/4 - appears fatigued & uncomfortable overall   Follows single step directions              Memory:  Fair              Sequencing: Fair              Problem solving:  Fair              Judgement/safety:  Fair                   Functional Assessment:  AM-PAC Daily Activity Raw Score: 12/24    Initial Eval Status  Date: 9/22/22 Treatment Status  Date:  9/27/22 STGs = LTGs  Time frame: 10-14 days   Feeding Minimal Assist   Cup to mouth from tray Min A (positioning in bed) Independent    Grooming Minimal Assist   Limited d/t decreased UB strength and chronic poor LUE AROM Min A (bed level) Independent    UB Dressing Moderate Assist  Fabián shirt over shoulders seated EOB  Mod A (due to limited ROM) S      LB Dressing Dependent  Fabián socks lying supine  Dep (assist with socks, deferred edu on AE due to decreased sititng tolerance) S      Bathing Maximal Assist  Decreased dynamic sitting balance poor functional reach for LB bathing tasks   Limited activity tolerance for bathing tasks Max A (simulated, pt declined) SBA  In seated position      Toileting Maximal Assist  limited ROM for posterior and anterior pericare     Dep (assist with hygiene) SBA      Bed Mobility  Supine to sit:  Max A x 2  Sit to supine: Max A x 2    Max A Supine to sit: SBA  Sit to supine: SBA   Functional Transfers Sit <> Stand: NT  Stand Pivot: NT  Commode: NT  Sit t<> Stand: Min A with AD  Stand Pivot: Min A with AD  Commode: Min A with AD   Functional Mobility NT     Min A with AD   Balance Sitting:     Static: CGA <> Min A    Dynamic: Mod A  Standing: NT Sitting: SBA    Standing: NT Sitting:     Static: IND    Dynamic: IND  Standing: Min A with AD   Activity Tolerance Fair  Limited d/t overall fatigue  Fair- WFL   Visual/  Perceptual Glasses: Yes   Not present         Vitals SpO2 on RA: 91% upon entering room required 2L to return to 95%  SpO2 stead EOB on 2L NC: 97%  SpO2 end of session on 2L NC: 97%     HR: 100-100 bpm during session             Education: Cleared by RN to see pt. Pt had her eyes closed most of the session when working and speaking with therapist. Pt required vc's and physical assist for proper technique/safety with hand placement/body mechanics/posture for bed mobility/ADLs. Pt required vc's for sequencing/initiation of ADLs/bed mobility. Pt able to  sit EOB ~10 mins to increase core strength/balance/activity tolerance for ease with ADLs. Pt required increased time to complete ADLs/functional transfers due to rest breaks, physical assist, and cognition. Pt instructed on use of call light for assistance and fall prevention. Pt demo'ing fair understanding of education provided. Continue to educate. Comments: Upon arrival pt was in bed & agreeable to therapy with [facility] approval. At end of session pt was supine in bed, call light within reach. Pt has made slow progress towards set goals.    Continue with current plan of care    Treatment Time In: 1150          Treatment Time Out: 4673             Treatment Charges: Mins Units   Ther Ex  45930     Manual Therapy 44500     Thera Activities 85129     ADL/Home Mgt 90174 15 1   Neuro Re-ed 44220     Group Therapy      Orthotic manage/training  46322     Non-Billable Time     Total Timed Treatment 15 [facility], [facility], OTR/L [id number]

## 2022-09-27 NOTE — PROGRESS NOTES
Physical Therapy Treatment    Name: Rina Mckeon  : 1940  MRN: [id number]      Date of Service: 2022    Evaluating PT:  Yasmeen Renee PT, DPT [id number]    Room #:  8208/7515-O  Diagnosis:  Change in mental status [R41.82]  Altered mental status, unspecified altered mental status type [R41.82]  PMHx/PSHx:    Past Medical History:   Diagnosis Date    Anemia     Arthritis     CAD (coronary artery disease)     Diabetes mellitus ([facility] [address]     Gout     History of bleeding peptic ulcer approx     Hypertension     Peritoneal dialysis catheter in place [facility])     Peritoneal dialysis status ([facility] [address]     at night for 8 hours    Thyroid disease     Use of cane as ambulatory aid     Wears glasses      Procedure/Surgery:  22 CHOLECYSTECTOMY LAPAROSCOPIC with intraoperative cholangiogram, ERCP ENDOSCOPIC RETROGRADE CHOLANGIOPANCREATOGRAPHY  ERCP SPHINCTER/PAPILLOTOMY  ERCP STONE REMOVAL  ERCP DILATION BALLOON   Reason for admission: AMS, difficulty ambulating and articulating words  Precautions:  Fall Risk, Bed Alarm, IV, TAPS/Wedges, Cognition, Equipment Needs:  TBD    SUBJECTIVE:  Pt lives alone in 1 story home with 3-steps to enter and 1HR with bed and bath on main floor. Pt has basement but does not need to access. Son lives 10 min away and assists pt with dialysis 2x.day. Pt was independent PTA. Pt oriented but had difficulty answering subjective questions during interview so info taken from son present. OBJECTIVE:   Initial Evaluation  Date:  Treatment  22 Short Term/ Long Term   Goals   AM-PAC 6 Clicks  063    Was pt agreeable to Eval/treatment? Yes yes    Does pt have pain? Unable to state No c/o pain    Bed Mobility  Rolling: mod A  Supine to sit:   Max A x 2  Sit to supine:  Max A x 2  Scooting: NT Rolling: MaxA  Supine to sit:  MaxA  Sit to supine:  MaxA   Scooting: MaxA Rolling: ind  Supine to sit: mod I  Sit to supine: mod I  Scooting: ind   Transfers NT Sit to stand: NT  Stand to sit: NT  Stand pivot: NT Sit to stand: SBA  Stand to sit: SBA  Stand pivot: SBA   Ambulation   NT    feet with AAD min a   Stair negotiation: ascended and descended NT NT 4 steps with hand rail min a   ROM BUE:  Refer to OT eval   BLE:  WNL     Strength BUE:  Refer to OT eval   BLE:  difficult to assess d/t difficulty following commands, global weakness  >4/5 globally   Balance Sitting EOB:  CGA<> MIN a  Dynamic Standing:  NT Sitting EOB:  SBA  Dynamic Standing:  NT Sitting EOB:  mod I   Dynamic Standing:  min A AAD   Pt is A & O x 3  Sensation:  NT  Edema:  none noted    Vitals:  SPO2 WNL throughout  HR: 65bpm    Patient education  Pt educated on role of PT, importance of continued mobility for improved strength    Patient response to education:   Pt verbalized understanding Pt demonstrated skill Pt requires further education in this area   yes yes yes     ASSESSMENT:    Comments:  patient semi-supine in bed upon entry and agreeable to PT treatment. Pt minimally participatory this date requiring heavy assist for bed mobility. Pt sat EOB for approximately 5 minutes with no assist for sitting balance. Pt refused to attempt to scoot forward to place B feet on floor. Assisted pt back to bed at end of session with all needs met. TAPS placed for L sidelying. Treatment:  Patient practiced and was instructed in the following treatment:    Bed Mobility: VCs provided for sequencing and safety during mobility. Manual assist provided for completion of task. Skilled Positioning: Positioning for improved posture, joint and skin integrity     PLAN:    Patient is making fair progress towards established goals. Will continue with current POC.       Time in  1510  Time out  1526    Total Treatment Time  16 minutes     CPT codes:  [] Gait training 49713 - minutes  [] Manual therapy 03072 - minutes  [x] Therapeutic activities 95381 16 minutes  [] Therapeutic exercises 06444 - minutes  [] Neuromuscular reeducation 44471 - minutes    Andre Jeremiah PT, DPT  [id number]

## 2022-09-27 NOTE — PROGRESS NOTES
Subjective:    Patient is awake and oriented. Conversant  Denies any chest pain, shortness of breath  Patient states she \"feels fatigued but better than yesterday\"    Objective:    /65   Pulse 57   Temp 98.1 °F (36.7 °C) (Oral)   Resp 17   Ht 5' (1.524 m)   Wt 139 lb 1.8 oz (63.1 kg)   SpO2 99%   BMI 27.17 kg/m²     In: 597 [P.O.:597]  Out: 969   In: 597   Out: 969     General Appearance:  awake and alert, conversant, slightly delayed responses  Head/face: NCAT  Eyes:  No gross abnormalities. Lungs:  Normal expansion. Clear to auscultation. No rales, rhonchi, or wheezing. Heart:  Heart sounds are normal.  Regular rate and rhythm without murmur, gallop or rub. Abdomen:  Soft, tender, normal bowel sounds. No bruits, PD cath  Extremities: Extremities warm to touch, pink, with no edema. Neurologic:  Awake, oriented x 2, no focal deficits, mild involuntary twitching of extremities - improved from admission     Recent Labs     09/25/22  0438 09/26/22  0611 09/27/22  0423   WBC 17.9* 24.2* 29.0*   HGB 8.4* 8.7* 7.8*   HCT 25.5* 27.1* 24.3*    410 456*         Recent Labs     09/26/22  0611 09/26/22  1313 09/26/22  1624 09/27/22  0423   *  --  128* 127*   K 2.7*   < > 3.8 3.1*  3.1*   CL 90*  --  92* 89*   CO2 22  --  21* 23   BUN 37*  --  38* 31*   CREATININE 4.3*  --  4.5* 3.9*   CALCIUM 9.2  --  8.9 9.5    < > = values in this interval not displayed. Assessment:    Principal Problem:    Change in mental status  Active Problems:    Chronic kidney disease    Moderate protein-calorie malnutrition ([facility] [address]  Resolved Problems:    * No resolved hospital problems.  *        PLAN:  Altered mental status d/t Sepsis   -WBC 23 > 17.9 post ERCP > 24.2 > 29.0 worsening today  -Infectious disease consulted  -IV Zosyn was to be completed 9/26/2022, but worsening leukocytosis > switch to meropenem 500 mg IV q 24 hrs per ID  -EEG > A potential for generalized photosensitive epilepsy, will defer to neurology > started on Keppra 250 mg twice daily, neurology signed off  -blood cultures x 2 are negative   -peritoneal body fluid culture NGTD     LFTs/acute cholecystitis-fluctuating  -s/p lap milton with IOC 9/21/2022  -Multiple filling defects in the CBD noted during IOC, advanced GI consulted for ERCP/stone removal sphincterotomy and sphincteroplasty-procedure completed on 9/23-which she tolerated well  -Lipitor restarted   - > 117>70 > 39- continuing to improve  - > 121>88 > 50- continuing to improve  -Total Bilirubin 1.7 > 1.6>1.8 > 0.8, improved  -Continue to monitor labs, hepatic function test daily -improving     ESRD on PD  -Nephro following  -PD per nephro   -Per general surgery, okay to continue peritoneal dialysis-no need for temporary dialysis line  -Monitor labs  -K 3.4 > 3.1 today, supplementation per nephro      Severe protein calorie malnutrition  -Nutrition consult  -Start Vanilla Glucerna TID and jacinda BID   -Monitor oral intake during meals  -Advance diet as tolerated     Diabetes mellitus   -Continue to monitor blood glucose levels  -Better controlled with ISS increase to high dose   May have to initiate p.o. hypoglycemic agents due to elevated glucose levels as they continue to be elevated at 335     Elevated troponin  -Without EKG changes or chest pain - likely related to renal failure - noted elevations 04/22 chronically. Trending down 183 > 164  -Follow labs  -Ok to restart Eliquis, Hx AFIB  -Cardio signed off     Constipation  -Dulcolax as needed  -Colace     Dry mouth, Concern for thrush  -Poss. due to long term antibiotic use  -Continue nystatin   -Oral Hygeine     Medication for other comorbidities continue as appropriate dose adjustment as necessary.     DVT prophylaxis Eliquis  PT OT  Discharge plan GAIL Rizo, APRN - CNP  3:31 PM  9/27/2022    Above note edited to reflect my thoughts   Appreciate infectious disease input Zosyn does not continued and changed to Merrem  Blood cultures have been negative to date  Continue to monitor clinical course-overall markedly improved clinically    I personally saw, examined and provided care for the patient. Radiographs, labs and medication list were reviewed by me independently. The case was discussed in detail and plans for care were established. Review of KLELEE Hinkle   , documentation was conducted and revisions were made as appropriate directly by me. I agree with the above documented exam, problem list, and plan of care.      Deb Tinajero MD  7:23 PM  9/27/2022

## 2022-09-27 NOTE — PROGRESS NOTES
[facility] Cardiology    INPATIENT CARDIOLOGY FOLLOW-UP    Name: Jermaine Dear    Age: 80 y.o. Date of Admission: 9/16/2022 11:18 AM    Date of Service: 9/27/2022    Chief Complaint: Follow-up for coronary artery disease, paroxysmal atrial fibrillation, aortic valve stenosis with normal EF, preoperative assessment prior to cholecystectomy followed by ERCP    9/27  Resting comfortably without cardiac complaints. No shortness of breath, chest pain, palpitations, orthopnea. Aspirin and Eliquis have been restarted. Review of Systems:   Cardiac: As per HPI  General: No fever, chills  Pulmonary: No cough, wheeze, or shortness of breath  GI: No nausea, vomiting,or abdominal pain  Neuro: No headache or confusion      Allergies:   Allergies   Allergen Reactions    Sulfa Antibiotics Swelling     Swelling after being out in sun       Current Medications:  Current Facility-Administered Medications   Medication Dose Route Frequency Provider Last Rate Last Admin    nystatin (MYCOSTATIN) 574903 UNIT/ML suspension 500,000 Units  5 mL Oral 4x Daily KERRI Whitley - CNP   500,000 Units at 09/27/22 0830    polyethylene glycol (GLYCOLAX) packet 17 g  17 g Oral Daily PRN Blanquita Jameson MD        bisacodyl (DULCOLAX) EC tablet 5 mg  5 mg Oral Daily PRN Blanquita Jameson MD        amLODIPine (NORVASC) tablet 2.5 mg  2.5 mg Oral Daily Jo Sims MD   2.5 mg at 09/27/22 0830    metoprolol tartrate (LOPRESSOR) tablet 12.5 mg  12.5 mg Oral BID Jo Sims MD   12.5 mg at 09/26/22 2047    delflex lo-yared 2.5% 5,000 mL with heparin (porcine) 1,000 Units solution   IntraPERitoneal Once Blanquita Jameson MD        sodium chloride flush 0.9 % injection 5-40 mL  5-40 mL IntraVENous 2 times per day Bryn Lopez MD   10 mL at 09/27/22 0835    sodium chloride flush 0.9 % injection 5-40 mL  5-40 mL IntraVENous PRN Bryn Lopez MD        0.9 % sodium chloride infusion  25 mL IntraVENous PRN Bryn Lopez MD oxyCODONE (ROXICODONE) immediate release tablet 5 mg  5 mg Oral Q4H PRN Marlene Reynoso MD   5 mg at 09/24/22 1204    insulin lispro (HUMALOG) injection vial 0-16 Units  0-16 Units SubCUTAneous TID WC Marlene Reynoso MD   4 Units at 09/27/22 0835    insulin lispro (HUMALOG) injection vial 0-4 Units  0-4 Units SubCUTAneous Nightly Marlene Reynoso MD   4 Units at 09/22/22 2135    piperacillin-tazobactam (ZOSYN) 4,500 mg in dextrose 5 % 100 mL IVPB ([id number])  4,500 mg IntraVENous Q12H Marlene Reynoso MD   Stopped at 09/27/22 0517    levETIRAcetam (KEPPRA) tablet 250 mg  250 mg Oral BID Marlene Reynoso MD   250 mg at 09/27/22 0830    acetaminophen (TYLENOL) suppository 650 mg  650 mg Rectal Q4H PRN Marlene Reynoso MD   650 mg at 09/18/22 1927    apixaban (ELIQUIS) tablet 2.5 mg  2.5 mg Oral BID Marlene Reynoso MD   2.5 mg at 09/27/22 5014    aspirin chewable tablet 81 mg  81 mg Oral Daily Marlene Reynoso MD   81 mg at 09/27/22 0829    atorvastatin (LIPITOR) tablet 20 mg  20 mg Oral Nightly Marlene Reynoso MD   20 mg at 09/26/22 2047    DULoxetine (CYMBALTA) extended release capsule 30 mg  30 mg Oral Daily Marlene Reynoso MD   30 mg at 09/27/22 0828    isosorbide mononitrate (IMDUR) extended release tablet 30 mg  30 mg Oral Daily Marlene Reynoso MD   30 mg at 09/27/22 0830    levothyroxine (SYNTHROID) tablet 50 mcg  50 mcg Oral Daily Marlene Reynoso MD   50 mcg at 09/27/22 0615    docusate sodium (COLACE) capsule 100 mg  100 mg Oral Nightly Marlene Reynoso MD   100 mg at 09/26/22 2049    pantoprazole (PROTONIX) tablet 40 mg  40 mg Oral QAM AC Marlene Reynoso MD   40 mg at 09/27/22 0615    glucose chewable tablet 16 g  4 tablet Oral PRN Marlene Reynoso MD        dextrose bolus 10% 125 mL  125 mL IntraVENous PRN Marlene Reynoso MD        Or    dextrose bolus 10% 250 mL  250 mL IntraVENous PRN Marlene Reynoso MD        glucagon (rDNA) injection 1 mg  1 mg SubCUTAneous PRN Stacey Ayala MD        dextrose 10 % infusion   IntraVENous Continuous PRN Stacey Ayala MD        acetaminophen (TYLENOL) tablet 650 mg  650 mg Oral Q4H PRN Stacey Ayala MD   650 mg at 09/25/22 0913    trimethobenzamide (TIGAN) injection 200 mg  200 mg IntraMUSCular Q6H PRN Stacey Ayala MD   200 mg at 09/25/22 0939      sodium chloride      dextrose         Physical Exam:  /65   Pulse 57   Temp 98.1 °F (36.7 °C) (Oral)   Resp 17   Ht 5' (1.524 m)   Wt 139 lb 1.8 oz (63.1 kg)   SpO2 99%   BMI 27.17 kg/m²   Weight change: Wt Readings from Last 3 Encounters:   09/25/22 139 lb 1.8 oz (63.1 kg)   07/27/22 143 lb 3.2 oz (65 kg)   04/29/22 158 lb 8.2 oz (71.9 kg)         Intake/Output:    Intake/Output Summary (Last 24 hours) at 9/27/2022 0842  Last data filed at 9/26/2022 1527  Gross per 24 hour   Intake 357 ml   Output --   Net 357 ml     No intake/output data recorded. General: Awake, alert, oriented x3, no acute distress    Neck: No JVD or carotid bruits,     Cardiac: Irregularly irregular rhythm with normal rate, diminished S2, grade 2 midsystolic murmur at the base, no diastolic murmurs normal carotid upstroke and no bruits. Resp: Unlabored respirations at rest.  No wheezes, rales or rhonchi. Abdomen: soft, nontender, nondistended, BS+; active bowel sounds    Extremities: no cyanosis, clubbing, or edema. Normal pulses in the upper/lower extremities bilaterally. Skin: Warm and dry, no bruises, petechiae or rashes. Neuro: moves all extremities appropriately to command, and normal sensation to light touch in the upper and lower extremities bilaterally. Laboratory Tests:  Lab Results   Component Value Date    CREATININE 3.9 (H) 09/27/2022    BUN 31 (H) 09/27/2022     (L) 09/27/2022    K 3.1 (L) 09/27/2022    K 3.1 (L) 09/27/2022    CL 89 (L) 09/27/2022    CO2 23 09/27/2022     No results for input(s): CKTOTAL, CKMB, TROPONINI in the last 72 hours.   No results found for: PROBNP  Lab Results   Component Value Date/Time    WBC 29.0 09/27/2022 04:23 AM    RBC 2.68 09/27/2022 04:23 AM    HGB 7.8 09/27/2022 04:23 AM    HCT 24.3 09/27/2022 04:23 AM    MCV 90.7 09/27/2022 04:23 AM    MCH 29.1 09/27/2022 04:23 AM    MCHC 32.1 09/27/2022 04:23 AM    RDW 14.1 09/27/2022 04:23 AM     09/27/2022 04:23 AM    MPV 10.6 09/27/2022 04:23 AM     Recent Labs     09/26/22  0611 09/27/22  0423   ALKPHOS 520* 548*   ALT 50* 48*   AST 39* 46*   PROT 5.8* 6.1*   BILITOT 0.8 0.8   BILIDIR  --  0.5*   LABALBU 1.6* 1.9*     Lab Results   Component Value Date/Time    MG 2.0 09/27/2022 04:23 AM     Lab Results   Component Value Date/Time    PROTIME 12.6 09/16/2022 11:41 AM    INR 1.2 09/16/2022 11:41 AM     Lab Results   Component Value Date/Time    TSH 3.910 09/17/2022 05:49 AM     No components found for: CHLPL  No results found for: TRIG  Lab Results   Component Value Date    HDL 27 04/28/2022     Lab Results   Component Value Date    LDLCALC 119 (H) 04/28/2022       Radiology:  FL ERCP BILIARY AND PANCREATIC S&I   Final Result   Fluoroscopic support for ERCP. Please refer to the procedure report for   further details. FLUORO FOR SURGICAL PROCEDURES   Final Result   Intraprocedural fluoroscopic spot images as above. See separate procedure   report for more information. NM HEPATOBILIARY   Final Result   1. Delayed excretion of radiotracer from the liver suggest underlying   hepatic dysfunction. This limits the overall accuracy of the examination. 2.  The common bile duct is patent. 3.  The gallbladder is not visualized by 4 hours. While this could represent   cholecystitis, given the delayed excretion from the liver, the finding is   less specific. Careful clinical correlation is advised. CT HEAD WO CONTRAST   Final Result   No acute intracranial abnormality.          US GALLBLADDER RUQ   Final Result   Distended gallbladder with numerous echogenic areas demonstrating shadowing   of cholelithiasis and intermixed sludge however no wall thickening or   pericholecystic fluid         CT ABDOMEN PELVIS WO CONTRAST Additional Contrast? None   Final Result   Markedly distended gallbladder with cholelithiasis and mild edematous wall   thickening concerning for possible acute cholecystitis or gallbladder   hydrops. The appearance is similar to the prior exam on 07/20/2022. Consider right upper quadrant ultrasound for further evaluation, if   clinically indicated. Small-moderate volume of ascites with stable peritoneal dialysis catheter   seen. No evidence of bowel obstruction. XR CHEST PORTABLE   Final Result   Increased opacification and bronchovascular prominence in comparison to the   prior study. CT HEAD WO CONTRAST   Final Result   1. There is no acute intracranial abnormality. Specifically, there is no   intracranial hemorrhage. 2. Atrophy and periventricular leukomalacia,   . Problem List:  Active Hospital Problems    Diagnosis     Chronic kidney disease [N18.9]      Priority: Medium    Moderate protein-calorie malnutrition ([facility] [address] [E44.0]      Priority: Medium    Change in mental status [R41.82]      Priority: Medium           ASSESSMENT/PLAN:  CAD and remains stable. Continue aspirin, amlodipine and isosorbide mononitrate. Office charts indicate that she was taking amlodipine 2.5 mg daily, metoprolol 25 mg twice daily and isosorbide 30 mg daily and meds have been adjusted accordingly    Hypertension controlled    Chronic atrial fibrillation with CVR. Beta-blocker restarted as above. Eliquis has been restarted    Stable aortic valve stenosis-stable    Status post lap milton and ERCP  Rehab transfer pending.   No further cardiac recommendations, please call if any questions      Electronically signed by Snow Craft MD on 9/27/2022 at 8:42 AM

## 2022-09-27 NOTE — CARE COORDINATION
9/27 Update CM note. ID consulted today d/t worsening leukocytosis, WBC 24.2. Na this AM, 127 and K, 3.1 (being supplemented). Albumin, 1.9. CT AP W WO contrast pending. Cardio, GSGY and GI have signed off. Zosyn IV Q12H continues. Received call from Germania Arizmendi at [facility]. She informed CM that [facility] Pimentel has not been trained on pt's cycler and staff would require additional training. Spoke with pt's son Shelly Ortega via phone who is going to visit [facility] today as an alternate option. He will contact CM once he visits facility. Spoke with liaison, Darlene Krause who states there are beds available for PD. Await callback from Shelly Ortega to determine if facility will be changed. SW/CM to contact Germania Arizmendi at [facility] at [phone] with final decision so they can facilitate setup of equipment. [id number]  Received call from pt's son Shelly Ortega, he would like to proceed with [facility]. Referral sent to Darlene Krause, await response.      SALLY SahuN, RN  [facility] Case Management   Cell: [phone]

## 2022-09-28 ENCOUNTER — APPOINTMENT (OUTPATIENT)
Dept: CT IMAGING | Age: 82
DRG: 853 | End: 2022-09-28
Payer: MEDICARE

## 2022-09-28 LAB
ABO/RH: NORMAL
ACANTHOCYTES: ABNORMAL
ALBUMIN SERPL-MCNC: 1.7 G/DL (ref 3.5–5.2)
ALP BLD-CCNC: 522 U/L (ref 35–104)
ALT SERPL-CCNC: 43 U/L (ref 0–32)
ANION GAP SERPL CALCULATED.3IONS-SCNC: 17 MMOL/L (ref 7–16)
ANISOCYTOSIS: ABNORMAL
ANISOCYTOSIS: ABNORMAL
ANTIBODY SCREEN: NORMAL
AST SERPL-CCNC: 47 U/L (ref 0–31)
BASOPHILS ABSOLUTE: 0 E9/L (ref 0–0.2)
BASOPHILS ABSOLUTE: 0 E9/L (ref 0–0.2)
BASOPHILS RELATIVE PERCENT: 0.4 % (ref 0–2)
BASOPHILS RELATIVE PERCENT: 0.4 % (ref 0–2)
BILIRUB SERPL-MCNC: 0.7 MG/DL (ref 0–1.2)
BILIRUBIN DIRECT: 0.4 MG/DL (ref 0–0.3)
BILIRUBIN, INDIRECT: 0.3 MG/DL (ref 0–1)
BLOOD BANK DISPENSE STATUS: NORMAL
BLOOD BANK PRODUCT CODE: NORMAL
BPU ID: NORMAL
BUN BLDV-MCNC: 30 MG/DL (ref 6–23)
BURR CELLS: ABNORMAL
C DIFF TOXIN/ANTIGEN: NORMAL
CALCIUM SERPL-MCNC: 9.1 MG/DL (ref 8.6–10.2)
CHLORIDE BLD-SCNC: 91 MMOL/L (ref 98–107)
CO2: 22 MMOL/L (ref 22–29)
CREAT SERPL-MCNC: 4 MG/DL (ref 0.5–1)
DESCRIPTION BLOOD BANK: NORMAL
EOSINOPHILS ABSOLUTE: 0 E9/L (ref 0.05–0.5)
EOSINOPHILS ABSOLUTE: 0.31 E9/L (ref 0.05–0.5)
EOSINOPHILS RELATIVE PERCENT: 0.9 % (ref 0–6)
EOSINOPHILS RELATIVE PERCENT: 1 % (ref 0–6)
GFR AFRICAN AMERICAN: 13
GFR NON-AFRICAN AMERICAN: 11 ML/MIN/1.73
GLUCOSE BLD-MCNC: 385 MG/DL (ref 74–99)
HCT VFR BLD CALC: 21.1 % (ref 34–48)
HCT VFR BLD CALC: 21.1 % (ref 34–48)
HEMOGLOBIN: 6.7 G/DL (ref 11.5–15.5)
HEMOGLOBIN: 7.1 G/DL (ref 11.5–15.5)
HYPOCHROMIA: ABNORMAL
LACTIC ACID: 2.6 MMOL/L (ref 0.5–2.2)
LYMPHOCYTES ABSOLUTE: 3.39 E9/L (ref 1.5–4)
LYMPHOCYTES ABSOLUTE: 3.44 E9/L (ref 1.5–4)
LYMPHOCYTES RELATIVE PERCENT: 11.3 % (ref 20–42)
LYMPHOCYTES RELATIVE PERCENT: 9.5 % (ref 20–42)
MAGNESIUM: 1.9 MG/DL (ref 1.6–2.6)
MCH RBC QN AUTO: 28.8 PG (ref 26–35)
MCH RBC QN AUTO: 30.3 PG (ref 26–35)
MCHC RBC AUTO-ENTMCNC: 31.8 % (ref 32–34.5)
MCHC RBC AUTO-ENTMCNC: 33.6 % (ref 32–34.5)
MCV RBC AUTO: 90.2 FL (ref 80–99.9)
MCV RBC AUTO: 90.6 FL (ref 80–99.9)
METAMYELOCYTES RELATIVE PERCENT: 0.9 % (ref 0–1)
METAMYELOCYTES RELATIVE PERCENT: 2.6 % (ref 0–1)
METER GLUCOSE: 104 MG/DL (ref 74–99)
METER GLUCOSE: 108 MG/DL (ref 74–99)
METER GLUCOSE: 188 MG/DL (ref 74–99)
METER GLUCOSE: 358 MG/DL (ref 74–99)
METER GLUCOSE: 63 MG/DL (ref 74–99)
METER GLUCOSE: 70 MG/DL (ref 74–99)
METER GLUCOSE: 77 MG/DL (ref 74–99)
METER GLUCOSE: 83 MG/DL (ref 74–99)
MONOCYTES ABSOLUTE: 1.23 E9/L (ref 0.1–0.95)
MONOCYTES ABSOLUTE: 1.72 E9/L (ref 0.1–0.95)
MONOCYTES RELATIVE PERCENT: 4.3 % (ref 2–12)
MONOCYTES RELATIVE PERCENT: 5.2 % (ref 2–12)
MYELOCYTE PERCENT: 0.9 % (ref 0–0)
MYELOCYTE PERCENT: 3.5 % (ref 0–0)
NEUTROPHILS ABSOLUTE: 25.87 E9/L (ref 1.8–7.3)
NEUTROPHILS ABSOLUTE: 28.9 E9/L (ref 1.8–7.3)
NEUTROPHILS RELATIVE PERCENT: 78.3 % (ref 43–80)
NEUTROPHILS RELATIVE PERCENT: 82.6 % (ref 43–80)
PDW BLD-RTO: 14.3 FL (ref 11.5–15)
PDW BLD-RTO: 16.2 FL (ref 11.5–15)
PHOSPHORUS: 2.7 MG/DL (ref 2.5–4.5)
PLATELET # BLD: 282 E9/L (ref 130–450)
PLATELET # BLD: 439 E9/L (ref 130–450)
PMV BLD AUTO: 10.3 FL (ref 7–12)
PMV BLD AUTO: 11.8 FL (ref 7–12)
POIKILOCYTES: ABNORMAL
POLYCHROMASIA: ABNORMAL
POTASSIUM REFLEX MAGNESIUM: 4.3 MMOL/L (ref 3.5–5)
POTASSIUM SERPL-SCNC: 4.3 MMOL/L (ref 3.5–5)
PROCALCITONIN: 1.94 NG/ML (ref 0–0.08)
RBC # BLD: 2.33 E12/L (ref 3.5–5.5)
RBC # BLD: 2.34 E12/L (ref 3.5–5.5)
REASON FOR REJECTION: NORMAL
REJECTED TEST: NORMAL
ROULEAUX: ABNORMAL
SCHISTOCYTES: ABNORMAL
SODIUM BLD-SCNC: 130 MMOL/L (ref 132–146)
TOTAL PROTEIN: 5.5 G/DL (ref 6.4–8.3)
WBC # BLD: 30.8 E9/L (ref 4.5–11.5)
WBC # BLD: 34.4 E9/L (ref 4.5–11.5)

## 2022-09-28 PROCEDURE — 74178 CT ABD&PLV WO CNTR FLWD CNTR: CPT

## 2022-09-28 PROCEDURE — 86923 COMPATIBILITY TEST ELECTRIC: CPT

## 2022-09-28 PROCEDURE — 80076 HEPATIC FUNCTION PANEL: CPT

## 2022-09-28 PROCEDURE — 80048 BASIC METABOLIC PNL TOTAL CA: CPT

## 2022-09-28 PROCEDURE — 2580000003 HC RX 258: Performed by: STUDENT IN AN ORGANIZED HEALTH CARE EDUCATION/TRAINING PROGRAM

## 2022-09-28 PROCEDURE — P9016 RBC LEUKOCYTES REDUCED: HCPCS

## 2022-09-28 PROCEDURE — 83605 ASSAY OF LACTIC ACID: CPT

## 2022-09-28 PROCEDURE — 83735 ASSAY OF MAGNESIUM: CPT

## 2022-09-28 PROCEDURE — 6370000000 HC RX 637 (ALT 250 FOR IP): Performed by: STUDENT IN AN ORGANIZED HEALTH CARE EDUCATION/TRAINING PROGRAM

## 2022-09-28 PROCEDURE — 85025 COMPLETE CBC W/AUTO DIFF WBC: CPT

## 2022-09-28 PROCEDURE — 82962 GLUCOSE BLOOD TEST: CPT

## 2022-09-28 PROCEDURE — 6360000004 HC RX CONTRAST MEDICATION: Performed by: RADIOLOGY

## 2022-09-28 PROCEDURE — 86900 BLOOD TYPING SEROLOGIC ABO: CPT

## 2022-09-28 PROCEDURE — 36415 COLL VENOUS BLD VENIPUNCTURE: CPT

## 2022-09-28 PROCEDURE — 87205 SMEAR GRAM STAIN: CPT

## 2022-09-28 PROCEDURE — 2580000003 HC RX 258: Performed by: INTERNAL MEDICINE

## 2022-09-28 PROCEDURE — 97129 THER IVNTJ 1ST 15 MIN: CPT

## 2022-09-28 PROCEDURE — 36430 TRANSFUSION BLD/BLD COMPNT: CPT

## 2022-09-28 PROCEDURE — 84145 PROCALCITONIN (PCT): CPT

## 2022-09-28 PROCEDURE — 84100 ASSAY OF PHOSPHORUS: CPT

## 2022-09-28 PROCEDURE — 80053 COMPREHEN METABOLIC PANEL: CPT

## 2022-09-28 PROCEDURE — 6370000000 HC RX 637 (ALT 250 FOR IP): Performed by: NURSE PRACTITIONER

## 2022-09-28 PROCEDURE — 90945 DIALYSIS ONE EVALUATION: CPT

## 2022-09-28 PROCEDURE — 1200000000 HC SEMI PRIVATE

## 2022-09-28 PROCEDURE — 97130 THER IVNTJ EA ADDL 15 MIN: CPT

## 2022-09-28 PROCEDURE — 87040 BLOOD CULTURE FOR BACTERIA: CPT

## 2022-09-28 PROCEDURE — 86850 RBC ANTIBODY SCREEN: CPT

## 2022-09-28 PROCEDURE — 86901 BLOOD TYPING SEROLOGIC RH(D): CPT

## 2022-09-28 PROCEDURE — 6360000002 HC RX W HCPCS: Performed by: INTERNAL MEDICINE

## 2022-09-28 PROCEDURE — 87070 CULTURE OTHR SPECIMN AEROBIC: CPT

## 2022-09-28 RX ORDER — SODIUM CHLORIDE 9 MG/ML
INJECTION, SOLUTION INTRAVENOUS PRN
Status: DISCONTINUED | OUTPATIENT
Start: 2022-09-28 | End: 2022-10-03 | Stop reason: HOSPADM

## 2022-09-28 RX ORDER — DEXTROSE MONOHYDRATE 50 MG/ML
INJECTION, SOLUTION INTRAVENOUS CONTINUOUS
Status: DISCONTINUED | OUTPATIENT
Start: 2022-09-28 | End: 2022-09-28

## 2022-09-28 RX ORDER — DEXTROSE MONOHYDRATE 50 MG/ML
INJECTION, SOLUTION INTRAVENOUS CONTINUOUS
Status: ACTIVE | OUTPATIENT
Start: 2022-09-28 | End: 2022-09-29

## 2022-09-28 RX ADMIN — DEXTROSE MONOHYDRATE 125 ML: 100 INJECTION, SOLUTION INTRAVENOUS at 20:45

## 2022-09-28 RX ADMIN — DEXTROSE MONOHYDRATE 125 ML: 100 INJECTION, SOLUTION INTRAVENOUS at 20:19

## 2022-09-28 RX ADMIN — INSULIN LISPRO 16 UNITS: 100 INJECTION, SOLUTION INTRAVENOUS; SUBCUTANEOUS at 11:10

## 2022-09-28 RX ADMIN — SODIUM CHLORIDE, PRESERVATIVE FREE 10 ML: 5 INJECTION INTRAVENOUS at 09:00

## 2022-09-28 RX ADMIN — IOPAMIDOL 50 ML: 755 INJECTION, SOLUTION INTRAVENOUS at 09:07

## 2022-09-28 RX ADMIN — NYSTATIN 500000 UNITS: 100000 SUSPENSION ORAL at 20:21

## 2022-09-28 RX ADMIN — ATORVASTATIN CALCIUM 20 MG: 20 TABLET, FILM COATED ORAL at 20:21

## 2022-09-28 RX ADMIN — DEXTROSE MONOHYDRATE: 50 INJECTION, SOLUTION INTRAVENOUS at 21:30

## 2022-09-28 RX ADMIN — MEROPENEM 500 MG: 1 INJECTION, POWDER, FOR SOLUTION INTRAVENOUS at 16:12

## 2022-09-28 RX ADMIN — SODIUM CHLORIDE, PRESERVATIVE FREE 10 ML: 5 INJECTION INTRAVENOUS at 21:32

## 2022-09-28 ASSESSMENT — PAIN SCALES - WONG BAKER
WONGBAKER_NUMERICALRESPONSE: 0
WONGBAKER_NUMERICALRESPONSE: 0

## 2022-09-28 NOTE — PROGRESS NOTES
[facility] MD Darnell Navas MD Marcello Burton, MD  Progress Note    9/28/2022      SUBJECTIVE:   9/21: Not in her room   She is getting cholcystectomy    9/22: Status post cholecystectomy yesterday without complication. Feeling better, ongoing fatigue and malaise, generalized weakness. Appetite is improved and she is looking forward to her dinner. Denies nausea or vomiting. Pain controlled. ROS otherwise unremarkable    9/23: Off the floor, and endoscopy for MRCP. Discussed care with nurse. Vital signs stable. CCPD without complication, though drainage this morning was blood-tinged. No clots and no fibrin. BP somewhat lower than yesterday. 9/24: Constipated. No dyspnea. Ongoing fatigue, malaise, generalized weakness. Ongoing anorexia. CCPD last evening without complication, effluent less blood-tinged. 9/25: Ongoing constipation. She believes her last bowel movement was at least 5 days ago. Quite uncomfortable, though no pain per se. Ongoing fatigue and generalized weakness. No complications CCPD last evening. Effluent clear. 9/26: Somnolent though arousable. Did not sleep well last night. No complications with CCPD. PD effluent has cleared. No nausea or vomiting. Ongoing abdominal discomfort though no pain. No headache. No dyspnea at rest on room air.    9/27: Somnolent, somewhat confused. Not sleeping well at night. Poor appetite and intake. CCPD at night without event. Effluent has been clear    9/28: Seen this morning. Mental status is worsening. My obtunded, somnolent. Somewhat arousable though does not answer questions or follow commands. Appears in no apparent distress and is breathing comfortably. Peritoneal effluent was jodee to red-colored this morning.   No other complications of her CCPD last night    PROBLEM LIST:    Principal Problem:    Change in mental status  Active Problems:    Chronic kidney disease    Moderate protein-calorie malnutrition ([facility] [address]  Resolved Problems:    * No resolved hospital problems. *       DIET:    Diet NPO Exceptions are: Sips of Water with Meds       Allergies : Sulfa antibiotics    Past Medical History:   Diagnosis Date    Anemia     Arthritis     CAD (coronary artery disease)     Diabetes mellitus ([facility] [address]     Gout     History of bleeding peptic ulcer approx     Hypertension     Peritoneal dialysis catheter in place [facility])     Peritoneal dialysis status ([facility] [address]     at night for 8 hours    Thyroid disease     Use of cane as ambulatory aid     Wears glasses        Past Surgical History:   Procedure Laterality Date    BACK SURGERY      CARDIOVASCULAR STRESS TEST      CARPAL TUNNEL RELEASE Bilateral     CATARACT REMOVAL WITH IMPLANT Bilateral      SECTION      CHOLECYSTECTOMY, LAPAROSCOPIC N/A 2022    CHOLECYSTECTOMY LAPAROSCOPIC with intraoperative cholangiogram performed by Jose L Gray MD at [address] [facility]  approx 2000    x 3; per Dr Trina Waldron, COLON, DIAGNOSTIC      ERCP N/A 2022    ERCP ENDOSCOPIC RETROGRADE CHOLANGIOPANCREATOGRAPHY performed by Stephanie Zurita MD at [address] [place] [address]    ERCP N/A 2022    ERCP SPHINCTER/PAPILLOTOMY performed by Stephanie Zurita MD at [address] [place] [address]    ERCP N/A 2022    ERCP STONE REMOVAL performed by Stephanie Zurita MD at [address] [facility]    ERCP N/A 2022    ERCP DILATION BALLOON performed by Setphanie Zurita MD at [address] [facility] [address]      left knee, right shoulder    HI TOTAL KNEE ARTHROPLASTY Right 10/31/2018    RIGHT KNEE TOTAL ARTHROPLASTY +++BRIDGER++ PNB++ performed by Gianna Navarro MD at [address] [place] [address]         History reviewed. No pertinent family history. reports that she has never smoked. She has never used smokeless tobacco. She reports that she does not drink alcohol and does not use drugs.     Review of Systems:   Constitutional: no fevers , no chills , feels ok   Eyes: no eye pain , no itching , no drainage  Ears, nose, mouth, throat, and face: no ear ,nose pain , hearing is ok ,no nasal drainage   Respiratory: no sob ,no cough ,no wheezing . Cardiovascular: no chest pain , no palpitation ,no sob . Gastrointestinal: no nausea, vomiting , constipation , no abdominal pain . Genitourinary:no urinary retention , no burning , dysuria . No polyuria   Hematologic/lymphatic: no bleeding , no cougulation issues . Musculoskeletal:no joint pain , no swelling . Neurological: no headaches ,no weakness , no numbness . Endocrine: no thirst , no weight issues .      MEDS (scheduled):    meropenem  500 mg IntraVENous Q24H    nystatin  5 mL Oral 4x Daily    amLODIPine  2.5 mg Oral Daily    metoprolol tartrate  12.5 mg Oral BID    custom dialysis builder   IntraPERitoneal Once    sodium chloride flush  5-40 mL IntraVENous 2 times per day    insulin lispro  0-16 Units SubCUTAneous TID WC    insulin lispro  0-4 Units SubCUTAneous Nightly    levETIRAcetam  250 mg Oral BID    [Held by provider] apixaban  2.5 mg Oral BID    aspirin  81 mg Oral Daily    atorvastatin  20 mg Oral Nightly    DULoxetine  30 mg Oral Daily    isosorbide mononitrate  30 mg Oral Daily    levothyroxine  50 mcg Oral Daily    docusate sodium  100 mg Oral Nightly    pantoprazole  40 mg Oral QAM AC       MEDS (infusions):   sodium chloride      sodium chloride      dextrose         MEDS (prn):  sodium chloride, polyethylene glycol, bisacodyl, sodium chloride flush, sodium chloride, oxyCODONE, acetaminophen, glucose, dextrose bolus **OR** dextrose bolus, glucagon (rDNA), dextrose, acetaminophen, trimethobenzamide    PHYSICAL EXAM:   BP (!) 98/41   Pulse 92   Temp 97.8 °F (36.6 °C) (Axillary)   Resp 16   Ht 5' (1.524 m)   Wt 139 lb 1.8 oz (63.1 kg)   SpO2 98%   BMI 27.17 kg/m²        Wt Readings from Last 3 Encounters:   09/25/22 139 lb 1.8 oz (63.1 kg)   07/27/22 143 lb 3.2 oz (65 kg)   04/29/22 158 lb 8.2 oz (71.9 kg)       Constitutional:  in no acute distress  Oral: mucus membranes moist  Neck: no JVD  Cardiovascular: S1, S2 regular rhythm, no murmur,or rub  Respiratory: Poor air entry bilateral no crackles, no wheeze  Gastrointestinal:  Soft, nontender, nondistended, NABS. No mass hepatosplenomegaly. CAPD catheter left lower quadrant  Ext: No edema, feet warm  Skin: dry, no rash  Neuro: Somnolent, arousable,  To questions or follow commands. Moves all 4 extremities. DATA:    Reviewed        Assessment    1. End-stage renal disease, on peritoneal dialysis. 2.  Encephalopathy, etiology unclear, metabolic versus ongoing infection. .  Markedly improved  3. Leukocytosis ? Ascedning cholangitis. Improved now status post cholecystectomy  4. Anemia due to ESRD  5. Hypomagnesemia. 6.  Mineral bone disease. 7.  Hyponatremia likely due to water overload, mild though, as well as hyperglycemia  8. Hypokalemia, due to poor intake, losses with PD. Supplemented, the repeat at 1 PM was hemolyzed. Next repeat 3.8 mmol/L     Recommendations  Continue CCPD this evening for solute and volume clearance.   increased the volume to 2000 cc per exchange   At thousand attempt in each bag to prevent clotting, adhesion formation due to blood in his peritoneum, fibrin  Continue to use all 2.5% dextrose solutions to increase water clearance  1000 units heparin added per bag to prevent clotting, adhesions given blood in her peritoneum  Dulcolax as needed  MiraLAX as needed  Colace  Supplement potassium again, p.o., 40 twice daily today  Follow labs     Electronically signed by Alexandra Barney MD on 9/28/2022

## 2022-09-28 NOTE — FLOWSHEET NOTE
Dialysate noted to be red in color. Dialysis nurse on call Noy Camarena) notified. Instructed to leave treatment running unless pt complains of severe pain. Dialysis nurse will discuss plan of care with physician in the morning.      Electronically signed by Mark Bustamante RN on 9/28/2022 at 1:12 AM

## 2022-09-28 NOTE — PROGRESS NOTES
Subjective:    Patient is asleep and resting  Son at bedside updated on patient care. All questions and concerns addressed    Objective:    /69   Pulse 57   Temp 97.5 °F (36.4 °C) (Axillary)   Resp 18   Ht 5' (1.524 m)   Wt 139 lb 1.8 oz (63.1 kg)   SpO2 98%   BMI 27.17 kg/m²     In: 240 [P.O.:240]  Out: 2452   In: 240   Out: 2452     General Appearance: Asleep  Head/face: NCAT  Eyes:  No gross abnormalities. Lungs:  Normal expansion. Clear to auscultation. No rales, rhonchi, or wheezing. Heart:  Heart sounds are normal.  Regular rate and rhythm without murmur, gallop or rub. Abdomen:  Soft, tender, normal bowel sounds. No bruits, PD cath  Extremities: Extremities warm to touch, pink, with no edema. Neurologic:  Asleep and resting     Recent Labs     09/26/22  0611 09/27/22  0423 09/28/22  0424   WBC 24.2* 29.0* 34.4*   HGB 8.7* 7.8* 6.7*   HCT 27.1* 24.3* 21.1*    456* 439         Recent Labs     09/26/22  1624 09/27/22  0423 09/28/22  0424   * 127* 130*   K 3.8 3.1*  3.1* 4.3  4.3   CL 92* 89* 91*   CO2 21* 23 22   BUN 38* 31* 30*   CREATININE 4.5* 3.9* 4.0*   CALCIUM 8.9 9.5 9.1         Assessment:    Principal Problem:    Change in mental status  Active Problems:    Chronic kidney disease    Moderate protein-calorie malnutrition (HCC)  Resolved Problems:    * No resolved hospital problems.  *        PLAN:  Altered mental status d/t Sepsis   -WBC 23 > 17.9 post ERCP > 24.2 > 29.0 > 34.4 worsening today  -Infectious disease consulted  -IV Zosyn was to be completed 9/26/2022, but worsening leukocytosis > switch to meropenem 500 mg IV q 24 hrs per ID  -EEG > A potential for generalized photosensitive epilepsy, will defer to neurology > started on Keppra 250 mg twice daily, neurology signed off  -blood cultures x 2 are negative   -peritoneal body fluid culture NGTD     LFTs/acute cholecystitis-fluctuating  -s/p lap milton with IOC 9/21/2022  -Multiple filling defects in the CBD noted during [address], advanced GI consulted for ERCP/stone removal sphincterotomy and sphincteroplasty-procedure completed on 9/23-which she tolerated well  -Lipitor restarted   - > 117>70 > 39 > 47- continuing to improve  - > 121>88 > 50 > 43- continuing to improve  -Total Bilirubin 1.7 > 1.6>1.8 > 0.8 > 0.7, improved  -Continue to monitor labs, hepatic function test daily -improving  -CT ABD/Pelvis: Large complex mass extending into the subhepatic region on the right. Differential considerations include an abscess or infected biloma, surgery will need to be notified as they signed off 2 days ago. ESRD on PD  -Nephro following  -PD per nephro   -Per general surgery, okay to continue peritoneal dialysis-no need for temporary dialysis line  -Monitor labs  -K 3.4 > 3.1 > 4.3   -Hb 7.8 > 6.7 -- 1 unit PRBCs ordered, trend H/H-hold Eliquis until hemoglobin stabilizes  -Dialysate noted to be red in color     Severe protein calorie malnutrition  -Nutrition consult  -Start Vanilla Glucerna TID and jacinda BID   -Monitor oral intake during meals  -Advance diet as tolerated     Diabetes mellitus   -Continue to monitor blood glucose levels  -Better controlled with ISS increase to high dose   -May have to initiate p.o. hypoglycemic agents due to elevated glucose levels as they continue to be elevated at 335     Elevated troponin  -Without EKG changes or chest pain - likely related to renal failure - noted elevations 04/22 chronically. Trending down 183 > 164  -Follow labs  -Ok to restart Eliquis, Hx AFIB  -Cardio signed off     Constipation  -Dulcolax as needed  -Colace     Dry mouth, Concern for thrush  -Poss. due to long term antibiotic use  -Continue nystatin   -Oral Hygeine     Medication for other comorbidities continue as appropriate dose adjustment as necessary.     DVT prophylaxis Eliquis  PT OT  Discharge plan GAIL Krishnan, KERRI - CNP  1:08 PM  9/28/2022    Above note edited to reflect my thoughts CT abdomen and pelvis ordered yesterday morning 927 shows evidence of abscess at surgical site  Case discussed with son at bedside at length  Case discussed with Dr. Jose Short surgical input-we will have to continue to monitor procalcitonin leukocytosis watch for fevers  Reconsider PD if patient does have an abscess in abdomen    I personally saw, examined and provided care for the patient. Radiographs, labs and medication list were reviewed by me independently. The case was discussed in detail and plans for care were established. Review of KERRI Garibay-CNP   , documentation was conducted and revisions were made as appropriate directly by me. I agree with the above documented exam, problem list, and plan of care.      Millie Espinosa MD  6:35 PM  9/28/2022

## 2022-09-28 NOTE — PROGRESS NOTES
GENERAL SURGERY  DAILY PROGRESS NOTE  9/28/2022    Subjective:  General surgery called today following results of patient's CT abdomen/pelvis, which were read as potential abscess within the gallbladder fossa. The patient currently has altered mental status and declining white count. She has been seen by the infectious disease service, who has changed her antibiotics to meropenem. No abdominal complaints. Minimal oral intake. Objective:  /69   Pulse 57   Temp 97.5 °F (36.4 °C) (Axillary)   Resp 18   Ht 5' (1.524 m)   Wt 139 lb 1.8 oz (63.1 kg)   SpO2 98%   BMI 27.17 kg/m²     General Appearance: Lethargic, answer some questions appropriately  Skin:  Skin color, texture, turgor poor  Head/face:  NCAT  Eyes:  No gross abnormalities. Sclera nonicteric  Lungs/Chest:  Normal expansion. No respiratory distress. On room air  Heart: Warm throughout. Regular rate   Abdomen:  Soft, minimal tenderness, non distended, bruising around laparoscopic incisions. Extremities: Extremities warm to touch, pink    Assessment/Plan:  80 y.o. female currently admitted with altered mental status  s/p lap milton with IOC 09/21 and ERCP with sphincterotomy and stone removal 9/23. Now with worsening leukocytosis     At the time of the patient's surgery, surgical snow was placed in the gallbladder fossa, this can have an unusual appearance on CT imaging, which would be consistent with what the patient's CT scan from yesterday showed. With the patient's benign abdominal exam, post-surgical abscess seems less likely.  Will order additional workup     - body fluid culture  - blood culture  - UA  - respiratory culture  - procalcitonin    Discussed with Dr. Meaghan Shepard    Electronically signed by Paresh Marks DO on 9/28/2022 at 4:05 PM

## 2022-09-28 NOTE — PROGRESS NOTES
RN called to reschedule CT scan for tomorrow in AM due to patient's dialysis.  Call [phone] to schedule in AM.

## 2022-09-28 NOTE — FLOWSHEET NOTE
09/28/22 1850   Peritoneal Dialysis Catheter Right lower abdomen   No Placement Date or Time found. Catheter Location: Right lower abdomen   Status Accessed   Site Condition Clean, dry, intact   Dressing Status New dressing applied   Dressing Gauze   Date of Last Dressing Change 09/28/22   Dialysis Type Continuous cycling   Exit Site Condition Clean, dry and intact   Catheter Care Given Yes   Cycler   Verification of Prescription CCPD   Total Volume Programmed 61543 mL   Therapy Time (Hours:Minutes) 9   Fill Volume 2000 mL   Last Fill Volume 1500 mL   Number of Cycles 6   Bag Volume 5000 mL   Number of Bags Used 3   Dianeal Solution   (Dextrose 2.5%in 5000 mL)   CCPD initiated, catheter accessed, drsg changed, aseptic technique, draining blood tinged cloudy fluid, tolerating well. Peritoneal fluid specimen hand delivered to lab.

## 2022-09-28 NOTE — PROGRESS NOTES
Department of Internal Medicine  Infectious Diseases   Progress Note      C/C :  Leukocytosis     Discussed with the pt's son   Pt is very lethargic   No acute distress   Afebrile       Current Facility-Administered Medications   Medication Dose Route Frequency Provider Last Rate Last Admin    0.9 % sodium chloride infusion   IntraVENous PRN April Nabila, APRN - CNP        meropenem (MERREM) 500 mg IVPB (mini-bag)  500 mg IntraVENous Q24H Naseem Cutler MD   Stopped at 09/27/22 1815    nystatin (MYCOSTATIN) 212645 UNIT/ML suspension 500,000 Units  5 mL Oral 4x Daily Elizabet Ari, APRN - CNP   500,000 Units at 09/27/22 2046    polyethylene glycol (GLYCOLAX) packet 17 g  17 g Oral Daily PRN John Carpenter MD        bisacodyl (DULCOLAX) EC tablet 5 mg  5 mg Oral Daily PRN John Carpenter MD        amLODIPine (NORVASC) tablet 2.5 mg  2.5 mg Oral Daily Bernie Solomon MD   2.5 mg at 09/27/22 0830    metoprolol tartrate (LOPRESSOR) tablet 12.5 mg  12.5 mg Oral BID Bernie Solomon MD   12.5 mg at 09/27/22 2046    delflex lo-yared 2.5% 5,000 mL with heparin (porcine) 1,000 Units solution   IntraPERitoneal Once John Carpenter MD        sodium chloride flush 0.9 % injection 5-40 mL  5-40 mL IntraVENous 2 times per day Sheldon Alexis MD   10 mL at 09/28/22 0900    sodium chloride flush 0.9 % injection 5-40 mL  5-40 mL IntraVENous PRN Sheldon Alexis MD        0.9 % sodium chloride infusion  25 mL IntraVENous PRN Sheldon Alexis MD        oxyCODONE (ROXICODONE) immediate release tablet 5 mg  5 mg Oral Q4H PRN Irasema Mathews MD   5 mg at 09/24/22 1204    insulin lispro (HUMALOG) injection vial 0-16 Units  0-16 Units SubCUTAneous TID WC Irasema Mathews MD   16 Units at 09/28/22 1110    insulin lispro (HUMALOG) injection vial 0-4 Units  0-4 Units SubCUTAneous Nightly Irasema Mathews MD   4 Units at 09/22/22 2135    levETIRAcetam (KEPPRA) tablet 250 mg  250 mg Oral BID Irasema Mathews MD   250 mg at 09/27/22 2047    acetaminophen (TYLENOL) suppository 650 mg  650 mg Rectal Q4H PRN Danni Mcmahon MD   650 mg at 09/18/22 1927    apixaban (ELIQUIS) tablet 2.5 mg  2.5 mg Oral BID Danni Mcmahon MD   2.5 mg at 09/27/22 2046    aspirin chewable tablet 81 mg  81 mg Oral Daily Danni Mcmahon MD   81 mg at 09/27/22 0829    atorvastatin (LIPITOR) tablet 20 mg  20 mg Oral Nightly Danni Mcmahon MD   20 mg at 09/27/22 2046    DULoxetine (CYMBALTA) extended release capsule 30 mg  30 mg Oral Daily Danni Mcmahon MD   30 mg at 09/27/22 7896    isosorbide mononitrate (IMDUR) extended release tablet 30 mg  30 mg Oral Daily Danni Mcmahon MD   30 mg at 09/27/22 0830    levothyroxine (SYNTHROID) tablet 50 mcg  50 mcg Oral Daily Danni Mcmahon MD   50 mcg at 09/27/22 0615    docusate sodium (COLACE) capsule 100 mg  100 mg Oral Nightly Danni Mcmahon MD   100 mg at 09/26/22 2049    pantoprazole (PROTONIX) tablet 40 mg  40 mg Oral QAM AC Danni Mcmahon MD   40 mg at 09/27/22 0615    glucose chewable tablet 16 g  4 tablet Oral PRN Danni Mcmahon MD        dextrose bolus 10% 125 mL  125 mL IntraVENous PRN Danni Mcmahon MD        Or    dextrose bolus 10% 250 mL  250 mL IntraVENous PRN Danni Mcmahon MD        glucagon (rDNA) injection 1 mg  1 mg SubCUTAneous PRN Danni Mcmahon MD        dextrose 10 % infusion   IntraVENous Continuous PRN Danni Mcmahon MD        acetaminophen (TYLENOL) tablet 650 mg  650 mg Oral Q4H PRN Danni Mcmahon MD   650 mg at 09/25/22 0913    trimethobenzamide (TIGAN) injection 200 mg  200 mg IntraMUSCular Q6H PRN Danni Mcmahon MD   200 mg at 09/25/22 0939       REVIEW OF SYSTEMS:  could not be obtained           PHYSICAL EXAM:      Vitals:     /69   Pulse 57   Temp 97.5 °F (36.4 °C) (Axillary)   Resp 18   Ht 5' (1.524 m)   Wt 139 lb 1.8 oz (63.1 kg)   SpO2 98%   BMI 27.17 kg/m²       General Appearance:    Lethargic    Head: Normocephalic, atraumatic   Eyes:    No pallor, no icterus,   Ears:    No obvious deformity or drainage.    Nose:   No nasal drainage   Throat:   Mucosa moist, no oral thrush   Neck:   Supple, no lymphadenopathy   Lungs:     Clear to auscultation bilaterally   Heart:     Irregular, systolic murmur    Abdomen:     Soft, non-tender, bowel sounds present - loose stool    Extremities:   No edema, no cyanosis    Pulses:   Dorsalis pedis palpable    Skin:   no rashes      CBC with Differential:      Lab Results   Component Value Date/Time    WBC 34.4 09/28/2022 04:24 AM    RBC 2.33 09/28/2022 04:24 AM    HGB 6.7 09/28/2022 04:24 AM    HCT 21.1 09/28/2022 04:24 AM     09/28/2022 04:24 AM    MCV 90.6 09/28/2022 04:24 AM    MCH 28.8 09/28/2022 04:24 AM    MCHC 31.8 09/28/2022 04:24 AM    RDW 14.3 09/28/2022 04:24 AM    NRBC 1.7 09/27/2022 04:23 AM    BANDSPCT 2 12/19/2015 04:23 PM    METASPCT 0.9 09/28/2022 04:24 AM    LYMPHOPCT 9.5 09/28/2022 04:24 AM    PROMYELOPCT 1.7 09/27/2022 04:23 AM    MONOPCT 5.2 09/28/2022 04:24 AM    MYELOPCT 0.9 09/28/2022 04:24 AM    BASOPCT 0.4 09/28/2022 04:24 AM    MONOSABS 1.72 09/28/2022 04:24 AM    LYMPHSABS 3.44 09/28/2022 04:24 AM    EOSABS 0.31 09/28/2022 04:24 AM    BASOSABS 0.00 09/28/2022 04:24 AM       CMP     Lab Results   Component Value Date/Time     09/28/2022 04:24 AM    K 4.3 09/28/2022 04:24 AM    K 4.3 09/28/2022 04:24 AM    CL 91 09/28/2022 04:24 AM    CO2 22 09/28/2022 04:24 AM    BUN 30 09/28/2022 04:24 AM    CREATININE 4.0 09/28/2022 04:24 AM    GFRAA 13 09/28/2022 04:24 AM    LABGLOM 11 09/28/2022 04:24 AM    GLUCOSE 385 09/28/2022 04:24 AM    PROT 5.5 09/28/2022 04:24 AM    LABALBU 1.7 09/28/2022 04:24 AM    CALCIUM 9.1 09/28/2022 04:24 AM    BILITOT 0.7 09/28/2022 04:24 AM    ALKPHOS 522 09/28/2022 04:24 AM    AST 47 09/28/2022 04:24 AM    ALT 43 09/28/2022 04:24 AM     Stool for  C diff toxin - negative        CT scan of abdomen and pelvis -  Impression: Status post cholecystectomy. Large complex mass demonstrating a combination of soft tissue, fluid, fat,   and air attenuation in the gallbladder fossa and extending into the   subhepatic region on the right. Differential considerations include an   abscess or infected biloma. IMPRESSION:     S/P lap cholecystectomy  (for acute cholecystitis ) and ERCP   Leukocytosis         RECOMMENDATIONS:         1. Meropenem 500 mg IV q 24 hrs  2. Drainage of fluid - check cx   3.  CBC with diff

## 2022-09-28 NOTE — PLAN OF CARE
Problem: Skin/Tissue Integrity  Goal: Absence of new skin breakdown  Description: 1. Monitor for areas of redness and/or skin breakdown  2. Assess vascular access sites hourly  3. Every 4-6 hours minimum:  Change oxygen saturation probe site  4. Every 4-6 hours:  If on nasal continuous positive airway pressure, respiratory therapy assess nares and determine need for appliance change or resting period.   Outcome: Progressing     Problem: Safety - Adult  Goal: Free from fall injury  Outcome: Progressing     Problem: Chronic Conditions and Co-morbidities  Goal: Patient's chronic conditions and co-morbidity symptoms are monitored and maintained or improved  Outcome: Progressing     Problem: Discharge Planning  Goal: Discharge to home or other facility with appropriate resources  Outcome: Progressing     Problem: Nutrition Deficit:  Goal: Optimize nutritional status  Outcome: Progressing     Problem: Pain  Goal: Verbalizes/displays adequate comfort level or baseline comfort level  Outcome: Progressing

## 2022-09-28 NOTE — FLOWSHEET NOTE
09/28/22 0847   Vitals   /66   Temp 98.2 °F (36.8 °C)   Heart Rate 68   Resp 20   Peritoneal Dialysis Catheter Right lower abdomen   No Placement Date or Time found.    Catheter Location: Right lower abdomen   Site Condition Clean, dry, intact   Dressing Status New dressing applied   Dressing Gauze   Cycler   Verification of Prescription CCPD   Ultrafiltration (UF) (mL) 1483 mL   Ccpd completed clear jodee colored effuent noted no complications, stay safe cap applied

## 2022-09-28 NOTE — CARE COORDINATION
9/28 Update CM note. Spoke with Darlene Krause at [facility], pt accepted and can transition there once medically stable, no pre-cert required. PAULA/Destination complete. 7000 started and will need completed when discharge order is placed. Ambulance form in soft chart and will need completed and signed on DAY of discharge. Spoke with Germnaia Arizmendi at [facility] ([phone]) and informed her of acceptance. She informed CM they will coordinate with facility and pt's son to deliver supplies and cycler to facility. CT AP this AM revealed large complex mass demonstrating a combination of soft tissue, fluid, fat, and air attenuation in the gallbladder fossa and extending into the subhepatic region on the right. Await GSGY recs. PT/OT re-evaluated yesterday, 8 and 12/24. ID started Merrem IV Q24H.      SALLY SahuN, RN  [facility] Case Management   Cell: [phone]

## 2022-09-28 NOTE — PROGRESS NOTES
Dr. Richmond Rivero consult recalled per general surgery request. Will see pt before surgery.       Electronically signed by Zachary Bruner RN on 9/28/2022 at 6:59 AM

## 2022-09-29 ENCOUNTER — APPOINTMENT (OUTPATIENT)
Dept: CT IMAGING | Age: 82
DRG: 853 | End: 2022-09-29
Payer: MEDICARE

## 2022-09-29 LAB
ALBUMIN SERPL-MCNC: 1.1 G/DL (ref 3.5–5.2)
ALP BLD-CCNC: 477 U/L (ref 35–104)
ALT SERPL-CCNC: 29 U/L (ref 0–32)
ANION GAP SERPL CALCULATED.3IONS-SCNC: 15 MMOL/L (ref 7–16)
ANISOCYTOSIS: ABNORMAL
APPEARANCE FLUID: NORMAL
AST SERPL-CCNC: 37 U/L (ref 0–31)
BASOPHILS ABSOLUTE: 0 E9/L (ref 0–0.2)
BASOPHILS RELATIVE PERCENT: 0.4 % (ref 0–2)
BILIRUB SERPL-MCNC: 0.6 MG/DL (ref 0–1.2)
BILIRUBIN DIRECT: 0.3 MG/DL (ref 0–0.3)
BILIRUBIN, INDIRECT: 0.3 MG/DL (ref 0–1)
BUN BLDV-MCNC: 26 MG/DL (ref 6–23)
CALCIUM SERPL-MCNC: 7.9 MG/DL (ref 8.6–10.2)
CELL COUNT FLUID TYPE: NORMAL
CHLORIDE BLD-SCNC: 92 MMOL/L (ref 98–107)
CO2: 18 MMOL/L (ref 22–29)
COLOR FLUID: NORMAL
CREAT SERPL-MCNC: 3.9 MG/DL (ref 0.5–1)
EOSINOPHILS ABSOLUTE: 0 E9/L (ref 0.05–0.5)
EOSINOPHILS RELATIVE PERCENT: 1.3 % (ref 0–6)
GFR AFRICAN AMERICAN: 13
GFR NON-AFRICAN AMERICAN: 11 ML/MIN/1.73
GLUCOSE BLD-MCNC: 278 MG/DL (ref 74–99)
HCT VFR BLD CALC: 21.8 % (ref 34–48)
HCT VFR BLD CALC: 26.4 % (ref 34–48)
HEMOGLOBIN: 7.1 G/DL (ref 11.5–15.5)
HEMOGLOBIN: 8.8 G/DL (ref 11.5–15.5)
HYPOCHROMIA: ABNORMAL
LACTIC ACID: 2 MMOL/L (ref 0.5–2.2)
LYMPHOCYTES ABSOLUTE: 2.69 E9/L (ref 1.5–4)
LYMPHOCYTES RELATIVE PERCENT: 9.6 % (ref 20–42)
MAGNESIUM: 1.9 MG/DL (ref 1.6–2.6)
MCH RBC QN AUTO: 29.7 PG (ref 26–35)
MCHC RBC AUTO-ENTMCNC: 32.6 % (ref 32–34.5)
MCV RBC AUTO: 91.2 FL (ref 80–99.9)
METAMYELOCYTES RELATIVE PERCENT: 3.5 % (ref 0–1)
METER GLUCOSE: 139 MG/DL (ref 74–99)
METER GLUCOSE: 191 MG/DL (ref 74–99)
METER GLUCOSE: 236 MG/DL (ref 74–99)
METER GLUCOSE: 289 MG/DL (ref 74–99)
MONOCYTE, FLUID: 23 %
MONOCYTES ABSOLUTE: 2.42 E9/L (ref 0.1–0.95)
MONOCYTES RELATIVE PERCENT: 8.7 % (ref 2–12)
NEUTROPHIL, FLUID: 78 %
NEUTROPHILS ABSOLUTE: 22.06 E9/L (ref 1.8–7.3)
NEUTROPHILS RELATIVE PERCENT: 78.2 % (ref 43–80)
NUCLEATED CELLS FLUID: 138 /UL
PDW BLD-RTO: 14.8 FL (ref 11.5–15)
PHOSPHORUS: 3.4 MG/DL (ref 2.5–4.5)
PLATELET # BLD: 367 E9/L (ref 130–450)
PMV BLD AUTO: 10.4 FL (ref 7–12)
POIKILOCYTES: ABNORMAL
POLYCHROMASIA: ABNORMAL
POTASSIUM REFLEX MAGNESIUM: 3.9 MMOL/L (ref 3.5–5)
POTASSIUM SERPL-SCNC: 3.9 MMOL/L (ref 3.5–5)
RBC # BLD: 2.39 E12/L (ref 3.5–5.5)
RBC FLUID: 2000 /UL
SODIUM BLD-SCNC: 125 MMOL/L (ref 132–146)
STOMATOCYTES: ABNORMAL
TOTAL PROTEIN: 5 G/DL (ref 6.4–8.3)
WBC # BLD: 26.9 E9/L (ref 4.5–11.5)

## 2022-09-29 PROCEDURE — 83605 ASSAY OF LACTIC ACID: CPT

## 2022-09-29 PROCEDURE — 2580000003 HC RX 258: Performed by: INTERNAL MEDICINE

## 2022-09-29 PROCEDURE — 2580000003 HC RX 258: Performed by: STUDENT IN AN ORGANIZED HEALTH CARE EDUCATION/TRAINING PROGRAM

## 2022-09-29 PROCEDURE — 90945 DIALYSIS ONE EVALUATION: CPT

## 2022-09-29 PROCEDURE — 6360000002 HC RX W HCPCS: Performed by: INTERNAL MEDICINE

## 2022-09-29 PROCEDURE — 82962 GLUCOSE BLOOD TEST: CPT

## 2022-09-29 PROCEDURE — 80048 BASIC METABOLIC PNL TOTAL CA: CPT

## 2022-09-29 PROCEDURE — 80076 HEPATIC FUNCTION PANEL: CPT

## 2022-09-29 PROCEDURE — 6370000000 HC RX 637 (ALT 250 FOR IP): Performed by: NURSE PRACTITIONER

## 2022-09-29 PROCEDURE — 85014 HEMATOCRIT: CPT

## 2022-09-29 PROCEDURE — 87205 SMEAR GRAM STAIN: CPT

## 2022-09-29 PROCEDURE — 80053 COMPREHEN METABOLIC PANEL: CPT

## 2022-09-29 PROCEDURE — 2580000003 HC RX 258: Performed by: NURSE PRACTITIONER

## 2022-09-29 PROCEDURE — 36415 COLL VENOUS BLD VENIPUNCTURE: CPT

## 2022-09-29 PROCEDURE — 6370000000 HC RX 637 (ALT 250 FOR IP): Performed by: STUDENT IN AN ORGANIZED HEALTH CARE EDUCATION/TRAINING PROGRAM

## 2022-09-29 PROCEDURE — 87070 CULTURE OTHR SPECIMN AEROBIC: CPT

## 2022-09-29 PROCEDURE — 85018 HEMOGLOBIN: CPT

## 2022-09-29 PROCEDURE — 89051 BODY FLUID CELL COUNT: CPT

## 2022-09-29 PROCEDURE — 6360000002 HC RX W HCPCS: Performed by: NURSE PRACTITIONER

## 2022-09-29 PROCEDURE — 85025 COMPLETE CBC W/AUTO DIFF WBC: CPT

## 2022-09-29 PROCEDURE — 1200000000 HC SEMI PRIVATE

## 2022-09-29 PROCEDURE — 70450 CT HEAD/BRAIN W/O DYE: CPT

## 2022-09-29 PROCEDURE — 84100 ASSAY OF PHOSPHORUS: CPT

## 2022-09-29 PROCEDURE — 6370000000 HC RX 637 (ALT 250 FOR IP): Performed by: INTERNAL MEDICINE

## 2022-09-29 PROCEDURE — 83735 ASSAY OF MAGNESIUM: CPT

## 2022-09-29 RX ADMIN — MEROPENEM 500 MG: 1 INJECTION, POWDER, FOR SOLUTION INTRAVENOUS at 14:22

## 2022-09-29 RX ADMIN — NYSTATIN 500000 UNITS: 100000 SUSPENSION ORAL at 23:07

## 2022-09-29 RX ADMIN — METOPROLOL TARTRATE 12.5 MG: 25 TABLET, FILM COATED ORAL at 23:07

## 2022-09-29 RX ADMIN — ATORVASTATIN CALCIUM 20 MG: 20 TABLET, FILM COATED ORAL at 23:07

## 2022-09-29 RX ADMIN — SODIUM CHLORIDE, PRESERVATIVE FREE 10 ML: 5 INJECTION INTRAVENOUS at 23:08

## 2022-09-29 RX ADMIN — LEVETIRACETAM 250 MG: 500 INJECTION, SOLUTION INTRAVENOUS at 13:06

## 2022-09-29 RX ADMIN — DOCUSATE SODIUM 100 MG: 100 CAPSULE, LIQUID FILLED ORAL at 23:07

## 2022-09-29 NOTE — SIGNIFICANT EVENT
Critical Care - Rapid Response Team Note      Date of event: 9/29/2022   Time of event: 2044    Wu Lim 80y.o. year old female   YOB: 1940     PCP:  Alba Yuen MD     Location: [address] [place] [address]-A   Witnessed? : [x]Yes  [] No  Initial Code status: [x] Full  [] DNR-CCA  []DNR-CC    []Limited  ______________________________________________________________________  Reason for RRT:   Altered Mental Status    Chief Complaint for this admission:   Chief Complaint   Patient presents with    Altered Mental Status     Son reports she is usually Alert & oriented. Today she is more lethargic & twitching, which is new. She receives daily home dialysis & had her treatment this morning       Admit date:  9/16/2022     Admitting Diagnosis: Change in mental status [R41.82]  Altered mental status, unspecified altered mental status type [R41.82]      Subjective: Patient is an 81yo F with a PMHx of Anemia, Arthritis, CAD, DM Bleeding Peptic Ulcer, HTN and ESRD on Peritoneal dialysis who was admitted for concerns of AMS. RRT called for concerns of worsening mentation. BG seen to be 63 and patient was given 125cc of D10 which brought BG to 77. Repeat BG 10mins later showed BG of 70. Patient was assessed at bedside. BP: 140/58, SpO2: 91% on 6L NC which has showed increased O2 requirement. Patient was seen to be lethargic but is AAOx3. Repeat BP: 130/119 and another 125cc of D10 was given. SpO2 monitor was switched to patient's earlobe which showed SpO2 of 99%. O2 was decreased to 2L NC. BMP, CBC and Lactic Acid were drawn. Patient admits to feeling mildly improved at this time. Patient started on D5 @75cc/hr for 8hrs and will recheck BG in 2hrs.      Labs since last 72 hours:   CBC with Differential:    Lab Results   Component Value Date/Time    WBC 30.8 09/28/2022 09:06 PM    RBC 2.34 09/28/2022 09:06 PM    HGB 7.1 09/28/2022 09:06 PM    HCT 21.1 09/28/2022 09:06 PM     09/28/2022 09:06 PM    MCV 90.2 09/28/2022 09:06 PM    MCH 30.3 09/28/2022 09:06 PM    MCHC 33.6 09/28/2022 09:06 PM    RDW 16.2 09/28/2022 09:06 PM    NRBC 1.7 09/27/2022 04:23 AM    BANDSPCT 2 12/19/2015 04:23 PM    METASPCT 2.6 09/28/2022 09:06 PM    LYMPHOPCT 11.3 09/28/2022 09:06 PM    PROMYELOPCT 1.7 09/27/2022 04:23 AM    MONOPCT 4.3 09/28/2022 09:06 PM    MYELOPCT 3.5 09/28/2022 09:06 PM    BASOPCT 0.4 09/28/2022 09:06 PM    MONOSABS 1.23 09/28/2022 09:06 PM    LYMPHSABS 3.39 09/28/2022 09:06 PM    EOSABS 0.00 09/28/2022 09:06 PM    BASOSABS 0.00 09/28/2022 09:06 PM     BMP:    Lab Results   Component Value Date/Time     09/28/2022 04:24 AM    K 4.3 09/28/2022 04:24 AM    K 4.3 09/28/2022 04:24 AM    CL 91 09/28/2022 04:24 AM    CO2 22 09/28/2022 04:24 AM    BUN 30 09/28/2022 04:24 AM    LABALBU 1.7 09/28/2022 04:24 AM    CREATININE 4.0 09/28/2022 04:24 AM    CALCIUM 9.1 09/28/2022 04:24 AM    GFRAA 13 09/28/2022 04:24 AM    LABGLOM 11 09/28/2022 04:24 AM    GLUCOSE 385 09/28/2022 04:24 AM         Imaging since last 7 days:  CT ABDOMEN PELVIS W WO CONTRAST Additional Contrast? None    Result Date: 9/28/2022  Status post cholecystectomy. Large complex mass demonstrating a combination of soft tissue, fluid, fat, and air attenuation in the gallbladder fossa and extending into the subhepatic region on the right. Differential considerations include an abscess or infected biloma. Multiple other findings as described. FL ERCP BILIARY AND PANCREATIC S&I    Result Date: 9/23/2022  Fluoroscopic support for ERCP. Please refer to the procedure report for further details.          Objective:   Initial Assessment on arrival:  Vital signs: Temp: 97.5, BP: 140/58, RR: 16, HR: 73     Airway and Condition: Conscious, Pulse present, Breathing, and Airway Open/Clear noted    Lungs And Circulation: clear to auscultation bilaterally noted                  Neurologic: pupils reactive, follows commands, and motor strength equal  noted    Initial Interventions: Labs ordered: BMP, CBC, Lactic Acid  Imaging ordered: N/A  Meds/Fluids/Rx given:   D10 125cc x1 given  Patient started on D5 @75cc/hr for 8hrs          RRT Assessment and Plan:    Prema Walsh is a 80 y.o. female with  has a past medical history of Anemia, Arthritis, CAD (coronary artery disease), Diabetes mellitus ([facility] [address] Gout, History of bleeding peptic ulcer, Hypertension, Peritoneal dialysis catheter in place [facility]), Peritoneal dialysis status ([facility] [address] Thyroid disease, Use of cane as ambulatory aid, and Wears glasses. who was admitted on 9/16/2022 with admitting diagnosis Change in mental status [R41.82]  Altered mental status, unspecified altered mental status type [R41.82]  . RRT was called on 9/29/2022 . Initial assessment and interventions as noted above. Current problems include: Altered Mentation likely 2/2 hypoglycemia - improving  Hx of Diabetes Mellitus    Plan: Will follow BMP, CBC and Lactic Acid  D5 drip @75cc/hr started and to be run for 8hrs  Will follow BG 2hrs after starting D5 drip  ? Code status: [x] Full  [] DNR-CCA  []DNR-CC []Limited    Disposition:  [x] No transfer   [] Transfer to monitor floor  [] Transfer to: [] MICU [] NICU [] CCU [] SICU    Patients family updated:     [] Yes  [] No   Discussed with:  [] Critical Care Intensivist:         [x] Primary Care Provider: Dr. Pallavi Lopez was attempted to be contact.  Dr. Rosa Alexandra NP, April, was contacted and updated      [] Other: ?    Mikki Montero MD PGY-2  9/29/2022 12:11 AM  Attending Physician: Dr. Pallavi Lopez

## 2022-09-29 NOTE — FLOWSHEET NOTE
Marvin Flores Officer called with update on pt status. All questions answered.     Electronically signed by Daniel Shaver RN on 9/28/2022 at 10:18 PM

## 2022-09-29 NOTE — FLOWSHEET NOTE
09/29/22 0815   Vitals   /71   Temp 97.5 °F (36.4 °C)   Temp Source Axillary   Heart Rate 70   Resp 18   SpO2 96 %   Peritoneal Dialysis Catheter Right lower abdomen   No Placement Date or Time found. Catheter Location: Right lower abdomen   Status Deaccessed   Site Condition Clean, dry, intact   Dressing Status Clean, dry & intact   Dressing Gauze   Date of Last Dressing Change 09/28/22   Dialysis Type Continuous cycling   Exit Site Condition not seen   Catheter Care Given Yes   Cycler   Verification of Prescription CCPD   Total Volume Programmed 36308 mL   Therapy Time (Hours:Minutes) 11:01   Fill Volume 2000 mL   Last Fill Volume 1500 mL   Dextrose Setting Same (Nonextraneal)   Number of Cycles 6   Bag Volume 5000 mL   Number of Bags Used 3   Dianeal Solution Other (Comment)  (2.5% in 5000mL)   I Drain (mL) 1252 mL   Ultrafiltration (UF) (mL) 1209 mL   Average Dwell Time (Hours:Minutes) 75   Lost Dwell Time (Hours:Minutes) +2:1     CCPD complete. Line disconnected aseptically and iodine capped. Alarms overnight and slow draining added 2 hours of dwell time to programmed Rx. Rx verified as 9:00 programmed. Order for PD effluent completed after aseptic collection from drain bags and taken to lab.

## 2022-09-29 NOTE — PROGRESS NOTES
Subjective:    Patient is awake and alert. States \"feels better than yesterday\"  Son at bedside updated on patient care. All questions and concerns addressed  Continue to monitor for fevers    Objective:    BP (!) 103/57   Pulse 72   Temp 97.4 °F (36.3 °C) (Axillary)   Resp 18   Ht 5' (1.524 m)   Wt 139 lb 1.8 oz (63.1 kg)   SpO2 98%   BMI 27.17 kg/m²     In: 411 [Blood:411]  Out: 3944   In: 411   Out: 3944     General Appearance: awake and alert, conversant, slightly delayed responses  Head/face: NCAT  Eyes:  No gross abnormalities. Lungs:  Normal expansion. Clear to auscultation. No rales, rhonchi, or wheezing. Heart:  Heart sounds are normal.  Regular rate and rhythm without murmur, gallop or rub. Abdomen:  Soft, tender, normal bowel sounds. No bruits, PD cath  Extremities: Extremities warm to touch, pink, with no edema. Neurologic:  Awake, oriented x 2, no focal deficits      Recent Labs     09/28/22  0424 09/28/22  2106 09/29/22  0530   WBC 34.4* 30.8* 26.9*   HGB 6.7* 7.1* 7.1*   HCT 21.1* 21.1* 21.8*    282 367         Recent Labs     09/27/22  0423 09/28/22  0424 09/29/22  0530   * 130* 125*   K 3.1*  3.1* 4.3  4.3 3.9  3.9   CL 89* 91* 92*   CO2 23 22 18*   BUN 31* 30* 26*   CREATININE 3.9* 4.0* 3.9*   CALCIUM 9.5 9.1 7.9*         Assessment:    Principal Problem:    Change in mental status  Active Problems:    Chronic kidney disease    Moderate protein-calorie malnutrition (HCC)  Resolved Problems:    * No resolved hospital problems.  *        PLAN:  Altered mental status d/t Sepsis   -WBC 23 > 17.9 post ERCP > 24.2 > 29.0 > 34.4 > 26.9 improving today  -Infectious disease consulted  -IV Zosyn was to be completed 9/26/2022, but worsening leukocytosis > switch to meropenem 500 mg IV q 24 hrs per ID  -EEG > A potential for generalized photosensitive epilepsy, will defer to neurology > started on Keppra 250 mg twice daily, neurology signed off  -blood cultures x 2 are negative -PO Keppra switched to IV Keppra 250 mg BID due to pt not tolerating PO meds  -Peritoneal body fluid culture NGTD  -RRT 9/28 for worsening mentation and twitching. BGL noted to be 63, 250 cc D10 administered. BGL improved to 104. D5 @ 75 ordered for 8 hours with recheck in 2hrs > . Pt more responsive and alert at the end of RRT     LFTs/acute cholecystitis-fluctuating  -s/p lap milton with IOC 9/21/2022  -Multiple filling defects in the CBD noted during IOC, advanced GI consulted for ERCP/stone removal sphincterotomy and sphincteroplasty-procedure completed on 9/23-which she tolerated well  -Lipitor restarted   - > 117>70 > 39 > 47 > 37- continuing to improve  - > 121>88 > 50 > 43 > 29- continuing to improve  -Total Bilirubin 1.7 > 1.6>1.8 > 0.8 > 0.7, improved  -Continue to monitor labs, hepatic function test daily -improving  -CT ABD/Pelvis: Large complex mass extending into the subhepatic region on the right. Differential considerations include an abscess or infected biloma, surgery will need to be notified as they signed off 2 days ago. -General surgery does not feel this is a abscess, will await ID recs for possible need for draining fluid.       ESRD on PD  -Nephro following  -PD per nephro   -Per general surgery, okay to continue peritoneal dialysis-no need for temporary dialysis line  -Monitor labs  -K 3.4 > 3.1 > 4.3   -Hb 7.8 > 6.7 -- 1 unit PRBCs ordered, trend H/H-hold Eliquis until hemoglobin stabilizes  -Dialysate noted to be red in color, now pink in color this AM.     Severe protein calorie malnutrition  -Nutrition consult  -Start Vanilla Glucerna TID and jacinda BID   -Monitor oral intake during meals  -Advance diet as tolerated     Diabetes mellitus   -Continue to monitor blood glucose levels  -Better controlled with ISS increase to high dose   -May have to initiate p.o. hypoglycemic agents due to elevated glucose levels as they continue to be elevated at 335     Elevated troponin  -Without EKG changes or chest pain - likely related to renal failure - noted elevations 04/22 chronically. Trending down 183 > 164  -Follow labs  -Ok to restart Eliquis, Hx AFIB  -Cardio signed off     Constipation  -Dulcolax as needed  -Colace     Dry mouth, Concern for thrush  -Poss. due to long term antibiotic use  -Continue nystatin   -Oral Hygeine     Medication for other comorbidities continue as appropriate dose adjustment as necessary. DVT prophylaxis Eliquis  PT OT  Discharge plan KERRI Vásquez CNP  12:22 PM  9/29/2022    Above note edited to reflect my thoughts   Lengthy discussion with son again at bedside today  Case discussed with consulting physicians-Dr. Krupa Queen, Dr. Jorge A Stevenson, Dr. Selvin Lu  Await cultures from peritoneal fluid/  Not thought to be abscess in gallbladder fossa  Continue to monitor for sepsis/decompensation  White count improved on Merrem    I personally saw, examined and provided care for the patient. Radiographs, labs and medication list were reviewed by me independently. The case was discussed in detail and plans for care were established. Review of KELLEE Sood   , documentation was conducted and revisions were made as appropriate directly by me. I agree with the above documented exam, problem list, and plan of care.      Wayne Bradford MD  8:37 PM  9/29/2022

## 2022-09-29 NOTE — CODE DOCUMENTATION
Dr Steve Arnett NP April notified of RRT and patient condition.  Rrt Residents will be monitoring this evening

## 2022-09-29 NOTE — PROGRESS NOTES
Department of Internal Medicine  Infectious Diseases   Progress Note      C/C :  Leukocytosis , sepsis     Discussed with the pt's son in the room   Pt is more awake and alert   Denies fever, chills, abdomen pain     No acute distress   Afebrile       Current Facility-Administered Medications   Medication Dose Route Frequency Provider Last Rate Last Admin    levETIRAcetam (KEPPRA) 250 mg in sodium chloride 0.9 % 100 mL IVPB  250 mg IntraVENous Q12H Elizabet Ari, APRN -  mL/hr at 09/29/22 1306 250 mg at 09/29/22 1306    0.9 % sodium chloride infusion   IntraVENous PRN April Nabila, APRN - CNP        meropenem (MERREM) 500 mg IVPB (mini-bag)  500 mg IntraVENous Q24H Naseem Cutler MD   Stopped at 09/28/22 1925    nystatin (MYCOSTATIN) 366357 UNIT/ML suspension 500,000 Units  5 mL Oral 4x Daily Elizabet Ari, APRN - CNP   500,000 Units at 09/28/22 2021    polyethylene glycol (GLYCOLAX) packet 17 g  17 g Oral Daily PRN Donaldo Meehan MD        bisacodyl (DULCOLAX) EC tablet 5 mg  5 mg Oral Daily PRN Donaldo Meehan MD        amLODIPine (NORVASC) tablet 2.5 mg  2.5 mg Oral Daily Bernie Solomon MD   2.5 mg at 09/27/22 0830    metoprolol tartrate (LOPRESSOR) tablet 12.5 mg  12.5 mg Oral BID Bernie Solomon MD   12.5 mg at 09/27/22 2046    delflex lo-yared 2.5% 5,000 mL with heparin (porcine) 1,000 Units solution   IntraPERitoneal Once Donaldo Meehan MD        sodium chloride flush 0.9 % injection 5-40 mL  5-40 mL IntraVENous 2 times per day Nickie Valladares MD   10 mL at 09/28/22 2132    sodium chloride flush 0.9 % injection 5-40 mL  5-40 mL IntraVENous PRN Nickie Valladares MD        0.9 % sodium chloride infusion  25 mL IntraVENous PRN Nickie Valladares MD        oxyCODONE (ROXICODONE) immediate release tablet 5 mg  5 mg Oral Q4H PRN Irasema Mathews MD   5 mg at 09/24/22 1204    insulin lispro (HUMALOG) injection vial 0-16 Units  0-16 Units SubCUTAneous TID  Irasema Mathews MD   16 Units at 09/28/22 1110    insulin lispro (HUMALOG) injection vial 0-4 Units  0-4 Units SubCUTAneous Nightly Claire Fleischer, MD   4 Units at 09/22/22 2135    acetaminophen (TYLENOL) suppository 650 mg  650 mg Rectal Q4H PRN Claire Fleischer, MD   650 mg at 09/18/22 1927    [Held by provider] apixaban (ELIQUIS) tablet 2.5 mg  2.5 mg Oral BID Claire Fleischer, MD   2.5 mg at 09/27/22 2046    aspirin chewable tablet 81 mg  81 mg Oral Daily Claire Fleischer, MD   81 mg at 09/27/22 0829    atorvastatin (LIPITOR) tablet 20 mg  20 mg Oral Nightly Claire Fleischer, MD   20 mg at 09/28/22 2021    DULoxetine (CYMBALTA) extended release capsule 30 mg  30 mg Oral Daily Claire Fleischer, MD   30 mg at 09/27/22 5139    isosorbide mononitrate (IMDUR) extended release tablet 30 mg  30 mg Oral Daily Claire Fleischer, MD   30 mg at 09/27/22 0830    levothyroxine (SYNTHROID) tablet 50 mcg  50 mcg Oral Daily Claire Fleischer, MD   50 mcg at 09/27/22 0615    docusate sodium (COLACE) capsule 100 mg  100 mg Oral Nightly Claire Fleischer, MD   100 mg at 09/26/22 2049    pantoprazole (PROTONIX) tablet 40 mg  40 mg Oral QAM AC Claire Fleischer, MD   40 mg at 09/27/22 0615    glucose chewable tablet 16 g  4 tablet Oral PRN Claire Fleischer, MD        dextrose bolus 10% 125 mL  125 mL IntraVENous PRN Claire Fleischer, MD   Stopped at 09/28/22 2053    Or    dextrose bolus 10% 250 mL  250 mL IntraVENous PRN Claire Fleischer, MD        glucagon (rDNA) injection 1 mg  1 mg SubCUTAneous PRN Claire Fleischer, MD        dextrose 10 % infusion   IntraVENous Continuous PRN Claire Fleischer, MD        acetaminophen (TYLENOL) tablet 650 mg  650 mg Oral Q4H PRN Claire Fleischer, MD   650 mg at 09/25/22 0913    trimethobenzamide (TIGAN) injection 200 mg  200 mg IntraMUSCular Q6H PRN Claire Fleischer, MD   200 mg at 09/25/22 0937       REVIEW OF SYSTEMS:    CONSTITUTIONAL:Denies fever or chills   HEENT: denies blurring of vision or double vision, denies hearing problem  RESPIRATORY: denies cough, shortness of breath, sputum expectoration,  CARDIOVASCULAR:  Denies palpitation  GASTROINTESTINAL:  Denies abdomen pain  GENITOURINARY:  ESRD on PD   INTEGUMENT: denies wound , rash  HEMATOLOGIC/LYMPHATIC:  Denies lymph node swelling, gum bleeding or easy bruising. MUSCULOSKELETAL:  Denies leg pain , joint pain , joint swelling  NEUROLOGICAL:  weakness           PHYSICAL EXAM:      Vitals:     BP (!) 103/57   Pulse 72   Temp 97.4 °F (36.3 °C) (Axillary)   Resp 18   Ht 5' (1.524 m)   Wt 139 lb 1.8 oz (63.1 kg)   SpO2 98%   BMI 27.17 kg/m²       General Appearance:    More awake and alert , no distress    Head:    Normocephalic, atraumatic   Eyes:    No pallor, no icterus,   Ears:    No obvious deformity or drainage.    Nose:   No nasal drainage   Throat:   Mucosa moist, no oral thrush   Neck:   Supple, no lymphadenopathy   Lungs:     Clear to auscultation bilaterally   Heart:     Irregular, systolic murmur    Abdomen:     Soft, non-tender, bowel sounds present - loose stool    Extremities:   No edema, no cyanosis    Pulses:   Dorsalis pedis palpable    Skin:   no rashes      CBC with Differential:      Lab Results   Component Value Date/Time    WBC 26.9 09/29/2022 05:30 AM    RBC 2.39 09/29/2022 05:30 AM    HGB 8.8 09/29/2022 12:07 PM    HCT 26.4 09/29/2022 12:07 PM     09/29/2022 05:30 AM    MCV 91.2 09/29/2022 05:30 AM    MCH 29.7 09/29/2022 05:30 AM    MCHC 32.6 09/29/2022 05:30 AM    RDW 14.8 09/29/2022 05:30 AM    NRBC 1.7 09/27/2022 04:23 AM    BANDSPCT 2 12/19/2015 04:23 PM    METASPCT 3.5 09/29/2022 05:30 AM    LYMPHOPCT 9.6 09/29/2022 05:30 AM    PROMYELOPCT 1.7 09/27/2022 04:23 AM    MONOPCT 8.7 09/29/2022 05:30 AM    MYELOPCT 3.5 09/28/2022 09:06 PM    BASOPCT 0.4 09/29/2022 05:30 AM    MONOSABS 2.42 09/29/2022 05:30 AM    LYMPHSABS 2.69 09/29/2022 05:30 AM    EOSABS 0.00 09/29/2022 05:30 AM    BASOSABS 0.00 09/29/2022 05:30 AM       CMP Lab Results   Component Value Date/Time     09/29/2022 05:30 AM    K 3.9 09/29/2022 05:30 AM    K 3.9 09/29/2022 05:30 AM    CL 92 09/29/2022 05:30 AM    CO2 18 09/29/2022 05:30 AM    BUN 26 09/29/2022 05:30 AM    CREATININE 3.9 09/29/2022 05:30 AM    GFRAA 13 09/29/2022 05:30 AM    LABGLOM 11 09/29/2022 05:30 AM    GLUCOSE 278 09/29/2022 05:30 AM    PROT 5.0 09/29/2022 05:30 AM    LABALBU 1.1 09/29/2022 05:30 AM    CALCIUM 7.9 09/29/2022 05:30 AM    BILITOT 0.6 09/29/2022 05:30 AM    ALKPHOS 477 09/29/2022 05:30 AM    AST 37 09/29/2022 05:30 AM    ALT 29 09/29/2022 05:30 AM     Stool for  C diff toxin - negative        CT scan of abdomen and pelvis -  Impression:     Status post cholecystectomy. Large complex mass demonstrating a combination of soft tissue, fluid, fat,   and air attenuation in the gallbladder fossa and extending into the   subhepatic region on the right. Differential considerations include an   abscess or infected biloma. IMPRESSION:     S/P lap cholecystectomy  (for acute cholecystitis ) and ERCP   Leukocytosis   3. GB fossa ? Changes post operative       RECOMMENDATIONS:         1. Meropenem 500 mg IV q 24 hrs  2. Peritoneal fluid cx   3.  CBC with diff     Discussed with Dr Babs Jaramillo

## 2022-09-29 NOTE — PROGRESS NOTES
GENERAL SURGERY  DAILY PROGRESS NOTE  9/29/2022  CC: abdominal pain     Subjective:  Patient exam are reliable this morning. More awake and alert. No abdominal pain. Abdominal skin with no concerns of necrotizing infection. Objective:  BP (!) 103/57   Pulse 72   Temp 97.4 °F (36.3 °C) (Axillary)   Resp 18   Ht 5' (1.524 m)   Wt 139 lb 1.8 oz (63.1 kg)   SpO2 98%   BMI 27.17 kg/m²     General Appearance: Lethargic, answer some questions appropriately  Skin:  Skin color, texture, turgor poor  Head/face:  NCAT  Eyes:  No gross abnormalities. Sclera nonicteric  Lungs/Chest:  Normal expansion. No respiratory distress. On room air  Heart: Warm throughout. Regular rate   Abdomen:  Soft, no tenderness, mild distention with dialysate fluid running bruising around laparoscopic incisions. Extremities: Extremities warm to touch, pink    Assessment/Plan:  80 y.o. female currently admitted with altered mental status  s/p lap milton with IOC 09/21 and ERCP with sphincterotomy and stone removal 9/23. Now with worsening leukocytosis    General surgery team reconsulted for increasing white blood cell count. There was concern for intra-abdominal abscess, but multiple pieces of surgical snow were inserted into the gallbladder fossa during surgery. Abscess less likely.     Continue fever work-up  Appreciate infectious disease help    Electronically signed by Tracee Worley MD on 9/29/2022 at 1:15 PM

## 2022-09-29 NOTE — FLOWSHEET NOTE
09/29/22 1745   Vitals   /61   Temp 97.5 °F (36.4 °C)   Temp Source Axillary   Heart Rate 74   Resp 16   SpO2 97 %   Weight 140 lb 6.9 oz (63.7 kg)   Peritoneal Dialysis Catheter Right lower abdomen   No Placement Date or Time found.    Catheter Location: Right lower abdomen   Status Accessed   Site Condition Clean, dry, intact   Dressing Status New dressing applied   Dressing Gauze   Date of Last Dressing Change 09/29/22   Dialysis Type Continuous cycling   Exit Site Condition well healed, small amount of crust on line easily removed   Catheter Care Given Yes   Drainage Description Yellow   Cycler   Verification of Prescription CCPD   Total Volume Programmed 77066 mL   Therapy Time (Hours:Minutes) 9   Fill Volume 2000 mL   Last Fill Volume 1500 mL   Dextrose Setting Same (Nonextraneal)   Number of Cycles 6   Bag Volume 5000 mL   Number of Bags Used 3   Dianeal Solution   (2.5% in 5000ml)

## 2022-09-29 NOTE — CARE COORDINATION
9/29 Update CM note. Pt was RRT last evening for AMS, hypoglycemia, and twitching. Started on Keppra IV Q12H. Merrem IV Q24H continues per ID and peritoneal fluid collected for analysis (in process). WBC today, 26.9, trending down. Per [address], after reviewing CT AP from yesterday, documentation states multiple pieces of surgical snow were inserted into the gallbladder fossa during surgery so an abscess is less likely. Pt is accepted at [address] [place] [address] and [facility] is making arrangements with [place]. Pt's son will transport cycler and supplies to facility once medically stable for discharge. no pre-cert required. PAULA/Destination complete. 7000 started and will need completed when discharge order is placed. Ambulance form in soft chart and will need completed and signed on DAY of discharge.     SALLY VasquezN, RN  [facility] Case Management   Cell: [phone]

## 2022-09-30 LAB
HCT VFR BLD CALC: 25.2 % (ref 34–48)
HEMOGLOBIN: 8.5 G/DL (ref 11.5–15.5)
MCH RBC QN AUTO: 29.5 PG (ref 26–35)
MCHC RBC AUTO-ENTMCNC: 33.7 % (ref 32–34.5)
MCV RBC AUTO: 87.5 FL (ref 80–99.9)
METER GLUCOSE: 159 MG/DL (ref 74–99)
METER GLUCOSE: 185 MG/DL (ref 74–99)
METER GLUCOSE: 222 MG/DL (ref 74–99)
METER GLUCOSE: 231 MG/DL (ref 74–99)
PDW BLD-RTO: 14.8 FL (ref 11.5–15)
PLATELET # BLD: 438 E9/L (ref 130–450)
PMV BLD AUTO: 10.2 FL (ref 7–12)
RBC # BLD: 2.88 E12/L (ref 3.5–5.5)
WBC # BLD: 25.2 E9/L (ref 4.5–11.5)

## 2022-09-30 PROCEDURE — 6370000000 HC RX 637 (ALT 250 FOR IP): Performed by: INTERNAL MEDICINE

## 2022-09-30 PROCEDURE — 6370000000 HC RX 637 (ALT 250 FOR IP): Performed by: NURSE PRACTITIONER

## 2022-09-30 PROCEDURE — 85027 COMPLETE CBC AUTOMATED: CPT

## 2022-09-30 PROCEDURE — 82962 GLUCOSE BLOOD TEST: CPT

## 2022-09-30 PROCEDURE — 2580000003 HC RX 258: Performed by: NURSE PRACTITIONER

## 2022-09-30 PROCEDURE — 6360000002 HC RX W HCPCS: Performed by: NURSE PRACTITIONER

## 2022-09-30 PROCEDURE — 97129 THER IVNTJ 1ST 15 MIN: CPT

## 2022-09-30 PROCEDURE — 36415 COLL VENOUS BLD VENIPUNCTURE: CPT

## 2022-09-30 PROCEDURE — 6370000000 HC RX 637 (ALT 250 FOR IP): Performed by: STUDENT IN AN ORGANIZED HEALTH CARE EDUCATION/TRAINING PROGRAM

## 2022-09-30 PROCEDURE — 6360000002 HC RX W HCPCS: Performed by: INTERNAL MEDICINE

## 2022-09-30 PROCEDURE — 2580000003 HC RX 258: Performed by: STUDENT IN AN ORGANIZED HEALTH CARE EDUCATION/TRAINING PROGRAM

## 2022-09-30 PROCEDURE — 90945 DIALYSIS ONE EVALUATION: CPT

## 2022-09-30 PROCEDURE — 1200000000 HC SEMI PRIVATE

## 2022-09-30 PROCEDURE — 2580000003 HC RX 258: Performed by: INTERNAL MEDICINE

## 2022-09-30 RX ADMIN — METOPROLOL TARTRATE 12.5 MG: 25 TABLET, FILM COATED ORAL at 21:37

## 2022-09-30 RX ADMIN — DOCUSATE SODIUM 100 MG: 100 CAPSULE, LIQUID FILLED ORAL at 21:37

## 2022-09-30 RX ADMIN — LEVOTHYROXINE SODIUM 50 MCG: 0.05 TABLET ORAL at 06:19

## 2022-09-30 RX ADMIN — SODIUM CHLORIDE, PRESERVATIVE FREE 10 ML: 5 INJECTION INTRAVENOUS at 21:37

## 2022-09-30 RX ADMIN — ATORVASTATIN CALCIUM 20 MG: 20 TABLET, FILM COATED ORAL at 21:37

## 2022-09-30 RX ADMIN — SODIUM CHLORIDE, PRESERVATIVE FREE 10 ML: 5 INJECTION INTRAVENOUS at 10:10

## 2022-09-30 RX ADMIN — AMLODIPINE BESYLATE 2.5 MG: 5 TABLET ORAL at 10:10

## 2022-09-30 RX ADMIN — MEROPENEM 500 MG: 1 INJECTION, POWDER, FOR SOLUTION INTRAVENOUS at 17:06

## 2022-09-30 RX ADMIN — LEVETIRACETAM 250 MG: 500 INJECTION, SOLUTION INTRAVENOUS at 01:24

## 2022-09-30 RX ADMIN — METOPROLOL TARTRATE 12.5 MG: 25 TABLET, FILM COATED ORAL at 10:10

## 2022-09-30 RX ADMIN — PANTOPRAZOLE SODIUM 40 MG: 40 TABLET, DELAYED RELEASE ORAL at 06:19

## 2022-09-30 RX ADMIN — LEVETIRACETAM 250 MG: 500 INJECTION, SOLUTION INTRAVENOUS at 10:20

## 2022-09-30 RX ADMIN — NYSTATIN 500000 UNITS: 100000 SUSPENSION ORAL at 21:37

## 2022-09-30 RX ADMIN — NYSTATIN 500000 UNITS: 100000 SUSPENSION ORAL at 10:09

## 2022-09-30 RX ADMIN — ISOSORBIDE MONONITRATE 30 MG: 30 TABLET, EXTENDED RELEASE ORAL at 10:09

## 2022-09-30 RX ADMIN — DULOXETINE HYDROCHLORIDE 30 MG: 30 CAPSULE, DELAYED RELEASE ORAL at 10:09

## 2022-09-30 RX ADMIN — ASPIRIN 81 MG 81 MG: 81 TABLET ORAL at 10:09

## 2022-09-30 ASSESSMENT — PAIN SCALES - WONG BAKER
WONGBAKER_NUMERICALRESPONSE: 0

## 2022-09-30 ASSESSMENT — PAIN SCALES - GENERAL
PAINLEVEL_OUTOF10: 0
PAINLEVEL_OUTOF10: 0

## 2022-09-30 NOTE — PROGRESS NOTES
[facility] MD Annie Aguiar MD Ethelyn Lanius, MD  Progress Note    9/29/2022      SUBJECTIVE:   9/21: Not in her room   She is getting cholcystectomy    9/22: Status post cholecystectomy yesterday without complication. Feeling better, ongoing fatigue and malaise, generalized weakness. Appetite is improved and she is looking forward to her dinner. Denies nausea or vomiting. Pain controlled. ROS otherwise unremarkable    9/23: Off the floor, and endoscopy for MRCP. Discussed care with nurse. Vital signs stable. CCPD without complication, though drainage this morning was blood-tinged. No clots and no fibrin. BP somewhat lower than yesterday. 9/24: Constipated. No dyspnea. Ongoing fatigue, malaise, generalized weakness. Ongoing anorexia. CCPD last evening without complication, effluent less blood-tinged. 9/25: Ongoing constipation. She believes her last bowel movement was at least 5 days ago. Quite uncomfortable, though no pain per se. Ongoing fatigue and generalized weakness. No complications CCPD last evening. Effluent clear. 9/26: Somnolent though arousable. Did not sleep well last night. No complications with CCPD. PD effluent has cleared. No nausea or vomiting. Ongoing abdominal discomfort though no pain. No headache. No dyspnea at rest on room air.    9/27: Somnolent, somewhat confused. Not sleeping well at night. Poor appetite and intake. CCPD at night without event. Effluent has been clear    9/28: Seen this morning. Mental status is worsening. My obtunded, somnolent. Somewhat arousable though does not answer questions or follow commands. Appears in no apparent distress and is breathing comfortably. Peritoneal effluent was jodee to red-colored this morning.   No other complications of her CCPD last night    9/29 seen this afternoon   On RA lying flat   Abdomen soft   Bp stable       PROBLEM LIST:    Principal Problem:    Change in mental status  Active Problems:    Chronic kidney disease    Moderate protein-calorie malnutrition ([address]  Resolved Problems:    * No resolved hospital problems. *       DIET:    ADULT DIET; Regular; Low Potassium (Less than 3000 mg/day); Low Phosphorus (Less than 1000 mg)  ADULT ORAL NUTRITION SUPPLEMENT; AM Snack, PM Snack; Renal Oral Supplement       Allergies : Sulfa antibiotics    Past Medical History:   Diagnosis Date    Anemia     Arthritis     CAD (coronary artery disease)     Diabetes mellitus ([address]     Gout     History of bleeding peptic ulcer approx     Hypertension     Peritoneal dialysis catheter in place [facility])     Peritoneal dialysis status ([address]     at night for 8 hours    Thyroid disease     Use of cane as ambulatory aid     Wears glasses        Past Surgical History:   Procedure Laterality Date    BACK SURGERY      CARDIOVASCULAR STRESS TEST      CARPAL TUNNEL RELEASE Bilateral     CATARACT REMOVAL WITH IMPLANT Bilateral      SECTION      CHOLECYSTECTOMY, LAPAROSCOPIC N/A 2022    CHOLECYSTECTOMY LAPAROSCOPIC with intraoperative cholangiogram performed by Ruben Cintron MD at [address] [facility]  approx 2000    x 3; per Dr Franklin Jerry, COLON, DIAGNOSTIC      ERCP N/A 2022    ERCP ENDOSCOPIC RETROGRADE CHOLANGIOPANCREATOGRAPHY performed by Carmen Jones MD at [address] [place] [address]    ERCP N/A 2022    ERCP SPHINCTER/PAPILLOTOMY performed by Carmen Jones MD at [address] [place] [address]    ERCP N/A 2022    ERCP STONE REMOVAL performed by Carmen Jones MD at [address] [place] [address]    ERCP N/A 2022    ERCP DILATION BALLOON performed by Carmen Jones MD at [address] [facility] [address]      left knee, right shoulder    VT TOTAL KNEE ARTHROPLASTY Right 10/31/2018    RIGHT KNEE TOTAL ARTHROPLASTY +++BRIDGER++ PNB++ performed by August Nixon MD at [address] [facility]         History reviewed. No pertinent family history.      reports that she has never smoked. She has never used smokeless tobacco. She reports that she does not drink alcohol and does not use drugs. Review of Systems:   Constitutional: no fevers , no chills , feels ok   Eyes: no eye pain , no itching , no drainage  Ears, nose, mouth, throat, and face: no ear ,nose pain , hearing is ok ,no nasal drainage   Respiratory: no sob ,no cough ,no wheezing . Cardiovascular: no chest pain , no palpitation ,no sob . Gastrointestinal: no nausea, vomiting , constipation , no abdominal pain . Genitourinary:no urinary retention , no burning , dysuria . No polyuria   Hematologic/lymphatic: no bleeding , no cougulation issues . Musculoskeletal:no joint pain , no swelling . Neurological: no headaches ,no weakness , no numbness . Endocrine: no thirst , no weight issues .      MEDS (scheduled):    levETIRAcetam  250 mg IntraVENous Q12H    meropenem  500 mg IntraVENous Q24H    nystatin  5 mL Oral 4x Daily    amLODIPine  2.5 mg Oral Daily    metoprolol tartrate  12.5 mg Oral BID    custom dialysis builder   IntraPERitoneal Once    sodium chloride flush  5-40 mL IntraVENous 2 times per day    insulin lispro  0-16 Units SubCUTAneous TID WC    insulin lispro  0-4 Units SubCUTAneous Nightly    [Held by provider] apixaban  2.5 mg Oral BID    aspirin  81 mg Oral Daily    atorvastatin  20 mg Oral Nightly    DULoxetine  30 mg Oral Daily    isosorbide mononitrate  30 mg Oral Daily    levothyroxine  50 mcg Oral Daily    docusate sodium  100 mg Oral Nightly    pantoprazole  40 mg Oral QAM AC       MEDS (infusions):   sodium chloride      sodium chloride      dextrose         MEDS (prn):  sodium chloride, polyethylene glycol, bisacodyl, sodium chloride flush, sodium chloride, oxyCODONE, acetaminophen, glucose, dextrose bolus **OR** dextrose bolus, glucagon (rDNA), dextrose, acetaminophen, trimethobenzamide    PHYSICAL EXAM:   BP (!) 106/54   Pulse 77   Temp 97.7 °F (36.5 °C) (Temporal)   Resp 16   Ht 5' (1.524 m)   Wt 140 lb 6.9 oz (63.7 kg)   SpO2 95%   BMI 27.43 kg/m²        Wt Readings from Last 3 Encounters:   09/29/22 140 lb 6.9 oz (63.7 kg)   07/27/22 143 lb 3.2 oz (65 kg)   04/29/22 158 lb 8.2 oz (71.9 kg)       Constitutional:  in no acute distress  Oral: mucus membranes moist  Neck: no JVD  Cardiovascular: S1, S2 regular rhythm, no murmur,or rub  Respiratory: Poor air entry bilateral no crackles, no wheeze  Gastrointestinal:  Soft, nontender, nondistended, NABS. No mass hepatosplenomegaly. CAPD catheter left lower quadrant  Ext: No edema, feet warm  Skin: dry, no rash  Neuro: Somnolent, arousable,  To questions or follow commands. Moves all 4 extremities. DATA:    Reviewed        Assessment    1. End-stage renal disease, on peritoneal dialysis. 2.  Encephalopathy, etiology unclear, metabolic versus ongoing infection. .  Markedly improved  3. Leukocytosis ? Ascedning cholangitis. Improved now status post cholecystectomy  4. Anemia due to ESRD  5. Hypomagnesemia. 6.  Mineral bone disease. 7.  Hyponatremia likely due to water overload, mild though, as well as hyperglycemia  8. Hypokalemia, due to poor intake, losses with PD. Supplemented, the repeat at 1 PM was hemolyzed. Next repeat 3.8 mmol/L     Recommendations    PD fluid sample reviewed and really does not support a septic picture . Her abdomen is soft . (she dose have> 50 neurtrophills in PD fluid but still this marginal and does not support a bad case of PD asscoiated peritonitis )     Will d/w ID and general surgery whether they d like to transition to HD though above PD fluids sample as mentioned above does not explain her sepsis     Fu final PD fluid cultures   Continue ABX per ID . Wbc tagged scan ? ?       Electronically signed by Ernestine Rand MD on 9/29/2022

## 2022-09-30 NOTE — CARE COORDINATION
9/30 Update CM note. Keppra IV Q12H and Merrem IV Q24H continue. Na yesterday was 125, labs pending this AM. GSGY stating leukocytosis may be reactive secondary to hemoperitoneum, which occurred following restart of Eliquis. They are stating to hold until and resume at discharge. PD fluid gram stain did not reveal organisms. Awaiting further recs from consulting physicians. Pt is accepted at [address] [place] [address] and [facility] is making arrangements with [place]. Pt's son will transport cycler and supplies to facility once medically stable for discharge. no pre-cert required. PAULA/Destination complete. 7000 started and will need completed when discharge order is placed. Ambulance form in soft chart and will need completed and signed on DAY of discharge. 1610  Per Darla Paget at [facility], pt can discharge over the weekend.     SALLY ValenciaN, RN  PHYSICIANS [facility] SURGICAL [facility] Case Management   Cell: [phone]

## 2022-09-30 NOTE — FLOWSHEET NOTE
09/30/22 1853   Vitals   /66   Temp 98 °F (36.7 °C)   Heart Rate 90   Resp 18   SpO2 97 %   Weight 139 lb 15.9 oz (63.5 kg)   Peritoneal Dialysis Catheter Right lower abdomen   No Placement Date or Time found. Catheter Location: Right lower abdomen   Status Accessed   Site Condition Clean, dry, intact   Dressing Status New dressing applied;Clean, dry & intact   Dressing Gauze   Date of Last Dressing Change 09/30/22   Dialysis Type Continuous cycling   Drainage Description Sanguinous; Yellow   Cycler   Verification of Prescription CCPD   Total Volume Programmed 83012 mL   Therapy Time (Hours:Minutes) 9   Fill Volume 2000 mL   Last Fill Volume 46978 mL   Dextrose Setting Same (Nonextraneal)   Number of Cycles 6   Bag Volume 5000 mL   Number of Bags Used 3   CCPD tx initiated without problem using aseptic technique. Dressing changed. Effluent yellow/blood tinged. This has been baseline for effluent. Pt reports no complaints. Report given to floor RN.  Pt stable

## 2022-09-30 NOTE — PROGRESS NOTES
Subjective:    Patient resting in bed  Continue to monitor for fevers    Objective:    BP (!) 106/57   Pulse 72   Temp 98.3 °F (36.8 °C) (Oral)   Resp 16   Ht 5' (1.524 m)   Wt 140 lb 6.9 oz (63.7 kg)   SpO2 96%   BMI 27.43 kg/m²     In: 0   Out: 2461   In: 0   Out: 2461     General Appearance: awake and alert, conversant, slightly delayed responses  Head/face: NCAT  Eyes:  No gross abnormalities. Lungs:  Normal expansion. Clear to auscultation. No rales, rhonchi, or wheezing. Heart:  Heart sounds are normal.  Regular rate and rhythm without murmur, gallop or rub. Abdomen:  Soft, tender, normal bowel sounds. No bruits, PD cath  Extremities: Extremities warm to touch, pink, with no edema. Neurologic:  Awake, oriented x 2, no focal deficits      Recent Labs     09/28/22  2106 09/29/22  0530 09/29/22  1207 09/30/22  1057   WBC 30.8* 26.9*  --  25.2*   HGB 7.1* 7.1* 8.8* 8.5*   HCT 21.1* 21.8* 26.4* 25.2*    367  --  438         Recent Labs     09/28/22  0424 09/29/22  0530   * 125*   K 4.3  4.3 3.9  3.9   CL 91* 92*   CO2 22 18*   BUN 30* 26*   CREATININE 4.0* 3.9*   CALCIUM 9.1 7.9*         Assessment:    Principal Problem:    Change in mental status  Active Problems:    Chronic kidney disease    Moderate protein-calorie malnutrition (HCC)  Resolved Problems:    * No resolved hospital problems. *        PLAN:  Altered mental status d/t Sepsis   -WBC 23 > 17.9 post ERCP > 24.2 > 29.0 > 34.4 > 25.2 improving today  -Infectious disease consulted  -IV Zosyn was to be completed 9/26/2022, but worsening leukocytosis > switch to meropenem 500 mg IV q 24 hrs per ID  -EEG > A potential for generalized photosensitive epilepsy, will defer to neurology > started on Keppra 250 mg twice daily, neurology signed off  -blood cultures x 2 are negative   -PO Keppra switched to IV Keppra 250 mg BID   -Peritoneal body fluid culture NGTD  -RRT 9/28 for worsening mentation and twitching.  BGL noted to be 63, 250 cc D10 administered. BGL improved to 104. D5 @ 75 ordered for 8 hours with recheck in 2hrs > . Pt more responsive and alert at the end of RRT     LFTs/acute cholecystitis-fluctuating  -s/p lap milton with IOC 9/21/2022  -Multiple filling defects in the CBD noted during IOC, advanced GI consulted for ERCP/stone removal sphincterotomy and sphincteroplasty-procedure completed on 9/23-which she tolerated well  -Lipitor restarted   -Continue to monitor labs, hepatic function test daily -improving  -CT ABD/Pelvis: Large complex mass extending into the subhepatic region on the right. Differential considerations include an abscess or infected biloma,  -General surgery does not feel this is a abscess and feels the worsening leukocytosis is secondary to hemoperitoneum, following restarting eliquis. Await final ABX plans from ID.   -peritoneal fluid gram stain without organisms      ESRD on PD  -Nephro following  -PD per nephro   -Per general surgery, okay to continue peritoneal dialysis-no need for temporary dialysis line  -Monitor labs  -K 3.4 > 3.1 > 4.3   -Hb 7.8 > 6.7 -- 1 unit PRBCs ordered, trend H/H-hold Eliquis until hemoglobin stabilizes  -Dialysate noted to be red in color, now pink in color this AM.     Severe protein calorie malnutrition  -Nutrition consult  -Start Vanilla Glucerna TID and jacinda BID   -Monitor oral intake during meals  -Advance diet as tolerated     Diabetes mellitus   -Continue to monitor blood glucose levels  -Better controlled with ISS increase to high dose   -May have to initiate p.o. hypoglycemic agents due to elevated glucose levels as they continue to be elevated at 335     Elevated troponin  -Without EKG changes or chest pain - likely related to renal failure - noted elevations 04/22 chronically. Trending down 183 > 164  -Follow labs  -Cardio signed off     Constipation  -Dulcolax as needed  -Colace     Dry mouth, Concern for thrush  -Poss.  due to long term antibiotic use  -Continue nystatin   -Oral Hygeine     Medication for other comorbidities continue as appropriate dose adjustment as necessary. DVT prophylaxis Eliquis  PT OT  Discharge plan GAIL    KERRI Mccurdy CNP  12:52 PM  9/30/2022    Above note edited to reflect my thoughts     I personally saw, examined and provided care for the patient. Radiographs, labs and medication list were reviewed by me independently. The case was discussed in detail and plans for care were established. Review of KELLEE Mccurdy   , documentation was conducted and revisions were made as appropriate directly by me. I agree with the above documented exam, problem list, and plan of care.      Marcelino Cooley MD  5:28 PM  9/30/2022

## 2022-09-30 NOTE — PROGRESS NOTES
Patient on peritoneal dialysis. Machine beeping and reading \"supply bag lines are blocked. Please check the supply lines\". Call placed to  to locate PD nurse. PD nurse \"Margo\" answered via telephone and stated she would walk this nurse through with troubleshooting. Instructions followed and unable to clear message from screen.

## 2022-09-30 NOTE — FLOWSHEET NOTE
09/30/22 1319   Vitals   /60   Temp 98 °F (36.7 °C)   Temp Source Oral   Heart Rate 78   Resp 18   SpO2 97 %   Peritoneal Dialysis Catheter Right lower abdomen   No Placement Date or Time found. Catheter Location: Right lower abdomen   Status Clamped   Site Condition Clean, dry, intact   Dressing Status Clean, dry & intact   Dressing Gauze   Date of Last Dressing Change 09/29/22   Dialysis Type Continuous cycling   Drainage Description Sanguinous; Yellow   Cycler   Verification of Prescription CCPD   Ultrafiltration (UF) (mL) 521 mL   CCPD tx completed without problem using aseptic technique. Tx extended this morning d/t machine not initially connected correctly at the start of setup. Machine alarming throughout the night. System corrected this morning and remainder of tx ran well. Effluent yellow/blood tinted. Pt and patient family reports coloring better today than previous days. Catheter clamped. Pin intact. Stay safe cap applied. Dressing D&I. Report given to floor RN.  Pt stable

## 2022-09-30 NOTE — PROGRESS NOTES
GENERAL SURGERY  DAILY PROGRESS NOTE  9/30/2022    Subjective:  Peritoneal dialysis running with pale jodee colored fluid. No abdominal pain. No other new events    Objective:  BP (!) 106/54   Pulse 77   Temp 97.7 °F (36.5 °C) (Temporal)   Resp 16   Ht 5' (1.524 m)   Wt 140 lb 6.9 oz (63.7 kg)   SpO2 95%   BMI 27.43 kg/m²     General Appearance: Lethargic, answers some questions appropriately  Skin:  Skin color, texture, turgor poor  Head/face:  NCAT  Eyes:  No gross abnormalities. Sclera nonicteric  Lungs/Chest:  Normal expansion. No respiratory distress. On room air  Heart: Warm throughout. Regular rate   Abdomen:  Soft, no tenderness, mild distention with dialysate fluid running, bruising around laparoscopic incisions. Extremities: Extremities warm to touch, pink    Assessment/Plan:  80 y.o. female currently admitted with altered mental status. s/p lap milton with IOC 09/21 and ERCP with sphincterotomy and stone removal 9/23. Now with worsening leukocytosis    Multiple pieces of surgical snow were inserted into the gallbladder fossa during surgery. Abscess less likely. Appreciate infectious disease input.  Blood cultures from 9/28 NGTD    Peritoneal dialysis fluid gram stain without organisms, will order cell count today    It seems likely that the patient's leukocytosis may be reactive secondary to hemoperitoneum, which occurred following restart of Eliquis    Recommend continued hold of Eliquis, may resume at discharge from surgical standpoint    Electronically signed by Brianda Gardner DO on 9/30/2022 at 6:09 AM     Seen/examined  GS from PD fluid unremarkable  Her abdominal exam is completely reassuring  Supportive care, check cultures  Would not order IR drain- the CT finding is appropriate for the hemostatic agents used  Tali

## 2022-09-30 NOTE — PROGRESS NOTES
[facility] MD Felipe Phillip MD Louise Mussel, MD  Progress Note    9/30/2022      SUBJECTIVE:   9/21: Not in her room   She is getting cholcystectomy    9/22: Status post cholecystectomy yesterday without complication. Feeling better, ongoing fatigue and malaise, generalized weakness. Appetite is improved and she is looking forward to her dinner. Denies nausea or vomiting. Pain controlled. ROS otherwise unremarkable    9/23: Off the floor, and endoscopy for MRCP. Discussed care with nurse. Vital signs stable. CCPD without complication, though drainage this morning was blood-tinged. No clots and no fibrin. BP somewhat lower than yesterday. 9/24: Constipated. No dyspnea. Ongoing fatigue, malaise, generalized weakness. Ongoing anorexia. CCPD last evening without complication, effluent less blood-tinged. 9/25: Ongoing constipation. She believes her last bowel movement was at least 5 days ago. Quite uncomfortable, though no pain per se. Ongoing fatigue and generalized weakness. No complications CCPD last evening. Effluent clear. 9/26: Somnolent though arousable. Did not sleep well last night. No complications with CCPD. PD effluent has cleared. No nausea or vomiting. Ongoing abdominal discomfort though no pain. No headache. No dyspnea at rest on room air.    9/27: Somnolent, somewhat confused. Not sleeping well at night. Poor appetite and intake. CCPD at night without event. Effluent has been clear    9/28: Seen this morning. Mental status is worsening. My obtunded, somnolent. Somewhat arousable though does not answer questions or follow commands. Appears in no apparent distress and is breathing comfortably. Peritoneal effluent was jodee to red-colored this morning.   No other complications of her CCPD last night    9/29 seen this afternoon   On RA lying flat   Abdomen soft   Bp stable     9/30 clinically a lot better this am , she is alert awake oriented   Stable vitals . PROBLEM LIST:    Principal Problem:    Change in mental status  Active Problems:    Chronic kidney disease    Moderate protein-calorie malnutrition ([address]  Resolved Problems:    * No resolved hospital problems. *       DIET:    ADULT DIET; Regular; Low Potassium (Less than 3000 mg/day); Low Phosphorus (Less than 1000 mg)  ADULT ORAL NUTRITION SUPPLEMENT; AM Snack, PM Snack;  Renal Oral Supplement       Allergies : Sulfa antibiotics    Past Medical History:   Diagnosis Date    Anemia     Arthritis     CAD (coronary artery disease)     Diabetes mellitus ([address]     Gout     History of bleeding peptic ulcer approx     Hypertension     Peritoneal dialysis catheter in place [facility])     Peritoneal dialysis status ([address]     at night for 8 hours    Thyroid disease     Use of cane as ambulatory aid     Wears glasses        Past Surgical History:   Procedure Laterality Date    BACK SURGERY      CARDIOVASCULAR STRESS TEST      CARPAL TUNNEL RELEASE Bilateral     CATARACT REMOVAL WITH IMPLANT Bilateral      SECTION      CHOLECYSTECTOMY, LAPAROSCOPIC N/A 2022    CHOLECYSTECTOMY LAPAROSCOPIC with intraoperative cholangiogram performed by Mony Lara MD at [facility]  approx 2000    x 3; per Dr Laura Montoya, COLON, DIAGNOSTIC      ERCP N/A 2022    ERCP ENDOSCOPIC RETROGRADE CHOLANGIOPANCREATOGRAPHY performed by Lois Alexis MD at [facility]    ERCP N/A 2022    ERCP SPHINCTER/PAPILLOTOMY performed by Lois Alexis MD at [facility]    ERCP N/A 2022    ERCP STONE REMOVAL performed by Lois Alexis MD at [facility]    ERCP N/A 2022    ERCP DILATION BALLOON performed by Lois Alexis MD at [address] [facility] [address]      left knee, right shoulder    DC TOTAL KNEE ARTHROPLASTY Right 10/31/2018    RIGHT KNEE TOTAL ARTHROPLASTY +++BRIDGER++ PNB++ performed by Senait Mullins MD at [address] [place] [address] GASTROINTESTINAL ENDOSCOPY         History reviewed. No pertinent family history. reports that she has never smoked. She has never used smokeless tobacco. She reports that she does not drink alcohol and does not use drugs. Review of Systems:   Constitutional: no fevers , no chills , feels ok   Eyes: no eye pain , no itching , no drainage  Ears, nose, mouth, throat, and face: no ear ,nose pain , hearing is ok ,no nasal drainage   Respiratory: no sob ,no cough ,no wheezing . Cardiovascular: no chest pain , no palpitation ,no sob . Gastrointestinal: no nausea, vomiting , constipation , no abdominal pain . Genitourinary:no urinary retention , no burning , dysuria . No polyuria   Hematologic/lymphatic: no bleeding , no cougulation issues . Musculoskeletal:no joint pain , no swelling . Neurological: no headaches ,no weakness , no numbness . Endocrine: no thirst , no weight issues .      MEDS (scheduled):    levETIRAcetam  250 mg IntraVENous Q12H    meropenem  500 mg IntraVENous Q24H    nystatin  5 mL Oral 4x Daily    amLODIPine  2.5 mg Oral Daily    metoprolol tartrate  12.5 mg Oral BID    custom dialysis builder   IntraPERitoneal Once    sodium chloride flush  5-40 mL IntraVENous 2 times per day    insulin lispro  0-16 Units SubCUTAneous TID WC    insulin lispro  0-4 Units SubCUTAneous Nightly    [Held by provider] apixaban  2.5 mg Oral BID    aspirin  81 mg Oral Daily    atorvastatin  20 mg Oral Nightly    DULoxetine  30 mg Oral Daily    isosorbide mononitrate  30 mg Oral Daily    levothyroxine  50 mcg Oral Daily    docusate sodium  100 mg Oral Nightly    pantoprazole  40 mg Oral QAM AC       MEDS (infusions):   sodium chloride      sodium chloride      dextrose         MEDS (prn):  sodium chloride, polyethylene glycol, bisacodyl, sodium chloride flush, sodium chloride, oxyCODONE, acetaminophen, glucose, dextrose bolus **OR** dextrose bolus, glucagon (rDNA), dextrose, acetaminophen, trimethobenzamide    PHYSICAL EXAM:   /60   Pulse 78   Temp 98 °F (36.7 °C) (Oral)   Resp 18   Ht 5' (1.524 m)   Wt 140 lb 6.9 oz (63.7 kg)   SpO2 97%   BMI 27.43 kg/m²        Wt Readings from Last 3 Encounters:   09/30/22 140 lb 6.9 oz (63.7 kg)   07/27/22 143 lb 3.2 oz (65 kg)   04/29/22 158 lb 8.2 oz (71.9 kg)       Constitutional:  in no acute distress  Oral: mucus membranes moist  Neck: no JVD  Cardiovascular: S1, S2 regular rhythm, no murmur,or rub  Respiratory: Poor air entry bilateral no crackles, no wheeze  Gastrointestinal:  Soft, nontender, nondistended, NABS. No mass hepatosplenomegaly. CAPD catheter left lower quadrant  Ext: No edema, feet warm  Skin: dry, no rash  Neuro: Somnolent, arousable,  To questions or follow commands. Moves all 4 extremities. DATA:    Reviewed        Assessment    1. End-stage renal disease, on peritoneal dialysis. 2.  Encephalopathy, etiology unclear, metabolic versus ongoing infection. .  Markedly improved  3. Leukocytosis ? Ascedning cholangitis. Improved now status post cholecystectomy  4. Anemia due to ESRD  5. Hypomagnesemia. 6.  Mineral bone disease. 7.  Hyponatremia likely due to water overload, mild though, as well as hyperglycemia  8. Hypokalemia, due to poor intake, losses with PD. Supplemented, the repeat at 1 PM was hemolyzed. Next repeat 3.8 mmol/L     Recommendations    PD fluid sample reviewed and really does not support a septic picture . Her abdomen is soft . (she dose have> 50 neurtrophills in PD fluid but still this marginal and does not support a bad case of PD asscoiated peritonitis )     Will d/w ID and general surgery whether they d like to transition to HD though above PD fluids sample as mentioned above does not explain her sepsis     Fu final PD fluid cultures   Continue ABX per ID .    Dw Dr Thorpe End today       Electronically signed by Charlie Martinez MD on 9/30/2022

## 2022-09-30 NOTE — PROGRESS NOTES
Department of Internal Medicine  Infectious Diseases   Progress Note      C/C :  Leukocytosis , sepsis     Discussed with the pt's son in the room   Pt is more awake and alert   Denies fever, chills, abdomen pain     No acute distress   Afebrile       Current Facility-Administered Medications   Medication Dose Route Frequency Provider Last Rate Last Admin    levETIRAcetam (KEPPRA) 250 mg in sodium chloride 0.9 % 100 mL IVPB  250 mg IntraVENous Q12H KERRI Garcia CNP   Stopped at 09/30/22 1035    0.9 % sodium chloride infusion   IntraVENous PRN Andree Dahl APRN - CNP        meropenem (MERREM) 500 mg IVPB (mini-bag)  500 mg IntraVENous Q24H Raf Santana MD   Stopped at 09/29/22 1624    nystatin (MYCOSTATIN) 650762 UNIT/ML suspension 500,000 Units  5 mL Oral 4x Daily KERRI Garcia CNP   500,000 Units at 09/30/22 1009    polyethylene glycol (GLYCOLAX) packet 17 g  17 g Oral Daily PRN Tamia Bruno MD        bisacodyl (DULCOLAX) EC tablet 5 mg  5 mg Oral Daily PRN Tamia Bruno MD        amLODIPine (NORVASC) tablet 2.5 mg  2.5 mg Oral Daily Kim Arellano MD   2.5 mg at 09/30/22 1010    metoprolol tartrate (LOPRESSOR) tablet 12.5 mg  12.5 mg Oral BID Kim Arellano MD   12.5 mg at 09/30/22 1010    delflex lo-yared 2.5% 5,000 mL with heparin (porcine) 1,000 Units solution   IntraPERitoneal Once Tamia Bruno MD        sodium chloride flush 0.9 % injection 5-40 mL  5-40 mL IntraVENous 2 times per day Celio Rayo MD   10 mL at 09/30/22 1010    sodium chloride flush 0.9 % injection 5-40 mL  5-40 mL IntraVENous PRN Celio Rayo MD        0.9 % sodium chloride infusion  25 mL IntraVENous PRN Celio Rayo MD        oxyCODONE (ROXICODONE) immediate release tablet 5 mg  5 mg Oral Q4H PRN Roselia Elise MD   5 mg at 09/24/22 1204    insulin lispro (HUMALOG) injection vial 0-16 Units  0-16 Units SubCUTAneous TID  Roselia Elise MD   16 Units at 09/28/22 1110    insulin lispro (HUMALOG) injection vial 0-4 Units  0-4 Units SubCUTAneous Nightly Irena Mccoy MD   4 Units at 09/22/22 2135    acetaminophen (TYLENOL) suppository 650 mg  650 mg Rectal Q4H PRN Irena Mccoy MD   650 mg at 09/18/22 1927    [Held by provider] apixaban (ELIQUIS) tablet 2.5 mg  2.5 mg Oral BID Irena Mccoy MD   2.5 mg at 09/27/22 2046    aspirin chewable tablet 81 mg  81 mg Oral Daily Irena Mccoy MD   81 mg at 09/30/22 1009    atorvastatin (LIPITOR) tablet 20 mg  20 mg Oral Nightly Irena Mccoy MD   20 mg at 09/29/22 2307    DULoxetine (CYMBALTA) extended release capsule 30 mg  30 mg Oral Daily Irena Mccoy MD   30 mg at 09/30/22 1009    isosorbide mononitrate (IMDUR) extended release tablet 30 mg  30 mg Oral Daily Irena Mccoy MD   30 mg at 09/30/22 1009    levothyroxine (SYNTHROID) tablet 50 mcg  50 mcg Oral Daily Irena Mccoy MD   50 mcg at 09/30/22 7697    docusate sodium (COLACE) capsule 100 mg  100 mg Oral Nightly Irena Mccoy MD   100 mg at 09/29/22 2307    pantoprazole (PROTONIX) tablet 40 mg  40 mg Oral QAM AC Irena Mccoy MD   40 mg at 09/30/22 0619    glucose chewable tablet 16 g  4 tablet Oral PRN Irena Mccoy MD        dextrose bolus 10% 125 mL  125 mL IntraVENous PRN Irena Mccoy MD   Stopped at 09/28/22 2053    Or    dextrose bolus 10% 250 mL  250 mL IntraVENous PRN Irena Mccoy MD        glucagon (rDNA) injection 1 mg  1 mg SubCUTAneous PRN Irena Mccoy MD        dextrose 10 % infusion   IntraVENous Continuous PRN Irena Mccoy MD        acetaminophen (TYLENOL) tablet 650 mg  650 mg Oral Q4H PRN Irena Mccoy MD   650 mg at 09/25/22 0913    trimethobenzamide (TIGAN) injection 200 mg  200 mg IntraMUSCular Q6H PRN Irena Mccoy MD   200 mg at 09/25/22 0939       REVIEW OF SYSTEMS:    CONSTITUTIONAL:Denies fever or chills   HEENT: denies blurring of vision or double vision, denies hearing problem  RESPIRATORY: denies cough, shortness of breath, sputum expectoration,  CARDIOVASCULAR:  Denies palpitation  GASTROINTESTINAL:  Denies abdomen pain  GENITOURINARY:  ESRD on PD   INTEGUMENT: denies wound , rash  HEMATOLOGIC/LYMPHATIC:  Denies lymph node swelling, gum bleeding or easy bruising. MUSCULOSKELETAL:  Denies leg pain , joint pain , joint swelling  NEUROLOGICAL:  weakness           PHYSICAL EXAM:      Vitals:     /60   Pulse 78   Temp 98 °F (36.7 °C) (Oral)   Resp 18   Ht 5' (1.524 m)   Wt 140 lb 6.9 oz (63.7 kg)   SpO2 97%   BMI 27.43 kg/m²       General Appearance:    More awake and alert , no distress    Head:    Normocephalic, atraumatic   Eyes:    No pallor, no icterus,   Ears:    No obvious deformity or drainage.    Nose:   No nasal drainage   Throat:   Mucosa moist, no oral thrush   Neck:   Supple, no lymphadenopathy   Lungs:     Clear to auscultation bilaterally   Heart:     Irregular, systolic murmur    Abdomen:     Soft, non-tender, bowel sounds present - loose stool    Extremities:   No edema, no cyanosis    Pulses:   Dorsalis pedis palpable    Skin:   no rashes      CBC with Differential:      Lab Results   Component Value Date/Time    WBC 25.2 09/30/2022 10:57 AM    RBC 2.88 09/30/2022 10:57 AM    HGB 8.5 09/30/2022 10:57 AM    HCT 25.2 09/30/2022 10:57 AM     09/30/2022 10:57 AM    MCV 87.5 09/30/2022 10:57 AM    MCH 29.5 09/30/2022 10:57 AM    MCHC 33.7 09/30/2022 10:57 AM    RDW 14.8 09/30/2022 10:57 AM    NRBC 1.7 09/27/2022 04:23 AM    BANDSPCT 2 12/19/2015 04:23 PM    METASPCT 3.5 09/29/2022 05:30 AM    LYMPHOPCT 9.6 09/29/2022 05:30 AM    PROMYELOPCT 1.7 09/27/2022 04:23 AM    MONOPCT 8.7 09/29/2022 05:30 AM    MYELOPCT 3.5 09/28/2022 09:06 PM    BASOPCT 0.4 09/29/2022 05:30 AM    MONOSABS 2.42 09/29/2022 05:30 AM    LYMPHSABS 2.69 09/29/2022 05:30 AM    EOSABS 0.00 09/29/2022 05:30 AM    BASOSABS 0.00 09/29/2022 05:30 AM       CMP     Lab Results   Component Value Date/Time     09/29/2022 05:30 AM    K 3.9 09/29/2022 05:30 AM    K 3.9 09/29/2022 05:30 AM    CL 92 09/29/2022 05:30 AM    CO2 18 09/29/2022 05:30 AM    BUN 26 09/29/2022 05:30 AM    CREATININE 3.9 09/29/2022 05:30 AM    GFRAA 13 09/29/2022 05:30 AM    LABGLOM 11 09/29/2022 05:30 AM    GLUCOSE 278 09/29/2022 05:30 AM    PROT 5.0 09/29/2022 05:30 AM    LABALBU 1.1 09/29/2022 05:30 AM    CALCIUM 7.9 09/29/2022 05:30 AM    BILITOT 0.6 09/29/2022 05:30 AM    ALKPHOS 477 09/29/2022 05:30 AM    AST 37 09/29/2022 05:30 AM    ALT 29 09/29/2022 05:30 AM     Stool for  C diff toxin - negative    Peritoneal fluid cx negative       CT scan of abdomen and pelvis -  Impression:     Status post cholecystectomy. Large complex mass demonstrating a combination of soft tissue, fluid, fat,   and air attenuation in the gallbladder fossa and extending into the   subhepatic region on the right. Differential considerations include an   abscess or infected biloma. IMPRESSION:     S/P lap cholecystectomy  (for acute cholecystitis ) and ERCP   Leukocytosis - 25 K, mental status improved   3. GB fossa ? Changes post operative       RECOMMENDATIONS:         1. Meropenem 500 mg IV q 24 hrs  2.  CBC with diff

## 2022-10-01 LAB
ALBUMIN SERPL-MCNC: 1.5 G/DL (ref 3.5–5.2)
ALP BLD-CCNC: 422 U/L (ref 35–104)
ALT SERPL-CCNC: 20 U/L (ref 0–32)
ANION GAP SERPL CALCULATED.3IONS-SCNC: 15 MMOL/L (ref 7–16)
AST SERPL-CCNC: 21 U/L (ref 0–31)
BILIRUB SERPL-MCNC: 0.4 MG/DL (ref 0–1.2)
BILIRUBIN DIRECT: 0.2 MG/DL (ref 0–0.3)
BILIRUBIN, INDIRECT: 0.2 MG/DL (ref 0–1)
BUN BLDV-MCNC: 23 MG/DL (ref 6–23)
CALCIUM SERPL-MCNC: 8.6 MG/DL (ref 8.6–10.2)
CHLORIDE BLD-SCNC: 90 MMOL/L (ref 98–107)
CO2: 23 MMOL/L (ref 22–29)
CREAT SERPL-MCNC: 4 MG/DL (ref 0.5–1)
GFR AFRICAN AMERICAN: 13
GFR NON-AFRICAN AMERICAN: 11 ML/MIN/1.73
GLUCOSE BLD-MCNC: 278 MG/DL (ref 74–99)
HCT VFR BLD CALC: 27.3 % (ref 34–48)
HEMOGLOBIN: 8.7 G/DL (ref 11.5–15.5)
MCH RBC QN AUTO: 30.4 PG (ref 26–35)
MCHC RBC AUTO-ENTMCNC: 31.9 % (ref 32–34.5)
MCV RBC AUTO: 95.5 FL (ref 80–99.9)
METER GLUCOSE: 100 MG/DL (ref 74–99)
METER GLUCOSE: 194 MG/DL (ref 74–99)
METER GLUCOSE: 209 MG/DL (ref 74–99)
METER GLUCOSE: 257 MG/DL (ref 74–99)
PDW BLD-RTO: 15 FL (ref 11.5–15)
PLATELET # BLD: 427 E9/L (ref 130–450)
PMV BLD AUTO: 9.8 FL (ref 7–12)
POTASSIUM SERPL-SCNC: 2.7 MMOL/L (ref 3.5–5)
POTASSIUM SERPL-SCNC: 3 MMOL/L (ref 3.5–5)
RBC # BLD: 2.86 E12/L (ref 3.5–5.5)
SODIUM BLD-SCNC: 128 MMOL/L (ref 132–146)
TOTAL PROTEIN: 5.4 G/DL (ref 6.4–8.3)
WBC # BLD: 16.9 E9/L (ref 4.5–11.5)

## 2022-10-01 PROCEDURE — 2580000003 HC RX 258: Performed by: INTERNAL MEDICINE

## 2022-10-01 PROCEDURE — 85027 COMPLETE CBC AUTOMATED: CPT

## 2022-10-01 PROCEDURE — 6370000000 HC RX 637 (ALT 250 FOR IP): Performed by: INTERNAL MEDICINE

## 2022-10-01 PROCEDURE — 82962 GLUCOSE BLOOD TEST: CPT

## 2022-10-01 PROCEDURE — 6370000000 HC RX 637 (ALT 250 FOR IP): Performed by: NURSE PRACTITIONER

## 2022-10-01 PROCEDURE — 6370000000 HC RX 637 (ALT 250 FOR IP): Performed by: STUDENT IN AN ORGANIZED HEALTH CARE EDUCATION/TRAINING PROGRAM

## 2022-10-01 PROCEDURE — 6360000002 HC RX W HCPCS: Performed by: INTERNAL MEDICINE

## 2022-10-01 PROCEDURE — 6370000000 HC RX 637 (ALT 250 FOR IP)

## 2022-10-01 PROCEDURE — 80076 HEPATIC FUNCTION PANEL: CPT

## 2022-10-01 PROCEDURE — 6360000002 HC RX W HCPCS: Performed by: NURSE PRACTITIONER

## 2022-10-01 PROCEDURE — 36415 COLL VENOUS BLD VENIPUNCTURE: CPT

## 2022-10-01 PROCEDURE — 2580000003 HC RX 258: Performed by: NURSE PRACTITIONER

## 2022-10-01 PROCEDURE — 80048 BASIC METABOLIC PNL TOTAL CA: CPT

## 2022-10-01 PROCEDURE — 1200000000 HC SEMI PRIVATE

## 2022-10-01 PROCEDURE — 84132 ASSAY OF SERUM POTASSIUM: CPT

## 2022-10-01 PROCEDURE — 90945 DIALYSIS ONE EVALUATION: CPT

## 2022-10-01 PROCEDURE — 2580000003 HC RX 258: Performed by: STUDENT IN AN ORGANIZED HEALTH CARE EDUCATION/TRAINING PROGRAM

## 2022-10-01 RX ORDER — POTASSIUM CHLORIDE 20 MEQ/1
40 TABLET, EXTENDED RELEASE ORAL ONCE
Status: COMPLETED | OUTPATIENT
Start: 2022-10-01 | End: 2022-10-01

## 2022-10-01 RX ORDER — POLYETHYLENE GLYCOL 3350 17 G/17G
17 POWDER, FOR SOLUTION ORAL DAILY
Status: DISCONTINUED | OUTPATIENT
Start: 2022-10-01 | End: 2022-10-03 | Stop reason: HOSPADM

## 2022-10-01 RX ADMIN — INSULIN LISPRO 8 UNITS: 100 INJECTION, SOLUTION INTRAVENOUS; SUBCUTANEOUS at 09:55

## 2022-10-01 RX ADMIN — MEROPENEM 500 MG: 1 INJECTION, POWDER, FOR SOLUTION INTRAVENOUS at 18:43

## 2022-10-01 RX ADMIN — SODIUM CHLORIDE, PRESERVATIVE FREE 10 ML: 5 INJECTION INTRAVENOUS at 09:08

## 2022-10-01 RX ADMIN — ATORVASTATIN CALCIUM 20 MG: 20 TABLET, FILM COATED ORAL at 23:39

## 2022-10-01 RX ADMIN — SODIUM CHLORIDE, PRESERVATIVE FREE 10 ML: 5 INJECTION INTRAVENOUS at 23:40

## 2022-10-01 RX ADMIN — POTASSIUM CHLORIDE 40 MEQ: 1500 TABLET, EXTENDED RELEASE ORAL at 06:21

## 2022-10-01 RX ADMIN — DULOXETINE HYDROCHLORIDE 30 MG: 30 CAPSULE, DELAYED RELEASE ORAL at 09:06

## 2022-10-01 RX ADMIN — LEVOTHYROXINE SODIUM 50 MCG: 0.05 TABLET ORAL at 06:21

## 2022-10-01 RX ADMIN — PANTOPRAZOLE SODIUM 40 MG: 40 TABLET, DELAYED RELEASE ORAL at 06:21

## 2022-10-01 RX ADMIN — ISOSORBIDE MONONITRATE 30 MG: 30 TABLET, EXTENDED RELEASE ORAL at 09:07

## 2022-10-01 RX ADMIN — NYSTATIN 500000 UNITS: 100000 SUSPENSION ORAL at 23:38

## 2022-10-01 RX ADMIN — ASPIRIN 81 MG 81 MG: 81 TABLET ORAL at 09:06

## 2022-10-01 RX ADMIN — POLYETHYLENE GLYCOL 3350 17 G: 17 POWDER, FOR SOLUTION ORAL at 09:09

## 2022-10-01 RX ADMIN — LEVETIRACETAM 250 MG: 500 INJECTION, SOLUTION INTRAVENOUS at 23:44

## 2022-10-01 RX ADMIN — NYSTATIN 500000 UNITS: 100000 SUSPENSION ORAL at 09:08

## 2022-10-01 RX ADMIN — BISACODYL 5 MG: 5 TABLET, DELAYED RELEASE ORAL at 09:17

## 2022-10-01 RX ADMIN — LEVETIRACETAM 250 MG: 500 INJECTION, SOLUTION INTRAVENOUS at 06:26

## 2022-10-01 RX ADMIN — METOPROLOL TARTRATE 12.5 MG: 25 TABLET, FILM COATED ORAL at 23:39

## 2022-10-01 RX ADMIN — LEVETIRACETAM 250 MG: 500 INJECTION, SOLUTION INTRAVENOUS at 11:23

## 2022-10-01 ASSESSMENT — PAIN SCALES - WONG BAKER: WONGBAKER_NUMERICALRESPONSE: 0

## 2022-10-01 ASSESSMENT — PAIN SCALES - GENERAL: PAINLEVEL_OUTOF10: 0

## 2022-10-01 NOTE — PROGRESS NOTES
[facility] MD Alexis Valle MD Cesario Sams, MD  Progress Note    10/1/2022      SUBJECTIVE:   9/21: Not in her room   She is getting cholcystectomy    9/22: Status post cholecystectomy yesterday without complication. Feeling better, ongoing fatigue and malaise, generalized weakness. Appetite is improved and she is looking forward to her dinner. Denies nausea or vomiting. Pain controlled. ROS otherwise unremarkable    9/23: Off the floor, and endoscopy for MRCP. Discussed care with nurse. Vital signs stable. CCPD without complication, though drainage this morning was blood-tinged. No clots and no fibrin. BP somewhat lower than yesterday. 9/24: Constipated. No dyspnea. Ongoing fatigue, malaise, generalized weakness. Ongoing anorexia. CCPD last evening without complication, effluent less blood-tinged. 9/25: Ongoing constipation. She believes her last bowel movement was at least 5 days ago. Quite uncomfortable, though no pain per se. Ongoing fatigue and generalized weakness. No complications CCPD last evening. Effluent clear. 9/26: Somnolent though arousable. Did not sleep well last night. No complications with CCPD. PD effluent has cleared. No nausea or vomiting. Ongoing abdominal discomfort though no pain. No headache. No dyspnea at rest on room air.    9/27: Somnolent, somewhat confused. Not sleeping well at night. Poor appetite and intake. CCPD at night without event. Effluent has been clear    9/28: Seen this morning. Mental status is worsening. My obtunded, somnolent. Somewhat arousable though does not answer questions or follow commands. Appears in no apparent distress and is breathing comfortably. Peritoneal effluent was jodee to red-colored this morning.   No other complications of her CCPD last night    9/29 seen this afternoon   On RA lying flat   Abdomen soft   Bp stable     9/30 clinically a lot better this am , she is alert awake oriented   Stable vitals . 10/1 : appear comfortable    PROBLEM LIST:    Principal Problem:    Change in mental status  Active Problems:    Chronic kidney disease    Moderate protein-calorie malnutrition ([address]  Resolved Problems:    * No resolved hospital problems. *       DIET:    ADULT DIET; Regular; Low Potassium (Less than 3000 mg/day); Low Phosphorus (Less than 1000 mg)  ADULT ORAL NUTRITION SUPPLEMENT; AM Snack, PM Snack;  Renal Oral Supplement       Allergies : Sulfa antibiotics    Past Medical History:   Diagnosis Date    Anemia     Arthritis     CAD (coronary artery disease)     Diabetes mellitus ([address]     Gout     History of bleeding peptic ulcer approx     Hypertension     Peritoneal dialysis catheter in place [facility])     Peritoneal dialysis status ([address]     at night for 8 hours    Thyroid disease     Use of cane as ambulatory aid     Wears glasses        Past Surgical History:   Procedure Laterality Date    BACK SURGERY      CARDIOVASCULAR STRESS TEST      CARPAL TUNNEL RELEASE Bilateral     CATARACT REMOVAL WITH IMPLANT Bilateral      SECTION      CHOLECYSTECTOMY, LAPAROSCOPIC N/A 2022    CHOLECYSTECTOMY LAPAROSCOPIC with intraoperative cholangiogram performed by Ruben Cintron MD at [facility]  approx 2000    x 3; per Dr Franklin Jerry, COLON, DIAGNOSTIC      ERCP N/A 2022    ERCP ENDOSCOPIC RETROGRADE CHOLANGIOPANCREATOGRAPHY performed by Carmen Jones MD at [address] [facility]    ERCP N/A 2022    ERCP SPHINCTER/PAPILLOTOMY performed by Carmen Jones MD at [address] [place] [address]    ERCP N/A 2022    ERCP STONE REMOVAL performed by Carmen Jones MD at [address] [facility]    ERCP N/A 2022    ERCP DILATION BALLOON performed by Carmen Jones MD at [address] [facility] [address]      left knee, right shoulder    OH TOTAL KNEE ARTHROPLASTY Right 10/31/2018    RIGHT KNEE TOTAL ARTHROPLASTY +++BRIDGER++ PNB++ performed by August Nixon MD at [facility]    UPPER GASTROINTESTINAL ENDOSCOPY         History reviewed. No pertinent family history. reports that she has never smoked. She has never used smokeless tobacco. She reports that she does not drink alcohol and does not use drugs. Review of Systems:   Constitutional: no fevers , no chills , feels ok   Eyes: no eye pain , no itching , no drainage  Ears, nose, mouth, throat, and face: no ear ,nose pain , hearing is ok ,no nasal drainage   Respiratory: no sob ,no cough ,no wheezing . Cardiovascular: no chest pain , no palpitation ,no sob . Gastrointestinal: no nausea, vomiting , constipation , no abdominal pain . Genitourinary:no urinary retention , no burning , dysuria . No polyuria   Hematologic/lymphatic: no bleeding , no cougulation issues . Musculoskeletal:no joint pain , no swelling . Neurological: no headaches ,no weakness , no numbness . Endocrine: no thirst , no weight issues .      MEDS (scheduled):    bisacodyl  5 mg Oral Daily    polyethylene glycol  17 g Oral Daily    levETIRAcetam  250 mg IntraVENous Q12H    meropenem  500 mg IntraVENous Q24H    nystatin  5 mL Oral 4x Daily    amLODIPine  2.5 mg Oral Daily    metoprolol tartrate  12.5 mg Oral BID    custom dialysis builder   IntraPERitoneal Once    sodium chloride flush  5-40 mL IntraVENous 2 times per day    insulin lispro  0-16 Units SubCUTAneous TID WC    insulin lispro  0-4 Units SubCUTAneous Nightly    [Held by provider] apixaban  2.5 mg Oral BID    aspirin  81 mg Oral Daily    atorvastatin  20 mg Oral Nightly    DULoxetine  30 mg Oral Daily    isosorbide mononitrate  30 mg Oral Daily    levothyroxine  50 mcg Oral Daily    docusate sodium  100 mg Oral Nightly    pantoprazole  40 mg Oral QAM AC       MEDS (infusions):   sodium chloride      sodium chloride      dextrose         MEDS (prn):  sodium chloride, sodium chloride flush, sodium chloride, oxyCODONE, acetaminophen, glucose, dextrose bolus **OR** dextrose bolus, glucagon (rDNA), dextrose, acetaminophen, trimethobenzamide    PHYSICAL EXAM:   BP (!) 98/55   Pulse 70   Temp 98 °F (36.7 °C)   Resp 16   Ht 5' (1.524 m)   Wt 139 lb (63 kg)   SpO2 100%   BMI 27.15 kg/m²        Wt Readings from Last 3 Encounters:   10/01/22 139 lb (63 kg)   07/27/22 143 lb 3.2 oz (65 kg)   04/29/22 158 lb 8.2 oz (71.9 kg)       Constitutional:  in no acute distress  Oral: mucus membranes moist  Neck: no JVD  Cardiovascular: S1, S2 regular rhythm, no murmur,or rub  Respiratory: Poor air entry bilateral no crackles, no wheeze  Gastrointestinal:  Soft, nontender, nondistended, NABS. No mass hepatosplenomegaly. CAPD catheter left lower quadrant  Ext: No edema, feet warm  Skin: dry, no rash  Neuro: Somnolent, arousable,  To questions or follow commands. Moves all 4 extremities. DATA:    Reviewed        Assessment    1. End-stage renal disease, on peritoneal dialysis. 2.  Encephalopathy, etiology unclear, metabolic versus ongoing infection. .  Markedly improved  3. Leukocytosis ? Ascedning cholangitis. Improved now status post cholecystectomy  4. Anemia due to ESRD  5. Hypomagnesemia. 6.  Mineral bone disease. 7.  Hyponatremia likely due to water overload, mild though, as well as hyperglycemia  8. Hypokalemia, due to poor intake, losses with PD. Supplemented, the repeat at 1 PM was hemolyzed. Next repeat 3.8 mmol/L     Recommendations    PD fluid sample reviewed and really does not support a septic picture . Her abdomen is soft . (she dose have> 50 neurtrophills in PD fluid but still this marginal and does not support a bad case of PD asscoiated peritonitis )     Will d/w ID and general surgery whether they d like to transition to HD though above PD fluids sample as mentioned above does not explain her sepsis       Continue CCPD   Fu final PD fluid cultures   Continue ABX per ID .           Electronically signed by Shilpi Haro MD on 10/1/2022

## 2022-10-01 NOTE — PROGRESS NOTES
Informed, by Lab, of critical potassium of 2.69. April, NP, notified via telephone. Awaiting orders. Potasium chloride extended release tablet 40 mEq ordered x 1 and administered without incident.

## 2022-10-01 NOTE — FLOWSHEET NOTE
10/01/22 0720   Vitals   BP (!) 116/45   Temp 97.8 °F (36.6 °C)   Temp Source Skin   Heart Rate 63   Resp 18   Weight 139 lb (63 kg)   Peritoneal Dialysis Catheter Right lower abdomen   No Placement Date or Time found.    Catheter Location: Right lower abdomen   Status Clamped   Site Condition Clean, dry, intact   Dressing Status Clean, dry & intact   Dressing Gauze   Date of Last Dressing Change 09/30/22   Dialysis Type Continuous cycling on hold   Cycler   Verification of Prescription CCPD   Total Volume Programmed 1150 mL   Therapy Time (Hours:Minutes) 9   Fill Volume 2000 mL   Last Fill Volume 1500 mL   Dextrose Setting Same (Nonextraneal)   Number of Cycles 6   Bag Volume 5000 mL   Number of Bags Used 3   Ultrafiltration (UF) (mL) 1115 mL   Average Dwell Time (Hours:Minutes) 0:53   1115Ml UF, blood in fluid, patient awake and alert, no complaints of pain

## 2022-10-01 NOTE — PROGRESS NOTES
Subjective:    Patient resting in bed  Overall feels well, no acute complaints    Objective:    BP (!) 116/45   Pulse 63   Temp 97.8 °F (36.6 °C) (Skin)   Resp 18   Ht 5' (1.524 m)   Wt 139 lb (63 kg)   SpO2 100%   BMI 27.15 kg/m²     In: 0   Out: 1636   In: 0   Out: 1636     General Appearance: awake and alert, conversant, slightly delayed responses  Head/face: NCAT  Eyes:  No gross abnormalities. Lungs:  Normal expansion. Clear to auscultation. No rales, rhonchi, or wheezing. Heart:  Heart sounds are normal.  Regular rate and rhythm without murmur, gallop or rub. Abdomen:  Soft, tender, normal bowel sounds. No bruits, PD cath  Extremities: Extremities warm to touch, pink, with no edema. Neurologic:  Awake, oriented x 2, no focal deficits      Recent Labs     09/29/22  0530 09/29/22  1207 09/30/22  1057 10/01/22  0430   WBC 26.9*  --  25.2* 16.9*   HGB 7.1* 8.8* 8.5* 8.7*   HCT 21.8* 26.4* 25.2* 27.3*     --  438 427         Recent Labs     09/29/22  0530 10/01/22  0430 10/01/22  0708   * 128*  --    K 3.9  3.9 2.7* 3.0*   CL 92* 90*  --    CO2 18* 23  --    BUN 26* 23  --    CREATININE 3.9* 4.0*  --    CALCIUM 7.9* 8.6  --          Assessment:    Principal Problem:    Change in mental status  Active Problems:    Chronic kidney disease    Moderate protein-calorie malnutrition (HCC)  Resolved Problems:    * No resolved hospital problems.  *        PLAN:  Altered mental status d/t Sepsis   -WBC 23 > 17.9 post ERCP > 24.2 > 29.0 > 34.4 > 25.2 improving today  -Infectious disease consulted  -IV Zosyn was to be completed 9/26/2022, but worsening leukocytosis > switch to meropenem 500 mg IV q 24 hrs per ID  -EEG > A potential for generalized photosensitive epilepsy, will defer to neurology > started on Keppra 250 mg twice daily, neurology signed off  -blood cultures x 2 are negative   -PO Keppra switched to IV Keppra 250 mg BID   -Peritoneal body fluid culture NGTD  -RRT 9/28 for worsening mentation and twitching. BGL noted to be 63, 250 cc D10 administered. BGL improved to 104. D5 @ 75 ordered for 8 hours with recheck in 2hrs > . Pt more responsive and alert at the end of RRT     LFTs/acute cholecystitis-fluctuating  -s/p lap milton with IOC 9/21/2022  -Multiple filling defects in the CBD noted during IOC, advanced GI consulted for ERCP/stone removal sphincterotomy and sphincteroplasty-procedure completed on 9/23-which she tolerated well  -Lipitor restarted   -Continue to monitor labs, hepatic function test daily -improving  -CT ABD/Pelvis: Large complex mass extending into the subhepatic region on the right. Differential considerations include an abscess or infected biloma,  -General surgery does not feel this is a abscess and feels the worsening leukocytosis is secondary to hemoperitoneum, following restarting eliquis. Await final ABX plans from ID.   -peritoneal fluid gram stain without organisms     ESRD on PD/hypokalemia  -Nephro following  -PD per nephro   -Per general surgery, okay to continue peritoneal dialysis-no need for temporary dialysis line  -Monitor labs  -K 3.4 > 3.1 > 4.3   -Hb 7.8 > 6.7 -- 1 unit PRBCs ordered, trend H/H-hold Eliquis until hemoglobin stabilizes  -Dialysate noted to be red in color, now pink in color this AM.     Severe protein calorie malnutrition  -Nutrition consult  -Start Vanilla Glucerna TID and jacinda BID   -Monitor oral intake during meals  -Advance diet as tolerated     Diabetes mellitus   -Continue to monitor blood glucose levels  -Better controlled with ISS increase to high dose   -May have to initiate p.o. hypoglycemic agents due to elevated glucose levels as they continue to be elevated at 335       Medication for other comorbidities continue as appropriate dose adjustment as necessary.     DVT prophylaxis Eliquis (held)  PT OT  Discharge plan GAIL    Electronically signed by KERRI Garibay - CNP on 10/1/2022 at 1:20 PM    Above note edited to reflect my thoughts     I personally saw, examined and provided care for the patient. Radiographs, labs and medication list were reviewed by me independently. The case was discussed in detail and plans for care were established. Review of KELLEE Marshall   , documentation was conducted and revisions were made as appropriate directly by me. I agree with the above documented exam, problem list, and plan of care.      Thomas Colvin MD  5:22 PM  10/1/2022

## 2022-10-01 NOTE — PROGRESS NOTES
GENERAL SURGERY  DAILY PROGRESS NOTE  10/1/2022    Subjective:  No issues overnight. No abdominal pain. No n/v however did not eat dinner. No bowel movements. Objective:  BP (!) 116/45   Pulse 63   Temp 97.8 °F (36.6 °C) (Skin)   Resp 18   Ht 5' (1.524 m)   Wt 139 lb (63 kg)   SpO2 100%   BMI 27.15 kg/m²     General Appearance: Lethargic, answers some questions appropriately  Skin:  Skin color, texture, turgor poor  Head/face:  NCAT  Eyes:  No gross abnormalities. Sclera nonicteric  Lungs/Chest:  Normal expansion. No respiratory distress. On room air  Heart: Warm throughout. Regular rate   Abdomen:  Soft, no tenderness, mild distention with dialysate fluid running, bruising around laparoscopic incisions. Extremities: Extremities warm to touch, pink    Assessment/Plan:  80 y.o. female currently admitted with altered mental status. s/p lap milton with IOC 09/21 and ERCP with sphincterotomy and stone removal 9/23. Now with worsening leukocytosis. PD fluid negative for infection. No growth seen on culture. - leukocytosis likely 2/2 to hemoperitoneum after restarting Eliquis   - Recommend continued hold of Eliquis, may resume at discharge from surgical standpoint    Plan to be discussed with attending.      Electronically signed by Vannesa Robledo MD on 10/1/2022 at 7:24 AM

## 2022-10-01 NOTE — PROGRESS NOTES
Department of Internal Medicine  Infectious Diseases   Progress Note      C/C :  Leukocytosis , sepsis     Discussed with the pt's son in the room   Pt is more awake and alert   Denies fever, chills, abdomen pain     No acute distress   Afebrile       Current Facility-Administered Medications   Medication Dose Route Frequency Provider Last Rate Last Admin    bisacodyl (DULCOLAX) EC tablet 5 mg  5 mg Oral Daily Aylin Byers MD   5 mg at 10/01/22 0917    polyethylene glycol (GLYCOLAX) packet 17 g  17 g Oral Daily Aylin Byers MD   17 g at 10/01/22 0909    levETIRAcetam (KEPPRA) 250 mg in sodium chloride 0.9 % 100 mL IVPB  250 mg IntraVENous Q12H KERRI Salter - CNP   Stopped at 10/01/22 1240    0.9 % sodium chloride infusion   IntraVENous PRN April STEVE DahlN - CNP        meropenem (MERREM) 500 mg IVPB (mini-bag)  500 mg IntraVENous Q24H Ole Groves MD   Stopped at 09/30/22 2006    nystatin (MYCOSTATIN) 883702 UNIT/ML suspension 500,000 Units  5 mL Oral 4x Daily Ana Harry APRN - CNP   500,000 Units at 10/01/22 0908    amLODIPine (NORVASC) tablet 2.5 mg  2.5 mg Oral Daily Inga Brito MD   2.5 mg at 09/30/22 1010    metoprolol tartrate (LOPRESSOR) tablet 12.5 mg  12.5 mg Oral BID Inga Brito MD   12.5 mg at 09/30/22 2137    delflex lo-yared 2.5% 5,000 mL with heparin (porcine) 1,000 Units solution   IntraPERitoneal Once Elsie Rowell MD        sodium chloride flush 0.9 % injection 5-40 mL  5-40 mL IntraVENous 2 times per day Julio César Orozco MD   10 mL at 10/01/22 0908    sodium chloride flush 0.9 % injection 5-40 mL  5-40 mL IntraVENous PRN Julio César Orozco MD        0.9 % sodium chloride infusion  25 mL IntraVENous PRN Julio César Orozco MD        oxyCODONE (ROXICODONE) immediate release tablet 5 mg  5 mg Oral Q4H PRN Hubert Loving MD   5 mg at 09/24/22 1204    insulin lispro (HUMALOG) injection vial 0-16 Units  0-16 Units SubCUTAneous TID  Hubert Loving MD   8 Units at 10/01/22 6145 insulin lispro (HUMALOG) injection vial 0-4 Units  0-4 Units SubCUTAneous Nightly Dez Bowman MD   4 Units at 09/22/22 2135    acetaminophen (TYLENOL) suppository 650 mg  650 mg Rectal Q4H PRN Dez Bowman MD   650 mg at 09/18/22 1927    [Held by provider] apixaban (ELIQUIS) tablet 2.5 mg  2.5 mg Oral BID Dez Bowman MD   2.5 mg at 09/27/22 2046    aspirin chewable tablet 81 mg  81 mg Oral Daily Dez Bowman MD   81 mg at 10/01/22 0906    atorvastatin (LIPITOR) tablet 20 mg  20 mg Oral Nightly Dez Bowman MD   20 mg at 09/30/22 2137    DULoxetine (CYMBALTA) extended release capsule 30 mg  30 mg Oral Daily Dez Bowman MD   30 mg at 10/01/22 0906    isosorbide mononitrate (IMDUR) extended release tablet 30 mg  30 mg Oral Daily Dez Bowman MD   30 mg at 10/01/22 6406    levothyroxine (SYNTHROID) tablet 50 mcg  50 mcg Oral Daily Dez Bowman MD   50 mcg at 10/01/22 7512    docusate sodium (COLACE) capsule 100 mg  100 mg Oral Nightly Dez Bowman MD   100 mg at 09/30/22 2137    pantoprazole (PROTONIX) tablet 40 mg  40 mg Oral QAM AC Dez Bowman MD   40 mg at 10/01/22 5812    glucose chewable tablet 16 g  4 tablet Oral PRN Dez Bowman MD        dextrose bolus 10% 125 mL  125 mL IntraVENous PRN Dez Bowman MD   Stopped at 09/28/22 2053    Or    dextrose bolus 10% 250 mL  250 mL IntraVENous PRN Dez Bowman MD        glucagon (rDNA) injection 1 mg  1 mg SubCUTAneous PRN Dez Bowman MD        dextrose 10 % infusion   IntraVENous Continuous PRN Dez Bowman MD        acetaminophen (TYLENOL) tablet 650 mg  650 mg Oral Q4H PRN Dez Bowman MD   650 mg at 09/25/22 0913    trimethobenzamide (TIGAN) injection 200 mg  200 mg IntraMUSCular Q6H PRN Dez Bowman MD   200 mg at 09/25/22 0939       REVIEW OF SYSTEMS:    CONSTITUTIONAL:Denies fever or chills   HEENT: denies blurring of vision or double vision, denies hearing problem  RESPIRATORY: denies cough, shortness of breath, sputum expectoration,  CARDIOVASCULAR:  Denies palpitation  GASTROINTESTINAL:  Denies abdomen pain  GENITOURINARY:  ESRD on PD   INTEGUMENT: denies wound , rash  HEMATOLOGIC/LYMPHATIC:  Denies lymph node swelling, gum bleeding or easy bruising. MUSCULOSKELETAL:  Denies leg pain , joint pain , joint swelling  NEUROLOGICAL:  weakness           PHYSICAL EXAM:      Vitals:     BP (!) 116/45   Pulse 63   Temp 97.8 °F (36.6 °C) (Skin)   Resp 18   Ht 5' (1.524 m)   Wt 139 lb (63 kg)   SpO2 100%   BMI 27.15 kg/m²       General Appearance:    Awake, alert, no distress     Head:    Normocephalic, atraumatic   Eyes:    No pallor, no icterus,   Ears:    No obvious deformity or drainage.    Nose:   No nasal drainage   Throat:   Mucosa moist, no oral thrush   Neck:   Supple, no lymphadenopathy   Lungs:     Clear to auscultation bilaterally   Heart:     Irregular, systolic murmur    Abdomen:     Soft, non-tender, bowel sounds present   Extremities:   No edema, no cyanosis    Pulses:   Dorsalis pedis palpable    Skin:   no rashes      CBC with Differential:      Lab Results   Component Value Date/Time    WBC 16.9 10/01/2022 04:30 AM    RBC 2.86 10/01/2022 04:30 AM    HGB 8.7 10/01/2022 04:30 AM    HCT 27.3 10/01/2022 04:30 AM     10/01/2022 04:30 AM    MCV 95.5 10/01/2022 04:30 AM    MCH 30.4 10/01/2022 04:30 AM    MCHC 31.9 10/01/2022 04:30 AM    RDW 15.0 10/01/2022 04:30 AM    NRBC 1.7 09/27/2022 04:23 AM    BANDSPCT 2 12/19/2015 04:23 PM    METASPCT 3.5 09/29/2022 05:30 AM    LYMPHOPCT 9.6 09/29/2022 05:30 AM    PROMYELOPCT 1.7 09/27/2022 04:23 AM    MONOPCT 8.7 09/29/2022 05:30 AM    MYELOPCT 3.5 09/28/2022 09:06 PM    BASOPCT 0.4 09/29/2022 05:30 AM    MONOSABS 2.42 09/29/2022 05:30 AM    LYMPHSABS 2.69 09/29/2022 05:30 AM    EOSABS 0.00 09/29/2022 05:30 AM    BASOSABS 0.00 09/29/2022 05:30 AM       CMP     Lab Results   Component Value Date/Time     10/01/2022 04:30 AM    K 3.0 10/01/2022 07:08 AM    K 3.9 09/29/2022 05:30 AM    CL 90 10/01/2022 04:30 AM    CO2 23 10/01/2022 04:30 AM    BUN 23 10/01/2022 04:30 AM    CREATININE 4.0 10/01/2022 04:30 AM    GFRAA 13 10/01/2022 04:30 AM    LABGLOM 11 10/01/2022 04:30 AM    GLUCOSE 278 10/01/2022 04:30 AM    PROT 5.4 10/01/2022 04:30 AM    LABALBU 1.5 10/01/2022 04:30 AM    CALCIUM 8.6 10/01/2022 04:30 AM    BILITOT 0.4 10/01/2022 04:30 AM    ALKPHOS 428 10/01/2022 04:30 AM    AST 21 10/01/2022 04:30 AM    ALT 20 10/01/2022 04:30 AM     Stool for  C diff toxin - negative    Peritoneal fluid cx negative       CT scan of abdomen and pelvis -  Impression:     Status post cholecystectomy. Large complex mass demonstrating a combination of soft tissue, fluid, fat,   and air attenuation in the gallbladder fossa and extending into the   subhepatic region on the right. Differential considerations include an   abscess or infected biloma. IMPRESSION:     S/P lap cholecystectomy  (for acute cholecystitis ) and ERCP   Leukocytosis -  16 K, mental status improved   3. GB fossa  ( CT scan ) post operative change       RECOMMENDATIONS:         1. Meropenem 500 mg IV q 24 hrs  2.  CBC with diff

## 2022-10-02 LAB
ALBUMIN SERPL-MCNC: 1.2 G/DL (ref 3.5–5.2)
ALP BLD-CCNC: 423 U/L (ref 35–104)
ALT SERPL-CCNC: 15 U/L (ref 0–32)
ANION GAP SERPL CALCULATED.3IONS-SCNC: 13 MMOL/L (ref 7–16)
AST SERPL-CCNC: 21 U/L (ref 0–31)
BILIRUB SERPL-MCNC: 0.4 MG/DL (ref 0–1.2)
BILIRUBIN DIRECT: 0.2 MG/DL (ref 0–0.3)
BILIRUBIN, INDIRECT: 0.2 MG/DL (ref 0–1)
BODY FLUID CULTURE, STERILE: NORMAL
BODY FLUID CULTURE, STERILE: NORMAL
BUN BLDV-MCNC: 19 MG/DL (ref 6–23)
CALCIUM SERPL-MCNC: 8.2 MG/DL (ref 8.6–10.2)
CHLORIDE BLD-SCNC: 93 MMOL/L (ref 98–107)
CO2: 22 MMOL/L (ref 22–29)
CREAT SERPL-MCNC: 4.1 MG/DL (ref 0.5–1)
GFR AFRICAN AMERICAN: 13
GFR NON-AFRICAN AMERICAN: 10 ML/MIN/1.73
GLUCOSE BLD-MCNC: 144 MG/DL (ref 74–99)
GRAM STAIN RESULT: NORMAL
GRAM STAIN RESULT: NORMAL
HCT VFR BLD CALC: 25.7 % (ref 34–48)
HEMOGLOBIN: 8 G/DL (ref 11.5–15.5)
MCH RBC QN AUTO: 29.9 PG (ref 26–35)
MCHC RBC AUTO-ENTMCNC: 31.1 % (ref 32–34.5)
MCV RBC AUTO: 95.9 FL (ref 80–99.9)
METER GLUCOSE: 164 MG/DL (ref 74–99)
METER GLUCOSE: 165 MG/DL (ref 74–99)
METER GLUCOSE: 171 MG/DL (ref 74–99)
METER GLUCOSE: 190 MG/DL (ref 74–99)
PDW BLD-RTO: 15.6 FL (ref 11.5–15)
PLATELET # BLD: 430 E9/L (ref 130–450)
PMV BLD AUTO: 9.6 FL (ref 7–12)
POTASSIUM SERPL-SCNC: 3.3 MMOL/L (ref 3.5–5)
RBC # BLD: 2.68 E12/L (ref 3.5–5.5)
SODIUM BLD-SCNC: 128 MMOL/L (ref 132–146)
TOTAL PROTEIN: 5.2 G/DL (ref 6.4–8.3)
WBC # BLD: 14.4 E9/L (ref 4.5–11.5)

## 2022-10-02 PROCEDURE — 2580000003 HC RX 258: Performed by: STUDENT IN AN ORGANIZED HEALTH CARE EDUCATION/TRAINING PROGRAM

## 2022-10-02 PROCEDURE — 6360000002 HC RX W HCPCS: Performed by: NURSE PRACTITIONER

## 2022-10-02 PROCEDURE — 6370000000 HC RX 637 (ALT 250 FOR IP)

## 2022-10-02 PROCEDURE — 6370000000 HC RX 637 (ALT 250 FOR IP): Performed by: NURSE PRACTITIONER

## 2022-10-02 PROCEDURE — 6360000002 HC RX W HCPCS: Performed by: INTERNAL MEDICINE

## 2022-10-02 PROCEDURE — 2580000003 HC RX 258: Performed by: INTERNAL MEDICINE

## 2022-10-02 PROCEDURE — 36415 COLL VENOUS BLD VENIPUNCTURE: CPT

## 2022-10-02 PROCEDURE — 80048 BASIC METABOLIC PNL TOTAL CA: CPT

## 2022-10-02 PROCEDURE — 6370000000 HC RX 637 (ALT 250 FOR IP): Performed by: INTERNAL MEDICINE

## 2022-10-02 PROCEDURE — 82962 GLUCOSE BLOOD TEST: CPT

## 2022-10-02 PROCEDURE — 6370000000 HC RX 637 (ALT 250 FOR IP): Performed by: STUDENT IN AN ORGANIZED HEALTH CARE EDUCATION/TRAINING PROGRAM

## 2022-10-02 PROCEDURE — 90945 DIALYSIS ONE EVALUATION: CPT

## 2022-10-02 PROCEDURE — 1200000000 HC SEMI PRIVATE

## 2022-10-02 PROCEDURE — 85027 COMPLETE CBC AUTOMATED: CPT

## 2022-10-02 PROCEDURE — 80076 HEPATIC FUNCTION PANEL: CPT

## 2022-10-02 RX ORDER — LEVETIRACETAM 5 MG/ML
250 INJECTION INTRAVASCULAR EVERY 12 HOURS
Status: DISCONTINUED | OUTPATIENT
Start: 2022-10-02 | End: 2022-10-03 | Stop reason: SDUPTHER

## 2022-10-02 RX ADMIN — SODIUM CHLORIDE, PRESERVATIVE FREE 10 ML: 5 INJECTION INTRAVENOUS at 10:21

## 2022-10-02 RX ADMIN — BISACODYL 5 MG: 5 TABLET, DELAYED RELEASE ORAL at 10:21

## 2022-10-02 RX ADMIN — MEROPENEM 500 MG: 1 INJECTION, POWDER, FOR SOLUTION INTRAVENOUS at 18:24

## 2022-10-02 RX ADMIN — AMLODIPINE BESYLATE 2.5 MG: 5 TABLET ORAL at 10:20

## 2022-10-02 RX ADMIN — ISOSORBIDE MONONITRATE 30 MG: 30 TABLET, EXTENDED RELEASE ORAL at 10:20

## 2022-10-02 RX ADMIN — SODIUM CHLORIDE, PRESERVATIVE FREE 10 ML: 5 INJECTION INTRAVENOUS at 23:07

## 2022-10-02 RX ADMIN — METOPROLOL TARTRATE 12.5 MG: 25 TABLET, FILM COATED ORAL at 10:20

## 2022-10-02 RX ADMIN — POLYETHYLENE GLYCOL 3350 17 G: 17 POWDER, FOR SOLUTION ORAL at 11:29

## 2022-10-02 RX ADMIN — NYSTATIN 500000 UNITS: 100000 SUSPENSION ORAL at 18:18

## 2022-10-02 RX ADMIN — DULOXETINE HYDROCHLORIDE 30 MG: 30 CAPSULE, DELAYED RELEASE ORAL at 10:21

## 2022-10-02 RX ADMIN — METOPROLOL TARTRATE 12.5 MG: 25 TABLET, FILM COATED ORAL at 23:12

## 2022-10-02 RX ADMIN — LEVOTHYROXINE SODIUM 50 MCG: 0.05 TABLET ORAL at 06:30

## 2022-10-02 RX ADMIN — PANTOPRAZOLE SODIUM 40 MG: 40 TABLET, DELAYED RELEASE ORAL at 06:30

## 2022-10-02 RX ADMIN — ASPIRIN 81 MG 81 MG: 81 TABLET ORAL at 10:20

## 2022-10-02 RX ADMIN — NYSTATIN 500000 UNITS: 100000 SUSPENSION ORAL at 10:20

## 2022-10-02 RX ADMIN — LEVETIRACETAM 250 MG: 500 INJECTION, SOLUTION INTRAVENOUS at 11:26

## 2022-10-02 RX ADMIN — ATORVASTATIN CALCIUM 20 MG: 20 TABLET, FILM COATED ORAL at 23:07

## 2022-10-02 RX ADMIN — DOCUSATE SODIUM 100 MG: 100 CAPSULE, LIQUID FILLED ORAL at 23:07

## 2022-10-02 RX ADMIN — NYSTATIN 500000 UNITS: 100000 SUSPENSION ORAL at 23:12

## 2022-10-02 ASSESSMENT — PAIN SCALES - WONG BAKER: WONGBAKER_NUMERICALRESPONSE: 0

## 2022-10-02 NOTE — PROGRESS NOTES
Department of Internal Medicine  Infectious Diseases   Progress Note      C/C :  Leukocytosis , sepsis     Discussed with the pt's son in the room   Pt is more awake and alert   Denies fever, chills, abdomen pain     No acute distress   Afebrile       Current Facility-Administered Medications   Medication Dose Route Frequency Provider Last Rate Last Admin    levETIRAcetam (KEPPRA) 250 mg/50 mL IVPB  250 mg IntraVENous Q12H Yamile Suly, APRN - CNP        bisacodyl (DULCOLAX) EC tablet 5 mg  5 mg Oral Daily Anam Alamo MD   5 mg at 10/02/22 1021    polyethylene glycol (GLYCOLAX) packet 17 g  17 g Oral Daily Anam Alamo MD   17 g at 10/01/22 0909    0.9 % sodium chloride infusion   IntraVENous PRN KERRI Silva - CNP        meropenem (MERREM) 500 mg IVPB (mini-bag)  500 mg IntraVENous Q24H Liza Groves MD 33.3 mL/hr at 10/01/22 1843 500 mg at 10/01/22 1843    nystatin (MYCOSTATIN) [id number] UNIT/ML suspension 500,000 Units  5 mL Oral 4x Daily Yamile STEVE TubbsN - CNP   500,000 Units at 10/02/22 1020    amLODIPine (NORVASC) tablet 2.5 mg  2.5 mg Oral Daily Ravi Eckert MD   2.5 mg at 10/02/22 1020    metoprolol tartrate (LOPRESSOR) tablet 12.5 mg  12.5 mg Oral BID Ravi Eckert MD   12.5 mg at 10/02/22 1020    delflex lo-yared 2.5% 5,000 mL with heparin (porcine) 1,000 Units solution   IntraPERitoneal Once Annie Ragsdale MD        sodium chloride flush 0.9 % injection 5-40 mL  5-40 mL IntraVENous 2 times per day Khushboo Kaur MD   10 mL at 10/02/22 1021    sodium chloride flush 0.9 % injection 5-40 mL  5-40 mL IntraVENous PRN Khushboo Kaur MD        0.9 % sodium chloride infusion  25 mL IntraVENous PRN Khushboo Kaur MD        oxyCODONE (ROXICODONE) immediate release tablet 5 mg  5 mg Oral Q4H PRN Hill Loaiza MD   5 mg at 09/24/22 1204    insulin lispro (HUMALOG) injection vial 0-16 Units  0-16 Units SubCUTAneous TID  Hill Loaiza MD   8 Units at 10/01/22 0955    insulin lispro (HUMALOG) injection vial 0-4 Units  0-4 Units SubCUTAneous Nightly Hill Loaiza MD   4 Units at 09/22/22 2135    acetaminophen (TYLENOL) suppository 650 mg  650 mg Rectal Q4H PRN Hill Loaiza MD   650 mg at 09/18/22 1927    [Held by provider] apixaban (ELIQUIS) tablet 2.5 mg  2.5 mg Oral BID Hill Loaiza MD   2.5 mg at 09/27/22 2046    aspirin chewable tablet 81 mg  81 mg Oral Daily Hill Loaiza MD   81 mg at 10/02/22 1020    atorvastatin (LIPITOR) tablet 20 mg  20 mg Oral Nightly Hill Loaiza MD   20 mg at 10/01/22 2339    DULoxetine (CYMBALTA) extended release capsule 30 mg  30 mg Oral Daily Hill Loaiza MD   30 mg at 10/02/22 1021    isosorbide mononitrate (IMDUR) extended release tablet 30 mg  30 mg Oral Daily Hill Loaiza MD   30 mg at 10/02/22 1020    levothyroxine (SYNTHROID) tablet 50 mcg  50 mcg Oral Daily Hill Loaiza MD   50 mcg at 10/02/22 0630    docusate sodium (COLACE) capsule 100 mg  100 mg Oral Nightly Hill Loaiza MD   100 mg at 09/30/22 2137    pantoprazole (PROTONIX) tablet 40 mg  40 mg Oral QAM AC Hill Loaiza MD   40 mg at 10/02/22 0630    glucose chewable tablet 16 g  4 tablet Oral PRN Hill Loaiza MD        dextrose bolus 10% 125 mL  125 mL IntraVENous PRN Hill Loaiza MD   Stopped at 09/28/22 2053    Or    dextrose bolus 10% 250 mL  250 mL IntraVENous PRN Hill Loaiza MD        glucagon (rDNA) injection 1 mg  1 mg SubCUTAneous PRN Hill Loaiza MD        dextrose 10 % infusion   IntraVENous Continuous PRN Hill Loaiza MD        acetaminophen (TYLENOL) tablet 650 mg  650 mg Oral Q4H PRN Hill Loaiza MD   650 mg at 09/25/22 0913    trimethobenzamide (TIGAN) injection 200 mg  200 mg IntraMUSCular Q6H PRN Hill Loaiza MD   200 mg at 09/25/22 0939       REVIEW OF SYSTEMS:    CONSTITUTIONAL:Denies fever or chills   HEENT: denies blurring of vision or double vision, denies hearing problem  RESPIRATORY: denies cough, shortness of breath, sputum expectoration,  CARDIOVASCULAR:  Denies palpitation  GASTROINTESTINAL:  Denies abdomen pain  GENITOURINARY:  ESRD on PD   INTEGUMENT: denies wound , rash  HEMATOLOGIC/LYMPHATIC:  Denies lymph node swelling, gum bleeding or easy bruising. MUSCULOSKELETAL:  Denies leg pain , joint pain , joint swelling  NEUROLOGICAL:  weakness           PHYSICAL EXAM:      Vitals:     BP (!) 120/51   Pulse 71   Temp 97.5 °F (36.4 °C) (Oral)   Resp 16   Ht 5' (1.524 m)   Wt 139 lb 1.8 oz (63.1 kg)   SpO2 100%   BMI 27.17 kg/m²       General Appearance:    Awake, alert, no distress     Head:    Normocephalic, atraumatic   Eyes:    No pallor, no icterus,   Ears:    No obvious deformity or drainage.    Nose:   No nasal drainage   Throat:   Mucosa moist, no oral thrush   Neck:   Supple, no lymphadenopathy   Lungs:     Clear to auscultation bilaterally   Heart:     Irregular, systolic murmur    Abdomen:     Soft, non-tender, bowel sounds present   Extremities:   No edema, no cyanosis    Pulses:   Dorsalis pedis palpable    Skin:   no rashes      CBC with Differential:      Lab Results   Component Value Date/Time    WBC 14.4 10/02/2022 04:30 AM    RBC 2.68 10/02/2022 04:30 AM    HGB 8.0 10/02/2022 04:30 AM    HCT 25.7 10/02/2022 04:30 AM     10/02/2022 04:30 AM    MCV 95.9 10/02/2022 04:30 AM    MCH 29.9 10/02/2022 04:30 AM    MCHC 31.1 10/02/2022 04:30 AM    RDW 15.6 10/02/2022 04:30 AM    NRBC 1.7 09/27/2022 04:23 AM    BANDSPCT 2 12/19/2015 04:23 PM    METASPCT 3.5 09/29/2022 05:30 AM    LYMPHOPCT 9.6 09/29/2022 05:30 AM    PROMYELOPCT 1.7 09/27/2022 04:23 AM    MONOPCT 8.7 09/29/2022 05:30 AM    MYELOPCT 3.5 09/28/2022 09:06 PM    BASOPCT 0.4 09/29/2022 05:30 AM    MONOSABS 2.42 09/29/2022 05:30 AM    LYMPHSABS 2.69 09/29/2022 05:30 AM    EOSABS 0.00 09/29/2022 05:30 AM    BASOSABS 0.00 09/29/2022 05:30 AM       CMP     Lab Results   Component Value Date/Time  10/02/2022 04:30 AM    K 3.3 10/02/2022 04:30 AM    K 3.9 09/29/2022 05:30 AM    CL 93 10/02/2022 04:30 AM    CO2 22 10/02/2022 04:30 AM    BUN 19 10/02/2022 04:30 AM    CREATININE 4.1 10/02/2022 04:30 AM    GFRAA 13 10/02/2022 04:30 AM    LABGLOM 10 10/02/2022 04:30 AM    GLUCOSE 144 10/02/2022 04:30 AM    PROT 5.2 10/02/2022 04:30 AM    LABALBU 1.2 10/02/2022 04:30 AM    CALCIUM 8.2 10/02/2022 04:30 AM    BILITOT 0.4 10/02/2022 04:30 AM    ALKPHOS 840 10/02/2022 04:30 AM    AST 21 10/02/2022 04:30 AM    ALT 15 10/02/2022 04:30 AM     Stool for  C diff toxin - negative    Peritoneal fluid cx negative       CT scan of abdomen and pelvis -  Impression:     Status post cholecystectomy. Large complex mass demonstrating a combination of soft tissue, fluid, fat,   and air attenuation in the gallbladder fossa and extending into the   subhepatic region on the right. Differential considerations include an   abscess or infected biloma. IMPRESSION:     S/P lap cholecystectomy  (for acute cholecystitis ) and ERCP   Leukocytosis -  14  K, mental status improved   3. GB fossa  ( CT scan ) post operative change     RECOMMENDATIONS:       1. Meropenem 500 mg IV q 24 hrs  2. CBC with diff   3.  D/C next 24 hrs if WBC improves

## 2022-10-02 NOTE — FLOWSHEET NOTE
10/02/22 0854   Vitals   /71   Temp 97.5 °F (36.4 °C)   Temp Source Oral   Heart Rate 84   Resp 16   SpO2 100 %   Weight 139 lb 1.8 oz (63.1 kg)   Peritoneal Dialysis Catheter Right lower abdomen   No Placement Date or Time found. Catheter Location: Right lower abdomen   Status Deaccessed   Site Condition Clean, dry, intact   Dressing Status Clean, dry & intact   Dressing Gauze   Date of Last Dressing Change 10/01/22   Dialysis Type Continuous cycling   Exit Site Condition not seen   Catheter Care Given Yes   Drainage Description   (Effluent was clear, orange, with blood seen settling at bottom of bags.)   Cycler   Verification of Prescription CCPD   Total Volume Programmed 08936 mL   Therapy Time (Hours:Minutes) 9   Fill Volume 2000 mL   Last Fill Volume 1500 mL   Dextrose Setting Same (Nonextraneal)   Number of Cycles 6   Bag Volume 5000 mL   Number of Bags Used 4   I Drain (mL) 1287 mL   Ultrafiltration (UF) (mL) 883 mL   Average Dwell Time (Hours:Minutes) 1:15   Lost Dwell Time (Hours:Minutes) +1:00   CCPD complete. Line disconnected aseptically and iodine capped. Pt Aox3, alert and talking with family. Feels well. Effluent remains orange to pink with blood visible settling in bases of bags.

## 2022-10-02 NOTE — PROGRESS NOTES
Subjective:    Patient resting in bed  Overall feels well, no acute complaints  Denies any acute complaints today  Awake alert and conversant  Objective:    /71   Pulse 84   Temp 97.5 °F (36.4 °C) (Oral)   Resp 16   Ht 5' (1.524 m)   Wt 139 lb 1.8 oz (63.1 kg)   SpO2 100%   BMI 27.17 kg/m²     In: -   Out: 3285   In: -   Out: 3285     General Appearance: awake and alert, conversant, slightly delayed responses  Head/face: NCAT  Eyes:  No gross abnormalities. Lungs:  Normal expansion. Clear to auscultation. No rales, rhonchi, or wheezing. Heart:  Heart sounds are normal.  Regular rate and rhythm without murmur, gallop or rub. Abdomen:  Soft, tender, normal bowel sounds. No bruits, PD cath  Extremities: Extremities warm to touch, pink, with no edema. Neurologic:  Awake, oriented x 2, no focal deficits      Recent Labs     09/30/22  1057 10/01/22  0430 10/02/22  0430   WBC 25.2* 16.9* 14.4*   HGB 8.5* 8.7* 8.0*   HCT 25.2* 27.3* 25.7*    427 430         Recent Labs     10/01/22  0430 10/01/22  0708 10/02/22  0430   *  --  128*   K 2.7* 3.0* 3.3*   CL 90*  --  93*   CO2 23  --  22   BUN 23  --  19   CREATININE 4.0*  --  4.1*   CALCIUM 8.6  --  8.2*         Assessment:    Principal Problem:    Change in mental status  Active Problems:    Chronic kidney disease    Moderate protein-calorie malnutrition (HCC)  Resolved Problems:    * No resolved hospital problems.  *        PLAN:  Altered mental status d/t Sepsis   -WBC 14.4 today, improving significantly   -Infectious disease consulted  -IV Zosyn was to be completed 9/26/2022, but worsening leukocytosis > switch to meropenem 500 mg IV q 24 hrs per ID  -EEG > A potential for generalized photosensitive epilepsy, will defer to neurology > started on Keppra 250 mg twice daily, neurology signed off  -blood cultures x 2 are negative   -PO Keppra switched to IV Keppra 250 mg BID   -Peritoneal body fluid culture NGTD  -RRT 9/28 for worsening mentation and twitching. BGL noted to be 63, 250 cc D10 administered. BGL improved to 104. D5 @ 75 ordered for 8 hours with recheck in 2hrs > . Pt more responsive and alert at the end of RRT     LFTs/acute cholecystitis-fluctuating  -s/p lap [place] with IOC 9/21/2022  -Multiple filling defects in the CBD noted during IOC, advanced GI consulted for ERCP/stone removal sphincterotomy and sphincteroplasty-procedure completed on 9/23-which she tolerated well  -Lipitor restarted   -Continue to monitor labs, hepatic function test daily -improving  -CT ABD/Pelvis: Large complex mass extending into the subhepatic region on the right. Differential considerations include an abscess or infected biloma,  -General surgery does not feel this is a abscess and feels the worsening leukocytosis is secondary to hemoperitoneum, following restarting eliquis. -peritoneal fluid gram stain without organisms     ESRD on PD/hypokalemia  -Nephro following  -PD per nephro   -Per general surgery, okay to continue peritoneal dialysis-no need for temporary dialysis line  -Monitor labs  -K 3.4 > 3.1 > 4.3   -Hb 7.8 > 6.7 > 8.0/25.7 today     Severe protein calorie malnutrition  -Nutrition consult  -Start Vanilla Glucerna TID and jacinda BID   -Monitor oral intake during meals  -Advance diet as tolerated     Diabetes mellitus   -Continue to monitor blood glucose levels  -Better controlled with ISS increase to high dose        Medication for other comorbidities continue as appropriate dose adjustment as necessary.     DVT prophylaxis Eliquis (held)  PT OT  Discharge plan Await final abx, DC plan tomorrow if accepted to facility      Warden Chava MD

## 2022-10-02 NOTE — FLOWSHEET NOTE
10/02/22 1637   Vitals   BP (!) 122/59   Temp 97.4 °F (36.3 °C)   Temp Source Oral   Heart Rate 70   Resp 16   SpO2 100 %   Weight 139 lb 8.8 oz (63.3 kg)   Peritoneal Dialysis Catheter Right lower abdomen   No Placement Date or Time found.    Catheter Location: Right lower abdomen   Status Accessed   Site Condition Clean, dry, intact   Dressing Status New dressing applied   Dressing Gauze   Date of Last Dressing Change 10/02/22   Dialysis Type Continuous cycling   Exit Site Condition well healed   Catheter Care Given Yes   Cycler   Verification of Prescription CCPD   Total Volume Programmed 26410 mL   Therapy Time (Hours:Minutes) 9   Fill Volume 2000 mL   Last Fill Volume 1500 mL   Dextrose Setting Same (Nonextraneal)   Number of Cycles 6   Bag Volume 5000 mL   Number of Bags Used 3

## 2022-10-03 VITALS
DIASTOLIC BLOOD PRESSURE: 61 MMHG | RESPIRATION RATE: 18 BRPM | WEIGHT: 137.79 LBS | HEART RATE: 70 BPM | HEIGHT: 60 IN | OXYGEN SATURATION: 99 % | SYSTOLIC BLOOD PRESSURE: 131 MMHG | TEMPERATURE: 98.8 F | BODY MASS INDEX: 27.05 KG/M2

## 2022-10-03 LAB
ALBUMIN SERPL-MCNC: 1.3 G/DL (ref 3.5–5.2)
ALP BLD-CCNC: 390 U/L (ref 35–104)
ALT SERPL-CCNC: 13 U/L (ref 0–32)
ANION GAP SERPL CALCULATED.3IONS-SCNC: 13 MMOL/L (ref 7–16)
AST SERPL-CCNC: 21 U/L (ref 0–31)
BILIRUB SERPL-MCNC: 0.4 MG/DL (ref 0–1.2)
BILIRUBIN DIRECT: <0.2 MG/DL (ref 0–0.3)
BILIRUBIN, INDIRECT: ABNORMAL MG/DL (ref 0–1)
BLOOD CULTURE, ROUTINE: NORMAL
BUN BLDV-MCNC: 18 MG/DL (ref 6–23)
CALCIUM SERPL-MCNC: 8.5 MG/DL (ref 8.6–10.2)
CHLORIDE BLD-SCNC: 94 MMOL/L (ref 98–107)
CO2: 22 MMOL/L (ref 22–29)
CREAT SERPL-MCNC: 4.2 MG/DL (ref 0.5–1)
CULTURE, BLOOD 2: NORMAL
GFR AFRICAN AMERICAN: 12
GFR NON-AFRICAN AMERICAN: 10 ML/MIN/1.73
GLUCOSE BLD-MCNC: 205 MG/DL (ref 74–99)
HCT VFR BLD CALC: 26.3 % (ref 34–48)
HEMOGLOBIN: 8.2 G/DL (ref 11.5–15.5)
MCH RBC QN AUTO: 30.5 PG (ref 26–35)
MCHC RBC AUTO-ENTMCNC: 31.2 % (ref 32–34.5)
MCV RBC AUTO: 97.8 FL (ref 80–99.9)
METER GLUCOSE: 100 MG/DL (ref 74–99)
METER GLUCOSE: 131 MG/DL (ref 74–99)
METER GLUCOSE: 173 MG/DL (ref 74–99)
PDW BLD-RTO: 15.8 FL (ref 11.5–15)
PLATELET # BLD: 422 E9/L (ref 130–450)
PMV BLD AUTO: 10 FL (ref 7–12)
POTASSIUM SERPL-SCNC: 2.9 MMOL/L (ref 3.5–5)
RBC # BLD: 2.69 E12/L (ref 3.5–5.5)
SARS-COV-2, NAAT: NOT DETECTED
SODIUM BLD-SCNC: 129 MMOL/L (ref 132–146)
TOTAL PROTEIN: 5.2 G/DL (ref 6.4–8.3)
WBC # BLD: 12.6 E9/L (ref 4.5–11.5)

## 2022-10-03 PROCEDURE — 97129 THER IVNTJ 1ST 15 MIN: CPT

## 2022-10-03 PROCEDURE — 6360000002 HC RX W HCPCS: Performed by: NURSE PRACTITIONER

## 2022-10-03 PROCEDURE — 80076 HEPATIC FUNCTION PANEL: CPT

## 2022-10-03 PROCEDURE — 85027 COMPLETE CBC AUTOMATED: CPT

## 2022-10-03 PROCEDURE — 6370000000 HC RX 637 (ALT 250 FOR IP): Performed by: INTERNAL MEDICINE

## 2022-10-03 PROCEDURE — 6370000000 HC RX 637 (ALT 250 FOR IP): Performed by: STUDENT IN AN ORGANIZED HEALTH CARE EDUCATION/TRAINING PROGRAM

## 2022-10-03 PROCEDURE — 80048 BASIC METABOLIC PNL TOTAL CA: CPT

## 2022-10-03 PROCEDURE — 82962 GLUCOSE BLOOD TEST: CPT

## 2022-10-03 PROCEDURE — 2580000003 HC RX 258: Performed by: STUDENT IN AN ORGANIZED HEALTH CARE EDUCATION/TRAINING PROGRAM

## 2022-10-03 PROCEDURE — 6370000000 HC RX 637 (ALT 250 FOR IP)

## 2022-10-03 PROCEDURE — 87635 SARS-COV-2 COVID-19 AMP PRB: CPT

## 2022-10-03 PROCEDURE — 36415 COLL VENOUS BLD VENIPUNCTURE: CPT

## 2022-10-03 PROCEDURE — 6370000000 HC RX 637 (ALT 250 FOR IP): Performed by: NURSE PRACTITIONER

## 2022-10-03 PROCEDURE — 97130 THER IVNTJ EA ADDL 15 MIN: CPT

## 2022-10-03 RX ORDER — POTASSIUM CHLORIDE 20 MEQ/1
40 TABLET, EXTENDED RELEASE ORAL ONCE
Status: COMPLETED | OUTPATIENT
Start: 2022-10-03 | End: 2022-10-03

## 2022-10-03 RX ORDER — LEVETIRACETAM 500 MG/1
250 TABLET ORAL EVERY 12 HOURS
Status: DISCONTINUED | OUTPATIENT
Start: 2022-10-03 | End: 2022-10-03 | Stop reason: HOSPADM

## 2022-10-03 RX ORDER — CEFDINIR 300 MG/1
300 CAPSULE ORAL DAILY
Qty: 14 CAPSULE | Refills: 1 | Status: SHIPPED | OUTPATIENT
Start: 2022-10-03 | End: 2022-10-10

## 2022-10-03 RX ORDER — LEVETIRACETAM 500 MG/1
250 TABLET ORAL 2 TIMES DAILY
Qty: 60 TABLET | Refills: 3 | DISCHARGE
Start: 2022-10-03

## 2022-10-03 RX ORDER — METRONIDAZOLE 500 MG/1
500 TABLET ORAL 3 TIMES DAILY
Qty: 21 TABLET | Refills: 1 | Status: SHIPPED | OUTPATIENT
Start: 2022-10-03 | End: 2022-10-10

## 2022-10-03 RX ADMIN — NYSTATIN 500000 UNITS: 100000 SUSPENSION ORAL at 12:39

## 2022-10-03 RX ADMIN — LEVETIRACETAM 250 MG: 500 INJECTION, SOLUTION INTRAVENOUS at 09:34

## 2022-10-03 RX ADMIN — DULOXETINE HYDROCHLORIDE 30 MG: 30 CAPSULE, DELAYED RELEASE ORAL at 09:27

## 2022-10-03 RX ADMIN — ASPIRIN 81 MG 81 MG: 81 TABLET ORAL at 09:34

## 2022-10-03 RX ADMIN — METOPROLOL TARTRATE 12.5 MG: 25 TABLET, FILM COATED ORAL at 09:27

## 2022-10-03 RX ADMIN — BISACODYL 5 MG: 5 TABLET, DELAYED RELEASE ORAL at 09:27

## 2022-10-03 RX ADMIN — AMLODIPINE BESYLATE 2.5 MG: 5 TABLET ORAL at 09:27

## 2022-10-03 RX ADMIN — SODIUM CHLORIDE, PRESERVATIVE FREE 10 ML: 5 INJECTION INTRAVENOUS at 09:27

## 2022-10-03 RX ADMIN — LEVETIRACETAM 250 MG: 500 INJECTION, SOLUTION INTRAVENOUS at 00:27

## 2022-10-03 RX ADMIN — NYSTATIN 500000 UNITS: 100000 SUSPENSION ORAL at 16:52

## 2022-10-03 RX ADMIN — POTASSIUM CHLORIDE 40 MEQ: 1500 TABLET, EXTENDED RELEASE ORAL at 12:39

## 2022-10-03 RX ADMIN — NYSTATIN 500000 UNITS: 100000 SUSPENSION ORAL at 09:27

## 2022-10-03 RX ADMIN — PANTOPRAZOLE SODIUM 40 MG: 40 TABLET, DELAYED RELEASE ORAL at 06:06

## 2022-10-03 RX ADMIN — ISOSORBIDE MONONITRATE 30 MG: 30 TABLET, EXTENDED RELEASE ORAL at 09:27

## 2022-10-03 RX ADMIN — POLYETHYLENE GLYCOL 3350 17 G: 17 POWDER, FOR SOLUTION ORAL at 09:27

## 2022-10-03 RX ADMIN — LEVOTHYROXINE SODIUM 50 MCG: 0.05 TABLET ORAL at 06:05

## 2022-10-03 ASSESSMENT — PAIN SCALES - GENERAL: PAINLEVEL_OUTOF10: 0

## 2022-10-03 ASSESSMENT — PAIN SCALES - WONG BAKER: WONGBAKER_NUMERICALRESPONSE: 0

## 2022-10-03 NOTE — PROGRESS NOTES
[facility] MD Trupti Chaney MD Cecil Romance, MD  Progress Note    10/2/2022      SUBJECTIVE:   9/21: Not in her room   She is getting cholcystectomy    9/22: Status post cholecystectomy yesterday without complication. Feeling better, ongoing fatigue and malaise, generalized weakness. Appetite is improved and she is looking forward to her dinner. Denies nausea or vomiting. Pain controlled. ROS otherwise unremarkable    9/23: Off the floor, and endoscopy for MRCP. Discussed care with nurse. Vital signs stable. CCPD without complication, though drainage this morning was blood-tinged. No clots and no fibrin. BP somewhat lower than yesterday. 9/24: Constipated. No dyspnea. Ongoing fatigue, malaise, generalized weakness. Ongoing anorexia. CCPD last evening without complication, effluent less blood-tinged. 9/25: Ongoing constipation. She believes her last bowel movement was at least 5 days ago. Quite uncomfortable, though no pain per se. Ongoing fatigue and generalized weakness. No complications CCPD last evening. Effluent clear. 9/26: Somnolent though arousable. Did not sleep well last night. No complications with CCPD. PD effluent has cleared. No nausea or vomiting. Ongoing abdominal discomfort though no pain. No headache. No dyspnea at rest on room air.    9/27: Somnolent, somewhat confused. Not sleeping well at night. Poor appetite and intake. CCPD at night without event. Effluent has been clear    9/28: Seen this morning. Mental status is worsening. My obtunded, somnolent. Somewhat arousable though does not answer questions or follow commands. Appears in no apparent distress and is breathing comfortably. Peritoneal effluent was jodee to red-colored this morning.   No other complications of her CCPD last night    9/29 seen this afternoon   On RA lying flat   Abdomen soft   Bp stable     9/30 clinically a lot better this am , she is alert awake oriented   Stable vitals . 10/1 : appear comfortable    PROBLEM LIST:    Principal Problem:    Change in mental status  Active Problems:    Chronic kidney disease    Moderate protein-calorie malnutrition ([address]  Resolved Problems:    * No resolved hospital problems. *       DIET:    ADULT DIET; Regular; Low Potassium (Less than 3000 mg/day); Low Phosphorus (Less than 1000 mg)  ADULT ORAL NUTRITION SUPPLEMENT; AM Snack, PM Snack;  Renal Oral Supplement       Allergies : Sulfa antibiotics    Past Medical History:   Diagnosis Date    Anemia     Arthritis     CAD (coronary artery disease)     Diabetes mellitus ([address]     Gout     History of bleeding peptic ulcer approx     Hypertension     Peritoneal dialysis catheter in place [facility])     Peritoneal dialysis status ([address]     at night for 8 hours    Thyroid disease     Use of cane as ambulatory aid     Wears glasses        Past Surgical History:   Procedure Laterality Date    BACK SURGERY      CARDIOVASCULAR STRESS TEST      CARPAL TUNNEL RELEASE Bilateral     CATARACT REMOVAL WITH IMPLANT Bilateral      SECTION      CHOLECYSTECTOMY, LAPAROSCOPIC N/A 2022    CHOLECYSTECTOMY LAPAROSCOPIC with intraoperative cholangiogram performed by Jarvis Vega MD at [facility]  approx 2000    x 3; per Dr Schmidt Bones, COLON, DIAGNOSTIC      ERCP N/A 2022    ERCP ENDOSCOPIC RETROGRADE CHOLANGIOPANCREATOGRAPHY performed by Michelle Borden MD at [address] [place] [address]    ERCP N/A 2022    ERCP SPHINCTER/PAPILLOTOMY performed by Michelle Borden MD at [address] [place] [address]    ERCP N/A 2022    ERCP STONE REMOVAL performed by Michelle Borden MD at [address] [facility]    ERCP N/A 2022    ERCP DILATION BALLOON performed by Michelle Borden MD at [address] [facility] [address]      left knee, right shoulder    [place] TOTAL KNEE ARTHROPLASTY Right 10/31/2018    RIGHT KNEE TOTAL ARTHROPLASTY +++BRIDGER++ PNB++ performed by Clayburn Prader, MD at [facility]    UPPER GASTROINTESTINAL ENDOSCOPY         History reviewed. No pertinent family history. reports that she has never smoked. She has never used smokeless tobacco. She reports that she does not drink alcohol and does not use drugs. Review of Systems:   Constitutional: no fevers , no chills , feels ok   Eyes: no eye pain , no itching , no drainage  Ears, nose, mouth, throat, and face: no ear ,nose pain , hearing is ok ,no nasal drainage   Respiratory: no sob ,no cough ,no wheezing . Cardiovascular: no chest pain , no palpitation ,no sob . Gastrointestinal: no nausea, vomiting , constipation , no abdominal pain . Genitourinary:no urinary retention , no burning , dysuria . No polyuria   Hematologic/lymphatic: no bleeding , no cougulation issues . Musculoskeletal:no joint pain , no swelling . Neurological: no headaches ,no weakness , no numbness . Endocrine: no thirst , no weight issues .      MEDS (scheduled):    levETIRAcetam  250 mg IntraVENous Q12H    bisacodyl  5 mg Oral Daily    polyethylene glycol  17 g Oral Daily    meropenem  500 mg IntraVENous Q24H    nystatin  5 mL Oral 4x Daily    amLODIPine  2.5 mg Oral Daily    metoprolol tartrate  12.5 mg Oral BID    custom dialysis builder   IntraPERitoneal Once    sodium chloride flush  5-40 mL IntraVENous 2 times per day    insulin lispro  0-16 Units SubCUTAneous TID WC    insulin lispro  0-4 Units SubCUTAneous Nightly    [Held by provider] apixaban  2.5 mg Oral BID    aspirin  81 mg Oral Daily    atorvastatin  20 mg Oral Nightly    DULoxetine  30 mg Oral Daily    isosorbide mononitrate  30 mg Oral Daily    levothyroxine  50 mcg Oral Daily    docusate sodium  100 mg Oral Nightly    pantoprazole  40 mg Oral QAM AC       MEDS (infusions):   sodium chloride      sodium chloride      dextrose         MEDS (prn):  sodium chloride, sodium chloride flush, sodium chloride, oxyCODONE, acetaminophen, glucose, dextrose bolus **OR** dextrose bolus, glucagon (rDNA), dextrose, acetaminophen, trimethobenzamide    PHYSICAL EXAM:   BP (!) 105/47   Pulse 66   Temp 97.9 °F (36.6 °C) (Temporal)   Resp 18   Ht 5' (1.524 m)   Wt 139 lb 8.8 oz (63.3 kg)   SpO2 100%   BMI 27.25 kg/m²        Wt Readings from Last 3 Encounters:   10/02/22 139 lb 8.8 oz (63.3 kg)   07/27/22 143 lb 3.2 oz (65 kg)   04/29/22 158 lb 8.2 oz (71.9 kg)       Constitutional:  in no acute distress  Oral: mucus membranes moist  Neck: no JVD  Cardiovascular: S1, S2 regular rhythm, no murmur,or rub  Respiratory: Poor air entry bilateral no crackles, no wheeze  Gastrointestinal:  Soft, nontender, nondistended, NABS. No mass hepatosplenomegaly. CAPD catheter left lower quadrant  Ext: No edema, feet warm  Skin: dry, no rash  Neuro: Somnolent, arousable,  To questions or follow commands. Moves all 4 extremities. DATA:    Reviewed        Assessment    1. End-stage renal disease, on peritoneal dialysis. 2.  Encephalopathy, etiology unclear, metabolic versus ongoing infection. .  Markedly improved  3. Leukocytosis ? Ascedning cholangitis. Improved now status post cholecystectomy  4. Anemia due to ESRD  5. Hypomagnesemia. 6.  Mineral bone disease. 7.  Hyponatremia likely due to water overload, mild though, as well as hyperglycemia  8. Hypokalemia, due to poor intake, losses with PD. Supplemented, the repeat at 1 PM was hemolyzed. Next repeat 3.8 mmol/L     Recommendations    PD fluid sample reviewed and really does not support a septic picture . Her abdomen is soft . (she dose have> 50 neurtrophills in PD fluid but still this marginal and does not support a bad case of PD asscoiated peritonitis )     Will d/w ID and general surgery whether they d like to transition to HD though above PD fluids sample as mentioned above does not explain her sepsis       Continue CCPD   Fu final PD fluid cultures   Continue ABX per ID .           Electronically signed by Jonatan Hernandez MD on 10/2/2022

## 2022-10-03 NOTE — PROGRESS NOTES
Department of Internal Medicine  Infectious Diseases   Progress Note      C/C :  Leukocytosis , sepsis     Discussed with the pt's son in the room   Pt is more awake and alert   Denies fever, chills, abdomen pain     No acute distress   Afebrile       Current Facility-Administered Medications   Medication Dose Route Frequency Provider Last Rate Last Admin    levETIRAcetam (KEPPRA) 250 mg in sodium chloride 0.9 % 100 mL IVPB  250 mg IntraVENous Q12H KERRI Harden CNP        Or    levETIRAcetam (KEPPRA) tablet 250 mg  250 mg Oral Q12H KERRI Harden CNP        bisacodyl (DULCOLAX) EC tablet 5 mg  5 mg Oral Daily Erica Kellogg MD   5 mg at 10/03/22 9431    polyethylene glycol (GLYCOLAX) packet 17 g  17 g Oral Daily Erica Kellogg MD   17 g at 10/03/22 0927    0.9 % sodium chloride infusion   IntraVENous PRN April KERRI Dahl - CNP        meropenem (MERREM) 500 mg IVPB (mini-bag)  500 mg IntraVENous Q24H Danita Caro MD   Stopped at 10/02/22 2156    nystatin (MYCOSTATIN) 053041 UNIT/ML suspension 500,000 Units  5 mL Oral 4x Daily KERRI Harden CNP   500,000 Units at 10/03/22 1239    amLODIPine (NORVASC) tablet 2.5 mg  2.5 mg Oral Daily Gee Hernandez MD   2.5 mg at 10/03/22 8989    metoprolol tartrate (LOPRESSOR) tablet 12.5 mg  12.5 mg Oral BID Gee Hernandez MD   12.5 mg at 10/03/22 8576    delflex lo-yared 2.5% 5,000 mL with heparin (porcine) 1,000 Units solution   IntraPERitoneal Once Randolph Chavez MD        sodium chloride flush 0.9 % injection 5-40 mL  5-40 mL IntraVENous 2 times per day Urmila Gillis MD   10 mL at 10/03/22 7880    sodium chloride flush 0.9 % injection 5-40 mL  5-40 mL IntraVENous PRN Urmila Gillis MD        0.9 % sodium chloride infusion  25 mL IntraVENous PRN Urmila Gillis MD        oxyCODONE (ROXICODONE) immediate release tablet 5 mg  5 mg Oral Q4H PRN Charlie Naik MD   5 mg at 09/24/22 1204    insulin lispro (HUMALOG) injection vial 0-16 Units  0-16 Units SubCUTAneous TID WC Teresa Purcell MD   8 Units at 10/01/22 0955    insulin lispro (HUMALOG) injection vial 0-4 Units  0-4 Units SubCUTAneous Nightly Teresa Purcell MD   4 Units at 09/22/22 2135    acetaminophen (TYLENOL) suppository 650 mg  650 mg Rectal Q4H PRN Teresa Purcell MD   650 mg at 09/18/22 1927    [Held by provider] apixaban (ELIQUIS) tablet 2.5 mg  2.5 mg Oral BID Teresa Purcell MD   2.5 mg at 09/27/22 2046    aspirin chewable tablet 81 mg  81 mg Oral Daily Teresa Purcell MD   81 mg at 10/03/22 0934    atorvastatin (LIPITOR) tablet 20 mg  20 mg Oral Nightly Teresa Purcell MD   20 mg at 10/02/22 2307    DULoxetine (CYMBALTA) extended release capsule 30 mg  30 mg Oral Daily Teresa Purcell MD   30 mg at 10/03/22 2822    isosorbide mononitrate (IMDUR) extended release tablet 30 mg  30 mg Oral Daily Teresa Purcell MD   30 mg at 10/03/22 0927    levothyroxine (SYNTHROID) tablet 50 mcg  50 mcg Oral Daily Teresa Purcell MD   50 mcg at 10/03/22 7527    docusate sodium (COLACE) capsule 100 mg  100 mg Oral Nightly Teresa Purcell MD   100 mg at 10/02/22 2307    pantoprazole (PROTONIX) tablet 40 mg  40 mg Oral QAM AC Teresa Purcell MD   40 mg at 10/03/22 0606    glucose chewable tablet 16 g  4 tablet Oral PRN Teresa Purcell MD        dextrose bolus 10% 125 mL  125 mL IntraVENous PRN Teresa Purcell MD   Stopped at 09/28/22 2053    Or    dextrose bolus 10% 250 mL  250 mL IntraVENous PRN Teresa Purcell MD        glucagon (rDNA) injection 1 mg  1 mg SubCUTAneous PRN Teresa Purcell MD        dextrose 10 % infusion   IntraVENous Continuous PRN Teresa Purcell MD        acetaminophen (TYLENOL) tablet 650 mg  650 mg Oral Q4H PRN Teresa Purcell MD   650 mg at 09/25/22 0913    trimethobenzamide (TIGAN) injection 200 mg  200 mg IntraMUSCular Q6H PRN Teresa Purcell MD   200 mg at 09/25/22 1572       REVIEW OF SYSTEMS:    CONSTITUTIONAL:Denies fever or chills   HEENT: denies blurring of vision or double vision, denies hearing problem  RESPIRATORY: denies cough, shortness of breath, sputum expectoration,  CARDIOVASCULAR:  Denies palpitation  GASTROINTESTINAL:  Denies abdomen pain  GENITOURINARY:  ESRD on PD   INTEGUMENT: denies wound , rash  HEMATOLOGIC/LYMPHATIC:  Denies lymph node swelling, gum bleeding or easy bruising. MUSCULOSKELETAL:  Denies leg pain , joint pain , joint swelling  NEUROLOGICAL:  weakness           PHYSICAL EXAM:      Vitals:     BP (!) 135/55   Pulse 61   Temp 97.3 °F (36.3 °C) (Oral)   Resp 16   Ht 5' (1.524 m)   Wt 137 lb 12.6 oz (62.5 kg)   SpO2 99%   BMI 26.91 kg/m²       General Appearance:    Awake, alert, no distress     Head:    Normocephalic, atraumatic   Eyes:    No pallor, no icterus,   Ears:    No obvious deformity or drainage.    Nose:   No nasal drainage   Throat:   Mucosa moist, no oral thrush   Neck:   Supple, no lymphadenopathy   Lungs:     Clear to auscultation bilaterally   Heart:     Irregular, systolic murmur    Abdomen:     Soft, non-tender, bowel sounds present   Extremities:   No edema, no cyanosis    Pulses:   Dorsalis pedis palpable    Skin:   no rashes      CBC with Differential:      Lab Results   Component Value Date/Time    WBC 12.6 10/03/2022 04:26 AM    RBC 2.69 10/03/2022 04:26 AM    HGB 8.2 10/03/2022 04:26 AM    HCT 26.3 10/03/2022 04:26 AM     10/03/2022 04:26 AM    MCV 97.8 10/03/2022 04:26 AM    MCH 30.5 10/03/2022 04:26 AM    MCHC 31.2 10/03/2022 04:26 AM    RDW 15.8 10/03/2022 04:26 AM    NRBC 1.7 09/27/2022 04:23 AM    BANDSPCT 2 12/19/2015 04:23 PM    METASPCT 3.5 09/29/2022 05:30 AM    LYMPHOPCT 9.6 09/29/2022 05:30 AM    PROMYELOPCT 1.7 09/27/2022 04:23 AM    MONOPCT 8.7 09/29/2022 05:30 AM    MYELOPCT 3.5 09/28/2022 09:06 PM    BASOPCT 0.4 09/29/2022 05:30 AM    MONOSABS 2.42 09/29/2022 05:30 AM    LYMPHSABS 2.69 09/29/2022 05:30 AM    EOSABS 0.00 09/29/2022 05:30 AM    BASOSABS 0.00 09/29/2022 05:30 AM       CMP     Lab Results   Component Value Date/Time     10/03/2022 04:26 AM    K 2.9 10/03/2022 04:26 AM    K 3.9 09/29/2022 05:30 AM    CL 94 10/03/2022 04:26 AM    CO2 22 10/03/2022 04:26 AM    BUN 18 10/03/2022 04:26 AM    CREATININE 4.2 10/03/2022 04:26 AM    GFRAA 12 10/03/2022 04:26 AM    LABGLOM 10 10/03/2022 04:26 AM    GLUCOSE 205 10/03/2022 04:26 AM    PROT 5.2 10/03/2022 04:26 AM    LABALBU 1.3 10/03/2022 04:26 AM    CALCIUM 8.5 10/03/2022 04:26 AM    BILITOT 0.4 10/03/2022 04:26 AM    ALKPHOS 390 10/03/2022 04:26 AM    AST 21 10/03/2022 04:26 AM    ALT 13 10/03/2022 04:26 AM     Stool for  C diff toxin - negative    Peritoneal fluid cx negative       CT scan of abdomen and pelvis -  Impression:     Status post cholecystectomy. Large complex mass demonstrating a combination of soft tissue, fluid, fat,   and air attenuation in the gallbladder fossa and extending into the   subhepatic region on the right. Differential considerations include an   abscess or infected biloma. IMPRESSION:     S/P lap cholecystectomy  (for acute cholecystitis ) and ERCP   Leukocytosis -  12  K, mental status improved   3. GB fossa  ( CT scan ) post operative change ? Infection     RECOMMENDATIONS:       1. Stop meropenem   2. Oral omicef 300 mg po q 24 hrs and flagyl 500 mg po q 8 hrs X 1 week   3.  CT scan outpt basis

## 2022-10-03 NOTE — FLOWSHEET NOTE
10/03/22 0800   Vitals   BP (!) 110/51   Temp 97.9 °F (36.6 °C)   Temp Source Temporal   Heart Rate 71   Resp 16   SpO2 100 %   Weight 137 lb 12.6 oz (62.5 kg)   Peritoneal Dialysis Catheter Right lower abdomen   No Placement Date or Time found. Catheter Location: Right lower abdomen   Status Deaccessed   Site Condition Clean, dry, intact   Dressing Status Clean, dry & intact   Dressing Gauze   Date of Last Dressing Change 10/03/22   Dialysis Type Continuous cycling   Exit Site Condition not seen   Catheter Care Given Yes   Cycler   Verification of Prescription CCPD   Total Volume Programmed 07868 mL   Therapy Time (Hours:Minutes) 9:00   Fill Volume 2000 mL   Last Fill Volume 1500 mL   Dextrose Setting Same (Nonextraneal)   Number of Cycles 6   Bag Volume 5000 mL   Number of Bags Used 3   Dianeal Solution   (2.5% in 5000ml)   I Drain (mL) 757 mL   Ultrafiltration (UF) (mL) 987 mL   Average Dwell Time (Hours:Minutes) 1:12   Lost Dwell Time (Hours:Minutes) +0.57   CCPD complete. Disconnected aseptically from cycler and iodine capped catheter. Pt reports no discomfort overnight. No alarms. Effluent pink to light orange. Blood setting in bases of bags. MD aware.

## 2022-10-03 NOTE — PROGRESS NOTES
Comprehensive Nutrition Assessment    Type and Reason for Visit:  Reassess    Nutrition Recommendations/Plan:   Modify/increase ONS to Nepro TID to further optimize intake in the setting of moderate malnutrition and varied/suboptimal po intake. Recommend lifting renal diet restrictions and Start Carb Controlled 5 diet if phos WNL(LUIZA; last draw 9/29 WNL) & with consideration for ongoing hypokalemia. Malnutrition Assessment:  Malnutrition Status: Moderate malnutrition (09/22/22 1610)    Context:  Chronic Illness (poor intake PTA)     Findings of the 6 clinical characteristics of malnutrition:  Energy Intake:  75% or less estimated energy requirements for 1 month or longer  Weight Loss:  Unable to assess (wt flux d/t fluid shifts d/t ESRD/PD)     Body Fat Loss:  Mild body fat loss Orbital, Triceps   Muscle Mass Loss:  Mild muscle mass loss Temples (temporalis), Clavicles (pectoralis & deltoids), Hand (interosseous)  Fluid Accumulation:  No significant fluid accumulation     Strength:  Not Performed    Nutrition Assessment:    Pt remains nutritionally at risk w/ ongoing suboptimal/varied PO intake & moderate malnutrition. Admit w/ AMS, cholecystitis s/p lap milton w/ IOC. Noted choledocholithiasis s/p ERCP w/ stone removal. Noted RRT 9/28 for worsening mentation and twitching; Noted leukocytosis-per GS, may be reactive 2/2  to hemoperitoneum. Pt w/ ESRD on PD, hx DM2, TIA. Will provide updated diet/ONS recs and monitor. Nutrition Related Findings:    A&Ox4, active BS, hypokalemia, low Ca,hyponatremia,  hyperglycemia, Loss of appetite, +3 pitting/ +1 non-pitting edema, -I/Os 13 L, Wound Type: Multiple, Surgical Incision (x4 lap sites)       Current Nutrition Intake & Therapies:    Average Meal Intake: 1-25%, %, 51-75% (varied PO intake)  Average Supplements Intake: Unable to assess  ADULT DIET; Regular; Low Potassium (Less than 3000 mg/day);  Low Phosphorus (Less than 1000 mg)  ADULT ORAL NUTRITION SUPPLEMENT; Breakfast, Dinner; Renal Oral Supplement    Anthropometric Measures:  Height: 5' (152.4 cm)  Ideal Body Weight (IBW): 100 lbs (45 kg)    Admission Body Weight: 143 lb (64.9 kg) (9/19 actual)  Current Body Weight: 137 lb 12.6 oz (62.5 kg) (10/03 no method), 137.8 % IBW. Weight Source: Not Specified  Current BMI (kg/m2): 26.9  Usual Body Weight:  (LUIZA as pt w/ wt flux likely d/t fluid shifts r/t ESRD/PD)     Weight Adjustment For: No Adjustment                 BMI Categories: Overweight (BMI 25.0-29. 9)    Estimated Daily Nutrient Needs:  Energy Requirements Based On: Formula  Weight Used for Energy Requirements: Current  Energy (kcal/day): MSJ x 1.2 SF = 3688-0918  Weight Used for Protein Requirements: Ideal  Protein (g/day): 70-80 (1.5-1.8 gm/kg IBW)  Method Used for Fluid Requirements: Standard Renal  Fluid (ml/day): per renal management    Nutrition Diagnosis:   Moderate malnutrition, In context of chronic illness related to catabolic illness (ESRD) as evidenced by Criteria as identified in malnutrition assessment    Nutrition Interventions:   Food and/or Nutrient Delivery: Continue Oral Nutrition Supplement, Continue Current Diet  Nutrition Education/Counseling: Education not appropriate  Coordination of Nutrition Care: Continue to monitor while inpatient  Plan of Care discussed with: pt's son. Attempted to contact pt's nurse x3 but was unsuccessful- would like to know if pt can get assistance at meals times as this might promote oral intake.     Goals:  Previous Goal Met: Progressing toward Goal(s)  Goals: Meet at least 75% of estimated needs, PO intake 50% or greater, by next RD assessment  Specify Other Goals: >50 % of meals and ONS    Nutrition Monitoring and Evaluation:   Behavioral-Environmental Outcomes: None Identified  Food/Nutrient Intake Outcomes: Food and Nutrient Intake, Supplement Intake  Physical Signs/Symptoms Outcomes: Biochemical Data, GI Status, Fluid Status or Edema, Nutrition Focused Physical Findings, Skin, Weight    Discharge Planning:     Too soon to determine     Isaac Alex RD  Contact: ext [phone]

## 2022-10-03 NOTE — PROGRESS NOTES
NEPHROLOGY was messaged to notify of the potassium, and creatinine. Also messaged of possible discharge today.

## 2022-10-03 NOTE — PROGRESS NOTES
Subjective:    Patient resting in bed  Overall feels well, no acute complaints  Awake alert and conversant    Objective:    BP (!) 135/55   Pulse 61   Temp 97.3 °F (36.3 °C) (Oral)   Resp 16   Ht 5' (1.524 m)   Wt 137 lb 12.6 oz (62.5 kg)   SpO2 99%   BMI 26.91 kg/m²     In: -   Out: 3914   In: -   Out: 3914     General Appearance: awake and alert, conversant, markedly improved  Head/face: NCAT  Eyes:  No gross abnormalities. Lungs:  Normal expansion. Clear to auscultation. No rales, rhonchi, or wheezing. Heart:  Heart sounds are normal.  Regular rate and rhythm without murmur, gallop or rub. Abdomen:  Soft, tender, normal bowel sounds. No bruits, PD cath  Extremities: Extremities warm to touch, pink, with no edema. Neurologic:  Awake, oriented x 2, no focal deficits      Recent Labs     10/01/22  0430 10/02/22  0430 10/03/22  0426   WBC 16.9* 14.4* 12.6*   HGB 8.7* 8.0* 8.2*   HCT 27.3* 25.7* 26.3*    430 422         Recent Labs     10/01/22  0430 10/01/22  0708 10/02/22  0430 10/03/22  0426   *  --  128* 129*   K 2.7* 3.0* 3.3* 2.9*   CL 90*  --  93* 94*   CO2 23  --  22 22   BUN 23  --  19 18   CREATININE 4.0*  --  4.1* 4.2*   CALCIUM 8.6  --  8.2* 8.5*         Assessment:    Principal Problem:    Change in mental status  Active Problems:    Chronic kidney disease    Moderate protein-calorie malnutrition (HCC)  Resolved Problems:    * No resolved hospital problems.  *        PLAN:  Altered mental status d/t Sepsis   -WBC 12.6today, improving significantly   -Infectious disease consulted  -IV Zosyn was to be completed 9/26/2022, but worsening leukocytosis > switch to meropenem 500 mg IV q 24 hrs per ID  -EEG > A potential for generalized photosensitive epilepsy, will defer to neurology > started on Keppra 250 mg twice daily, neurology signed off  -blood cultures x 2 are negative   -PO Keppra switched to IV Keppra 250 mg BID   -Peritoneal body fluid culture NGTD  -RRT 9/28 for worsening mentation and twitching. BGL noted to be 63, 250 cc D10 administered. BGL improved to 104. D5 @ 75 ordered for 8 hours with recheck in 2hrs > . Pt more responsive and alert at the end of RRT     LFTs/acute cholecystitis-fluctuating  -s/p lap milton with IOC 9/21/2022  -Multiple filling defects in the CBD noted during IOC, advanced GI consulted for ERCP/stone removal sphincterotomy and sphincteroplasty-procedure completed on 9/23-which she tolerated well  -Lipitor restarted   -Continue to monitor labs, hepatic function test daily -improving  -CT ABD/Pelvis: Large complex mass extending into the subhepatic region on the right. Differential considerations include an abscess or infected biloma,  -General surgery does not feel this is a abscess and feels the worsening leukocytosis is secondary to hemoperitoneum, following restarting eliquis. -peritoneal fluid gram stain without organisms     ESRD on PD/hypokalemia  -Nephro following  -PD per nephro   -Per general surgery, okay to continue peritoneal dialysis-no need for temporary dialysis line  -Monitor labs  -K 3.4 > 3.1 > potassium down to 2.9, will require 2 times daily supplementation 40 mEq potassium-we will defer to renal service  -Hb 7.8 > 6.7 > 8.0/25.7 today     Severe protein calorie malnutrition  -Nutrition consult  -Start Vanilla Glucerna TID and jacinda BID   -Monitor oral intake during meals  -Advance diet as tolerated     Diabetes mellitus   -Continue to monitor blood glucose levels  -Better controlled with ISS increase to high dose        Medication for other comorbidities continue as appropriate dose adjustment as necessary.     DVT prophylaxis Eliquis (held)  PT OT  Discharge plan Likely discharge today once antibiotics finalized by infectious disease      Warden Chava MD

## 2022-10-03 NOTE — PLAN OF CARE
Problem: Skin/Tissue Integrity  Goal: Absence of new skin breakdown  Description: 1. Monitor for areas of redness and/or skin breakdown  2. Assess vascular access sites hourly  3. Every 4-6 hours minimum:  Change oxygen saturation probe site  4. Every 4-6 hours:  If on nasal continuous positive airway pressure, respiratory therapy assess nares and determine need for appliance change or resting period.   Outcome: Completed     Problem: Safety - Adult  Goal: Free from fall injury  Outcome: Completed     Problem: Chronic Conditions and Co-morbidities  Goal: Patient's chronic conditions and co-morbidity symptoms are monitored and maintained or improved  Outcome: Completed     Problem: Discharge Planning  Goal: Discharge to home or other facility with appropriate resources  Outcome: Completed     Problem: Discharge Planning  Goal: Discharge to home or other facility with appropriate resources  Outcome: Completed     Problem: Nutrition Deficit:  Goal: Optimize nutritional status  Outcome: Completed     Problem: Pain  Goal: Verbalizes/displays adequate comfort level or baseline comfort level  Outcome: Completed     Problem: ABCDS Injury Assessment  Goal: Absence of physical injury  Outcome: Completed

## 2022-10-03 NOTE — CARE COORDINATION
10/3 Update CM note. Labs today: Na-129, K-2.9 (physician aware). Protein-5.2 and Albumin-1.3. WBC continuing to trend down, 12.6 today. Pt remains on Merrem IV Q24H. ID stated possible discharge today according to last note. Keppra IV Q12H continues. [place] [facility] [place] Merrem, if this continues. Would need line placed prior to discharge as pt only has PIV at this time. Pt's son will transport cycler and supplies to facility once medically stable for discharge. [facility] will make arrangements with [place]. No pre-cert required. PAULA/Destination complete. 7000 started and will need completed when discharge order is placed. Ambulance form in soft chart and will need completed and signed on DAY of discharge. [address] with Deb Bush at [address] [facility]. She instructed CM to have pt's son Evelyn Loaiza to contact her at [phone] to arrange for delivery of cycler and supplies to facility. Spoke to Kenna [place] at [facility] she is in agreement for Evelyn Loaiza to bring supplies today. CM also notified by charge nurse that discharge is anticipated today. Await ID rounds to determine. Evelyn Loaiza (son) in agreement with above. [id number]  Discharge order noted. 7000 completed. Spoke with Rubén Gonzalez at [facility] transport arranged for 5:30 PM. COVID test ordered and given to bedside RN. Kenna Sanchez at [place] [facility] (son), and bedside RN notified of pickup time. Evelyn Loaiza is on way home from [facility]. and will be at facility with cycler and supplies. Confirmed with Deb Bush at [facility] that she spoke with son and he is aware of what to bring. Completed and signed ambulance form in soft chart.      SALLY MachucaN, RN  [facility] Case Management   Cell: [phone]

## 2022-10-14 ENCOUNTER — TRANSCRIBE ORDERS (OUTPATIENT)
Dept: ADMINISTRATIVE | Age: 82
End: 2022-10-14

## 2022-10-14 DIAGNOSIS — N18.6 END STAGE RENAL DISEASE (HCC): Primary | ICD-10-CM

## 2022-10-17 NOTE — DISCHARGE SUMMARY
[facility]   Discharge summary   Patient ID:  Lew Lowe  [id number]  80 y.o.  1940    Admit date: 9/16/2022    Discharge date and time: 10/3/22    Admission Diagnoses:   Patient Active Problem List   Diagnosis    Peritoneal dialysis catheter infection ([facility]    Sepsis ([facility]    SIRS (systemic inflammatory response syndrome) ([facility]    Type 2 diabetes mellitus with diabetic chronic kidney disease ([facility]    Osteoarthritis of right knee    Arthritis of knee, right    History of peritoneal dialysis    End stage renal disease (HCC)    Altered mental status    Acute delirium    Essential hypertension, benign    ESRD on peritoneal dialysis ([facility]    Type 2 diabetes mellitus, with long-term current use of insulin (HCC)    Primary hypertension    Hypomagnesemia    Hypokalemia    Obesity (BMI 30-39. 9)    TIA (transient ischemic attack)    Hyperlipidemia    AMS (altered mental status)    Diabetes mellitus (HCC)    Lactic acid acidosis    Leukocytosis    Change in mental status    Chronic kidney disease    Moderate protein-calorie malnutrition ([facility]       Discharge Diagnoses: AMS    Consults: cardiology, ID, GI, nephrology, general surgery, and vascular surgery    Procedures: lap milton, ERCP with stenting    Hospital Course:    PLAN:  Altered mental status d/t Sepsis   -WBC 12.6today, improving significantly   -Infectious disease consulted  -IV Zosyn was to be completed 9/26/2022, but worsening leukocytosis > switch to meropenem 500 mg IV q 24 hrs per ID  -EEG > A potential for generalized photosensitive epilepsy, will defer to neurology > started on Keppra 250 mg twice daily, neurology signed off  -blood cultures x 2 are negative   -PO Keppra switched to IV Keppra 250 mg BID   -Peritoneal body fluid culture NGTD  -RRT 9/28 for worsening mentation and twitching. BGL noted to be 63, 250 cc D10 administered. BGL improved to 104. D5 @ 75 ordered for 8 hours with recheck in 2hrs > .  Pt more responsive and alert at the end of RRT     LFTs/acute cholecystitis-fluctuating  -s/p lap milton with IOC 9/21/2022  -Multiple filling defects in the CBD noted during IOC, advanced GI consulted for ERCP/stone removal sphincterotomy and sphincteroplasty-procedure completed on 9/23-which she tolerated well  -Lipitor restarted   -Continue to monitor labs, hepatic function test daily -improving  -CT ABD/Pelvis: Large complex mass extending into the subhepatic region on the right. Differential considerations include an abscess or infected biloma,  -General surgery does not feel this is a abscess and feels the worsening leukocytosis is secondary to hemoperitoneum, following restarting eliquis. -peritoneal fluid gram stain without organisms      ESRD on PD/hypokalemia  -Nephro following  -PD per nephro   -Per general surgery, okay to continue peritoneal dialysis-no need for temporary dialysis line  -Monitor labs  -K 3.4 > 3.1 > potassium down to 2.9, will require 2 times daily supplementation 40 mEq potassium-we will defer to renal service  -Hb 7.8 > 6.7 > 8.0/25.7 today      Severe protein calorie malnutrition  -Nutrition consult  -Start Vanilla Glucerna TID and jacinda BID   -Monitor oral intake during meals  -Advance diet as tolerated     Diabetes mellitus   -Continue to monitor blood glucose levels  -Better controlled with ISS increase to high dose           No results for input(s): WBC, HGB, HCT, PLT in the last 72 hours. No results for input(s): NA, K, CL, CO2, BUN, CREATININE, GLU, CALCIUM in the last 72 hours. CT ABDOMEN PELVIS WO CONTRAST Additional Contrast? None    Result Date: 9/16/2022  EXAMINATION: CT OF THE ABDOMEN AND PELVIS WITHOUT CONTRAST 9/16/2022 3:10 pm TECHNIQUE: CT of the abdomen and pelvis was performed without the administration of intravenous contrast. Multiplanar reformatted images are provided for review.  Automated exposure control, iterative reconstruction, and/or weight based adjustment of the mA/kV was utilized to reduce the radiation dose to as low as reasonably achievable. COMPARISON: CT abdomen and pelvis without 07/20/2022 HISTORY: ORDERING SYSTEM PROVIDED HISTORY: ab pain, vomiting TECHNOLOGIST PROVIDED HISTORY: Reason for exam:->ab pain, vomiting Additional Contrast?->None Decision Support Exception - unselect if not a suspected or confirmed emergency medical condition->Emergency Medical Condition (MA) What reading provider will be dictating this exam?->CRC FINDINGS: Lower Chest:  Visualized portion of the lower chest demonstrates no acute abnormality. Organs: There is increased noncontrast attenuation of the liver. No focal hepatic lesion is noted. The gallbladder is significantly distended with gallstones and mild edematous wall thickening, which appears similar to the prior exam.  No biliary ductal dilatation is seen. GI/Bowel: Stomach and duodenal sweep demonstrate no acute abnormality. There is no evidence of bowel obstruction. No evidence of abnormal bowel wall thickening or distension. There is diverticulosis without evidence of diverticulitis. Pelvis:  Bladder is unremarkable in appearance. The uterus and adnexa are without acute abnormality. Peritoneal dialysis catheter is seen in the anterior pelvis, which is stable in position without evidence of kinking. Peritoneum/Retroperitoneum: A small-moderate volume of ascites is identified, which is less compared to the prior exam.  No free air is seen. No evidence of lymphadenopathy. Aorta is normal in caliber. Bones/Soft Tissues:  No acute abnormality of the visualized osseous structures. Markedly distended gallbladder with cholelithiasis and mild edematous wall thickening concerning for possible acute cholecystitis or gallbladder hydrops. The appearance is similar to the prior exam on 07/20/2022. Consider right upper quadrant ultrasound for further evaluation, if clinically indicated.  Small-moderate volume of ascites with stable peritoneal dialysis catheter seen. No evidence of bowel obstruction. CT HEAD WO CONTRAST    Result Date: 9/16/2022  EXAMINATION: CT OF THE HEAD WITHOUT CONTRAST  9/16/2022 1:03 pm TECHNIQUE: CT of the head was performed without the administration of intravenous contrast. Automated exposure control, iterative reconstruction, and/or weight based adjustment of the mA/kV was utilized to reduce the radiation dose to as low as reasonably achievable. COMPARISON: None. HISTORY: ORDERING SYSTEM PROVIDED HISTORY: ams TECHNOLOGIST PROVIDED HISTORY: Has a \"code stroke\" or \"stroke alert\" been called? ->No Reason for exam:->ams Decision Support Exception - unselect if not a suspected or confirmed emergency medical condition->Emergency Medical Condition (MA) What reading provider will be dictating this exam?->CRC FINDINGS: BRAIN/VENTRICLES: There is no acute intracranial hemorrhage, mass effect or midline shift. No abnormal extra-axial fluid collection. The gray-white differentiation is maintained without evidence of an acute infarct. There is no evidence of hydrocephalus. The ventricles, cisterns and sulci are prominent consistent with atrophy. There is decreased attenuation within the periventricular white matter consistent with periventricular leukomalacia. ORBITS: The visualized portion of the orbits demonstrate no acute abnormality. SINUSES: The visualized paranasal sinuses and mastoid air cells demonstrate no acute abnormality. SOFT TISSUES/SKULL:  No acute abnormality of the visualized skull or soft tissues. 1.  There is no acute intracranial abnormality. Specifically, there is no intracranial hemorrhage. 2. Atrophy and periventricular leukomalacia, .     US GALLBLADDER RUQ    Result Date: 9/16/2022  EXAMINATION: RIGHT UPPER QUADRANT ULTRASOUND 9/16/2022 8:27 pm COMPARISON: None.  HISTORY: ORDERING SYSTEM PROVIDED HISTORY: ab pain TECHNOLOGIST PROVIDED HISTORY: Reason for exam:->ab pain What reading provider will be dictating this exam?->CRC FINDINGS: LIVER:  The liver demonstrates normal echogenicity without evidence of intrahepatic biliary ductal dilatation. BILIARY SYSTEM:  Gallbladder distended with multiple echogenic foci demonstrating shadowing of cholelithiasis. No wall thickening or biliary dilatation. Common bile duct is within normal limits measuring 7 mm. RIGHT KIDNEY: The right kidney is grossly unremarkable without evidence of hydronephrosis. PANCREAS:  Visualized portions of the pancreas are unremarkable. OTHER: No evidence of right upper quadrant ascites. Distended gallbladder with numerous echogenic areas demonstrating shadowing of cholelithiasis and intermixed sludge however no wall thickening or pericholecystic fluid     XR CHEST PORTABLE    Result Date: 9/16/2022  EXAMINATION: ONE XRAY VIEW OF THE CHEST 9/16/2022 4:22 pm COMPARISON: 07/19/2022 HISTORY: ORDERING SYSTEM PROVIDED HISTORY: weakness, Possible Stroke TECHNOLOGIST PROVIDED HISTORY: Reason for exam:->weakness, Possible Stroke What reading provider will be dictating this exam?->CRC FINDINGS: Sternal wires are seen. EKG leads are seen superimposed over the chest. Poor inspiratory effort is seen that limits evaluation. The cardiomediastinal silhouette is slightly prominent in size. Prominence of the bronchovascular and interstitial lung markings is seen that demonstrates prominence in comparison to the prior study, increased hazziness is seen overlying the left hemithorax. Peribronchial cuffing and bilateral hilar prominence is seen. The right costophrenic angle is unremarkable, mild haziness with blunting of the left costophrenic angle is seen The lungs are without acute focal process. There is no pneumothorax. The osseous structures are without acute process. Increased opacification and bronchovascular prominence in comparison to the prior study.        Discharge Exam:    HEENT: NCAT,  PERRLA, No JVD  Heart:  RRR, no murmurs, gallops, or rubs. Lungs:  CTA bilaterally, no wheeze, rales or rhonchi  Abd: bowel sounds present, nontender, nondistended, no masses  Extrem:  No clubbing, cyanosis, or edema    [facility]: [facility]     Patient Condition at Discharge: Stable     Patient Instructions:      Medication List        START taking these medications      levETIRAcetam 500 MG tablet  Commonly known as: Keppra  Take 0.5 tablets by mouth 2 times daily     metoprolol tartrate 25 MG tablet  Commonly known as: LOPRESSOR  Take 0.5 tablets by mouth 2 times daily            CONTINUE taking these medications      amLODIPine 5 MG tablet  Commonly known as: NORVASC     apixaban 2.5 MG Tabs tablet  Commonly known as: ELIQUIS  Take 1 tablet by mouth 2 times daily     aspirin 81 MG chewable tablet  Take 1 tablet by mouth daily     atorvastatin 20 MG tablet  Commonly known as: LIPITOR  Take 1 tablet by mouth nightly     DIALYVITE 800 PO     DULoxetine 30 MG extended release capsule  Commonly known as: CYMBALTA     guaiFENesin 100 MG/5ML Soln oral solution  Commonly known as: ROBITUSSIN  Take 10 mLs by mouth every 6 hours as needed for Cough     insulin glargine 100 UNIT/ML injection vial  Commonly known as: LANTUS  Inject 10 Units into the skin in the morning and 10 Units before bedtime. isosorbide mononitrate 30 MG extended release tablet  Commonly known as: IMDUR     levothyroxine 50 MCG tablet  Commonly known as: SYNTHROID     omeprazole 20 MG delayed release capsule  Commonly known as: PRILOSEC     potassium chloride 20 MEQ extended release tablet  Commonly known as: KLOR-CON M     zinc sulfate 220 (50 Zn) MG capsule  Commonly known as: ZINCATE  Take 1 capsule by mouth in the morning for 2 doses.      ZYRTEC PO            ASK your doctor about these medications      cefdinir 300 MG capsule  Commonly known as: OMNICEF  Take 1 capsule by mouth daily for 7 days  Ask about: Should I take this medication?     metroNIDAZOLE 500 MG tablet  Commonly known as: FLAGYL  Take 1 tablet by mouth 3 times daily for 7 days  Ask about: Should I take this medication?     oxyCODONE-acetaminophen 5-325 MG per tablet  Commonly known as: Percocet  Take 1 tablet by mouth every 6 hours as needed for Pain for up to 7 days. Intended supply: 7 days. Take lowest dose possible to manage pain  Ask about: Should I take this medication? Where to Get Your Medications        These medications were sent to [place] [id number],  [address] [facility] [phone]  [address], [facility] [address]      Phone: [phone]   apixaban 2.5 MG Tabs tablet  oxyCODONE-acetaminophen 5-325 MG per tablet       You can get these medications from any pharmacy    Bring a paper prescription for each of these medications  cefdinir 300 MG capsule  metroNIDAZOLE 500 MG tablet       Information about where to get these medications is not yet available    Ask your nurse or doctor about these medications  levETIRAcetam 500 MG tablet  metoprolol tartrate 25 MG tablet       Activity: activity as tolerated  Diet: diabetic diet and renal diet    Pt has been advised to: Follow-up with Dejuan Shell MD in 1 week. Follow-up with consultants as recommended by them    Note that over 30 minutes was spent in preparing discharge papers, discussing discharge with patient, medication review, etc.    Signed:  KERRI Huff CNP  10/3/22    Above note edited to reflect my thoughts     I personally saw, examined and provided care for the patient. Radiographs, labs and medication list were reviewed by me independently. The case was discussed in detail and plans for care were established. Review of KELLEE Huff   , documentation was conducted and revisions were made as appropriate directly by me. I agree with the above documented exam, problem list, and plan of care.      Supriya Torrez MD  10/3/2022

## 2022-11-06 LAB — PHOSPHORUS: 3.6 MG/DL (ref 2.5–4.6)

## 2023-03-07 NOTE — CONSULTS
Comprehensive Nutrition Assessment    Type and Reason for Visit:  Initial, Consult (ONS/poor intake x 6 days)    Nutrition Recommendations/Plan:   Modified diet to 5 CHO to aid in pt's glucose management (hx of DM; hyperglycemia)  Start Vanilla Glucerna TID and franklin BID per discussion w/ pt + pt's son  Attempted to contact pt's nurse x3- would like to know if pt can receive assistance at meal times as this might promote oral intake  Will continue to monitor     Malnutrition Assessment:  Malnutrition Status: Moderate malnutrition (09/22/22 1610)    Context:  Chronic Illness (poor intake PTA)     Findings of the 6 clinical characteristics of malnutrition:  Energy Intake:  75% or less estimated energy requirements for 1 month or longer  Weight Loss:  Unable to assess (wt flux d/t fluid shifts d/t ESRD/PD)     Body Fat Loss:  Mild body fat loss Orbital, Triceps   Muscle Mass Loss:  Mild muscle mass loss Temples (temporalis), Clavicles (pectoralis & deltoids), Hand (interosseous)  Fluid Accumulation:  No significant fluid accumulation     Strength:  Not Performed    Nutrition Assessment:    pt adm d/t AMS; pt w/ ESRD on PD; PMhx of DM, CAD, AFIB, CABG (~2000); pt w/ cholecystitis now s/p cholecystectomy 9/21; pt w/ very poor intake during adm; spoke w/ pt and pt's son, he tells me it is hard to get pt to eat anything even PTA; Spoke w/ them about ONS- agreed to try Glucerna (vanilla) TID and Franklin BID; brought up the topic of tube feeds w/ son if pt does not increase intake & he wants her to recieve nutrition orally - attempted to get ahold of pt's nurse to see if pt can have assistance with feeding as this might help with intake; pt meets criteria for moderate malnutrition ; will start ONS and modify diet to 5 CHO to aid in glucose management as pt w/ hyperglycemia/hx of DM; will monitor.     Nutrition Related Findings:    mild muscle/fat wasting; -I/O; A&Ox4; hypoactive BS; ESRD on PD; +1 edema; hyponatremia; hyperglycemia; K WNL; no phos drawn today Wound Type: Surgical Incision (x4)       Current Nutrition Intake & Therapies:    Average Meal Intake: 0%, 1-25%  Average Supplements Intake: None Ordered  ADULT DIET; Regular; 5 carb choices (75 gm/meal); Low Fat (less than or equal to 50 gm/day)  ADULT ORAL NUTRITION SUPPLEMENT; Breakfast, Lunch, Dinner; Diabetic Oral Supplement  ADULT ORAL NUTRITION SUPPLEMENT; Breakfast, Lunch; Wound Healing Oral Supplement  Additional Calorie Sources:  peritoneal dialysis provides ~958 kcals. Anthropometric Measures:  Height: 5' (152.4 cm)  Ideal Body Weight (IBW): 100 lbs (45 kg)       Current Body Weight: 143 lb 4.8 oz (65 kg) (9/19-actual), 143.3 % IBW. Current BMI (kg/m2): 28  Usual Body Weight:  (LUIZA as pt w/ wt flux likely d/t fluid shifts r/t ESRD/PD)     Weight Adjustment For: No Adjustment                 BMI Categories: Overweight (BMI 25.0-29. 9)    Estimated Daily Nutrient Needs:  Energy Requirements Based On: Formula  Weight Used for Energy Requirements: Current  Energy (kcal/day): 7334-6249  Weight Used for Protein Requirements: Ideal  Protein (g/day): 1.8-2.0g/kgxIBW=85-90g (on PD ; w/ wounds)  Method Used for Fluid Requirements: Standard Renal  Fluid (ml/day): per renal management    Nutrition Diagnosis:   Moderate malnutrition, In context of chronic illness related to catabolic illness (ESRD) as evidenced by Criteria as identified in malnutrition assessment    Nutrition Interventions:   Food and/or Nutrient Delivery: Modify Current Diet, Start Oral Nutrition Supplement (modify diet to 5 CHO to aid in glucose management ; start vanilla glucerna TID (as long as K WNL) and jacinda BID)  Nutrition Education/Counseling: Education not appropriate  Coordination of Nutrition Care: Continue to monitor while inpatient  Plan of Care discussed with: pt's son.  Attempted to contact pt's nurse x3 but was unsuccessful- would like to know if pt can get assistance at meals times as this might promote oral intake. Goals:     Goals: other (specify)  Specify Other Goals: Consumes >20% meals/ONS. Nutrition Monitoring and Evaluation:   Behavioral-Environmental Outcomes: None Identified  Food/Nutrient Intake Outcomes: Food and Nutrient Intake, Supplement Intake  Physical Signs/Symptoms Outcomes: Biochemical Data, Nutrition Focused Physical Findings, Chewing or Swallowing, Skin, Weight, GI Status, Fluid Status or Edema, Hemodynamic Status    Discharge Planning:     Too soon to determine     Lilia Nathan RD  Contact: [phone] Headache Monitoring: I recommended monitoring the headaches for now. There is no evidence of increased intracranial pressure. They were instructed to call if the headaches are worsening. Completed Therapy?: No Retinoid Dermatitis Aggressive Treatment: I recommended more frequent application of Cetaphil or CeraVe to the areas of dermatitis. I also prescribed a topical steroid for twice daily use until the dermatitis resolves. Female Pregnancy Counseling Text: Female patients should also be on two forms of birth control while taking this medication and for one month after their last dose. Are Labs Available For Review?: Yes Myalgia Monitoring: I explained this is common when taking isotretinoin. If this worsens they will contact us. Use Therapeutic Ranged Or Therapeutic Target: please select Range or Target Myalgia Treatment: I explained this is common when taking isotretinoin. If this worsens they will contact us. They may try OTC ibuprofen. Cheilitis Normal Treatment: I recommended application of Vaseline or Aquaphor numerous times a day (as often as every hour) and before going to bed. Pounds Preamble Statement (Weight Entered In Details Tab): Reported Weight in pounds: Nosebleeds Normal Treatment: I explained this is common when taking isotretinoin. I recommended saline mist in each nostril multiple times a day. If this worsens they will contact us. Kilograms Preamble Statement (Weight Entered In Details Tab): Reported Weight in kilograms: Weight Units: pounds Lower Range (In Mg/Kg): 120 Cheilitis Aggressive Treatment: I recommended application of Vaseline or Aquaphor numerous times a day (as often as every hour) and before going to bed. I also prescribed a topical steroid for twice daily use. Female Completion Statement: After discussing her treatment course we decided to discontinue isotretinoin therapy at this time. I explained that she would need to continue her birth control methods for at least one month after the last dosage. She should also get a pregnancy test one month after the last dose. She shouldn't donate blood for one month after the last dose. She should call with any new symptoms of depression. Hypercholesterolemia Monitoring: I explained this is common when taking isotretinoin. We will monitor closely. Next Month's Dosage: 30mg BID Xerosis Normal Treatment: I recommended application of Cetaphil or CeraVe numerous times a day and before going to bed to all dry areas. Male Completion Statement: After discussing his treatment course we decided to discontinue isotretinoin therapy at this time. He shouldn't donate blood for one month after the last dose. He should call with any new symptoms of depression. Retinoid Dermatitis Normal Treatment: I recommended more frequent application of Cetaphil or CeraVe to the areas of dermatitis. Months Of Therapy Completed: 1 Upper Range (In Mg/Kg): 150 Xerosis Aggressive Treatment: I recommended application of Cetaphil or CeraVe numerous times a day and before going to bed to all dry areas. I also prescribed a topical steroid for twice daily use. Target Cumulative Dosage (In Mg/Kg): 135 Dosing Month 1 (Required For Cumulative Dosing): 40mg Daily Myalgia Treatment: I explained this is common when taking isotretinoin. If this worsens they will contact us. They may try OTC ibuprofen or tylenol Detail Level: Zone Xerosis Normal Treatment: I recommended application of Cetaphil or CeraVe numerous times a day going to bed to all dry areas. What Is The Patient's Gender: Female Counseling Text: I reviewed the side effect in detail. Patient should get monthly blood tests, not donate blood, not drive at night if vision affected, and not share medication. Xerosis Aggressive Treatment: I recommended application of Cetaphil or CeraVe numerous times a day going to bed to all dry areas. I also prescribed a topical steroid for twice daily use.

## 2023-04-28 NOTE — PROGRESS NOTES
Internal Medicine  Progress Note  Sabine Harper MD     Subjective:     Patient is drowsy and said she had a bad night - still struggles for words due to confusion. . Tolerating diet well no other c/o.     Scheduled Meds:   IV syringe builder   Other Once    allopurinol  150 mg Oral Daily    amLODIPine  2.5 mg Oral Daily    aspirin  81 mg Oral Daily    calcitRIOL  0.25 mcg Oral Once per day on Mon Tue Wed Thu Fri    vitamin D  2,000 Units Oral Daily    DULoxetine  20 mg Oral Daily    ezetimibe-simvastatin  1 tablet Oral Q72H    isosorbide mononitrate  30 mg Oral Daily    levothyroxine  50 mcg Oral Daily    losartan  100 mg Oral Daily    metoprolol succinate  50 mg Oral Daily    sevelamer  1,600 mg Oral TID    insulin glargine  20 Units Subcutaneous BID    ceFAZolin  500 mg Intravenous Q12H    docusate sodium  100 mg Oral Daily    famotidine  40 mg Oral Daily     Continuous Infusions:   sodium chloride 50 mL/hr at 10/31/18 1752     PRN Meds:ondansetron, HYDROcodone 5 mg - acetaminophen, morphine, morphine    Objective:      Physical Exam:  Vitals:   BP (!) 168/63   Pulse 82   Temp 98.1 °F (36.7 °C) (Oral)   Resp 20   Ht 4' 11\" (1.499 m)   Wt 174 lb 8 oz (79.2 kg)   SpO2 95%   BMI 35.24 kg/m²   I/O's    Intake/Output Summary (Last 24 hours) at 11/01/18 8631  Last data filed at 10/31/18 1537   Gross per 24 hour   Intake              500 ml   Output              150 ml   Net              350 ml     Exam:  General appearance: alert, appears stated age, cooperative, distracted and no distress  Head: Normocephalic, without obvious abnormality, atraumatic  Eyes: negative  Throat: lips, mucosa, and tongue normal; teeth and gums normal  Neck: no adenopathy, no carotid bruit, no JVD and supple, symmetrical, trachea midline  Back: negative  Lungs: clear to auscultation bilaterally  Chest wall: no tenderness  Heart: regular rate and rhythm  Abdomen: soft, non-tender; bowel sounds normal; no masses, without difficulty

## 2023-10-03 NOTE — PROGRESS NOTES
Comprehensive Nutrition Assessment    Type and Reason for Visit:  Initial, Positive Nutrition Screen    Nutrition Recommendations/Plan:   Continue current diet. Recommend and start Nepro ONS once daily to optimize intake and help meet increased nutrient needs d/t dialysis. Malnutrition Assessment:  Malnutrition Status:  Insufficient data (07/21/22 1034)    Context:  Chronic Illness     Findings of the 6 clinical characteristics of malnutrition:  Energy Intake:  Unable to assess  Weight Loss:  Unable to assess (LUIZA true weight hx or loss d/t possible fluid shifts / hx of ESRD on PD)     Body Fat Loss:  Unable to assess (Covid iso)     Muscle Mass Loss:  Unable to assess (covid iso)    Fluid Accumulation:  No significant fluid accumulation     Strength:  Not Performed    Nutrition Assessment:    Pt. admit w/ AMS and tested positive for COVID. Hx of ESRD on CCPD, CAD, HTN, DM. No intakes to assess at this time. Pt. is w/ increased nutrient needs d/t dialysis. Will start ONS and monitor. Nutrition Related Findings:    A&Ox4, +I/O, hyponatremia, Hyperglycemia, GI WDL, no edema Wound Type: None       Current Nutrition Intake & Therapies:    Average Meal Intake: Unable to assess  Average Supplements Intake: None Ordered  ADULT DIET; Regular; 4 carb choices (60 gm/meal); Low Fat/Low Chol/High Fiber/2 gm Na; No Added Salt (3-4 gm); Low Potassium (Less than 3000 mg/day); Low Phosphorus (Less than 1000 mg)    Anthropometric Measures:  Height: 4' 11\" (149.9 cm)  Ideal Body Weight (IBW): 95 lbs (43 kg)       Current Body Weight: 144 lb 1 oz (65.3 kg) (7/21), 151.6 % IBW. Weight Source: Bed Scale  Current BMI (kg/m2): 29.1  Usual Body Weight:  (LUIZA true weight hx or loss d/t possible fluid shifts / hx of ESRD on PD)     Weight Adjustment For: No Adjustment                 BMI Categories: Overweight (BMI 25.0-29. 9)    Estimated Daily Nutrient Needs:  Energy Requirements Based On: Formula  Weight Used for Energy Requirements: Current  Energy (kcal/day): 1200-1300kcal (MSJ x1. 2SF)  Weight Used for Protein Requirements: Ideal  Protein (g/day): 56-65g (1.3-1.5g/kgIBW) (as tolerated w/ ESRD)  Method Used for Fluid Requirements: Other (Comment)  Fluid (ml/day): per renal    Nutrition Diagnosis:   Inadequate oral intake related to cognitive or neurological impairment as evidenced by poor intake prior to admission, nausea, vomiting    Nutrition Interventions:   Food and/or Nutrient Delivery: Continue Current Diet, Start Oral Nutrition Supplement (Nepro once daily)  Nutrition Education/Counseling: No recommendation at this time  Coordination of Nutrition Care: Continue to monitor while inpatient       Goals:     Goals: PO intake 50% or greater, by next RD assessment       Nutrition Monitoring and Evaluation:   Behavioral-Environmental Outcomes: None Identified  Food/Nutrient Intake Outcomes: Food and Nutrient Intake, Supplement Intake  Physical Signs/Symptoms Outcomes: Biochemical Data, Diarrhea, GI Status, Nausea or Vomiting, Meal Time Behavior, Nutrition Focused Physical Findings, Weight, Skin    Discharge Planning:     Too soon to determine     Rosa Aguilar RD  Contact: ext [phone] PAST SURGICAL HISTORY:  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 Model # DRID, serial # [id number]    H/O left inguinal hernia repair     H/O: RCT (Rotator Cuff Tear) R s/p repair 14 yrs ago x2    History of abdominal aortic aneurysm repair     History of appendectomy     History of cataract surgery     History of CEA (carotid endarterectomy) Right (2011)    History of coronary angioplasty, last 5/2013 (4 DERECK placement)     Intrinsic (Urethral) Sphincter Deficiency, s/p  artificial urinary sphincter placement >10 yrs     s/p AAA (Abdominal Aortic Aneurysm) Repair 1997-- 3 months after CABG    S/P AVR AVR and MV repair via redo sternotomy    S/P AVR (aortic valve replacement)     S/P CABG x 4     S/P CABG x 4, 1997 1997 [facility]    Status post endoscopy, s/p ablation with cautery 9/25/13

## 2024-01-01 NOTE — PROGRESS NOTES
[facility] MD Carlito Wong MD Elray Harold, MD  Progress Note    9/27/2022    SUBJECTIVE:   9/21: Not in her room   She is getting cholcystectomy    9/22: Status post cholecystectomy yesterday without complication. Feeling better, ongoing fatigue and malaise, generalized weakness. Appetite is improved and she is looking forward to her dinner. Denies nausea or vomiting. Pain controlled. ROS otherwise unremarkable    9/23: Off the floor, and endoscopy for MRCP. Discussed care with nurse. Vital signs stable. CCPD without complication, though drainage this morning was blood-tinged. No clots and no fibrin. BP somewhat lower than yesterday. 9/24: Constipated. No dyspnea. Ongoing fatigue, malaise, generalized weakness. Ongoing anorexia. CCPD last evening without complication, effluent less blood-tinged. 9/25: Ongoing constipation. She believes her last bowel movement was at least 5 days ago. Quite uncomfortable, though no pain per se. Ongoing fatigue and generalized weakness. No complications CCPD last evening. Effluent clear. 9/26: Somnolent though arousable. Did not sleep well last night. No complications with CCPD. PD effluent has cleared. No nausea or vomiting. Ongoing abdominal discomfort though no pain. No headache. No dyspnea at rest on room air.    9/27: Somnolent, somewhat confused. Not sleeping well at night. Poor appetite and intake. CCPD at night without event. Effluent has been clear    PROBLEM LIST:    Principal Problem:    Change in mental status  Active Problems:    Chronic kidney disease    Moderate protein-calorie malnutrition ([address]  Resolved Problems:    * No resolved hospital problems.  *       DIET:    Diet NPO Exceptions are: Sips of Water with Meds       Allergies : Sulfa antibiotics    Past Medical History:   Diagnosis Date    Anemia     Arthritis     CAD (coronary artery disease)     Diabetes mellitus ([address]     Gout History of bleeding peptic ulcer approx     Hypertension     Peritoneal dialysis catheter in place [facility])     Peritoneal dialysis status ([address]     at night for 8 hours    Thyroid disease     Use of cane as ambulatory aid     Wears glasses        Past Surgical History:   Procedure Laterality Date    BACK SURGERY      CARDIOVASCULAR STRESS TEST      CARPAL TUNNEL RELEASE Bilateral     CATARACT REMOVAL WITH IMPLANT Bilateral      SECTION      CHOLECYSTECTOMY, LAPAROSCOPIC N/A 2022    CHOLECYSTECTOMY LAPAROSCOPIC with intraoperative cholangiogram performed by Kemar Silva MD at [address] [facility]  approx 2000    x 3; per Dr Brad Fuller, COLON, DIAGNOSTIC      ERCP N/A 2022    ERCP ENDOSCOPIC RETROGRADE CHOLANGIOPANCREATOGRAPHY performed by Trish Chang MD at [address] [facility]    ERCP N/A 2022    ERCP SPHINCTER/PAPILLOTOMY performed by Trish Chang MD at [address] [facility]    ERCP N/A 2022    ERCP STONE REMOVAL performed by Trish Chang MD at [address] [facility]    ERCP N/A 2022    ERCP DILATION BALLOON performed by Trish Chang MD at [address] [facility] [address]      left knee, right shoulder    SC TOTAL KNEE ARTHROPLASTY Right 10/31/2018    RIGHT KNEE TOTAL ARTHROPLASTY +++BRIDGER++ PNB++ performed by Lam Donahue MD at [address]         History reviewed. No pertinent family history. reports that she has never smoked. She has never used smokeless tobacco. She reports that she does not drink alcohol and does not use drugs. Review of Systems:   Constitutional: no fevers , no chills , feels ok   Eyes: no eye pain , no itching , no drainage  Ears, nose, mouth, throat, and face: no ear ,nose pain , hearing is ok ,no nasal drainage   Respiratory: no sob ,no cough ,no wheezing . Cardiovascular: no chest pain , no palpitation ,no sob . Gastrointestinal: no nausea, vomiting , constipation , no abdominal pain . Genitourinary:no urinary retention , no burning , dysuria . No polyuria   Hematologic/lymphatic: no bleeding , no cougulation issues . Musculoskeletal:no joint pain , no swelling . Neurological: no headaches ,no weakness , no numbness . Endocrine: no thirst , no weight issues . MEDS (scheduled):    meropenem  500 mg IntraVENous Q24H    nystatin  5 mL Oral 4x Daily    amLODIPine  2.5 mg Oral Daily    metoprolol tartrate  12.5 mg Oral BID    custom dialysis builder   IntraPERitoneal Once    sodium chloride flush  5-40 mL IntraVENous 2 times per day    insulin lispro  0-16 Units SubCUTAneous TID WC    insulin lispro  0-4 Units SubCUTAneous Nightly    levETIRAcetam  250 mg Oral BID    apixaban  2.5 mg Oral BID    aspirin  81 mg Oral Daily    atorvastatin  20 mg Oral Nightly    DULoxetine  30 mg Oral Daily    isosorbide mononitrate  30 mg Oral Daily    levothyroxine  50 mcg Oral Daily    docusate sodium  100 mg Oral Nightly    pantoprazole  40 mg Oral QAM AC       MEDS (infusions):   sodium chloride      sodium chloride      dextrose         MEDS (prn):  sodium chloride, polyethylene glycol, bisacodyl, sodium chloride flush, sodium chloride, oxyCODONE, acetaminophen, glucose, dextrose bolus **OR** dextrose bolus, glucagon (rDNA), dextrose, acetaminophen, trimethobenzamide    PHYSICAL EXAM:   Vital signs reviewed     Wt Readings from Last 3 Encounters:   09/25/22 139 lb 1.8 oz (63.1 kg)   07/27/22 143 lb 3.2 oz (65 kg)   04/29/22 158 lb 8.2 oz (71.9 kg)       Constitutional:  in no acute distress  Oral: mucus membranes moist  Neck: no JVD  Cardiovascular: S1, S2 regular rhythm, no murmur,or rub  Respiratory: Poor air entry bilateral no crackles, no wheeze  Gastrointestinal:  Soft, nontender, nondistended, NABS. CAPD catheter left lower quadrant  Ext: Scant dependent edema, feet warm  Skin: dry, no rash  Neuro: awake, alert, interactive. Disoriented clinical scenario      DATA:    Reviewed  Assessment    1. End-stage renal disease, on peritoneal dialysis. 2.  Encephalopathy, etiology unclear, metabolic versus ongoing infection. .  Markedly improved  3. Leukocytosis ? Ascedning cholangitis. Improved now status post cholecystectomy  4. Anemia due to ESRD  5. Hypomagnesemia. 6.  Mineral bone disease. 7.  Hyponatremia likely due to water overload, mild though, as well as hyperglycemia  8. Hypokalemia, due to poor intake, losses with PD. Supplemented, the repeat at 1 PM was hemolyzed. Next repeat 3.8 mmol/L     Recommendations  Continue CCPD this evening for solute and volume clearance.   increased the volume to 2000 cc per exchange   Continue to use all 2.5% dextrose solutions to increase water clearance  1000 units heparin added per bag to prevent clotting, adhesions given blood in her peritoneum  Dulcolax as needed  MiraLAX as needed  Colace  Supplement potassium again, p.o., 40 twice daily today  Follow labs     Electronically signed by Pallavi Nelson MD on 9/27/2022 [Anticipatory Guidance Given] : Anticipatory guidance addressed as per the history of present illness section [Hepatitis B In Hospital] : Hepatitis B administered while in the hospital

## 2024-09-25 NOTE — PROGRESS NOTES
Physician Progress Note      Elidia Starr  CSN #:                  [id number]  :                       1940  ADMIT DATE:       2022 11:18 AM  DISCH DATE:  RESPONDING  PROVIDER #:        Gayle Spann          QUERY TEXT:    Patient admitted with AMS and sepsis. Noted documentation of Moderate   protein-calorie malnutrition and Severe protein calorie malnutrition in IM   progress notes. If possible, please document in progress notes and discharge summary if you   are evaluating and /or treating any of the following: The medical record reflects the following:  Risk Factors: ESRD, sepsis, DM, AMS, acute cholecystitis  Clinical Indicators: Per  Nutrition assessment note: Moderate   malnutrition. Bhavya Gonzalez Bhavya Gonzalez Pt remains nutritionally at risk w/ ongoing poor PO intake &   moderate malnutrition. Admit w/ AMS, cholecystitis s/p lap milton w/ IOC. Noted   choledocholithiasis s/p ERCP w/ stone removal. Pt w/ ESRD on PD, hx DM2, TIA. Will provide updated diet/ONS recs and monitor. Treatment: Dietitian consult, ADULT ORAL NUTRITION SUPPLEMENT; AM Snack, PM   Snack; Renal Oral Supplement    ASPEN Criteria:    https://[email or web address] onlinelibrary. jones. com/doi/full/10.1177/844358801636087  5    Thank you,  Gretchen Pan, RN CDS  [phone]  Options provided:  -- Moderate Protein calorie malnutrition  -- Severe Protein calorie malnutrition  -- Other - I will add my own diagnosis  -- Disagree - Not applicable / Not valid  -- Disagree - Clinically unable to determine / Unknown  -- Refer to Clinical Documentation Reviewer    PROVIDER RESPONSE TEXT:    This patient has moderate protein calorie malnutrition.     Query created by: Diane Celestin on 2022 10:39 AM      Electronically signed by:  Gayle Spann 2022 1:35 PM Tranexamic Acid Counseling:  Patient advised of the small risk of bleeding problems with tranexamic acid. They were also instructed to call if they developed any nausea, vomiting or diarrhea. All of the patient's questions and concerns were addressed.

## 2024-09-26 NOTE — PROGRESS NOTES
GENERAL SURGERY  DAILY PROGRESS NOTE  9/24/2022    Subjective:  S/p ERCP 9/23 with stone removal and sphincterotomy and sphincteroplasty. Patient not feeling well this AM. Pain unchanged compared to yesterday. No n/v.     Objective:  BP (!) 119/53   Pulse 77   Temp 98.7 °F (37.1 °C) (Oral)   Resp 16   Ht 5' (1.524 m)   Wt 143 lb 4.8 oz (65 kg)   SpO2 98%   BMI 27.99 kg/m²     General appearance: alert, cooperative and in no acute distress. Eyes: grossly normal  Lungs: nonlabored breathing on 2 L nasal cannula  Heart: regular rate  Abdomen: soft, nondistended. Appropriately tender post op. Incisions clean/dry/intact with skin glue in place. Skin: No skin abnormalities  Neurologic: Alert and oriented x 3.  Grossly normal  Musculoskeletal: No clubbing cyanosis or edema    Assessment/Plan:  80 y.o. female history of ESRD on peritoneal dialysis, CAD, DM, A. fib on Eliquis currently admitted with altered mental status with acute cholecystitis and choledocholithiasis s/p Lap [place] with IOC 09/21 s/p ERCP with sphincterotomy and stone removal 9/23    - Continue Zosyn  - continue to trend LFTs  - ok to start renal diet   - restart Eliquis 9/25  - Okay to use PD catheter for dialysis from surgery perspective    Plan discussed with Dr. Millard Agent     Electronically signed by An Veras MD on 9/24/2022 at 8:28 AM Statement Selected

## (undated) DEVICE — 3M™ IOBAN™ 2 ANTIMICROBIAL INCISE DRAPE 6650EZ: Brand: IOBAN™ 2

## (undated) DEVICE — SYRINGE MED 10ML LUERLOCK TIP W/O SFTY DISP

## (undated) DEVICE — AGENT HEMSTAT W2XL4IN OXIDIZED REGENERATED CELOS ABSRB

## (undated) DEVICE — SYRINGE 20ML LL S/C 50

## (undated) DEVICE — GENERAL LAPAROSCOPY: Brand: MEDLINE INDUSTRIES, INC.

## (undated) DEVICE — ZIMMER® STERILE DISPOSABLE TOURNIQUET CUFF WITH PLC, DUAL PORT, SINGLE BLADDER, 30 IN. (76 CM)

## (undated) DEVICE — STRYKER PERFORMANCE SERIES SAGITTAL BLADE: Brand: STRYKER PERFORMANCE SERIES

## (undated) DEVICE — C-ARM: Brand: UNBRANDED

## (undated) DEVICE — GOWN,SIRUS,FABRNF,XL,20/CS: Brand: MEDLINE

## (undated) DEVICE — SOLUTION IV IRRIG WATER 1000ML POUR BRL 2F7114

## (undated) DEVICE — CANNULATING SPHINCTEROTOME: Brand: JAGTOME™ REVOLUTION RX

## (undated) DEVICE — GUIDEWIRE VASC L260CM TIP L5CM 0.25FR STR RND TIP TUNGSTEN

## (undated) DEVICE — LOOP LIG SUT SZ 0 L18IN ABSRB POLYDIOXANONE MFIL PDS II

## (undated) DEVICE — GARMENT,MEDLINE,DVT,INT,CALF,MED, GEN2: Brand: MEDLINE

## (undated) DEVICE — COVER HNDL LT DISP

## (undated) DEVICE — INSUFFLATION NEEDLE TO ESTABLISH PNEUMOPERITONEUM.: Brand: INSUFFLATION NEEDLE

## (undated) DEVICE — RETRIEVAL BALLOON CATHETER: Brand: EXTRACTOR™ PRO RX

## (undated) DEVICE — GLOVE RADIOLOGY 7.5 LTX ATTENUATION STRL

## (undated) DEVICE — DRAPE,REIN 53X77,STERILE: Brand: MEDLINE

## (undated) DEVICE — GOWN,SIRUS,FABRNF,L,20/CS: Brand: MEDLINE

## (undated) DEVICE — INFLATION DEVICE: Brand: ENCORE 26

## (undated) DEVICE — Z INACTIVE USE 2660664 SOLUTION IRRIG 3000ML 0.9% SOD CHL USP UROMATIC PLAS CONT

## (undated) DEVICE — MEDIA CONTRAST RX ISOVUE-300 61% 30ML VIALS

## (undated) DEVICE — PICO SINGLE USE NEGATIVE PRESSURE WOUND THERAPY SYSTEM 10CM X 30CM 4IN. X 12IN.: Brand: PICO

## (undated) DEVICE — ADHESIVE SKIN CLSR 0.7ML TOP DERMBND ADV

## (undated) DEVICE — SYRINGE MED 20ML STD CLR PLAS LUERLOCK TIP N CTRL DISP

## (undated) DEVICE — STAY.SAFE® CAP: Brand: STAY.SAFE®

## (undated) DEVICE — SYSTEM BX CAP BILI RAP EXCHG CAP LOK DEV COMPATIBLE W/ OLY

## (undated) DEVICE — 4-PORT MANIFOLD: Brand: NEPTUNE 2

## (undated) DEVICE — BASIC SINGLE BASIN 1-LF: Brand: MEDLINE INDUSTRIES, INC.

## (undated) DEVICE — WIPE,PROTECTANT,SKIN,SUREPREP: Brand: MEDLINE

## (undated) DEVICE — TUBING, SUCTION, 9/32" X 10', STRAIGHT: Brand: MEDLINE

## (undated) DEVICE — SOLUTION IRRIG 3000ML 0.9% SOD CHL USP UROMATIC PLAS CONT

## (undated) DEVICE — 3M™ STERI-DRAPE™ U-DRAPE 1015: Brand: STERI-DRAPE™

## (undated) DEVICE — GLOVE ORANGE PI 7 1/2   MSG9075

## (undated) DEVICE — SYRINGE MED 50ML LUERLOCK TIP

## (undated) DEVICE — PACK PROCEDURE SURG GEN CUST

## (undated) DEVICE — DOUBLE BASIN SET: Brand: MEDLINE INDUSTRIES, INC.

## (undated) DEVICE — BITEBLOCK 54FR W/ DENT RIM BLOX

## (undated) DEVICE — TOTAL KNEE PK

## (undated) DEVICE — KIT PLT RATIO DISPNS KT 2IN CANN TIP SPRY TIP DISP MAGELLAN

## (undated) DEVICE — STANDARD HYPODERMIC NEEDLE,POLYPROPYLENE HUB: Brand: MONOJECT

## (undated) DEVICE — PADDING,UNDERCAST,COTTON, 4"X4YD STERILE: Brand: MEDLINE

## (undated) DEVICE — SPONGE LAP W18XL18IN WHT COT 4 PLY FLD STRUNG RADPQ DISP ST

## (undated) DEVICE — STANDARD HYPODERMIC NEEDLE,ALUMINUM HUB: Brand: MONOJECT

## (undated) DEVICE — SCISSORS ENDOSCP DIA5MM CRV MPLR CAUT W/ RATCH HNDL

## (undated) DEVICE — APPLIER CLP M L L11.4IN DIA10MM ENDOSCP ROT MULT FOR LIG

## (undated) DEVICE — PUMP SUC IRR TBNG L10FT W/ HNDPC ASSEMB STRYKEFLOW 2

## (undated) DEVICE — GAUZE,SPONGE,4"X4",8PLY,STRL,LF,10/TRAY: Brand: MEDLINE

## (undated) DEVICE — CANNULA NSL ORAL AD FOR CAPNOFLEX CO2 O2 AIRLFE

## (undated) DEVICE — Device

## (undated) DEVICE — ELECTRODE PT RET AD L9FT HI MOIST COND ADH HYDRGEL CORDED

## (undated) DEVICE — PATIENT RETURN ELECTRODE, SINGLE-USE, CONTACT QUALITY MONITORING, ADULT, WITH 9FT CORD, FOR PATIENTS WEIGING OVER 33LBS. (15KG): Brand: MEGADYNE

## (undated) DEVICE — MEDIA CONTRAST ISOVUE  300 10X50ML

## (undated) DEVICE — BILIARY BALLOON DILATATION CATHETER: Brand: CRE™ RX

## (undated) DEVICE — STAY.SAFE® CATHETER EXTENSION SET WITH LUER-LOCK, 12 INCH: Brand: STAY.SAFE®

## (undated) DEVICE — BANDAGE COMPR W6INXL12FT SMOOTH FOR LIMB EXSANG ESMARCH

## (undated) DEVICE — TROCAR: Brand: KII FIOS FIRST ENTRY

## (undated) DEVICE — AGENT HEMSTAT W4XL4IN OXIDIZED REGENERATED CELOS STRUCTURED

## (undated) DEVICE — TROCAR: Brand: KII® SLEEVE

## (undated) DEVICE — SINGLE HOSE WITH CPC CONNECTORS: Brand: A.T.S.®

## (undated) DEVICE — DEFENDO AIR WATER SUCTION AND BIOPSY VALVE KIT FOR  OLYMPUS: Brand: DEFENDO AIR/WATER/SUCTION AND BIOPSY VALVE

## (undated) DEVICE — THE KUMAR CATHETER®, USED IN CONJUNCTION WITH KUMAR CHOLANGIOGRAPHY® CLAMP, IS MEANT TO PROVIDE A MEANS OF LAPAROSCOPIC CHOLANGIOGRAPHY. IT COMPRISES A TRANSLUCENT TUBING ( 76 CM. LENGTH AND 16 GA. ) THAT CARRIES A 19 GA., 1.25 CM LONG NEEDLE AT THE END. THE KUMAR CATHETER® IS USED TO PUNCTURE THE HARTMANN'S POUCH OF THE GALLBLADDER FOR BILIARY ACCESS AND / OR ASPIRATION. PRODUCT IS LATEX FREE.: Brand: KUMAR CATHETER®

## (undated) DEVICE — TAPE ADH W3INXL10YD WHT COT WVN BK POWERFUL RUB BASE HIGHLY

## (undated) DEVICE — NEEDLE HYPO 18GA L1.5IN PNK POLYPR HUB S STL THN WALL FILL

## (undated) DEVICE — TROCAR: Brand: KII SHIELDED BLADED ACCESS SYSTEM

## (undated) DEVICE — TUBE IRRIG HNDPC HI FLO TP INTRPULS W/SUCTION TUBE

## (undated) DEVICE — TISSUE RETRIEVAL SYSTEM: Brand: INZII RETRIEVAL SYSTEM

## (undated) DEVICE — BOWL AND CEMENT CARTRIDGE WITH BREAKAWAY FEMORAL NOZZLE: Brand: ACM

## (undated) DEVICE — SYSTEM INJ BILI RAP REFIL CONT

## (undated) DEVICE — TOWEL,OR,DSP,ST,BLUE,STD,6/PK,12PK/CS: Brand: MEDLINE

## (undated) DEVICE — SPONGE GZ W4XL4IN RAYON POLY FILL CVR W/ NONWOVEN FAB

## (undated) DEVICE — BANDAGE COMPR W6INXL10YD ST M E WHITE/BEIGE

## (undated) DEVICE — SUTURE STRATAFIX SYMMETRIC SZ 1 L18IN ABSRB VLT CT1 L36CM SXPP1A404